# Patient Record
Sex: FEMALE | Race: BLACK OR AFRICAN AMERICAN | Employment: UNEMPLOYED | URBAN - METROPOLITAN AREA
[De-identification: names, ages, dates, MRNs, and addresses within clinical notes are randomized per-mention and may not be internally consistent; named-entity substitution may affect disease eponyms.]

---

## 2017-03-27 ENCOUNTER — HOSPITAL ENCOUNTER (OUTPATIENT)
Dept: PREADMISSION TESTING | Age: 57
Discharge: HOME | End: 2017-03-27
Payer: MEDICAID

## 2017-03-27 ENCOUNTER — HOSPITAL ENCOUNTER (OUTPATIENT)
Dept: GENERAL RADIOLOGY | Age: 57
Discharge: HOME | End: 2017-03-27
Attending: ORTHOPAEDIC SURGERY
Payer: MEDICAID

## 2017-03-27 VITALS
BODY MASS INDEX: 37.38 KG/M2 | HEIGHT: 63 IN | OXYGEN SATURATION: 97 % | WEIGHT: 210.98 LBS | SYSTOLIC BLOOD PRESSURE: 126 MMHG | HEART RATE: 73 BPM | DIASTOLIC BLOOD PRESSURE: 79 MMHG | TEMPERATURE: 98.4 F | RESPIRATION RATE: 16 BRPM

## 2017-03-27 LAB
25(OH)D3 SERPL-MCNC: 9.1 NG/ML (ref 30–100)
ABO + RH BLD: NORMAL
ALBUMIN SERPL BCP-MCNC: 3.6 G/DL (ref 3.5–5)
ALBUMIN/GLOB SERPL: 1.1 {RATIO} (ref 1.1–2.2)
ALP SERPL-CCNC: 58 U/L (ref 45–117)
ALT SERPL-CCNC: 32 U/L (ref 12–78)
ANION GAP BLD CALC-SCNC: 11 MMOL/L (ref 5–15)
APPEARANCE UR: CLEAR
AST SERPL W P-5'-P-CCNC: 16 U/L (ref 15–37)
BACTERIA URNS QL MICRO: NEGATIVE /HPF
BILIRUB SERPL-MCNC: 0.3 MG/DL (ref 0.2–1)
BILIRUB UR QL: NEGATIVE
BLOOD GROUP ANTIBODIES SERPL: NORMAL
BUN SERPL-MCNC: 10 MG/DL (ref 6–20)
BUN/CREAT SERPL: 13 (ref 12–20)
CALCIUM SERPL-MCNC: 8.7 MG/DL (ref 8.5–10.1)
CHLORIDE SERPL-SCNC: 108 MMOL/L (ref 97–108)
CO2 SERPL-SCNC: 26 MMOL/L (ref 21–32)
COLOR UR: NORMAL
CREAT SERPL-MCNC: 0.76 MG/DL (ref 0.55–1.02)
EPITH CASTS URNS QL MICRO: NORMAL /LPF
ERYTHROCYTE [DISTWIDTH] IN BLOOD BY AUTOMATED COUNT: 14.9 % (ref 11.5–14.5)
EST. AVERAGE GLUCOSE BLD GHB EST-MCNC: 123 MG/DL
GLOBULIN SER CALC-MCNC: 3.4 G/DL (ref 2–4)
GLUCOSE SERPL-MCNC: 78 MG/DL (ref 65–100)
GLUCOSE UR STRIP.AUTO-MCNC: NEGATIVE MG/DL
HBA1C MFR BLD: 5.9 % (ref 4.2–6.3)
HCT VFR BLD AUTO: 35.1 % (ref 35–47)
HGB BLD-MCNC: 11.5 G/DL (ref 11.5–16)
HGB UR QL STRIP: NEGATIVE
HYALINE CASTS URNS QL MICRO: NORMAL /LPF (ref 0–5)
INR PPP: 1 (ref 0.9–1.1)
KETONES UR QL STRIP.AUTO: NEGATIVE MG/DL
LEUKOCYTE ESTERASE UR QL STRIP.AUTO: NEGATIVE
MCH RBC QN AUTO: 29 PG (ref 26–34)
MCHC RBC AUTO-ENTMCNC: 32.8 G/DL (ref 30–36.5)
MCV RBC AUTO: 88.4 FL (ref 80–99)
NITRITE UR QL STRIP.AUTO: NEGATIVE
PH UR STRIP: 7 [PH] (ref 5–8)
PLATELET # BLD AUTO: 271 K/UL (ref 150–400)
POTASSIUM SERPL-SCNC: 3.9 MMOL/L (ref 3.5–5.1)
PREALB SERPL-MCNC: 26.2 MG/DL (ref 20–40)
PROT SERPL-MCNC: 7 G/DL (ref 6.4–8.2)
PROT UR STRIP-MCNC: NEGATIVE MG/DL
PROTHROMBIN TIME: 10.1 SEC (ref 9–11.1)
RBC # BLD AUTO: 3.97 M/UL (ref 3.8–5.2)
RBC #/AREA URNS HPF: NORMAL /HPF (ref 0–5)
SODIUM SERPL-SCNC: 145 MMOL/L (ref 136–145)
SP GR UR REFRACTOMETRY: 1.02 (ref 1–1.03)
SPECIMEN EXP DATE BLD: NORMAL
UA: UC IF INDICATED,UAUC: NORMAL
UROBILINOGEN UR QL STRIP.AUTO: 1 EU/DL (ref 0.2–1)
WBC # BLD AUTO: 6.2 K/UL (ref 3.6–11)
WBC URNS QL MICRO: NORMAL /HPF (ref 0–4)

## 2017-03-27 PROCEDURE — 84134 ASSAY OF PREALBUMIN: CPT | Performed by: ORTHOPAEDIC SURGERY

## 2017-03-27 PROCEDURE — 86900 BLOOD TYPING SEROLOGIC ABO: CPT | Performed by: ORTHOPAEDIC SURGERY

## 2017-03-27 PROCEDURE — 93005 ELECTROCARDIOGRAM TRACING: CPT

## 2017-03-27 PROCEDURE — 36415 COLL VENOUS BLD VENIPUNCTURE: CPT | Performed by: ORTHOPAEDIC SURGERY

## 2017-03-27 PROCEDURE — 80053 COMPREHEN METABOLIC PANEL: CPT | Performed by: ORTHOPAEDIC SURGERY

## 2017-03-27 PROCEDURE — 85610 PROTHROMBIN TIME: CPT | Performed by: ORTHOPAEDIC SURGERY

## 2017-03-27 PROCEDURE — 85027 COMPLETE CBC AUTOMATED: CPT | Performed by: ORTHOPAEDIC SURGERY

## 2017-03-27 PROCEDURE — 83036 HEMOGLOBIN GLYCOSYLATED A1C: CPT | Performed by: ORTHOPAEDIC SURGERY

## 2017-03-27 PROCEDURE — 82306 VITAMIN D 25 HYDROXY: CPT | Performed by: ORTHOPAEDIC SURGERY

## 2017-03-27 PROCEDURE — 81001 URINALYSIS AUTO W/SCOPE: CPT | Performed by: ORTHOPAEDIC SURGERY

## 2017-03-27 PROCEDURE — 71020 XR CHEST PA LAT: CPT

## 2017-03-27 RX ORDER — SODIUM CHLORIDE, SODIUM LACTATE, POTASSIUM CHLORIDE, CALCIUM CHLORIDE 600; 310; 30; 20 MG/100ML; MG/100ML; MG/100ML; MG/100ML
25 INJECTION, SOLUTION INTRAVENOUS CONTINUOUS
Status: CANCELLED | OUTPATIENT
Start: 2017-03-27

## 2017-03-27 RX ORDER — VANCOMYCIN/0.9 % SOD CHLORIDE 1.5G/250ML
1500 PLASTIC BAG, INJECTION (ML) INTRAVENOUS ONCE
Status: CANCELLED | OUTPATIENT
Start: 2017-03-27 | End: 2017-03-27

## 2017-03-27 RX ORDER — PREGABALIN 150 MG/1
150 CAPSULE ORAL ONCE
Status: CANCELLED | OUTPATIENT
Start: 2017-03-27 | End: 2017-03-28

## 2017-03-27 RX ORDER — LEVOFLOXACIN 5 MG/ML
500 INJECTION, SOLUTION INTRAVENOUS ONCE
Status: CANCELLED | OUTPATIENT
Start: 2017-03-27 | End: 2017-03-27

## 2017-03-27 RX ORDER — ACETAMINOPHEN 500 MG
1000 TABLET ORAL ONCE
Status: CANCELLED | OUTPATIENT
Start: 2017-03-27 | End: 2017-03-28

## 2017-03-27 NOTE — PERIOP NOTES
[facility]  Preoperative Instructions        Surgery Date 4/4/17          Time of Arrival 9:00am    1. On the day of your surgery, please report to the Surgical Services Registration Desk and sign in at your designated time. The Surgery Center is located to the right of the Emergency Room. 2. You must have someone with you to drive you home. You should not drive a car for 24 hours following surgery. Please make arrangements for a friend or family member to stay with you for the first 24 hours after your surgery. 3. Do not have anything to eat or drink (including water, gum, mints, coffee, juice) after midnight 4/3/17              . This may not apply to medications prescribed by your physician. Please note special instructions, if applicable. If you are currently taking Plavix, Coumadin, or other blood-thinning agents, contact your surgeon for instructions. 4. We recommend you do not drink any alcoholic beverages for 24 hours before and after your surgery. 5. Have a list of all current medications, including vitamins, herbal supplements and any other over the counter medications. Stop all Aspirin and non-steroidal anti-inflammatory drugs (I.e. Advil, Aleve), as directed by your surgeon's office. Stop all vitamins and herbal supplements seven days prior to your surgery. 6. Wear comfortable clothes. Wear glasses instead of contacts. Do not bring any money or jewelry. Please bring picture ID, insurance card, and any prearranged co-payment or hospital payment. Do not wear make-up, particularly mascara the morning of your surgery. Do not wear nail polish, particularly if you are having foot /hand surgery. Wear your hair loose or down, no ponytails, buns, anaya pins or clips. All body piercings must be removed.   Please shower with antibacterial soap for three consecutive days before and on the morning of surgery, but do not apply any lotions, powders or deodorants after the shower on the day of surgery. Please use a fresh towels after each shower. Please sleep in clean clothes and change bed linens the night before surgery. Please do not shave for 48 hours prior to surgery. Shaving of the face is acceptable. 7. You should understand that if you do not follow these instructions your surgery may be cancelled. If your physical condition changes (I.e. fever, cold or flu) please contact your surgeon as soon as possible. 8. It is important that you be on time. If a situation occurs where you may be late, please call [phone] (OR Holding Area). 9. If you have any questions and or problems, please call [phone] (Pre-admission Testing). 10. Your surgery time may be subject to change. You will receive a phone call the evening prior if your time changes. 11.  If having outpatient surgery, you must have someone to drive you here, stay with you during the duration of your stay, and to drive you home at time of discharge. Special Instructions:    MEDICATIONS TO TAKE THE MORNING OF SURGERY WITH A SIP OF WATER: Gabapentin, Meclizine, Zofran      I understand a pre-operative phone call will be made to verify my surgery time. In the event that I am not available, I give permission for a message to be left on my answering service and/or with another person?  Yes [phone]         ___________________      __________   _________    (Signature of Patient)             (Witness)                (Date and Time)

## 2017-03-27 NOTE — PERIOP NOTES
Preventing Infections Before - and After - Your Surgery  IMPORTANT INSTRUCTIONS    Please read and follow these instructions carefully. Every Night for Three (3) nights before your surgery:  1. Shower with an antibacterial soap, such as Dial, or the soap provided at your preassessment appointment. A shower is better than a bath for cleaning your skin. 2. If needed, ask someone to help you reach all areas of your body. Dont forget to clean your belly button with every shower. The night before your surgery:  1. On the night before your surgery, shower with an antibacterial soap, such as Dial, or the soap provided at your preassessment appointment. 2. With one packet of Hibiclens in hand, turn water off.  3. Apply Hibiclens antiseptic skin cleanser with a clean, freshly washed washcloth. ? Gently apply to your body from chin to toes (except the genital area) and especially the area(s) where your incision(s) will be. ? Leave Hibiclens on your skin for at least 20 seconds. CAUTION: If needed, Hibiclens may be used to clean the folds of skin of the legs (such as in the area of the groin) and on your buttocks and hips. However, do not use Hibiclens above the neck or in the genital area (your bottom) or put inside any area of your body. 4. Turn the water back on and rinse. 5. Dry gently with a clean, freshly washed towel. 6. After your shower, do not use any powder, deodorant, perfumes or lotion. 7. Use clean, freshly washed towels and washcloths every time you shower. 8. Wear clean, freshly washed pajamas to bed the night before surgery. 9. Sleep on clean, freshly washed sheets. 10. Do not allow pets to sleep in your bed with you. The Morning of your surgery:  1. Shower again thoroughly with an antibacterial soap, such as Dial or the soap provided at your preassessment appointment. If needed, ask someone for help to reach all areas of your body. Dont forget to clean your belly button! Rinse.   2. Dry gently with a clean, freshly washed towel. 3. After your shower, do not use any powder, deodorant, perfumes or lotion prior to surgery. 4. Put on clean, freshly washed clothing. Tips to help prevent infections after your surgery:  1. Protect your surgical wound from germs:  ? Hand washing is the most important thing you and your caregivers can do to prevent infections. ? Keep your bandage clean and dry! ? Do not touch your surgical wound. 2. Use clean, freshly washed towels and washcloths every time you shower; do not share bath linens with others. 3. Until your surgical wound is healed, wear clothing and sleep on bed linens each day that are clean and freshly washed. 4. Do not allow pets to sleep in your bed with you or touch your surgical wound. 5. Do not smoke - smoking delays wound healing. This may be a good time to stop smoking. 6. If you have diabetes, it is important for you to manage your blood sugar levels properly before your surgery as well as after your surgery. Poorly managed blood sugar levels slow down wound healing and prevent you from healing completely.

## 2017-03-28 LAB
ATRIAL RATE: 63 BPM
BACTERIA SPEC CULT: NORMAL
BACTERIA SPEC CULT: NORMAL
CALCULATED P AXIS, ECG09: 55 DEGREES
CALCULATED R AXIS, ECG10: 11 DEGREES
CALCULATED T AXIS, ECG11: 26 DEGREES
DIAGNOSIS, 93000: NORMAL
P-R INTERVAL, ECG05: 168 MS
Q-T INTERVAL, ECG07: 394 MS
QRS DURATION, ECG06: 74 MS
QTC CALCULATION (BEZET), ECG08: 403 MS
SERVICE CMNT-IMP: NORMAL
VENTRICULAR RATE, ECG03: 63 BPM

## 2017-03-29 RX ORDER — CHOLECALCIFEROL (VITAMIN D3) 125 MCG
1 CAPSULE ORAL DAILY
COMMUNITY
End: 2021-01-06

## 2017-03-29 NOTE — PERIOP NOTES
Called 's office for clearance note and vitamin d follow up. Spoke to Ebony. Patient treated with vitamin d 2000 daily.

## 2017-03-31 NOTE — H&P
HISTORY AND PHYSICAL    Subjective:     Chief Complaint is neck and RUE pain . HISTORY OF PRESENT ILLNESS:  Ms. Betzaida Milner is a pleasant 61year-old female who comes in today complaining of pain in the neck radiating into the right shoulder with tingling in the right hand . The pain has been present for 6 to 12 months and she has been seeing Dr. Amanda Jarvis for pain management. Since having a right C6 and right C7 VIV with Dr. Amanda Jarvis she noticed a slight decrease in pain in her right arm, but she still has tingling and weakness with  strength. She drops things frequently due to hand weakness and loss of dexterity. The pain seems to be returning in the right arm . The pain is deep, sharp, achy and stabbing in quality. She describes a painful, itching sensation in the right posterior shoulder and diffuse tingling in the right hand and all digits. Pain occurs constantly . Pain is worse with lying down and lifting ; pain is better with nothing . Pain is rated 6 out of 10 on the VAS. Patient has tried PT, NSAIDs, gabapentin and oral steroids in addition to pain management to relieve the pain. Patient Active Problem List    Diagnosis Date Noted    Chronic low back pain 2014    Depression 2014    Tobacco abuse 2014     Past Medical History:   Diagnosis Date    Adverse effect of anesthesia     hard to wake up    Chronic pain     Depression     GERD (gastroesophageal reflux disease)     Low back pain     Thromboembolus (HCC)     blood clot in foot during pregnancy    Tobacco abuse     Vertigo       Past Surgical History:   Procedure Laterality Date    HX  SECTION      x3    HX HYSTERECTOMY      HX ORTHOPAEDIC  2002    2 disks removed lower back [facility].      Prior to Admission medications    Medication Sig Start Date End Date Taking? Authorizing Provider   cholecalciferol, vitamin D3, (VITAMIN D3) 2,000 unit tab Take 1 Tab by mouth daily.     Historical Provider ondansetron (ZOFRAN ODT) 4 mg disintegrating tablet Take 1 Tab by mouth every eight (8) hours as needed for Nausea. 5/11/16   Martín Castellanos MD   naproxen (NAPROSYN) 375 mg tablet Take 1 Tab by mouth two (2) times daily as needed. 5/11/16   Martín Castellanos MD   amitriptyline (ELAVIL) 25 mg tablet Take 1 Tab by mouth nightly. 4/13/16   Martín Castellanos MD   gabapentin (NEURONTIN) 400 mg capsule Take 1 Cap by mouth three (3) times daily. 4/13/16   Martín Castellanos MD   meclizine (ANTIVERT) 25 mg tablet Take 1 Tab by mouth three (3) times daily as needed for Dizziness. 1/13/16   Martín Castellanos MD     Allergies   Allergen Reactions    Pcn [Penicillins] Hives      Social History   Substance Use Topics    Smoking status: Former Smoker     Packs/day: 0.25     Years: 4.00     Quit date: 6/4/2015    Smokeless tobacco: Never Used    Alcohol use No      Family History   Problem Relation Age of Onset    Hypertension Mother     Cancer Mother      multiple myeloma    Cancer Father      throat      Review of Systems  No bowel or bladder incontinence. No fevers or chills. No saddle anesthesia. Objective:     No data found. General Appearance:  - Well developed patient appropriate for age.   Orientation:  - Alert, active and oriented x3  Mood and Affect:  - Appropriate  Cardiovascular:  - No edema   Skin:  - No lesions on the neck  - No lesions on the right upper extremity  - No lesions on the left upper extremity  Gait:  - Normal  Balance:  - stable, mild difficulty with tandem gait  Neck:  - No tenderness to palpation   - Full range of motion  Right upper extremity:  - No tenderness to palpation  - Full range of motion  - Strength:  - 5 out of 5 to deltoid  - 5 out of 5 to biceps  - 5 out of 5 to wrist extensors  - 5 out of 5 to triceps  - 5 out of 5 to finger flexors  - 5 out of 5 to intrinsics  Left upper extremity:  - No tenderness to palpation  - Full range of motion  - Strength:  - 5 out of 5 to deltoid  - 5 out of 5 to biceps  - 5 out of 5 to wrist extensors  - 5 out of 5 to triceps  - 5 out of 5 to finger flexors  - 5 out of 5 to intrinsics  Sensation:  - Decreased on the right hand  Reflexes:  - +2 biceps   - +2 brachioradialis  - +2 triceps  Negative Florence's bilaterally  Negative Romberg's     IMAGING STUDIES:     MRI of the cervical spine done at [facility] 10/2017 demonstrates a disc herniation with central stenosis at C6-C7, foraminal stenosis on the right at C5-C6 and bilateral foraminal stenosis at C4-C5 .  At C3-C4 she has foraminal stenosis, left worse than right. Right C6 VIV with Dr. Sandip Kevin showed marked restriction and right C7 VIV with Dr. Sandip Kevin showed moderate restriction. Assessment:     Active Problems:    * No active hospital problems. *    -Cervicalgia  -Cervical spinal stenosis  -Cervical disc herniation  -Cervical foraminal stenosis  -Cervical spondylosis  -Cervical radiculopathy AYAD    We discussed the risks of surgery include but are not limited to: death, heart attack, stroke, lung injury or infection, blindness, illeus, bladder or bowel problems, bleeding, nerve injury (including numbness, pain and weakness), paralysis (which may be permanent), failure to heal, failure to fuse bone together in fusion procedures, failure of relief of symptoms, failure of relief of pain, increased pain, need for further surgery, failure or breakage of hardware, malpositioning of hardware, need to fuse or operate on additional levels determined either during or after surgery, destabilization of the spine which may require fusion or later surgery, infections which  may or may not require additional surgery, dural tears (tears of the sack holding in nerves and spinal fluid, meningitis, voice changes, vocal cord injury, blood clots, pulmonary embolus, and anesthetic complication.   Comorbidities such as obesity, smoking, rheumatoid arthritis, chronic steroid use and diabetes increases these risks.  She understands and wants to proceed. Plan:     The various methods of treatment have been discussed with the patient and family. After consideration of risks, benefits and other options for treatment, the patient has consented to surgical interventions (C4-7 ACDF). Questions were answered and Pre-op teaching was done by myself.

## 2017-04-03 NOTE — PERIOP NOTES
Talked to Kelsey Upton from Dr. Umer Freeman office. She is waiting for the clinic to send her the clearance. Kelsey Upton states pt is cleared and she will fax the clearance when she receives it.

## 2017-04-04 ENCOUNTER — HOSPITAL ENCOUNTER (OUTPATIENT)
Age: 57
Setting detail: OBSERVATION
Discharge: HOME HEALTH | DRG: 023 | End: 2017-04-07
Attending: ORTHOPAEDIC SURGERY | Admitting: ORTHOPAEDIC SURGERY
Payer: MEDICAID

## 2017-04-04 ENCOUNTER — APPOINTMENT (OUTPATIENT)
Dept: GENERAL RADIOLOGY | Age: 57
DRG: 023 | End: 2017-04-04
Attending: ORTHOPAEDIC SURGERY
Payer: MEDICAID

## 2017-04-04 ENCOUNTER — ANESTHESIA EVENT (OUTPATIENT)
Dept: SURGERY | Age: 57
DRG: 023 | End: 2017-04-04
Payer: MEDICAID

## 2017-04-04 ENCOUNTER — ANESTHESIA (OUTPATIENT)
Dept: SURGERY | Age: 57
DRG: 023 | End: 2017-04-04
Payer: MEDICAID

## 2017-04-04 PROCEDURE — 77030008771 HC TU NG SALEM SUMP -A: Performed by: NURSE ANESTHETIST, CERTIFIED REGISTERED

## 2017-04-04 PROCEDURE — 77030033138 HC SUT PGA STRATFX J&J -B: Performed by: ORTHOPAEDIC SURGERY

## 2017-04-04 PROCEDURE — 77030020061 HC IV BLD WRMR ADMIN SET 3M -B: Performed by: NURSE ANESTHETIST, CERTIFIED REGISTERED

## 2017-04-04 PROCEDURE — 77030008684 HC TU ET CUF COVD -B: Performed by: NURSE ANESTHETIST, CERTIFIED REGISTERED

## 2017-04-04 PROCEDURE — 77030018719 HC DRSG PTCH ANTIMIC J&J -A: Performed by: ORTHOPAEDIC SURGERY

## 2017-04-04 PROCEDURE — C1821 INTERSPINOUS IMPLANT: HCPCS | Performed by: ORTHOPAEDIC SURGERY

## 2017-04-04 PROCEDURE — 77030032490 HC SLV COMPR SCD KNE COVD -B: Performed by: ORTHOPAEDIC SURGERY

## 2017-04-04 PROCEDURE — 77030034849: Performed by: ORTHOPAEDIC SURGERY

## 2017-04-04 PROCEDURE — 76060000037 HC ANESTHESIA 3 TO 3.5 HR: Performed by: ORTHOPAEDIC SURGERY

## 2017-04-04 PROCEDURE — 77030013567 HC DRN WND RESERV BARD -A: Performed by: ORTHOPAEDIC SURGERY

## 2017-04-04 PROCEDURE — 77030003028 HC SUT VCRL J&J -A: Performed by: ORTHOPAEDIC SURGERY

## 2017-04-04 PROCEDURE — 74011000250 HC RX REV CODE- 250

## 2017-04-04 PROCEDURE — 0RT30ZZ RESECTION OF CERVICAL VERTEBRAL DISC, OPEN APPROACH: ICD-10-PCS | Performed by: ORTHOPAEDIC SURGERY

## 2017-04-04 PROCEDURE — 77030018836 HC SOL IRR NACL ICUM -A: Performed by: ORTHOPAEDIC SURGERY

## 2017-04-04 PROCEDURE — 76010000173 HC OR TIME 3 TO 3.5 HR INTENSV-TIER 1: Performed by: ORTHOPAEDIC SURGERY

## 2017-04-04 PROCEDURE — 77030026438 HC STYL ET INTUB CARD -A: Performed by: NURSE ANESTHETIST, CERTIFIED REGISTERED

## 2017-04-04 PROCEDURE — 74011250636 HC RX REV CODE- 250/636

## 2017-04-04 PROCEDURE — 72040 X-RAY EXAM NECK SPINE 2-3 VW: CPT

## 2017-04-04 PROCEDURE — 0RG2070 FUSION OF 2 OR MORE CERVICAL VERTEBRAL JOINTS WITH AUTOLOGOUS TISSUE SUBSTITUTE, ANTERIOR APPROACH, ANTERIOR COLUMN, OPEN APPROACH: ICD-10-PCS | Performed by: ORTHOPAEDIC SURGERY

## 2017-04-04 PROCEDURE — 76210000017 HC OR PH I REC 1.5 TO 2 HR: Performed by: ORTHOPAEDIC SURGERY

## 2017-04-04 PROCEDURE — 77030004391 HC BUR FLUT MEDT -C: Performed by: ORTHOPAEDIC SURGERY

## 2017-04-04 PROCEDURE — 77030020268 HC MISC GENERAL SUPPLY: Performed by: ORTHOPAEDIC SURGERY

## 2017-04-04 PROCEDURE — 65270000029 HC RM PRIVATE

## 2017-04-04 PROCEDURE — C1713 ANCHOR/SCREW BN/BN,TIS/BN: HCPCS | Performed by: ORTHOPAEDIC SURGERY

## 2017-04-04 PROCEDURE — 77030012961 HC IRR KT CYSTO/TUR ICUM -A: Performed by: ORTHOPAEDIC SURGERY

## 2017-04-04 PROCEDURE — 74011000250 HC RX REV CODE- 250: Performed by: ORTHOPAEDIC SURGERY

## 2017-04-04 PROCEDURE — 77030019908 HC STETH ESOPH SIMS -A: Performed by: NURSE ANESTHETIST, CERTIFIED REGISTERED

## 2017-04-04 PROCEDURE — 77030029099 HC BN WAX SSPC -A: Performed by: ORTHOPAEDIC SURGERY

## 2017-04-04 PROCEDURE — 77030014647 HC SEAL FBRN TISSL BAXT -D: Performed by: ORTHOPAEDIC SURGERY

## 2017-04-04 PROCEDURE — 77030002996 HC SUT SLK J&J -A: Performed by: ORTHOPAEDIC SURGERY

## 2017-04-04 PROCEDURE — 77030002551 HC PLT ANT CERV TI J&J -G: Performed by: ORTHOPAEDIC SURGERY

## 2017-04-04 PROCEDURE — 76001 XR FLUOROSCOPY OVER 60 MINUTES: CPT

## 2017-04-04 PROCEDURE — 77030012414: Performed by: ORTHOPAEDIC SURGERY

## 2017-04-04 PROCEDURE — 74011000272 HC RX REV CODE- 272: Performed by: ORTHOPAEDIC SURGERY

## 2017-04-04 PROCEDURE — 77030008467 HC STPLR SKN COVD -B: Performed by: ORTHOPAEDIC SURGERY

## 2017-04-04 PROCEDURE — 74011250636 HC RX REV CODE- 250/636: Performed by: ORTHOPAEDIC SURGERY

## 2017-04-04 PROCEDURE — 77030018846 HC SOL IRR STRL H20 ICUM -A: Performed by: ORTHOPAEDIC SURGERY

## 2017-04-04 PROCEDURE — 74011250636 HC RX REV CODE- 250/636: Performed by: ANESTHESIOLOGY

## 2017-04-04 PROCEDURE — 77030013079 HC BLNKT BAIR HGGR 3M -A: Performed by: NURSE ANESTHETIST, CERTIFIED REGISTERED

## 2017-04-04 PROCEDURE — 0RG20A0 FUSION OF 2 OR MORE CERVICAL VERTEBRAL JOINTS WITH INTERBODY FUSION DEVICE, ANTERIOR APPROACH, ANTERIOR COLUMN, OPEN APPROACH: ICD-10-PCS | Performed by: ORTHOPAEDIC SURGERY

## 2017-04-04 PROCEDURE — 77030009868 HC PIN DISTR CASPR AESC -B: Performed by: ORTHOPAEDIC SURGERY

## 2017-04-04 PROCEDURE — 74011250637 HC RX REV CODE- 250/637: Performed by: ORTHOPAEDIC SURGERY

## 2017-04-04 PROCEDURE — 77030003029 HC SUT VCRL J&J -B: Performed by: ORTHOPAEDIC SURGERY

## 2017-04-04 PROCEDURE — 99218 HC RM OBSERVATION: CPT

## 2017-04-04 PROCEDURE — 77030034479 HC ADH SKN CLSR PRINEO J&J -B: Performed by: ORTHOPAEDIC SURGERY

## 2017-04-04 PROCEDURE — 77030034475 HC MISC IMPL SPN: Performed by: ORTHOPAEDIC SURGERY

## 2017-04-04 DEVICE — SCREW SPNL L14MM DIA4MM ANT CERV TI ST CONSTRN SKYLINE: Type: IMPLANTABLE DEVICE | Site: SPINE CERVICAL | Status: FUNCTIONAL

## 2017-04-04 DEVICE — GRAFT BNE SUB SM CANC FRZN MORSELIZED W/ VIABLE CELL: Type: IMPLANTABLE DEVICE | Site: SPINE CERVICAL | Status: FUNCTIONAL

## 2017-04-04 DEVICE — IMPLANTABLE DEVICE: Type: IMPLANTABLE DEVICE | Site: SPINE CERVICAL | Status: FUNCTIONAL

## 2017-04-04 DEVICE — PLATE SPNL L48MM ANT CERV TI 3 LEV SKYLINE: Type: IMPLANTABLE DEVICE | Site: SPINE CERVICAL | Status: FUNCTIONAL

## 2017-04-04 RX ORDER — FENTANYL CITRATE 50 UG/ML
INJECTION, SOLUTION INTRAMUSCULAR; INTRAVENOUS AS NEEDED
Status: DISCONTINUED | OUTPATIENT
Start: 2017-04-04 | End: 2017-04-04 | Stop reason: HOSPADM

## 2017-04-04 RX ORDER — SODIUM CHLORIDE 9 MG/ML
125 INJECTION, SOLUTION INTRAVENOUS CONTINUOUS
Status: DISPENSED | OUTPATIENT
Start: 2017-04-04 | End: 2017-04-05

## 2017-04-04 RX ORDER — ONDANSETRON 2 MG/ML
4 INJECTION INTRAMUSCULAR; INTRAVENOUS
Status: DISPENSED | OUTPATIENT
Start: 2017-04-04 | End: 2017-04-05

## 2017-04-04 RX ORDER — FACIAL-BODY WIPES
10 EACH TOPICAL DAILY PRN
Status: DISCONTINUED | OUTPATIENT
Start: 2017-04-06 | End: 2017-04-07 | Stop reason: HOSPADM

## 2017-04-04 RX ORDER — SUCCINYLCHOLINE CHLORIDE 20 MG/ML
INJECTION INTRAMUSCULAR; INTRAVENOUS AS NEEDED
Status: DISCONTINUED | OUTPATIENT
Start: 2017-04-04 | End: 2017-04-04 | Stop reason: HOSPADM

## 2017-04-04 RX ORDER — ONDANSETRON 2 MG/ML
INJECTION INTRAMUSCULAR; INTRAVENOUS AS NEEDED
Status: DISCONTINUED | OUTPATIENT
Start: 2017-04-04 | End: 2017-04-04 | Stop reason: HOSPADM

## 2017-04-04 RX ORDER — HYDROMORPHONE HYDROCHLORIDE 1 MG/ML
1 INJECTION, SOLUTION INTRAMUSCULAR; INTRAVENOUS; SUBCUTANEOUS
Status: DISPENSED | OUTPATIENT
Start: 2017-04-04 | End: 2017-04-05

## 2017-04-04 RX ORDER — SODIUM CHLORIDE, SODIUM LACTATE, POTASSIUM CHLORIDE, CALCIUM CHLORIDE 600; 310; 30; 20 MG/100ML; MG/100ML; MG/100ML; MG/100ML
25 INJECTION, SOLUTION INTRAVENOUS CONTINUOUS
Status: DISCONTINUED | OUTPATIENT
Start: 2017-04-04 | End: 2017-04-04 | Stop reason: HOSPADM

## 2017-04-04 RX ORDER — FAMOTIDINE 20 MG/1
20 TABLET, FILM COATED ORAL 2 TIMES DAILY
Status: DISCONTINUED | OUTPATIENT
Start: 2017-04-04 | End: 2017-04-07 | Stop reason: HOSPADM

## 2017-04-04 RX ORDER — SODIUM CHLORIDE 0.9 % (FLUSH) 0.9 %
5-10 SYRINGE (ML) INJECTION AS NEEDED
Status: DISCONTINUED | OUTPATIENT
Start: 2017-04-04 | End: 2017-04-04 | Stop reason: HOSPADM

## 2017-04-04 RX ORDER — ONDANSETRON 4 MG/1
4 TABLET, ORALLY DISINTEGRATING ORAL
Status: DISCONTINUED | OUTPATIENT
Start: 2017-04-04 | End: 2017-04-07 | Stop reason: HOSPADM

## 2017-04-04 RX ORDER — PROPOFOL 10 MG/ML
INJECTION, EMULSION INTRAVENOUS AS NEEDED
Status: DISCONTINUED | OUTPATIENT
Start: 2017-04-04 | End: 2017-04-04 | Stop reason: HOSPADM

## 2017-04-04 RX ORDER — SODIUM CHLORIDE 0.9 % (FLUSH) 0.9 %
5-10 SYRINGE (ML) INJECTION EVERY 8 HOURS
Status: DISCONTINUED | OUTPATIENT
Start: 2017-04-04 | End: 2017-04-04 | Stop reason: HOSPADM

## 2017-04-04 RX ORDER — ACETAMINOPHEN 500 MG
1000 TABLET ORAL EVERY 6 HOURS
Status: DISCONTINUED | OUTPATIENT
Start: 2017-04-04 | End: 2017-04-07 | Stop reason: HOSPADM

## 2017-04-04 RX ORDER — FENTANYL CITRATE 50 UG/ML
25 INJECTION, SOLUTION INTRAMUSCULAR; INTRAVENOUS
Status: DISCONTINUED | OUTPATIENT
Start: 2017-04-04 | End: 2017-04-04 | Stop reason: HOSPADM

## 2017-04-04 RX ORDER — MIDAZOLAM HYDROCHLORIDE 1 MG/ML
INJECTION, SOLUTION INTRAMUSCULAR; INTRAVENOUS AS NEEDED
Status: DISCONTINUED | OUTPATIENT
Start: 2017-04-04 | End: 2017-04-04 | Stop reason: HOSPADM

## 2017-04-04 RX ORDER — DIAZEPAM 5 MG/1
5 TABLET ORAL
Status: DISCONTINUED | OUTPATIENT
Start: 2017-04-04 | End: 2017-04-07 | Stop reason: HOSPADM

## 2017-04-04 RX ORDER — VANCOMYCIN/0.9 % SOD CHLORIDE 1.5G/250ML
1500 PLASTIC BAG, INJECTION (ML) INTRAVENOUS ONCE
Status: COMPLETED | OUTPATIENT
Start: 2017-04-04 | End: 2017-04-04

## 2017-04-04 RX ORDER — SODIUM CHLORIDE, SODIUM LACTATE, POTASSIUM CHLORIDE, CALCIUM CHLORIDE 600; 310; 30; 20 MG/100ML; MG/100ML; MG/100ML; MG/100ML
INJECTION, SOLUTION INTRAVENOUS
Status: DISCONTINUED | OUTPATIENT
Start: 2017-04-04 | End: 2017-04-04 | Stop reason: HOSPADM

## 2017-04-04 RX ORDER — LIDOCAINE HYDROCHLORIDE 10 MG/ML
0.1 INJECTION, SOLUTION EPIDURAL; INFILTRATION; INTRACAUDAL; PERINEURAL AS NEEDED
Status: DISCONTINUED | OUTPATIENT
Start: 2017-04-04 | End: 2017-04-04 | Stop reason: HOSPADM

## 2017-04-04 RX ORDER — NALOXONE HYDROCHLORIDE 0.4 MG/ML
0.4 INJECTION, SOLUTION INTRAMUSCULAR; INTRAVENOUS; SUBCUTANEOUS AS NEEDED
Status: DISCONTINUED | OUTPATIENT
Start: 2017-04-04 | End: 2017-04-07 | Stop reason: HOSPADM

## 2017-04-04 RX ORDER — SODIUM CHLORIDE 0.9 % (FLUSH) 0.9 %
5-10 SYRINGE (ML) INJECTION EVERY 8 HOURS
Status: DISCONTINUED | OUTPATIENT
Start: 2017-04-05 | End: 2017-04-07 | Stop reason: HOSPADM

## 2017-04-04 RX ORDER — DIPHENHYDRAMINE HYDROCHLORIDE 50 MG/ML
12.5 INJECTION, SOLUTION INTRAMUSCULAR; INTRAVENOUS AS NEEDED
Status: DISCONTINUED | OUTPATIENT
Start: 2017-04-04 | End: 2017-04-04 | Stop reason: HOSPADM

## 2017-04-04 RX ORDER — ROCURONIUM BROMIDE 10 MG/ML
INJECTION, SOLUTION INTRAVENOUS AS NEEDED
Status: DISCONTINUED | OUTPATIENT
Start: 2017-04-04 | End: 2017-04-04 | Stop reason: HOSPADM

## 2017-04-04 RX ORDER — OXYCODONE HYDROCHLORIDE 5 MG/1
5 TABLET ORAL
Status: DISCONTINUED | OUTPATIENT
Start: 2017-04-04 | End: 2017-04-07 | Stop reason: HOSPADM

## 2017-04-04 RX ORDER — HYDROXYZINE HYDROCHLORIDE 10 MG/1
10 TABLET, FILM COATED ORAL
Status: DISCONTINUED | OUTPATIENT
Start: 2017-04-04 | End: 2017-04-07 | Stop reason: HOSPADM

## 2017-04-04 RX ORDER — AMOXICILLIN 250 MG
1 CAPSULE ORAL 2 TIMES DAILY
Status: DISCONTINUED | OUTPATIENT
Start: 2017-04-04 | End: 2017-04-07 | Stop reason: HOSPADM

## 2017-04-04 RX ORDER — AMITRIPTYLINE HYDROCHLORIDE 25 MG/1
25 TABLET, FILM COATED ORAL
Status: DISCONTINUED | OUTPATIENT
Start: 2017-04-04 | End: 2017-04-07 | Stop reason: HOSPADM

## 2017-04-04 RX ORDER — MECLIZINE HCL 12.5 MG 12.5 MG/1
25 TABLET ORAL
Status: DISCONTINUED | OUTPATIENT
Start: 2017-04-04 | End: 2017-04-07 | Stop reason: HOSPADM

## 2017-04-04 RX ORDER — HYDROMORPHONE HYDROCHLORIDE 1 MG/ML
0.2 INJECTION, SOLUTION INTRAMUSCULAR; INTRAVENOUS; SUBCUTANEOUS
Status: DISCONTINUED | OUTPATIENT
Start: 2017-04-04 | End: 2017-04-04 | Stop reason: HOSPADM

## 2017-04-04 RX ORDER — LABETALOL HYDROCHLORIDE 5 MG/ML
INJECTION, SOLUTION INTRAVENOUS AS NEEDED
Status: DISCONTINUED | OUTPATIENT
Start: 2017-04-04 | End: 2017-04-04 | Stop reason: HOSPADM

## 2017-04-04 RX ORDER — DEXAMETHASONE SODIUM PHOSPHATE 4 MG/ML
INJECTION, SOLUTION INTRA-ARTICULAR; INTRALESIONAL; INTRAMUSCULAR; INTRAVENOUS; SOFT TISSUE AS NEEDED
Status: DISCONTINUED | OUTPATIENT
Start: 2017-04-04 | End: 2017-04-04 | Stop reason: HOSPADM

## 2017-04-04 RX ORDER — LIDOCAINE HYDROCHLORIDE 20 MG/ML
INJECTION, SOLUTION EPIDURAL; INFILTRATION; INTRACAUDAL; PERINEURAL AS NEEDED
Status: DISCONTINUED | OUTPATIENT
Start: 2017-04-04 | End: 2017-04-04 | Stop reason: HOSPADM

## 2017-04-04 RX ORDER — HYDROMORPHONE HYDROCHLORIDE 2 MG/ML
INJECTION, SOLUTION INTRAMUSCULAR; INTRAVENOUS; SUBCUTANEOUS AS NEEDED
Status: DISCONTINUED | OUTPATIENT
Start: 2017-04-04 | End: 2017-04-04 | Stop reason: HOSPADM

## 2017-04-04 RX ORDER — LEVOFLOXACIN 5 MG/ML
500 INJECTION, SOLUTION INTRAVENOUS ONCE
Status: COMPLETED | OUTPATIENT
Start: 2017-04-04 | End: 2017-04-04

## 2017-04-04 RX ORDER — SODIUM CHLORIDE 0.9 % (FLUSH) 0.9 %
5-10 SYRINGE (ML) INJECTION AS NEEDED
Status: DISCONTINUED | OUTPATIENT
Start: 2017-04-04 | End: 2017-04-07 | Stop reason: HOSPADM

## 2017-04-04 RX ORDER — POLYETHYLENE GLYCOL 3350 17 G/17G
17 POWDER, FOR SOLUTION ORAL DAILY
Status: DISCONTINUED | OUTPATIENT
Start: 2017-04-05 | End: 2017-04-07 | Stop reason: HOSPADM

## 2017-04-04 RX ORDER — OXYCODONE HYDROCHLORIDE 5 MG/1
10 TABLET ORAL
Status: DISCONTINUED | OUTPATIENT
Start: 2017-04-04 | End: 2017-04-07 | Stop reason: HOSPADM

## 2017-04-04 RX ORDER — VANCOMYCIN/0.9 % SOD CHLORIDE 1.5G/250ML
1500 PLASTIC BAG, INJECTION (ML) INTRAVENOUS EVERY 12 HOURS
Status: COMPLETED | OUTPATIENT
Start: 2017-04-05 | End: 2017-04-05

## 2017-04-04 RX ORDER — ACETAMINOPHEN 500 MG
1000 TABLET ORAL ONCE
Status: COMPLETED | OUTPATIENT
Start: 2017-04-04 | End: 2017-04-04

## 2017-04-04 RX ORDER — DEXAMETHASONE SODIUM PHOSPHATE 4 MG/ML
8 INJECTION, SOLUTION INTRA-ARTICULAR; INTRALESIONAL; INTRAMUSCULAR; INTRAVENOUS; SOFT TISSUE
Status: COMPLETED | OUTPATIENT
Start: 2017-04-04 | End: 2017-04-04

## 2017-04-04 RX ORDER — PREGABALIN 75 MG/1
150 CAPSULE ORAL ONCE
Status: COMPLETED | OUTPATIENT
Start: 2017-04-04 | End: 2017-04-04

## 2017-04-04 RX ORDER — DEXAMETHASONE SODIUM PHOSPHATE 4 MG/ML
10 INJECTION, SOLUTION INTRA-ARTICULAR; INTRALESIONAL; INTRAMUSCULAR; INTRAVENOUS; SOFT TISSUE ONCE
Status: COMPLETED | OUTPATIENT
Start: 2017-04-05 | End: 2017-04-05

## 2017-04-04 RX ORDER — FENTANYL CITRATE 50 UG/ML
INJECTION, SOLUTION INTRAMUSCULAR; INTRAVENOUS
Status: COMPLETED
Start: 2017-04-04 | End: 2017-04-04

## 2017-04-04 RX ORDER — MELATONIN
2000 DAILY
Status: DISCONTINUED | OUTPATIENT
Start: 2017-04-05 | End: 2017-04-07 | Stop reason: HOSPADM

## 2017-04-04 RX ADMIN — FENTANYL CITRATE 25 MCG: 50 INJECTION, SOLUTION INTRAMUSCULAR; INTRAVENOUS at 14:53

## 2017-04-04 RX ADMIN — LEVOFLOXACIN 500 MG: 5 INJECTION, SOLUTION INTRAVENOUS at 11:45

## 2017-04-04 RX ADMIN — ROCURONIUM BROMIDE 45 MG: 10 INJECTION, SOLUTION INTRAVENOUS at 11:39

## 2017-04-04 RX ADMIN — FENTANYL CITRATE 25 MCG: 50 INJECTION, SOLUTION INTRAMUSCULAR; INTRAVENOUS at 15:47

## 2017-04-04 RX ADMIN — LABETALOL HYDROCHLORIDE 5 MG: 5 INJECTION, SOLUTION INTRAVENOUS at 14:29

## 2017-04-04 RX ADMIN — LIDOCAINE HYDROCHLORIDE 100 MG: 20 INJECTION, SOLUTION EPIDURAL; INFILTRATION; INTRACAUDAL; PERINEURAL at 11:32

## 2017-04-04 RX ADMIN — DIAZEPAM 5 MG: 5 TABLET ORAL at 15:21

## 2017-04-04 RX ADMIN — FAMOTIDINE 20 MG: 20 TABLET, FILM COATED ORAL at 17:24

## 2017-04-04 RX ADMIN — FENTANYL CITRATE 25 MCG: 50 INJECTION, SOLUTION INTRAMUSCULAR; INTRAVENOUS at 16:21

## 2017-04-04 RX ADMIN — ACETAMINOPHEN 1000 MG: 500 TABLET ORAL at 10:49

## 2017-04-04 RX ADMIN — ROCURONIUM BROMIDE 5 MG: 10 INJECTION, SOLUTION INTRAVENOUS at 11:32

## 2017-04-04 RX ADMIN — AMITRIPTYLINE HYDROCHLORIDE 25 MG: 25 TABLET, FILM COATED ORAL at 21:30

## 2017-04-04 RX ADMIN — OXYCODONE HYDROCHLORIDE 10 MG: 5 TABLET ORAL at 20:45

## 2017-04-04 RX ADMIN — FENTANYL CITRATE 25 MCG: 50 INJECTION, SOLUTION INTRAMUSCULAR; INTRAVENOUS at 15:23

## 2017-04-04 RX ADMIN — DEXAMETHASONE SODIUM PHOSPHATE 8 MG: 4 INJECTION, SOLUTION INTRA-ARTICULAR; INTRALESIONAL; INTRAMUSCULAR; INTRAVENOUS; SOFT TISSUE at 11:59

## 2017-04-04 RX ADMIN — PROPOFOL 200 MG: 10 INJECTION, EMULSION INTRAVENOUS at 11:32

## 2017-04-04 RX ADMIN — SUCCINYLCHOLINE CHLORIDE 140 MG: 20 INJECTION INTRAMUSCULAR; INTRAVENOUS at 11:32

## 2017-04-04 RX ADMIN — FENTANYL CITRATE 50 MCG: 50 INJECTION, SOLUTION INTRAMUSCULAR; INTRAVENOUS at 12:44

## 2017-04-04 RX ADMIN — ONDANSETRON HYDROCHLORIDE 4 MG: 2 INJECTION, SOLUTION INTRAMUSCULAR; INTRAVENOUS at 20:48

## 2017-04-04 RX ADMIN — FENTANYL CITRATE 50 MCG: 50 INJECTION, SOLUTION INTRAMUSCULAR; INTRAVENOUS at 12:27

## 2017-04-04 RX ADMIN — HYDROMORPHONE HYDROCHLORIDE 0.2 MG: 1 INJECTION, SOLUTION INTRAMUSCULAR; INTRAVENOUS; SUBCUTANEOUS at 15:54

## 2017-04-04 RX ADMIN — FENTANYL CITRATE 100 MCG: 50 INJECTION, SOLUTION INTRAMUSCULAR; INTRAVENOUS at 11:32

## 2017-04-04 RX ADMIN — PREGABALIN 150 MG: 75 CAPSULE ORAL at 10:49

## 2017-04-04 RX ADMIN — FENTANYL CITRATE 25 MCG: 50 INJECTION, SOLUTION INTRAMUSCULAR; INTRAVENOUS at 14:44

## 2017-04-04 RX ADMIN — ONDANSETRON 4 MG: 2 INJECTION INTRAMUSCULAR; INTRAVENOUS at 14:15

## 2017-04-04 RX ADMIN — GABAPENTIN 400 MG: 100 CAPSULE ORAL at 17:24

## 2017-04-04 RX ADMIN — FENTANYL CITRATE 25 MCG: 50 INJECTION, SOLUTION INTRAMUSCULAR; INTRAVENOUS at 15:05

## 2017-04-04 RX ADMIN — PROPOFOL 50 MG: 10 INJECTION, EMULSION INTRAVENOUS at 13:30

## 2017-04-04 RX ADMIN — SODIUM CHLORIDE, SODIUM LACTATE, POTASSIUM CHLORIDE, AND CALCIUM CHLORIDE 25 ML/HR: 600; 310; 30; 20 INJECTION, SOLUTION INTRAVENOUS at 11:14

## 2017-04-04 RX ADMIN — HYDROMORPHONE HYDROCHLORIDE 0.5 MG: 2 INJECTION, SOLUTION INTRAMUSCULAR; INTRAVENOUS; SUBCUTANEOUS at 14:37

## 2017-04-04 RX ADMIN — FENTANYL CITRATE 50 MCG: 50 INJECTION, SOLUTION INTRAMUSCULAR; INTRAVENOUS at 13:30

## 2017-04-04 RX ADMIN — HYDROMORPHONE HYDROCHLORIDE 0.2 MG: 1 INJECTION, SOLUTION INTRAMUSCULAR; INTRAVENOUS; SUBCUTANEOUS at 15:32

## 2017-04-04 RX ADMIN — DEXAMETHASONE SODIUM PHOSPHATE 8 MG: 4 INJECTION, SOLUTION INTRAMUSCULAR; INTRAVENOUS at 18:20

## 2017-04-04 RX ADMIN — VANCOMYCIN HYDROCHLORIDE 1500 MG: 10 INJECTION, POWDER, LYOPHILIZED, FOR SOLUTION INTRAVENOUS at 11:14

## 2017-04-04 RX ADMIN — MIDAZOLAM HYDROCHLORIDE 2 MG: 1 INJECTION, SOLUTION INTRAMUSCULAR; INTRAVENOUS at 11:23

## 2017-04-04 RX ADMIN — SODIUM CHLORIDE 125 ML/HR: 900 INJECTION, SOLUTION INTRAVENOUS at 14:47

## 2017-04-04 RX ADMIN — HYDROMORPHONE HYDROCHLORIDE 0.5 MG: 2 INJECTION, SOLUTION INTRAMUSCULAR; INTRAVENOUS; SUBCUTANEOUS at 13:20

## 2017-04-04 RX ADMIN — HYDROMORPHONE HYDROCHLORIDE 0.5 MG: 2 INJECTION, SOLUTION INTRAMUSCULAR; INTRAVENOUS; SUBCUTANEOUS at 12:19

## 2017-04-04 RX ADMIN — HYDROMORPHONE HYDROCHLORIDE 1 MG: 1 INJECTION, SOLUTION INTRAMUSCULAR; INTRAVENOUS; SUBCUTANEOUS at 23:29

## 2017-04-04 RX ADMIN — HYDROMORPHONE HYDROCHLORIDE 0.2 MG: 1 INJECTION, SOLUTION INTRAMUSCULAR; INTRAVENOUS; SUBCUTANEOUS at 15:06

## 2017-04-04 RX ADMIN — SODIUM CHLORIDE, SODIUM LACTATE, POTASSIUM CHLORIDE, CALCIUM CHLORIDE: 600; 310; 30; 20 INJECTION, SOLUTION INTRAVENOUS at 11:35

## 2017-04-04 RX ADMIN — VANCOMYCIN HYDROCHLORIDE 1.5 G: 10 INJECTION, POWDER, LYOPHILIZED, FOR SOLUTION INTRAVENOUS at 11:35

## 2017-04-04 RX ADMIN — HYDROMORPHONE HYDROCHLORIDE 0.2 MG: 1 INJECTION, SOLUTION INTRAMUSCULAR; INTRAVENOUS; SUBCUTANEOUS at 14:50

## 2017-04-04 RX ADMIN — HYDROMORPHONE HYDROCHLORIDE 0.5 MG: 2 INJECTION, SOLUTION INTRAMUSCULAR; INTRAVENOUS; SUBCUTANEOUS at 11:53

## 2017-04-04 RX ADMIN — FENTANYL CITRATE 25 MCG: 50 INJECTION, SOLUTION INTRAMUSCULAR; INTRAVENOUS at 15:56

## 2017-04-04 RX ADMIN — OXYCODONE HYDROCHLORIDE 10 MG: 5 TABLET ORAL at 17:24

## 2017-04-04 RX ADMIN — FENTANYL CITRATE 50 MCG: 50 INJECTION, SOLUTION INTRAMUSCULAR; INTRAVENOUS at 13:24

## 2017-04-04 RX ADMIN — GABAPENTIN 400 MG: 100 CAPSULE ORAL at 21:30

## 2017-04-04 RX ADMIN — LABETALOL HYDROCHLORIDE 5 MG: 5 INJECTION, SOLUTION INTRAVENOUS at 14:37

## 2017-04-04 RX ADMIN — Medication 1 LOZENGE: at 18:19

## 2017-04-04 NOTE — PROGRESS NOTES
Patient unable to move her right arm but able to slight moved fingers and feeling weird to touch. Dr Pat Javier inform order Decadron 8 mg iv now.

## 2017-04-04 NOTE — PROGRESS NOTES
Pt seen and examined this PM.  Weak RUE on the deltoid, biceps and triceps. FROM wrist and hand. 1 dose of steroid today.   Will recheck in AM.  No s/s on LUE and BLE

## 2017-04-04 NOTE — IP AVS SNAPSHOT
Current Discharge Medication List  
  
START taking these medications Dose & Instructions Dispensing Information Comments Morning Noon Evening Bedtime  
 acetaminophen 500 mg tablet Commonly known as:  TYLENOL Your last dose was: Your next dose is:    
   
   
 Dose:  1000 mg Take 2 Tabs by mouth every six (6) hours for 14 days. Quantity:  112 Tab Refills:  0  
     
   
   
   
  
 methylPREDNISolone 4 mg Tab Commonly known as:  MEDROL Your last dose was: Your next dose is: Take two more doses (dinner and bedtime) 4/7 Take three times with meals 4/8 Take with breakfast and supper 4/9 Take with breakfast 4/10 Quantity:  8 Tab Refills:  0  
     
   
   
   
  
 oxyCODONE IR 5 mg immediate release tablet Commonly known as:  Carolyn Dankh Your last dose was: Your next dose is:    
   
   
 Dose:  5-10 mg Take 1-2 Tabs by mouth every three (3) hours as needed. Max Daily Amount: 80 mg.  
 Quantity:  80 Tab Refills:  0  
     
   
   
   
  
 polyethylene glycol 17 gram/dose powder Commonly known as:  Diego Spotted Your last dose was: Your next dose is:    
   
   
 Dose:  17 g Take 17 g by mouth daily for 15 days. Quantity:  255 g Refills:  0  
     
   
   
   
  
 senna-docusate 8.6-50 mg per tablet Commonly known as:  SENNA PLUS Your last dose was: Your next dose is:    
   
   
 Dose:  1 Tab Take 1 Tab by mouth two (2) times a day. Quantity:  60 Tab Refills:  0 CONTINUE these medications which have NOT CHANGED Dose & Instructions Dispensing Information Comments Morning Noon Evening Bedtime  
 amitriptyline 25 mg tablet Commonly known as:  ELAVIL Your last dose was: Your next dose is:    
   
   
 Dose:  25 mg Take 1 Tab by mouth nightly. Quantity:  90 Tab Refills:  3  
     
   
   
   
  
 gabapentin 400 mg capsule Commonly known as:  NEURONTIN Your last dose was: Your next dose is:    
   
   
 Dose:  400 mg Take 1 Cap by mouth three (3) times daily. Quantity:  270 Cap Refills:  11  
     
   
   
   
  
 meclizine 25 mg tablet Commonly known as:  ANTIVERT Your last dose was: Your next dose is:    
   
   
 Dose:  25 mg Take 1 Tab by mouth three (3) times daily as needed for Dizziness. Quantity:  30 Tab Refills:  6  
     
   
   
   
  
 ondansetron 4 mg disintegrating tablet Commonly known as:  ZOFRAN ODT Your last dose was: Your next dose is:    
   
   
 Dose:  4 mg Take 1 Tab by mouth every eight (8) hours as needed for Nausea. Quantity:  12 Tab Refills:  1 VITAMIN D3 2,000 unit Tab Generic drug:  cholecalciferol (vitamin D3) Your last dose was: Your next dose is:    
   
   
 Dose:  1 Tab Take 1 Tab by mouth daily. Refills:  0 STOP taking these medications   
 naproxen 375 mg tablet Commonly known as:  NAPROSYN Where to Get Your Medications Information on where to get these meds will be given to you by the nurse or doctor. ! Ask your nurse or doctor about these medications  
  acetaminophen 500 mg tablet  
 methylPREDNISolone 4 mg Tab  
 oxyCODONE IR 5 mg immediate release tablet  
 polyethylene glycol 17 gram/dose powder  
 senna-docusate 8.6-50 mg per tablet

## 2017-04-04 NOTE — PERIOP NOTES
Handoff Report from Operating Room to PACU    Report received from STACY Ariza and ARSENIO Barlow[address]) regarding Emmie Milind. Surgeon(s):  Kevin Begum MD  And Procedure(s) (LRB):  C4-7 ANTERIOR CERVICAL DISCECTOMY WITH FUSION (N/A)  confirmed   with allergies, drains and dressings discussed. Anesthesia type, drugs, patient history, complications, estimated blood loss, vital signs, intake and output, and last pain medication, lines, reversal medications and temperature were reviewed.

## 2017-04-04 NOTE — OP NOTES
[address], [facility]   OP NOTE       Name:  Nic Saha   MR#:  [id number]   :  1960   Account #:  [de-identified]    Surgery Date:  2017   Date of Adm:  2017       PREOPERATIVE DIAGNOSES:   1. Cervical spinal stenosis, C4 to C7.   2. Cervical radiculopathy. 3. Cervicalgia. POSTOPERATIVE DIAGNOSES:   1. Cervical spinal stenosis, C4 to C7.   2. Cervical radiculopathy. 2. Cervicalgia. PROCEDURES PERFORMED:   1. C4-C5 anterior cervical diskectomy and fusion. 2. C5-C6 anterior cervical diskectomy and fusion. 3. C6-C7 anterior cervical diskectomy and fusion. 4. E6-X0 application of interbody biomechanical device. 5. C5-C6 insertion of interbody biomechanical device. 6. P8-H7 application of interbody biomechanical device. 7. C4-C5, C6-C7 anterior instrumentation. 8. Use of allograft bone for spine fusion. 9. Use of operative microscope. SURGEON: Haley Blanton MD    FIRST ASSISTANT: Odin Xiong     ESTIMATED BLOOD LOSS: 200 mL. COMPLICATIONS: None. SPECIMENS REMOVED: None. ANESTHESIA: General.      DRAINS: X1.     IMPLANTS USED: The De[employer]uy [employer] [place] plate and the Q6T   Peggs interbody biomechanical device. INDICATIONS FOR PROCEDURE: This patient is a pleasant 57-year-  old lady with cervical spinal stenosis that has resulted in cervicalgia   and cervical radiculopathy and has failed to improve with nonoperative   treatment. At this point, she would like to proceed with surgical   intervention. I have given her warnings about possible complications,   including but not limited to pain, scar, bleeding, infection, nonunion,   damage to surrounding structures, death, paralysis, blindness, and   stroke. She understands and wants to proceed.     DESCRIPTION OF PROCEDURE: Informed consent was obtained   and the operative site was properly marked, the patient was moved   back in the operating room and underwent general endotracheal   anesthesia. She was positioned supine on the room table using the   Foruforever table flat top. The arms were well padded and tucked at the   sides. The knees were gently bent with pillows. A bump was placed   between the shoulder blades and the shoulders were gently taped   down with 3-inch tape. The fluoroscope was used to aura the level of   the incision and we then proceeded to prep and drape in the usual   manner. Time-out was obtained, verifying that this was the correct   patient, the correct surgery, the correct site, as well as that she had   received antibiotics within 30 minutes of the incision. We then proceeded to perform a standard anterior approach to the   cervical spine, exposing the area between C4 and C7. Once that area   was exposed and hemostasis was obtained, fluoroscopy was used to   verify that we were in the correct level. Once that was all verified, I   proceeded then to elevate the longus colli muscle on both right and left   sides, protecting the sympathetic chain and exposing the uncinate   processes bilaterally. We then proceeded to place Shadow-Line   retractors and then placed a Harrison pin at C4 and another one at C5. I   then proceeded to bring in the operative microscope and kept it in   place for the remainder of the procedure. A 15 blade was used to   perform a box annulotomy and I was able to do that annulotomy   to remove the annulus with a pituitary and then removed the remainder   of the annulus with a pituitary and a curette, all the way down to the   PLL. I was able then to use a Midas Yuval to remove the anterior   osteophytes, the posterior osteophytes and remove the medial   overhang of the uncinate processes and make the endplates coplanar. I was able then to measure for an interbody biomechanical spacer with   a size 7 having a good fit.  I then proceeded to remove the PLL with a   Kerrison #1, followed by Kerrison #2, decompressing the thecal sac   and entering the neural foramina bilaterally. Once this was completed,   I proceeded to insert the interbody biomechanical spacer packed with   Kaitlynn allograft bone and then remove the [place] pin from C4   and move it down to C6, and repeated the procedure in exact same   manner at the C5-C6 level, finally moving down to the C6-C7 and   repeating once again at the C6-C7 level. Once all 3 levels had been   fully decompressed, the interbody biomechanical spacer was inserted,   I proceeded then to insert the Ubly plate and then drill for 14 mm   screws bilaterally at C4, C5, C6, C7. Final tightening was put into place   and locking with the final locking device. The wounds were then irrigated with 3 L of normal saline. A deep drain   was placed. The platysma was closed with a 2-0 Vicryl, subcutaneous   with 3-0 Vicryl, the skin with a 3-0 running Monocryl and Dermabond. Sterile dressing was applied. X-rays were taken and saved to PACS. The patient was awakened and transferred to the PACU in stable   condition. POSTOPERATIVE PLAN: The patient is going to remain here   overnight. We are going to give her SCDs and CHANTELLE hose for DVT   prophylaxis and Ancef for infection prophylaxis.         MD TIMBO Winter / MAR   D:  04/04/2017   14:09   T:  04/04/2017   14:44   Job #:  [id number]

## 2017-04-04 NOTE — ANESTHESIA PREPROCEDURE EVALUATION
Anesthetic History   No history of anesthetic complications            Review of Systems / Medical History  Patient summary reviewed, nursing notes reviewed and pertinent labs reviewed    Pulmonary                Comments: Former smoker - Quit 2015 - 4 pack years   Neuro/Psych         Psychiatric history    Comments: Cervicalgia/Cervical stenosis with radiculopathy  Depression  Vertigo Cardiovascular                  Exercise tolerance: >4 METS     GI/Hepatic/Renal     GERD: well controlled           Endo/Other        Obesity     Other Findings            Physical Exam    Airway  Mallampati: I  TM Distance: > 6 cm  Neck ROM: decreased range of motion   Mouth opening: Normal     Cardiovascular  Regular rate and rhythm,  S1 and S2 normal,  no murmur, click, rub, or gallop             Dental         Pulmonary  Breath sounds clear to auscultation               Abdominal  GI exam deferred       Other Findings            Anesthetic Plan    ASA: 2  Anesthesia type: general          Induction: Intravenous  Anesthetic plan and risks discussed with: Patient

## 2017-04-04 NOTE — IP AVS SNAPSHOT
[address] [facility] 
[phone] Patient: José Miguel Lozoya MRN: [id number] QEQ:2/23/1964 You are allergic to the following Allergen Reactions Pcn (Penicillins) Hives Recent Documentation Height Weight Breastfeeding? BMI OB Status Smoking Status 1.6 m 94.3 kg No 36.83 kg/m2 Hysterectomy Former Smoker Emergency Contacts Name Discharge Info Relation Home Work Mobile Trg Radha 13 CAREGIVER [3] Child [2] [phone] [address] [facility] CAREGIVER [3] Daughter [21] [phone] About your hospitalization You were admitted on:  April 4, 2017 You last received care in the:  [facility] 3 ORTHOPEDICS You were discharged on:  April 7, 2017 Unit phone number:  [phone] Why you were hospitalized Your primary diagnosis was:  Not on File Your diagnoses also included:  S/P Cervical Spinal Fusion Providers Seen During Your Hospitalizations Provider Role Specialty Primary office phone Ruben Bunn MD Attending Provider Orthopedic Surgery [phone] Your Primary Care Physician (PCP) Primary Care Physician Office Phone Office Fax [address], [address] [phone] Follow-up Information Follow up With Details Comments Contact Info Ruben Bunn MD Schedule an appointment as soon as possible for a visit in 2 weeks  [address] [place] [address] 
[phone] Current Discharge Medication List  
  
START taking these medications Dose & Instructions Dispensing Information Comments Morning Noon Evening Bedtime  
 acetaminophen 500 mg tablet Commonly known as:  TYLENOL Your last dose was: Your next dose is:    
   
   
 Dose:  1000 mg Take 2 Tabs by mouth every six (6) hours for 14 days. Quantity:  112 Tab Refills:  0 methylPREDNISolone 4 mg Tab Commonly known as:  MEDROL Your last dose was: Your next dose is: Take two more doses (dinner and bedtime) 4/7 Take three times with meals 4/8 Take with breakfast and supper 4/9 Take with breakfast 4/10 Quantity:  8 Tab Refills:  0  
     
   
   
   
  
 oxyCODONE IR 5 mg immediate release tablet Commonly known as:  Sushilasg Rutherford Your last dose was: Your next dose is:    
   
   
 Dose:  5-10 mg Take 1-2 Tabs by mouth every three (3) hours as needed. Max Daily Amount: 80 mg.  
 Quantity:  80 Tab Refills:  0  
     
   
   
   
  
 polyethylene glycol 17 gram/dose powder Commonly known as:  Sharkumar [place] Your last dose was: Your next dose is:    
   
   
 Dose:  17 g Take 17 g by mouth daily for 15 days. Quantity:  255 g Refills:  0  
     
   
   
   
  
 senna-docusate 8.6-50 mg per tablet Commonly known as:  SENNA PLUS Your last dose was: Your next dose is:    
   
   
 Dose:  1 Tab Take 1 Tab by mouth two (2) times a day. Quantity:  60 Tab Refills:  0 CONTINUE these medications which have NOT CHANGED Dose & Instructions Dispensing Information Comments Morning Noon Evening Bedtime  
 amitriptyline 25 mg tablet Commonly known as:  ELAVIL Your last dose was: Your next dose is:    
   
   
 Dose:  25 mg Take 1 Tab by mouth nightly. Quantity:  90 Tab Refills:  3  
     
   
   
   
  
 gabapentin 400 mg capsule Commonly known as:  NEURONTIN Your last dose was: Your next dose is:    
   
   
 Dose:  400 mg Take 1 Cap by mouth three (3) times daily. Quantity:  270 Cap Refills:  11  
     
   
   
   
  
 meclizine 25 mg tablet Commonly known as:  ANTIVERT Your last dose was: Your next dose is:    
   
   
 Dose:  25 mg Take 1 Tab by mouth three (3) times daily as needed for Dizziness. Quantity:  30 Tab Refills:  6  
     
   
   
   
  
 ondansetron 4 mg disintegrating tablet Commonly known as:  ZOFRAN ODT Your last dose was: Your next dose is:    
   
   
 Dose:  4 mg Take 1 Tab by mouth every eight (8) hours as needed for Nausea. Quantity:  12 Tab Refills:  1 VITAMIN D3 2,000 unit Tab Generic drug:  cholecalciferol (vitamin D3) Your last dose was: Your next dose is:    
   
   
 Dose:  1 Tab Take 1 Tab by mouth daily. Refills:  0 STOP taking these medications   
 naproxen 375 mg tablet Commonly known as:  NAPROSYN Where to Get Your Medications Information on where to get these meds will be given to you by the nurse or doctor. ! Ask your nurse or doctor about these medications  
  acetaminophen 500 mg tablet  
 methylPREDNISolone 4 mg Tab  
 oxyCODONE IR 5 mg immediate release tablet  
 polyethylene glycol 17 gram/dose powder  
 senna-docusate 8.6-50 mg per tablet Discharge Instructions 4 [facility] Orthopedics [facility] Discharge Instruction Sheet: Anterior Cervical Fusion Veronika Perry Pain control: 
Typically, we will prescribe a narcotic usually 1-2 tabs every four hours is sufficient for the pain. Most patients need this only for the first few weeks. You should discontinue this as the pain decreases. You should not drive while taking any narcotic pain medications. Constipation Pain medicines and anesthesia can be constipating-this can be prevented by gentle physical activity and drinking plenty of fluid. It should be treated with over-the-counter medications such as Miralax or suppositories, and/or Fleets enema. You should have a bowel movement at least every other day following surgery. Incision care Keep this area clean and dry. Do not remove the dressing. DO NOT take a tub bath or go swimming until cleared by your doctor. DO NOT apply lotions, oils, or creams to incision. Cover the wound with an impermiable dressing to shower for then next 5 days, then no cover is needed. Wear cervical collar for comfort. If staples are in place, they should be removed about 14-20 days after surgery. To increase and promote healing: 
? Stop Smoking (or at least cut back on smoking). ? Eat a well-balanced diet (high in protein and vitamin C) ? If your appetite is poor, consider nutritional supplements like Ensure, Glucerna, or [place] Instant Breakfast. 
? If you are diabetic, controlling you blood sugars is very important to prevent infection and promote wound healing. Nutrition: ? If you were on a supplement such as Ensure or Glucerna) while in the hospital, please continue using them with each meal for the next 30 days. ? Eat a well-balanced diet - High in protein, high in vitamins and minerals, especially vitamin C and zinc.  
 
Restrictions: 
Limited bending at waist 
Lift no more than 10 pounds Warning signs :  
Please call your physician IMMEDIATELY at [phone] if you have: ? If you have throat soreness that worsens ? If you have difficulty swallowing. ? If you have any difficulty breathing ? Bleeding from incision that is constant. ? Change in mental status (unusual behavior or confusion) ? If your incision develops redness or swelling 
? Change in wound drainage (increase in amount, color, or foul odor) ? [place] over 101.5 degrees Fahrenheit  
? Headache that is not relieved with pain medication ? Tenderness or redness in the calf of your leg Emergency: CALL 841 if you have: ? Any Difficulty Breathing or Shortness of breath ? Difficulty Swallowing ? Chest pain ? Localized chest pain when coughing or taking a deep breath Take your steroid medication as directed until it is complete. Perform shoulder range of motion exercises as directed by PT to prevent frozen shoulder. Obtain and use over the door pulley for shoulder range of motion. (Examples below) Follow-up Please call Dr. Samia Porter office for a follow up appointment in 2 weeks at [phone]. You can return to work when cleared by a physician. During normal business hours you may reach Dr. Raina Bagley' team directly at [phone] if you have concerns or questions. Discharge Orders None [employer] Announcement We are excited to announce that we are making your provider's discharge notes available to you in [employer]. You will see these notes when they are completed and signed by the physician that discharged you from your recent hospital stay. If you have any questions or concerns about any information you see in [employer], please call the Health Information Department where you were seen or reach out to your Primary Care Provider for more information about your plan of care. Introducing [facility]! Dear Faustina Camacho: Thank you for requesting a [employer] account. Our records indicate that you already have an active [employer] account. You can access your account anytime at [email or web address] Did you know that you can access your hospital and ER discharge instructions at any time in [employer]? You can also review all of your test results from your hospital stay or ER visit. Additional Information If you have questions, please visit the Frequently Asked Questions section of the [employer] website at [email or web address] Remember, [employer] is NOT to be used for urgent needs. For medical emergencies, dial 911. Now available from your iPhone and Android! General Information Please provide this summary of care documentation to your next provider.  
  
  
    
    
 Patient Signature: ____________________________________________________________ Date:  ____________________________________________________________  
  
Carlena Jaxon Provider Signature:  ____________________________________________________________ Date:  ____________________________________________________________

## 2017-04-04 NOTE — IP AVS SNAPSHOT
Summary of Care Report The Summary of Care report has been created to help improve care coordination. Users with access to [employer] or [address] (Web-based application) may access additional patient information including the Discharge Summary. If you are not currently a [employer] user and need more information, please call the number listed below in the [place] [address] section and ask to be connected with Medical Records. Facility Information Name Address Phone [address] [address] [phone] Patient Information Patient Name Sex  Kim Funk ([id number]) Female 1960 Discharge Information Admitting Provider Service Area Unit Brie De La Rosa MD / [address] [facility] 3 Orthopedics  [phone] Discharge Provider Discharge Date/Time Discharge Disposition Destination (none) 2017 (Pending) [facility] (none) Patient Language Language ENGLISH [13] Hospital Problems as of 2017  Reviewed: 2017  9:14 AM by Chaim Alvarado MD  
  
  
  
 Class Noted - Resolved Last Modified POA Active Problems S/P cervical spinal fusion  2017 - Present 2017 by Brie De La Rosa MD Unknown Entered by Brie De La Rosa MD  
  
Non-Hospital Problems as of 2017  Reviewed: 2017  9:14 AM by Cahim Alvarado MD  
  
  
  
 Class Noted - Resolved Last Modified Active Problems Chronic low back pain  2014 - Present 2014 by Marichuy Chavarria MD  
  Entered by Marichuy Chavarria MD  
  Depression  2014 - Present 2014 by Marichuy Chavarria MD  
  Entered by Marichuy Chavarria MD  
  Tobacco abuse  2014 - Present 2014 by Marichuy Chavarria MD  
  Entered by Marichuy Chavarria MD  
  
You are allergic to the following Allergen Reactions Pcn (Penicillins) Hives Current Discharge Medication List  
  
START taking these medications Dose & Instructions Dispensing Information Comments  
 acetaminophen 500 mg tablet Commonly known as:  TYLENOL Dose:  1000 mg Take 2 Tabs by mouth every six (6) hours for 14 days. Quantity:  112 Tab Refills:  0  
   
 methylPREDNISolone 4 mg Tab Commonly known as:  MEDROL Take two more doses (dinner and bedtime) 4/7 Take three times with meals 4/8 Take with breakfast and supper 4/9 Take with breakfast 4/10 Quantity:  8 Tab Refills:  0  
   
 oxyCODONE IR 5 mg immediate release tablet Commonly known as:  Joel Kerry Dose:  5-10 mg Take 1-2 Tabs by mouth every three (3) hours as needed. Max Daily Amount: 80 mg.  
 Quantity:  80 Tab Refills:  0  
   
 polyethylene glycol 17 gram/dose powder Commonly known as:  [place] Dose:  17 g Take 17 g by mouth daily for 15 days. Quantity:  255 g Refills:  0  
   
 senna-docusate 8.6-50 mg per tablet Commonly known as:  SENNA PLUS Dose:  1 Tab Take 1 Tab by mouth two (2) times a day. Quantity:  60 Tab Refills:  0 CONTINUE these medications which have NOT CHANGED Dose & Instructions Dispensing Information Comments  
 amitriptyline 25 mg tablet Commonly known as:  ELAVIL Dose:  25 mg Take 1 Tab by mouth nightly. Quantity:  90 Tab Refills:  3  
   
 gabapentin 400 mg capsule Commonly known as:  NEURONTIN Dose:  400 mg Take 1 Cap by mouth three (3) times daily. Quantity:  270 Cap Refills:  11  
   
 meclizine 25 mg tablet Commonly known as:  ANTIVERT Dose:  25 mg Take 1 Tab by mouth three (3) times daily as needed for Dizziness. Quantity:  30 Tab Refills:  6  
   
 ondansetron 4 mg disintegrating tablet Commonly known as:  ZOFRAN ODT Dose:  4 mg Take 1 Tab by mouth every eight (8) hours as needed for Nausea. Quantity:  12 Tab Refills:  1 VITAMIN D3 2,000 unit Tab Generic drug:  cholecalciferol (vitamin D3) Dose:  1 Tab Take 1 Tab by mouth daily. Refills:  0 STOP taking these medications Comments  
 naproxen 375 mg tablet Commonly known as:  NAPROSYN Current Immunizations Name Date Influenza Vaccine Intradermal PF 1/13/2016, 10/10/2014 Tdap 8/6/2015 Surgery Information ID Date/Time Status Primary Surgeon All Procedures Location [id number] [facility] C4-7 ANTERIOR CERVICAL DISCECTOMY WITH FUSION [facility] MAIN OR Follow-up Information Follow up With Details Comments Contact Info Maurice Fernando MD Schedule an appointment as soon as possible for a visit in 2 weeks  [address] [facility] [address] [facility] [address] [facility] 
[phone] Discharge Instructions [address] [facility] Orthopedics [facility] Discharge Instruction Sheet: Anterior Cervical Fusion Rica Briggs Pain control: 
Typically, we will prescribe a narcotic usually 1-2 tabs every four hours is sufficient for the pain. Most patients need this only for the first few weeks. You should discontinue this as the pain decreases. You should not drive while taking any narcotic pain medications. Constipation Pain medicines and anesthesia can be constipating-this can be prevented by gentle physical activity and drinking plenty of fluid. It should be treated with over-the-counter medications such as Miralax or suppositories, and/or Fleets enema. You should have a bowel movement at least every other day following surgery. Incision care Keep this area clean and dry. Do not remove the dressing. DO NOT take a tub bath or go swimming until cleared by your doctor. DO NOT apply lotions, oils, or creams to incision.   Cover the wound with an impermiable dressing to shower for then next 5 days, then no cover is needed. Wear cervical collar for comfort. If staples are in place, they should be removed about 14-20 days after surgery. To increase and promote healing: 
? Stop Smoking (or at least cut back on smoking). ? Eat a well-balanced diet (high in protein and vitamin C) ? If your appetite is poor, consider nutritional supplements like Ensure, Glucerna, or [place] Instant Breakfast. 
? If you are diabetic, controlling you blood sugars is very important to prevent infection and promote wound healing. Nutrition: ? If you were on a supplement such as Ensure or Glucerna) while in the hospital, please continue using them with each meal for the next 30 days. ? Eat a well-balanced diet - High in protein, high in vitamins and minerals, especially vitamin C and zinc.  
 
Restrictions: 
Limited bending at waist 
Lift no more than 10 pounds Warning signs :  
Please call your physician IMMEDIATELY at [phone] if you have: ? If you have throat soreness that worsens ? If you have difficulty swallowing. ? If you have any difficulty breathing ? Bleeding from incision that is constant. ? Change in mental status (unusual behavior or confusion) ? If your incision develops redness or swelling 
? Change in wound drainage (increase in amount, color, or foul odor) ? [place] over 101.5 degrees Fahrenheit  
? Headache that is not relieved with pain medication ? Tenderness or redness in the calf of your leg Emergency: CALL 911 if you have: ? Any Difficulty Breathing or Shortness of breath ? Difficulty Swallowing ? Chest pain ? Localized chest pain when coughing or taking a deep breath Take your steroid medication as directed until it is complete. Perform shoulder range of motion exercises as directed by PT to prevent frozen shoulder. Obtain and use over the door pulley for shoulder range of motion. (Examples below) Follow-up Please call Dr. Dickey Check office for a follow up appointment in 2 weeks at [phone]. You can return to work when cleared by a physician. During normal business hours you may reach Dr. Kendrick Pompa' team directly at [phone] if you have concerns or questions. Chart Review Routing History Recipient Method Report Sent By Crissie Sever Prentis Coy Fax: [phone] Fax [facility] IP AMB RESULT REPORT IMAGING Rob Blanton MD [[id number]] 11/16/2016  2:36 PM 10/20/2016 Scotty Ludwig MD  
Phone: [phone] In Basket IP Auto Routed Kristi Duran MD [[id number]] 4/4/2017  6:57 PM 04/04/2017

## 2017-04-04 NOTE — PROGRESS NOTES
Physical Therapy Note    Orders received. Noted PT evaluation to be initiated on 4/5/17 as appropriate. Will follow up tomorrow for PT evaluation.     Thank you,  Dexter Michaels, PT, DPT

## 2017-04-04 NOTE — BRIEF OP NOTE
BRIEF OPERATIVE NOTE    Date of Procedure: 4/4/2017   Preoperative Diagnosis: CERVICALGIA  RADICULOPATHY  STENOSIS   Postoperative Diagnosis: CERVICALGIA  RADICULOPATHY  STENOSIS     Procedure(s):  C4-7 ANTERIOR CERVICAL DISCECTOMY WITH FUSION  Surgeon(s) and Role:     * Jeannette Guallpa MD - Primary       Physician Assistant: Mahin Hutchinson    Surgical Staff:  Circ-1: Marisol Verdugo RN  Circ-Relief: Eris He RN  Circ-Intern: Candice Aviles RN  Physician Assistant: Mahin Hutchinson  Radiology Technician: Heavenly Smith, RT  Scrub Tech-1: Lauren Huizar RT; Keily Gandara  Event Time In   Incision Start 1216   Incision Close      Anesthesia: General   Estimated Blood Loss: 200cc  Specimens: * No specimens in log *   Findings: stenosis   Complications: none  Implants:   Implant Name Type Inv.  Item Serial No.  Lot No. LRB No. Used Action   CASCAdia interbody ysytem cervical [id number] degree     [employer] [facility]-31015 N/A 1 Implanted   GRAFT BNE [place]ARA  --  - [id number]  GRAFT BNE [place] SM --  [id number] MUSCULOSKELETAL TRANS N/A N/A 1 Implanted   GRAFT BNE [place]ARA  --  - [id number]  GRAFT BNE [place]ARA SM --  [id number] MUSCULOSKELETAL TRANS N/A N/A 1 Implanted   [place] cervical interbody [id number] , 7 degree   N/A [employer] [facility]-[id number] N/A 2 Implanted   PLATE CERV ANTR 3 LVL 48MM TI -- SKYLINE - SN/A  PLATE CERV ANTR 3 LVL 48MM TI -- SKYLINE N/A JNJ DEPUY SPINE N/A N/A 1 Implanted   SCR CERV ANTR FA ST 14MM TI -- SKYLINE - SN/A  SCR CERV ANTR FA ST 14MM TI -- SKYLINE N/A [employer]NJ DEPUY SPINE N/A N/A 8 Implanted   MARCELA CONST LRG-D SCW 14MM TI -- SKYLINE - SN/A   MARCELA CONST LRG-D SCW 14MM TI -- SKYLINE N/A [employer]NJ DEPUY SPINE N/A N/A 2 Implanted

## 2017-04-04 NOTE — ANESTHESIA POSTPROCEDURE EVALUATION
Post-Anesthesia Evaluation and Assessment    Patient: Yelena Rodriguez MRN: [id number]  SSN: [id number]    YOB: 1960  Age: 61 y.o. Sex: female       Cardiovascular Function/Vital Signs  Visit Vitals    BP (!) 147/92 (BP 1 Location: Left arm, BP Patient Position: At rest)    Pulse 75    Temp 36.4 °C (97.5 °F)    Resp 25    Ht 5' 3\" (1.6 m)    Wt 94.3 kg (207 lb 14.3 oz)    SpO2 100%    BMI 36.83 kg/m2       Patient is status post general anesthesia for Procedure(s):  C4-7 ANTERIOR CERVICAL DISCECTOMY WITH FUSION. Nausea/Vomiting: None    Postoperative hydration reviewed and adequate. Pain:  Pain Scale 1: Numeric (0 - 10) (04/04/17 1545)  Pain Intensity 1: 8 (04/04/17 1545)   Managed    Neurological Status:   Neuro (WDL): Exceptions to WDL (04/04/17 1445)  Neuro  Neurologic State: Drowsy; Eyes open to voice (04/04/17 1445)  Orientation Level: Oriented X4 (04/04/17 1445)  Cognition: Follows commands (04/04/17 1445)  Speech: Clear (04/04/17 1445)  LUE Motor Response: Purposeful;Weak (04/04/17 1445)  LLE Motor Response: Purposeful (04/04/17 1445)  RUE Motor Response: Purposeful;Weak (04/04/17 1445)  RLE Motor Response: Purposeful (04/04/17 1445)   At baseline    Mental Status and Level of Consciousness: Arousable    Pulmonary Status:   O2 Device: Nasal cannula (04/04/17 1545)   Adequate oxygenation and airway patent    Complications related to anesthesia: None    Post-anesthesia assessment completed.  No concerns    Signed By: Bj Rivera MD     April 4, 2017

## 2017-04-04 NOTE — ROUTINE PROCESS
Patient: Yelena Rodriguez MRN: [id number]  SSN: [id number]   YOB: 1960  Age: 61 y.o. Sex: female     Patient is status post Procedure(s):  C4-7 ANTERIOR CERVICAL DISCECTOMY WITH FUSION. Surgeon(s) and Role:     * Deepthi Williamson MD - Primary    Local/Dose/Irrigation:  See [facility]                Peripheral IV 04/04/17 Right Antecubital (Active)   Site Assessment Clean, dry, & intact 4/4/2017 11:00 AM   Phlebitis Assessment 0 4/4/2017 11:00 AM   Infiltration Assessment 0 4/4/2017 11:00 AM   Dressing Status Clean, dry, & intact; Occlusive 4/4/2017 11:00 AM   Dressing Type Tape;Transparent 4/4/2017 11:00 AM   Hub Color/Line Status Pink; Infusing;Patent 4/4/2017 11:00 AM   Alcohol Cap Used Yes 4/4/2017 11:00 AM       Peripheral IV 04/04/17 Left Hand (Active)          Ritesh-Walker Drain 04/04/17 Anterior Neck (Active)      Airway - Endotracheal Tube 04/04/17 Oral (Active)   Line Po Lips 4/4/2017 12:00 AM                   Dressing/Packing:  Wound Neck Anterior-DRESSING TYPE: Topical skin adhesive/glue;Transparent film (biopatch ) (04/04/17 2193)  Splint/Cast: Wound Neck Anterior-SPLINT TYPE/MATERIAL: Cervical Collar]

## 2017-04-04 NOTE — PERIOP NOTES
TRANSFER - OUT REPORT:    Verbal report given to HRASH Rogers on Ruddy Tejada  being transferred to [place] [address] for routine post - op       Report consisted of patients Situation, Background, Assessment and   Recommendations(SBAR). Information from the following report(s) SBAR, Kardex, OR Summary, Intake/Output, MAR and Cardiac Rhythm NSR was reviewed with the receiving nurse. Opportunity for questions and clarification was provided. Patient transported with:   O2 @ 2 liters  Tech     Attempted to update daughter at 1, but patient's daughter was not in waiting room, and updated at time of transfer of room assignment and status.

## 2017-04-05 ENCOUNTER — APPOINTMENT (OUTPATIENT)
Dept: GENERAL RADIOLOGY | Age: 57
DRG: 023 | End: 2017-04-05
Attending: ORTHOPAEDIC SURGERY
Payer: MEDICAID

## 2017-04-05 PROCEDURE — 77010033678 HC OXYGEN DAILY

## 2017-04-05 PROCEDURE — 97116 GAIT TRAINING THERAPY: CPT

## 2017-04-05 PROCEDURE — 74011250637 HC RX REV CODE- 250/637: Performed by: NURSE PRACTITIONER

## 2017-04-05 PROCEDURE — 74011250636 HC RX REV CODE- 250/636: Performed by: ORTHOPAEDIC SURGERY

## 2017-04-05 PROCEDURE — 74011250637 HC RX REV CODE- 250/637: Performed by: ORTHOPAEDIC SURGERY

## 2017-04-05 PROCEDURE — 97530 THERAPEUTIC ACTIVITIES: CPT

## 2017-04-05 PROCEDURE — 99218 HC RM OBSERVATION: CPT

## 2017-04-05 PROCEDURE — 97161 PT EVAL LOW COMPLEX 20 MIN: CPT

## 2017-04-05 PROCEDURE — 72040 X-RAY EXAM NECK SPINE 2-3 VW: CPT

## 2017-04-05 PROCEDURE — 65270000029 HC RM PRIVATE

## 2017-04-05 RX ORDER — METHYLPREDNISOLONE 4 MG/1
4 TABLET ORAL
Status: DISCONTINUED | OUTPATIENT
Start: 2017-04-07 | End: 2017-04-07 | Stop reason: HOSPADM

## 2017-04-05 RX ORDER — METHYLPREDNISOLONE 4 MG/1
4 TABLET ORAL
Status: DISCONTINUED | OUTPATIENT
Start: 2017-04-09 | End: 2017-04-07 | Stop reason: HOSPADM

## 2017-04-05 RX ORDER — METHYLPREDNISOLONE 4 MG/1
8 TABLET ORAL ONCE
Status: COMPLETED | OUTPATIENT
Start: 2017-04-06 | End: 2017-04-06

## 2017-04-05 RX ORDER — METHYLPREDNISOLONE 4 MG/1
4 TABLET ORAL ONCE
Status: COMPLETED | OUTPATIENT
Start: 2017-04-05 | End: 2017-04-05

## 2017-04-05 RX ORDER — METHYLPREDNISOLONE 4 MG/1
8 TABLET ORAL ONCE
Status: COMPLETED | OUTPATIENT
Start: 2017-04-05 | End: 2017-04-05

## 2017-04-05 RX ORDER — METHYLPREDNISOLONE 4 MG/1
4 TABLET ORAL
Status: DISCONTINUED | OUTPATIENT
Start: 2017-04-08 | End: 2017-04-07 | Stop reason: HOSPADM

## 2017-04-05 RX ORDER — TAMSULOSIN HYDROCHLORIDE 0.4 MG/1
0.4 CAPSULE ORAL DAILY
Status: DISCONTINUED | OUTPATIENT
Start: 2017-04-05 | End: 2017-04-07 | Stop reason: HOSPADM

## 2017-04-05 RX ORDER — METHYLPREDNISOLONE 4 MG/1
4 TABLET ORAL
Status: COMPLETED | OUTPATIENT
Start: 2017-04-06 | End: 2017-04-06

## 2017-04-05 RX ORDER — METHYLPREDNISOLONE 4 MG/1
4 TABLET ORAL
Status: DISCONTINUED | OUTPATIENT
Start: 2017-04-10 | End: 2017-04-07 | Stop reason: HOSPADM

## 2017-04-05 RX ADMIN — METHYLPREDNISOLONE 4 MG: 4 TABLET ORAL at 20:47

## 2017-04-05 RX ADMIN — GABAPENTIN 400 MG: 100 CAPSULE ORAL at 22:28

## 2017-04-05 RX ADMIN — Medication 10 ML: at 22:29

## 2017-04-05 RX ADMIN — TAMSULOSIN HYDROCHLORIDE 0.4 MG: 0.4 CAPSULE ORAL at 13:58

## 2017-04-05 RX ADMIN — CHLORASEPTIC 1 SPRAY: 1.5 LIQUID ORAL at 12:05

## 2017-04-05 RX ADMIN — VANCOMYCIN HYDROCHLORIDE 1500 MG: 10 INJECTION, POWDER, LYOPHILIZED, FOR SOLUTION INTRAVENOUS at 02:26

## 2017-04-05 RX ADMIN — METHYLPREDNISOLONE 8 MG: 4 TABLET ORAL at 12:07

## 2017-04-05 RX ADMIN — FAMOTIDINE 20 MG: 20 TABLET, FILM COATED ORAL at 17:23

## 2017-04-05 RX ADMIN — DOCUSATE SODIUM AND SENNOSIDES 1 TABLET: 8.6; 5 TABLET, FILM COATED ORAL at 17:25

## 2017-04-05 RX ADMIN — DEXAMETHASONE SODIUM PHOSPHATE 10 MG: 4 INJECTION, SOLUTION INTRAMUSCULAR; INTRAVENOUS at 14:00

## 2017-04-05 RX ADMIN — OXYCODONE HYDROCHLORIDE 10 MG: 5 TABLET ORAL at 13:58

## 2017-04-05 RX ADMIN — POLYETHYLENE GLYCOL 3350 17 G: 17 POWDER, FOR SOLUTION ORAL at 09:02

## 2017-04-05 RX ADMIN — Medication 10 ML: at 14:26

## 2017-04-05 RX ADMIN — HYDROMORPHONE HYDROCHLORIDE 1 MG: 1 INJECTION, SOLUTION INTRAMUSCULAR; INTRAVENOUS; SUBCUTANEOUS at 05:11

## 2017-04-05 RX ADMIN — AMITRIPTYLINE HYDROCHLORIDE 25 MG: 25 TABLET, FILM COATED ORAL at 22:29

## 2017-04-05 RX ADMIN — OXYCODONE HYDROCHLORIDE 10 MG: 5 TABLET ORAL at 17:21

## 2017-04-05 RX ADMIN — METHYLPREDNISOLONE 4 MG: 4 TABLET ORAL at 14:00

## 2017-04-05 RX ADMIN — GABAPENTIN 400 MG: 100 CAPSULE ORAL at 17:22

## 2017-04-05 RX ADMIN — HYDROMORPHONE HYDROCHLORIDE 1 MG: 1 INJECTION, SOLUTION INTRAMUSCULAR; INTRAVENOUS; SUBCUTANEOUS at 09:02

## 2017-04-05 RX ADMIN — METHYLPREDNISOLONE 8 MG: 4 TABLET ORAL at 22:28

## 2017-04-05 RX ADMIN — ACETAMINOPHEN 1000 MG: 500 TABLET ORAL at 23:30

## 2017-04-05 RX ADMIN — OXYCODONE HYDROCHLORIDE 10 MG: 5 TABLET ORAL at 20:48

## 2017-04-05 NOTE — PROGRESS NOTES
Spiritual Care Partner Volunteer visited patient in orthopedics on 4/5/2017. Documented by:  Visit by: Rev. Latoya Degroot.  Kitty Darling MA, [facility]    Lead  Profession Development & Advancement

## 2017-04-05 NOTE — PROGRESS NOTES
Ortho/ NeuroSurgery NP Note    POD# 1 s/p C4-7 ANTERIOR CERVICAL DISCECTOMY WITH FUSION     Pt OOB to chair. No complaints. Some discomfort with swallowing but liquids and pills going down ok. Patient with new right arm weakness since surgery. VSS Afebrile. Recio removed. Patient is voiding in adequate amounts. Labs  Lab Results   Component Value Date/Time    HGB 11.5 03/27/2017 03:40 PM      Lab Results   Component Value Date/Time    INR 1.0 03/27/2017 03:40 PM        Dressing c.d.i, cryotherapy  Drain  in place. Calves soft and supple; No pain with passive stretch  Sensation and motor intact  SCDs for mechanical DVT proph while in bed     PLAN:  1) PT BID  2) RUE weakness- steroids  3) Swallowing difficulty- steroids and topical analgesics. 4) Plan d/c per therapy recommendations.      Марина Amaya NP

## 2017-04-05 NOTE — PROGRESS NOTES
SW met with pt to provide Obs letter. Copy given to pt and copy placed on pt bedside chart. SW will continue to follow and assist with additional discharge needs.     Jeramie Guerrero, MSW  Ext [phone]

## 2017-04-05 NOTE — PROGRESS NOTES
Bedside and Verbal shift change report given to Uli Kaye (oncoming nurse) by Aly Blair RN (offgoing nurse). Report included the following information SBAR, Kardex, OR Summary, Procedure Summary, Intake/Output and MAR.

## 2017-04-05 NOTE — PROGRESS NOTES
Bedside and Verbal shift change report given to kasia rubio (oncoming nurse) by Vladislav Cardoso (offgoing nurse). Report included the following information SBAR, Kardex, OR Summary, Procedure Summary, Intake/Output, MAR, Accordion and Recent Results.

## 2017-04-05 NOTE — ROUTINE PROCESS
Bedside and Verbal shift change report given to KRISTIN Jha (oncoming nurse) by [facility], RN (offgoing nurse). Report included the following information SBAR, Kardex, Intake/Output, MAR, Recent Results and Med Rec Status.

## 2017-04-05 NOTE — PROGRESS NOTES
Problem: Mobility Impaired (Adult and Pediatric)  Goal: *Acute Goals and Plan of Care (Insert Text)  Physical Therapy Goals  Initiated 4/5/2017    1. Patient will move from supine to sit and sit to supine in bed with supervision/set-up within 4 days. 2. Patient will perform sit to stand with supervision/set-up within 4 days. 3. Patient will ambulate with modified independence for 150 feet with the least restrictive device within 4 days. 4. Patient will ascend/descend 4 stairs with 1 handrail(s) with modified independence within 4 days. 5. Patient will verbalize and demonstrate understanding of spinal precautions (No bending, lifting greater than 5 lbs, or twisting; log-roll technique; frequent repositioning as instructed) within 4 days. PHYSICAL THERAPY TREATMENT  Patient: Ale Mcnulty (61 y.o. female)  Date: 4/5/2017  Precautions:        ASSESSMENT:  Patient agreeable to tx and motivated for progress for the PM. Transitioned gait from HHA x 2 to SBA and no DME upon return to her room. Noted limited confidence in mobility but no overt LOB and much improved compared to am. She continues to c/o limited RUE strength and ROM with minimal change so far. Encouraged her to continued using it and to strengthen existing motor pathways. Progression toward goals:  [X]      Improving appropriately and progressing toward goals  [ ]      Improving slowly and progressing toward goals  [ ]      Not making progress toward goals and plan of care will be adjusted       PLAN:  Patient continues to benefit from skilled intervention to address the above impairments. Continue treatment per established plan of care. Discharge Recommendations:  Rehab vs outpatient to address RUE  Further Equipment Recommendations for Discharge:  None       SUBJECTIVE:   Patient stated I feel the same.    The patient stated 2/3 back precautions. Reviewed all 3 with patient.       OBJECTIVE DATA SUMMARY:   Functional Mobility Training:  Bed Mobility:  Log    Supine to Sit: Minimum assistance     Scooting: Minimum assistance  Transfers:  Sit to Stand: Minimum assistance; Moderate assistance                                Ambulation/Gait Training:  Distance (ft): 175 Feet (ft)  Assistive Device: Brace/Splint;Gait belt (bilateral HHA)  Ambulation - Level of Assistance: Moderate assistance     Gait Description (WDL): Exceptions to WDL  Gait Abnormalities: Decreased step clearance        Base of Support: Widened     Speed/Debbie: Shuffled; Slow  Step Length: Left shortened;Right shortened                             Initially HHA x2 to CGA without DME  Stairs:            Therapeutic Exercises:      Pain:  Pain Scale 1: Numeric (0 - 10)  Pain Intensity 1: 7  Pain Location 1: Throat;Neck  Pain Orientation 1: Anterior  Pain Description 1: Aching  Pain Intervention(s) 1: Medication (see MAR)     After treatment:   [ ]  Patient left in no apparent distress sitting up in chair  [X]  Patient left in no apparent distress in bed  [X]  Call bell left within reach  [X]  Nursing notified  [ ]  Caregiver present  [ ]  Bed alarm activated      COMMUNICATION/COLLABORATION:   The patients plan of care was discussed with: Registered Nurse     Sharon Peters, PT, DPT   Time Calculation: 23 mins

## 2017-04-05 NOTE — PROGRESS NOTES
Problem: Mobility Impaired (Adult and Pediatric)  Goal: *Acute Goals and Plan of Care (Insert Text)  Physical Therapy Goals  Initiated 4/5/2017    1. Patient will move from supine to sit and sit to supine in bed with supervision/set-up within 4 days. 2. Patient will perform sit to stand with supervision/set-up within 4 days. 3. Patient will ambulate with modified independence for 150 feet with the least restrictive device within 4 days. 4. Patient will ascend/descend 4 stairs with 1 handrail(s) with modified independence within 4 days. 5. Patient will verbalize and demonstrate understanding of spinal precautions (No bending, lifting greater than 5 lbs, or twisting; log-roll technique; frequent repositioning as instructed) within 4 days. PHYSICAL THERAPY EVALUATION  Patient: Debbie Mark (61 y.o. female)  Date: 4/5/2017  Primary Diagnosis: CERVICALGIA RADICULOPATHY STENOSIS   CERVICALGIA RADICULOPATHY STENOSIS   Procedure(s) (LRB):  C4-7 ANTERIOR CERVICAL DISCECTOMY WITH FUSION (N/A) 1 Day Post-Op   Precautions:          ASSESSMENT :  Based on the objective data described below, the patient presents with considerable drowsiness, moderate pain, and significantly impaired strength and ROM of the right shoulder post operatively. At the time of evaluation she demonstrated trace strength at the elbow and shoulder and 2+ strength at the wrist and hand. She has diminished sensation thoughout the hand but her complaints are greatest in the C6 dermatome. Completed a short gait session to facilitate transport for xray where slow gait with high guard and shuffling steps were noted. Will progress activity towards discharge to home vs rehab pending progress. Patient will benefit from skilled intervention to address the above impairments.   Patients rehabilitation potential is considered to be Good  Factors which may influence rehabilitation potential include:   [X]         None noted  [ ]         Mental ability/status  [ ]         Medical condition  [ ]         Home/family situation and support systems  [ ]         Safety awareness  [ ]         Pain tolerance/management  [ ]         Other:        PLAN :  Recommendations and Planned Interventions:  [X]           Bed Mobility Training             [ ]    Neuromuscular Re-Education  [X]           Transfer Training                   [ ]    Orthotic/Prosthetic Training  [X]           Gait Training                         [ ]    Modalities  [X]           Therapeutic Exercises           [ ]    Edema Management/Control  [X]           Therapeutic Activities            [ ]    Patient and Family Training/Education  [ ]           Other (comment):     Frequency/Duration: Patient will be followed by physical therapy  twice daily to address goals. Discharge Recommendations: To Be Determined  Further Equipment Recommendations for Discharge: to be determined          SUBJECTIVE:   Patient stated My right arm won't move.       OBJECTIVE DATA SUMMARY:   HISTORY:    Past Medical History:   Diagnosis Date    Adverse effect of anesthesia       hard to wake up    Chronic pain      Depression      GERD (gastroesophageal reflux disease)      Low back pain      Thromboembolus (HCC)       blood clot in foot during pregnancy    Tobacco abuse      Vertigo       Past Surgical History:   Procedure Laterality Date    HX  SECTION         x3    HX HYSTERECTOMY        HX ORTHOPAEDIC   2002     2 disks removed lower back [facility].     Prior Level of Function/Home Situation: independent and active  Personal factors and/or comorbidities impacting plan of care:      Home Situation  Home Environment: Apartment  # Steps to Enter: 0  One/Two Story Residence: One story  Living Alone: No  Support Systems: Family member(s)  Patient Expects to be Discharged to[de-identified] Private residence  Current DME Used/Available at Home: None     EXAMINATION/PRESENTATION/DECISION MAKING:   Critical Behavior:  Neurologic State: Alert  Orientation Level: Oriented X4  Cognition: Follows commands     Hearing: Auditory  Auditory Impairment: None  Range Of Motion:  AROM: Grossly decreased, non-functional (WFL excpt, right UE, trace shlder & elbow, 2+/5 /wrst ex)                       Strength:    Strength: Grossly decreased, non-functional (trace right shoulder/elbow, 2+/5 /wrist ext)                    Tone & Sensation:                  Sensation: Impaired (diminished right hand, impaired >C6 thumb)               Coordination:     Vision:      Functional Mobility:  Bed Mobility:     Supine to Sit: Minimum assistance     Scooting: Minimum assistance  Transfers:  Sit to Stand: Minimum assistance; Moderate assistance                          Balance:   Sitting: Impaired  Sitting - Static: Fair (occasional)  Sitting - Dynamic: Fair (occasional)  Standing: Impaired  Standing - Static: Fair  Standing - Dynamic : Fair  Ambulation/Gait Training:  Distance (ft): 25 Feet (ft)  Assistive Device: Brace/Splint;Gait belt (bilateral HHA)  Ambulation - Level of Assistance: Moderate assistance     Gait Description (WDL): Exceptions to WDL  Gait Abnormalities: Decreased step clearance        Base of Support: Widened     Speed/Kingsley: Shuffled; Slow  Step Length: Left shortened;Right shortened              Increased trunk sway and slow kingsley                      Physical Therapy Evaluation Charge Determination   History Examination Presentation Decision-Making   MEDIUM  Complexity : 1-2 comorbidities / personal factors will impact the outcome/ POC  MEDIUM Complexity : 3 Standardized tests and measures addressing body structure, function, activity limitation and / or participation in recreation  MEDIUM Complexity : Evolving with changing characteristics  MEDIUM Complexity : FOTO score of 26-74      Based on the above components, the patient evaluation is determined to be of the following complexity level: MEDIUM Pain:  Pain Scale 1: Numeric (0 - 10)  Pain Intensity 1: 0 during eval  Pain Location 1: Neck; Throat  Pain Orientation 1: Left  Pain Description 1: Aching  Pain Intervention(s) 1: Medication (see MAR)  Activity Tolerance:   BP stable seated edge of bed  Please refer to the flowsheet for vital signs taken during this treatment. After treatment:   [ ]         Patient left in no apparent distress sitting up in chair  [X]         Patient left in no apparent distress in wheelchair in prep for transfer to radiology  [X]         Call bell left within reach  [X]         Nursing notified  [ ]         Caregiver present  [ ]         Bed alarm activated      COMMUNICATION/EDUCATION:   The patients plan of care was discussed with: Registered Nurse.  [X]         Fall prevention education was provided and the patient/caregiver indicated understanding. [X]         Patient/family have participated as able in goal setting and plan of care. [X]         Patient/family agree to work toward stated goals and plan of care. [ ]         Patient understands intent and goals of therapy, but is neutral about his/her participation. [ ]         Patient is unable to participate in goal setting and plan of care.      Thank you for this referral.  Estevan Nagy, PT, DPT   Time Calculation: 29 mins

## 2017-04-06 PROBLEM — Z98.1 S/P CERVICAL SPINAL FUSION: Status: ACTIVE | Noted: 2017-04-06

## 2017-04-06 PROCEDURE — 74011250637 HC RX REV CODE- 250/637: Performed by: NURSE PRACTITIONER

## 2017-04-06 PROCEDURE — 99218 HC RM OBSERVATION: CPT

## 2017-04-06 PROCEDURE — 65270000029 HC RM PRIVATE

## 2017-04-06 PROCEDURE — 97116 GAIT TRAINING THERAPY: CPT

## 2017-04-06 PROCEDURE — 97535 SELF CARE MNGMENT TRAINING: CPT

## 2017-04-06 PROCEDURE — 74011250637 HC RX REV CODE- 250/637: Performed by: ORTHOPAEDIC SURGERY

## 2017-04-06 PROCEDURE — G8987 SELF CARE CURRENT STATUS: HCPCS

## 2017-04-06 PROCEDURE — 74011250636 HC RX REV CODE- 250/636: Performed by: ORTHOPAEDIC SURGERY

## 2017-04-06 PROCEDURE — 97165 OT EVAL LOW COMPLEX 30 MIN: CPT

## 2017-04-06 RX ADMIN — OXYCODONE HYDROCHLORIDE 10 MG: 5 TABLET ORAL at 18:49

## 2017-04-06 RX ADMIN — METHYLPREDNISOLONE 4 MG: 4 TABLET ORAL at 09:06

## 2017-04-06 RX ADMIN — DIAZEPAM 5 MG: 5 TABLET ORAL at 19:48

## 2017-04-06 RX ADMIN — OXYCODONE HYDROCHLORIDE 10 MG: 5 TABLET ORAL at 12:50

## 2017-04-06 RX ADMIN — GABAPENTIN 400 MG: 100 CAPSULE ORAL at 09:05

## 2017-04-06 RX ADMIN — GABAPENTIN 400 MG: 100 CAPSULE ORAL at 16:01

## 2017-04-06 RX ADMIN — Medication 10 ML: at 21:21

## 2017-04-06 RX ADMIN — ACETAMINOPHEN 1000 MG: 500 TABLET ORAL at 12:49

## 2017-04-06 RX ADMIN — AMITRIPTYLINE HYDROCHLORIDE 25 MG: 25 TABLET, FILM COATED ORAL at 21:19

## 2017-04-06 RX ADMIN — METHYLPREDNISOLONE 4 MG: 4 TABLET ORAL at 18:14

## 2017-04-06 RX ADMIN — OXYCODONE HYDROCHLORIDE 10 MG: 5 TABLET ORAL at 16:01

## 2017-04-06 RX ADMIN — DOCUSATE SODIUM AND SENNOSIDES 1 TABLET: 8.6; 5 TABLET, FILM COATED ORAL at 09:06

## 2017-04-06 RX ADMIN — ACETAMINOPHEN 1000 MG: 500 TABLET ORAL at 08:09

## 2017-04-06 RX ADMIN — DOCUSATE SODIUM AND SENNOSIDES 1 TABLET: 8.6; 5 TABLET, FILM COATED ORAL at 18:14

## 2017-04-06 RX ADMIN — FAMOTIDINE 20 MG: 20 TABLET, FILM COATED ORAL at 09:05

## 2017-04-06 RX ADMIN — ACETAMINOPHEN 1000 MG: 500 TABLET ORAL at 18:18

## 2017-04-06 RX ADMIN — OXYCODONE HYDROCHLORIDE 10 MG: 5 TABLET ORAL at 09:11

## 2017-04-06 RX ADMIN — METHYLPREDNISOLONE 4 MG: 4 TABLET ORAL at 12:49

## 2017-04-06 RX ADMIN — METHYLPREDNISOLONE 8 MG: 4 TABLET ORAL at 21:19

## 2017-04-06 RX ADMIN — OXYCODONE HYDROCHLORIDE 10 MG: 5 TABLET ORAL at 21:43

## 2017-04-06 RX ADMIN — OXYCODONE HYDROCHLORIDE 10 MG: 5 TABLET ORAL at 03:23

## 2017-04-06 RX ADMIN — Medication 10 ML: at 08:09

## 2017-04-06 RX ADMIN — TAMSULOSIN HYDROCHLORIDE 0.4 MG: 0.4 CAPSULE ORAL at 09:06

## 2017-04-06 RX ADMIN — GABAPENTIN 400 MG: 100 CAPSULE ORAL at 21:18

## 2017-04-06 RX ADMIN — Medication 10 ML: at 13:59

## 2017-04-06 RX ADMIN — FAMOTIDINE 20 MG: 20 TABLET, FILM COATED ORAL at 18:14

## 2017-04-06 RX ADMIN — VITAMIN D, TAB 1000IU (100/BT) 2000 UNITS: 25 TAB at 09:05

## 2017-04-06 NOTE — PROGRESS NOTES
Problem: Self Care Deficits Care Plan (Adult)  Goal: *Acute Goals and Plan of Care (Insert Text)  Occupational Therapy Goals  Initiated 4/6/2017    1. Patient will perform lower body dressing with minimal assistance/contact guard assist using Reacher PRN within 7 days. 2. Patient will perform toileting with supervision/set-up using most appropriate DME within 7 days. 3. Patient will grooming at supervision/set-up within 7 days. 4. Patient will don/doff neck brace at minimal assistance/contact guard assist within 7 days. 5. Patient will verbalize/demonstrate 3/3 cervical precautions during ADL tasks without cues within 7 days. OCCUPATIONAL THERAPY EVALUATION  Patient: Olive Keys (61 y.o. female)  Date: 4/6/2017  Primary Diagnosis: CERVICALGIA RADICULOPATHY STENOSIS   CERVICALGIA RADICULOPATHY STENOSIS   S/P cervical spinal fusion  Procedure(s) (LRB):  C4-7 ANTERIOR CERVICAL DISCECTOMY WITH FUSION (N/A) 2 Days Post-Op   Precautions:          ASSESSMENT :  Based on the objective data described below, the patient presents with generalized endurance, strength, functional mobility, and ADLs. Pt was living with family and was independent prior. Pt was sitting up in chair when OT arrived. OT assessed pts LUE and noted the she has fair  strength in left hand,with the exception of only gravity eliminated movement in her thumb on the left. She has weak wrist movement in flexion , and extension but against gravity, and supination and pronation is weak is able to move against gravity. Pt has elbow extension, and flexion with gravity eliminated, and shoulder adduction. Pt noted to elevate her shoulder on the left when she was asked or assisted with shoulder flexion. Pt was walked to the sink, with SBA and setup for brushing her teeth and she was able to hold the toothpaste with left hand. She was set up for brushing her teeth and washing her face.   Pt was given yellow foam square for hand strength on the left.  Pt was left in the chair with call bell with int reach and recommend that pt be discharged to her daughter home and have out pt OT or PT for her LUE. Patient will benefit from skilled intervention to address the above impairments. Patients rehabilitation potential is considered to be Good  Factors which may influence rehabilitation potential include:   [X]             None noted  [ ]             Mental ability/status  [ ]             Medical condition  [ ]             Home/family situation and support systems  [ ]             Safety awareness  [ ]             Pain tolerance/management  [ ]             Other:        PLAN :  Recommendations and Planned Interventions:  [X]               Self Care Training                  [ ]        Therapeutic Activities  [X]               Functional Mobility Training    [ ]        Cognitive Retraining  [X]               Therapeutic Exercises           [X]        Endurance Activities  [ ]               Balance Training                   [X]        Neuromuscular Re-Education  [ ]               Visual/Perceptual Training     [X]   Home Safety Training  [X]               Patient Education                 [X]        Family Training/Education  [ ]               Other (comment):     Frequency/Duration: Patient will be followed by occupational therapy 5 times a week to address goals. Discharge Recommendations: Outpatient  Further Equipment Recommendations for Discharge: tbd       SUBJECTIVE:   Patient stated I am so glad my arm is doing better.       OBJECTIVE DATA SUMMARY:   HISTORY:   Past Medical History:   Diagnosis Date    Adverse effect of anesthesia       hard to wake up    Chronic pain      Depression      GERD (gastroesophageal reflux disease)      Low back pain      Thromboembolus (HCC)       blood clot in foot during pregnancy    Tobacco abuse      Vertigo       Past Surgical History:   Procedure Laterality Date    HX  SECTION         x3    HX HYSTERECTOMY        HX ORTHOPAEDIC   2002     2 disks removed lower back [facility].        Prior Level of Function/Home Situation: pt lives with family and was independent in all nu prior. Expanded or extensive additional review of patient history:      Home Situation  Home Environment: Apartment  # Steps to Enter: 0  One/Two Story Residence: One story  Living Alone: No  Support Systems: Family member(s)  Patient Expects to be Discharged to[de-identified] Private residence  Current DME Used/Available at Home: None  [X]  Right hand dominant             [ ]  Left hand dominant     EXAMINATION OF PERFORMANCE DEFICITS:  Cognitive/Behavioral Status:  Neurologic State: Alert  Orientation Level: Appropriate for age;Oriented X4  Cognition: Appropriate decision making; Follows commands  Perception: Appears intact  Perseveration: No perseveration noted  Safety/Judgement: Awareness of environment; Fall prevention     Skin: in good health      Edema: none     Hearing: Auditory  Auditory Impairment: None     Vision/Perceptual:    Tracking: Able to track stimulus in all quadrants w/o difficulty                                 Range of Motion:     AROM: Grossly decreased, non-functional (LUE functional)  PROM: Grossly decreased, non-functional (LUE functional)                       Strength:     Strength: Grossly decreased, non-functional (LUE functional strength)                 Coordination:  Coordination: Grossly decreased, non-functional (LUE intact)  Fine Motor Skills-Upper: Left Intact; Right Impaired    Gross Motor Skills-Upper: Left Intact; Right Impaired     Tone & Sensation:     Tone: Abnormal (in RUE)  Sensation: Intact                       Balance:  Sitting: Intact  Sitting - Static: Good (unsupported)  Sitting - Dynamic: Good (unsupported)  Standing: Impaired; Without support  Standing - Static: Good;Constant support  Standing - Dynamic : Fair     Functional Mobility and Transfers for ADLs:  Bed Mobility:  Supine to Sit: Stand-by asssistance  Sit to Supine: Stand-by asssistance     Transfers:  Sit to Stand: Stand-by asssistance  Stand to Sit: Stand-by asssistance  Bed to Chair: Stand-by asssistance  Toilet Transfer : Stand-by asssistance     ADL Assessment:  Feeding: Setup     Oral Facial Hygiene/Grooming: Setup     Bathing: Maximum assistance;Minimum assistance     Upper Body Dressing: Moderate assistance;Contact guard assistance     Lower Body Dressing: Maximum assistance;Minimum assistance     Toileting: Stand by assistance;Supervision            Barthel Index:      Bathin  Bladder: 10  Bowels: 10  Groomin  Dressin  Feedin  Mobility: 10  Stairs: 0  Toilet Use: 5  Transfer (Bed to Chair and Back): 10  Total: 55         Barthel and G-code impairment scale:  Percentage of impairment CH  0% CI  1-19% CJ  20-39% CK  40-59% CL  60-79% CM  80-99% CN  100%   Barthel Score 0-100 100 99-80 79-60 59-40 20-39 1-19    0   Barthel Score 0-20 20 17-19 13-16 9-12 5-8 1-4 0      The Barthel ADL Index: Guidelines  1. The index should be used as a record of what a patient does, not as a record of what a patient could do. 2. The main aim is to establish degree of independence from any help, physical or verbal, however minor and for whatever reason. 3. The need for supervision renders the patient not independent. 4. A patient's performance should be established using the best available evidence. Asking the patient, friends/relatives and nurses are the usual sources, but direct observation and common sense are also important. However direct testing is not needed. 5. Usually the patient's performance over the preceding 24-48 hours is important, but occasionally longer periods will be relevant. 6. Middle categories imply that the patient supplies over 50 per cent of the effort. 7. Use of aids to be independent is allowed. Tari Severe., Barthel, D.W. (9889). Functional evaluation: the Barthel Index. [facility] (14)2.   Leslie Pulido DEEP Fitzgerald, Rudy Ritchie., Alvina Aceves., Hildy Halsted. (1999). Measuring the change indisability after inpatient rehabilitation; comparison of the responsiveness of the Barthel Index and Functional Power Measure. Journal of Neurology, Neurosurgery, and Psychiatry, 66(4), 064-113. NILDA Farnsworth, TUCKER Malhotra, & Rosalina Mckinley M.A. (2004.) Assessment of post-stroke quality of life in cost-effectiveness studies: The usefulness of the Barthel Index and the EuroQoL-5D. Quality of Life Research, 13, 499-90         Cognitive Retraining  Safety/Judgement: Awareness of environment; Fall prevention           Functional Measure:        G codes: In compliance with CMSs Claims Based Outcome Reporting, the following G-code set was chosen for this patient based on their primary functional limitation being treated: The outcome measure chosen to determine the severity of the functional limitation was the Barthel with a score of 55/100 which was correlated with the impairment scale. · Self Care:               - CURRENT STATUS:    CK - 40%-59% impaired, limited or restricted               - GOAL STATUS:           CJ - 20%-39% impaired, limited or restricted               - D/C STATUS:                       ---------------To be determined---------------      Occupational Therapy Evaluation Charge Determination   History Examination Decision-Making   MEDIUM Complexity : Expanded review of history including physical, cognitive and psychosocial  history  MEDIUM Complexity : 3-5 performance deficits relating to physical, cognitive , or psychosocial skils that result in activity limitations and / or participation restrictions MEDIUM Complexity : Patient may present with comorbidities that affect occupational performnce.  Miniml to moderate modification of tasks or assistance (eg, physical or verbal ) with assesment(s) is necessary to enable patient to complete evaluation       Based on the above components, the patient evaluation is determined to be of the following complexity level: MEDIUM  Pain:  Pain Scale 1: Numeric (0 - 10)  Pain Intensity 1: 0  Pain Location 1: Neck  Pain Orientation 1: Anterior  Pain Description 1: Burning; Sore  Pain Intervention(s) 1: Medication (see MAR)  Activity Tolerance:   vss  Please refer to the flowsheet for vital signs taken during this treatment. After treatment:   [X] Patient left in no apparent distress sitting up in chair  [ ] Patient left in no apparent distress in bed  [X] Call bell left within reach  [X] Nursing notified  [ ] Caregiver present  [ ] Bed alarm activated      COMMUNICATION/EDUCATION:   The patients plan of care was discussed with: Physical Therapist and Registered Nurse.  [X] Home safety education was provided and the patient/caregiver indicated understanding. [X] Patient/family have participated as able in goal setting and plan of care. [X] Patient/family agree to work toward stated goals and plan of care. [ ] Patient understands intent and goals of therapy, but is neutral about his/her participation. [ ] Patient is unable to participate in goal setting and plan of care. This patients plan of care is appropriate for delegation to [facility].      Thank you for this referral.  Merlin Flores, OT  Time Calculation: 36 mins

## 2017-04-06 NOTE — PROGRESS NOTES
Spiritual Care Partner Volunteer visited patient in Ortho on 4/6/17.   Documented by:  ISAÍAS Oneill, [facility], [address] [place] [address] [address]     Paging Service  [phone])

## 2017-04-06 NOTE — PROGRESS NOTES
Problem: Patient Education: Go to Patient Education Activity  Goal: Patient/Family Education  Outcome: Progressing Towards Goal  Patient will use call bell for assistance before rising

## 2017-04-06 NOTE — PROGRESS NOTES
Problem: Mobility Impaired (Adult and Pediatric)  Goal: *Acute Goals and Plan of Care (Insert Text)  Physical Therapy Goals  Initiated 4/5/2017    1. Patient will move from supine to sit and sit to supine in bed with supervision/set-up within 4 days. 2. Patient will perform sit to stand with supervision/set-up within 4 days. 3. Patient will ambulate with modified independence for 150 feet with the least restrictive device within 4 days. 4. Patient will ascend/descend 4 stairs with 1 handrail(s) with modified independence within 4 days. 5. Patient will verbalize and demonstrate understanding of spinal precautions (No bending, lifting greater than 5 lbs, or twisting; log-roll technique; frequent repositioning as instructed) within 4 days. PHYSICAL THERAPY TREATMENT  Patient: Julia Mendoza (61 y.o. female)  Date: 4/6/2017  Precautions:  Falls, spinal      ASSESSMENT:  Patient received supine in bed and agreeable to therapy. Patient tolerated session well and made good progress towards goals. Patient completed supine<>sit with standby assist, sit<>stand with standby assist, and increased gait distance ambulated. Patient demonstrated decreased kingsley, no LOB. Patient requested to return to supine in bed at end of session. Patient continues to have decreased strength in RUE, but slowly improving and demonstrated good carryover from OT session. Patient will continue to benefit from PT to progress mobility as tolerated and reach highest level of independence. D/C rec: home with outpatient PT  Progression toward goals:  [ ]      Improving appropriately and progressing toward goals  [ ]      Improving slowly and progressing toward goals  [ ]      Not making progress toward goals and plan of care will be adjusted       PLAN:  Patient continues to benefit from skilled intervention to address the above impairments. Continue treatment per established plan of care.   Discharge Recommendations:  Outpatient  Further Equipment Recommendations for Discharge:  none       SUBJECTIVE:   Patient stated I feel good.    The patient stated 3/3 back precautions. Reviewed all 3 with patient. OBJECTIVE DATA SUMMARY:   Functional Mobility Training:  Bed Mobility:  Log    Supine to Sit: Stand-by asssistance  Sit to Supine: Stand-by asssistance        Transfers:  Sit to Stand: Stand-by asssistance  Stand to Sit: Stand-by asssistance   Ambulation/Gait Training:  Distance (ft): 250 Feet (ft)  Assistive Device: Gait belt;Brace/Splint  Ambulation - Level of Assistance: Stand-by asssistance;Contact guard assistance        Gait Abnormalities: Decreased step clearance (decreased arm swing R)        Base of Support: Widened     Speed/Debbie: Pace decreased (<100 feet/min)  Step Length: Right shortened;Left shortened     Pain:  Pain Scale 1: Numeric (0 - 10)  Pain Intensity 1: 0  Pain Location 1: Neck  Pain Orientation 1: Anterior  Pain Description 1: Burning; Sore  Pain Intervention(s) 1: Medication (see MAR)  Activity Tolerance:   Good. Please refer to the flowsheet for vital signs taken during this treatment.   After treatment:   [ ]  Patient left in no apparent distress sitting up in chair  [X]  Patient left in no apparent distress in bed  [X]  Call bell left within reach  [X]  Nursing notified  [ ]  Caregiver present  [ ]  Bed alarm activated      COMMUNICATION/COLLABORATION:   The patients plan of care was discussed with: Registered Nurse     Martinez Mccormick PT, DPT   Time Calculation: 13 mins

## 2017-04-06 NOTE — PROGRESS NOTES
Bedside interdisciplinary rounds were held today to discuss patient plan of care and outcomes. The following members were present: Nurse Practitioners, Nurse, Clinical Care Leader, Pharmacy, Physical Therapy, and Case Management. Plan:  RUE weakness and swallowing improving with steroids. Patient's plan is for home with daughter who lives in [place].

## 2017-04-06 NOTE — PROGRESS NOTES
ORTHO POST OP SPINE PROGRESS NOTE    2017  Admit Date: 2017  Admit Diagnosis: CERVICALGIA RADICULOPATHY STENOSIS   CERVICALGIA RADICULOPATHY STENOSIS   Procedure: Procedure(s):  C4-7 ANTERIOR CERVICAL DISCECTOMY WITH FUSION  Post Op day: 2 Days Post-Op    Subjective: Yelena Rodriguez is a patient who has complaints of neck pain and arm weakness s/p C4-7 ACDF. new onset arm weakness following surgery. rec steroids yesterday although notes little improvement. has worked with PT/OT . Review of Systems: Pertinent items are noted in HPI. Objective:     PT/OT:   Distance Ambulated:           Time Ambulated (min):        Ambulation Response: Activity Response: Fairly tolerated  Assistive Device:              Assistive Device: Walker (comment), Fall prevention device    Vital Signs:    Blood pressure 132/69, pulse 79, temperature 97.9 °F (36.6 °C), resp. rate 16, height 5' 3\" (1.6 m), weight 94.3 kg (207 lb 14.3 oz), SpO2 98 %, not currently breastfeeding. Temp (24hrs), Av.9 °F (37.2 °C), Min:97.9 °F (36.6 °C), Max:100.5 °F (38.1 °C)          1901 -  0700  In: 1147.1 [P.O.:120; I.V.:1027.1]  Out: 2655 [Urine:2550; Drains:105]    LAB:    No results for input(s): HGB, HGBEXT, WBC, PLT, PLTEXT, HGBEXT, PLTEXT in the last 72 hours.     Wound/Drain Assessment:  Drain:      Dressing:     Physical Exam:  Neurological: weak in RUE diffuse  Incision clean, dry, and intact  0/1/1/2/2/3 RUE    Assessment:      Patient Active Problem List   Diagnosis Code    Chronic low back pain M54.5, G89.29    Depression F32.9    Tobacco abuse Z72.0       Plan:     Continue PT/OT/Rehab  Discontinue: IV and drain (cervical)  Consult: PT  and OT    Discharge To: home v rehab       Signed By: EDIN Villagomez

## 2017-04-06 NOTE — PROGRESS NOTES
1500- Bedside shift change report given to [address] [facility] (oncoming nurse) by Laura Gallegos (offgoing nurse). Report included the following information SBAR, Kardex, Procedure Summary, Intake/Output, MAR and Recent Results.

## 2017-04-06 NOTE — PROGRESS NOTES
Problem: Mobility Impaired (Adult and Pediatric)  Goal: *Acute Goals and Plan of Care (Insert Text)  Physical Therapy Goals  Initiated 4/5/2017    1. Patient will move from supine to sit and sit to supine in bed with supervision/set-up within 4 days. 2. Patient will perform sit to stand with supervision/set-up within 4 days. 3. Patient will ambulate with modified independence for 150 feet with the least restrictive device within 4 days. 4. Patient will ascend/descend 4 stairs with 1 handrail(s) with modified independence within 4 days. 5. Patient will verbalize and demonstrate understanding of spinal precautions (No bending, lifting greater than 5 lbs, or twisting; log-roll technique; frequent repositioning as instructed) within 4 days. PHYSICAL THERAPY TREATMENT  Patient: Ruddy Tejada (61 y.o. female)  Date: 4/6/2017  Diagnosis: CERVICALGIA RADICULOPATHY STENOSIS   CERVICALGIA RADICULOPATHY STENOSIS  <principal problem not specified>  Procedure(s) (LRB):  C4-7 ANTERIOR CERVICAL DISCECTOMY WITH FUSION (N/A) 2 Days Post-Op  Precautions:   Falls, Spine      ASSESSMENT:  Patient received supine in bed and agreeable to therapy. Patient tolerated session well and making good progress towards goals. Patient required min assist for supine to sidelying position to bring RUE across midline due to significant RUE weakness (proximal >distal). Patient then able to perform sidelying to seated EOB with CGA. Patient performed sit<>stand with standby assist from bed and toilet without assistive device. Patient increased gait distance ambulated without assistive device and demonstrated no LOB. Patient demonstrated improved confidence with gait. Patient remained seated in chair at end of session. Patient will continue to benefit from PT to progress mobility as tolerated and reach highest level of independence. Patient would mostly benefit from outpatient PT with emphasis on RUE weakness.    Progression toward goals:  [X] Improving appropriately and progressing toward goals  [ ]      Improving slowly and progressing toward goals  [ ]      Not making progress toward goals and plan of care will be adjusted       PLAN:  Patient continues to benefit from skilled intervention to address the above impairments. Continue treatment per established plan of care. Discharge Recommendations:  Outpatient  Further Equipment Recommendations for Discharge:         SUBJECTIVE:   Patient stated I'm feeling better, just this arm won't move.    The patient stated 0/3 back precautions. Reviewed all 3 with patient. OBJECTIVE DATA SUMMARY:   Critical Behavior:  Neurologic State: Alert  Orientation Level: Oriented X4  Cognition: Appropriate for age attention/concentration, Follows commands     Functional Mobility Training:  Bed Mobility:  Log    Supine to Sit: Minimum assistance (to bring RUE across midline)   Cervical collar in place upon arrival to room  Transfers:  Sit to Stand: Stand-by asssistance  Stand to Sit: Stand-by asssistance           Balance:  Sitting: Intact  Standing: Impaired  Standing - Static: Good  Standing - Dynamic : Fair  Ambulation/Gait Training:  Distance (ft): 200 Feet (ft)  Assistive Device: Gait belt;Brace/Splint  Ambulation - Level of Assistance: Stand-by asssistance;Contact guard assistance        Gait Abnormalities: Decreased step clearance (decreased arm swing R)        Base of Support: Widened     Speed/Debbie: Pace decreased (<100 feet/min)  Step Length: Right shortened;Left shortened        Pain:  Pain Scale 1: Numeric (0 - 10)  Pain Intensity 1: 0  Pain Location 1: Neck  Pain Orientation 1: Anterior;Posterior  Pain Description 1: Burning; Sore  Pain Intervention(s) 1: Medication (see MAR)  Activity Tolerance:   Good  Please refer to the flowsheet for vital signs taken during this treatment.   After treatment:   [X]  Patient left in no apparent distress sitting up in chair  [ ]  Patient left in no apparent distress in bed  [X]  Call bell left within reach  [X]  Nursing notified  [ ]  Caregiver present  [ ]  Bed alarm activated      COMMUNICATION/COLLABORATION:   The patients plan of care was discussed with: Occupational Therapist and Registered Nurse     Bety Mandujano PT, DPT   Time Calculation: 15 mins

## 2017-04-07 VITALS
DIASTOLIC BLOOD PRESSURE: 85 MMHG | WEIGHT: 207.89 LBS | HEART RATE: 69 BPM | HEIGHT: 63 IN | OXYGEN SATURATION: 96 % | TEMPERATURE: 98.8 F | SYSTOLIC BLOOD PRESSURE: 142 MMHG | RESPIRATION RATE: 16 BRPM | BODY MASS INDEX: 36.84 KG/M2

## 2017-04-07 PROCEDURE — 99218 HC RM OBSERVATION: CPT

## 2017-04-07 PROCEDURE — 74011250637 HC RX REV CODE- 250/637: Performed by: NURSE PRACTITIONER

## 2017-04-07 PROCEDURE — 74011250636 HC RX REV CODE- 250/636: Performed by: ORTHOPAEDIC SURGERY

## 2017-04-07 PROCEDURE — 97530 THERAPEUTIC ACTIVITIES: CPT

## 2017-04-07 PROCEDURE — 97116 GAIT TRAINING THERAPY: CPT

## 2017-04-07 PROCEDURE — 74011250637 HC RX REV CODE- 250/637: Performed by: ORTHOPAEDIC SURGERY

## 2017-04-07 RX ORDER — OXYCODONE HYDROCHLORIDE 5 MG/1
5-10 TABLET ORAL
Qty: 80 TAB | Refills: 0 | Status: SHIPPED | OUTPATIENT
Start: 2017-04-07 | End: 2018-07-23

## 2017-04-07 RX ORDER — POLYETHYLENE GLYCOL 3350 17 G/17G
17 POWDER, FOR SOLUTION ORAL DAILY
Qty: 255 G | Refills: 0 | Status: SHIPPED | OUTPATIENT
Start: 2017-04-07 | End: 2017-04-22

## 2017-04-07 RX ORDER — AMOXICILLIN 250 MG
1 CAPSULE ORAL 2 TIMES DAILY
Qty: 60 TAB | Refills: 0 | Status: SHIPPED | OUTPATIENT
Start: 2017-04-07 | End: 2018-06-12 | Stop reason: ALTCHOICE

## 2017-04-07 RX ORDER — ACETAMINOPHEN 500 MG
1000 TABLET ORAL EVERY 6 HOURS
Qty: 112 TAB | Refills: 0 | Status: SHIPPED | OUTPATIENT
Start: 2017-04-07 | End: 2017-04-21

## 2017-04-07 RX ORDER — METHYLPREDNISOLONE 4 MG/1
TABLET ORAL
Qty: 8 TAB | Refills: 0 | Status: SHIPPED | OUTPATIENT
Start: 2017-04-07 | End: 2018-06-12 | Stop reason: ALTCHOICE

## 2017-04-07 RX ADMIN — GABAPENTIN 400 MG: 100 CAPSULE ORAL at 09:17

## 2017-04-07 RX ADMIN — OXYCODONE HYDROCHLORIDE 10 MG: 5 TABLET ORAL at 09:17

## 2017-04-07 RX ADMIN — VITAMIN D, TAB 1000IU (100/BT) 2000 UNITS: 25 TAB at 09:17

## 2017-04-07 RX ADMIN — ACETAMINOPHEN 1000 MG: 500 TABLET ORAL at 05:41

## 2017-04-07 RX ADMIN — Medication 10 ML: at 05:44

## 2017-04-07 RX ADMIN — ACETAMINOPHEN 1000 MG: 500 TABLET ORAL at 12:39

## 2017-04-07 RX ADMIN — DOCUSATE SODIUM AND SENNOSIDES 1 TABLET: 8.6; 5 TABLET, FILM COATED ORAL at 09:17

## 2017-04-07 RX ADMIN — METHYLPREDNISOLONE 4 MG: 4 TABLET ORAL at 09:17

## 2017-04-07 RX ADMIN — POLYETHYLENE GLYCOL 3350 17 G: 17 POWDER, FOR SOLUTION ORAL at 09:18

## 2017-04-07 RX ADMIN — GABAPENTIN 400 MG: 100 CAPSULE ORAL at 15:55

## 2017-04-07 RX ADMIN — OXYCODONE HYDROCHLORIDE 10 MG: 5 TABLET ORAL at 12:39

## 2017-04-07 RX ADMIN — OXYCODONE HYDROCHLORIDE 10 MG: 5 TABLET ORAL at 15:55

## 2017-04-07 RX ADMIN — FAMOTIDINE 20 MG: 20 TABLET, FILM COATED ORAL at 09:17

## 2017-04-07 RX ADMIN — OXYCODONE HYDROCHLORIDE 10 MG: 5 TABLET ORAL at 05:41

## 2017-04-07 RX ADMIN — METHYLPREDNISOLONE 4 MG: 4 TABLET ORAL at 12:39

## 2017-04-07 RX ADMIN — OXYCODONE HYDROCHLORIDE 10 MG: 5 TABLET ORAL at 01:43

## 2017-04-07 NOTE — PROGRESS NOTES
Pt to discharge home today and stay with her daughter in the [place] area. Pt stated her daughter was aware of an outpatient PT facility in the area and would make the appointment post discharge. No additional needs.     Evert Hope, MSW  Ext [phone]

## 2017-04-07 NOTE — PROGRESS NOTES
Problem: Mobility Impaired (Adult and Pediatric)  Goal: *Acute Goals and Plan of Care (Insert Text)  Physical Therapy Goals  Initiated 4/5/2017    1. Patient will move from supine to sit and sit to supine in bed with supervision/set-up within 4 days. 2. Patient will perform sit to stand with supervision/set-up within 4 days. 3. Patient will ambulate with modified independence for 150 feet with the least restrictive device within 4 days. 4. Patient will ascend/descend 4 stairs with 1 handrail(s) with modified independence within 4 days. 5. Patient will verbalize and demonstrate understanding of spinal precautions (No bending, lifting greater than 5 lbs, or twisting; log-roll technique; frequent repositioning as instructed) within 4 days. PHYSICAL THERAPY TREATMENT     Patient: Ruddy Tejada (61 y.o. female)  Date: 4/7/2017  Diagnosis: CERVICALGIA RADICULOPATHY STENOSIS   CERVICALGIA RADICULOPATHY STENOSIS   S/P cervical spinal fusion <principal problem not specified>  Procedure(s) (LRB):  C4-7 ANTERIOR CERVICAL DISCECTOMY WITH FUSION (N/A) 3 Days Post-Op  Precautions:    Chart, physical therapy assessment, plan of care and goals were reviewed. ASSESSMENT:  Pt progressing well with mobility as she is at supervision level. Pt ambulated 300 ft without AD and also performed 8 steps with 1 hand rail. Pt instructed in safety and pacing. Pt also instructed in self ROM and ex's for RUE. Pt to discharge home with family and HHPT. Pt ready for discharge from PT standpoint. Progression toward goals:  [X]      Improving appropriately and progressing toward goals  [ ]      Improving slowly and progressing toward goals  [ ]      Not making progress toward goals and plan of care will be adjusted       PLAN:  Patient continues to benefit from skilled intervention to address the above impairments. Continue treatment per established plan of care.   Discharge Recommendations:  Home Health with transition to Outpatient PT   Further Equipment Recommendations for Discharge:  none       SUBJECTIVE:   Patient stated Bill Wray still can't do a lot with the right arm.    The patient stated 3/3 back precautions. Reviewed all 3 with patient. OBJECTIVE DATA SUMMARY:   Critical Behavior:  Neurologic State: Alert  Orientation Level: Oriented X4  Cognition: Follows commands  Safety/Judgement: Awareness of environment, Fall prevention  Functional Mobility Training:  Bed Mobility:  Log Rolling: Supervision (to left side)  Supine to Sit: Supervision  Sit to Supine: Supervision           Transfers:  Sit to Stand: Supervision  Stand to Sit: Supervision              Balance:  Sitting: Intact  Standing - Static: Good  Standing - Dynamic : Fair  Ambulation/Gait Training:  Distance (ft): 300 Feet (ft)  Assistive Device: Gait belt  Ambulation - Level of Assistance: Supervision  Gait Abnormalities: Decreased step clearance (decreased RUE swing)  Base of Support: Widened     Speed/Debbie: Pace decreased (<100 feet/min)                    Stairs:  Number of Stairs Trained: 4  Stairs - Level of Assistance: Stand-by asssistance  Rail Use: Both  Therapeutic Exercises:   Pt instructed in AROM ex's of: shoulder shrugs, scapular retraction; AAROM shoulder flex/ext, horizontal abd/add; AAROM elbow flex/ext, wrist flex/ext and hand flex/ext     Pain:  Pain Scale 1: Numeric (0 - 10)  Pain Intensity 1: 8  Pain Location 1: Neck  Pain Orientation 1: Anterior  Pain Description 1: Aching  Pain Intervention(s) 1: Medication (see MAR)  Activity Tolerance:   No s/s of distress with activity     Please refer to the flowsheet for vital signs taken during this treatment.      After treatment:   [ ] Patient left in no apparent distress sitting up in chair  [X] Patient left in no apparent distress in bed  [X] Call bell left within reach  [X] Nursing notified  [ ] Caregiver present  [ ] Bed alarm activated      COMMUNICATION/COLLABORATION:   The patients plan of care was discussed with: Registered Nurse and ortho NP     Bryan Tarango PTA   Time Calculation: 30 mins

## 2017-04-07 NOTE — PROGRESS NOTES
ORTHO POST OP SPINE PROGRESS NOTE    2017  Admit Date: 2017  Admit Diagnosis: CERVICALGIA RADICULOPATHY STENOSIS   CERVICALGIA RADICULOPATHY STENOSIS   S/P cervical spinal fusion  Procedure: Procedure(s):  C4-7 ANTERIOR CERVICAL DISCECTOMY WITH FUSION  Post Op day: 3 Days Post-Op    Subjective: Emmie Vaz is a patient who has complaints of neck and RUE pain and weakness s/p C4-7 ACDF. has had diffuse weakness in RUE since surgery. noting more pain in her R thumb this morning stating that it is sensitive to touch. She notes improvement with swallowing. tolerating po and able to void. .     Review of Systems: Pertinent items are noted in HPI. Objective:     PT/OT:   Distance Ambulated:           Time Ambulated (min):        Ambulation Response: Activity Response: Fairly tolerated  Assistive Device:              Assistive Device: Fall prevention device    Vital Signs:    Blood pressure 127/75, pulse 75, temperature 98.5 °F (36.9 °C), resp. rate 18, height 5' 3\" (1.6 m), weight 94.3 kg (207 lb 14.3 oz), SpO2 94 %, not currently breastfeeding. Temp (24hrs), Av.5 °F (36.9 °C), Min:98.3 °F (36.8 °C), Max:98.8 °F (37.1 °C)          1901 -  0700  In: 960 [P.O.:960]  Out: 2615 [Urine:2550; Drains:65]    LAB:    No results for input(s): HGB, HGBEXT, WBC, PLT, PLTEXT, HGBEXT, PLTEXT in the last 72 hours. Wound/Drain Assessment:  Drain:      Dressing:     Physical Exam:  Neurological: weak in RUE  Incision clean, dry, and intact  /2/3/3/3 RUE    Assessment:      Patient Active Problem List   Diagnosis Code    Chronic low back pain M54.5, G89.29    Depression F32.9    Tobacco abuse Z72.0    S/P cervical spinal fusion Z98.1       Plan:     Continue PT/OT/Rehab  Discontinue: IV  Consult: PT  and OT    Discharge To: home.    Request PT work with shoulder ROM      Signed By: EDIN Currie

## 2017-04-07 NOTE — PROGRESS NOTES
Bedside and Verbal shift change report given to [address] (oncoming nurse) by Candi Geiger RN (offgoing nurse). Report included the following information SBAR, Kardex, OR Summary, Procedure Summary, Intake/Output, MAR and Recent Results.

## 2017-04-07 NOTE — PROGRESS NOTES
Bedside and Verbal shift change report given to Nikos FITCH RN (oncoming nurse) by John Hart (offgoing nurse). Report included the following information SBAR, Kardex, OR Summary, Procedure Summary, Intake/Output, MAR, Recent Results and Med Rec Status.

## 2017-04-07 NOTE — DISCHARGE SUMMARY
Spine Discharge Summary    Patient ID:  Wagner Ruggiero  [id number]  female  61 y.o.  1960    Admit date: 4/4/2017    Discharge date: 4/7/2017    Admitting Physician: Estefanía Dorado MD     Consulting Physician(s):   Treatment Team: Attending Provider: Estefanía Dorado MD; Care Manager: JAIDEN Riggins; Utilization Review: Vishal Lin    Date of Surgery:   4/4/2017     Preoperative Diagnosis:  CERVICALGIA  RADICULOPATHY  STENOSIS     Postoperative Diagnosis:   CERVICALGIA  RADICULOPATHY  STENOSIS     Procedure(s):  C4-7 ANTERIOR CERVICAL DISCECTOMY WITH FUSION     Anesthesia Type:   General     Surgeon: Estefanía Dorado MD                            HPI:  Pt is a 61 y.o. female who has a history of CERVICALGIA  RADICULOPATHY  STENOSIS   with pain and limitations of activities of daily living who presents at this time for a C4-C7 ACDF following the failure of conservative management. PMH:   Past Medical History:   Diagnosis Date    Adverse effect of anesthesia     hard to wake up    Chronic pain     Depression     GERD (gastroesophageal reflux disease)     Low back pain     Thromboembolus (HCC)     blood clot in foot during pregnancy    Tobacco abuse     Vertigo        Medications upon admission :   Prior to Admission Medications   Prescriptions Last Dose Informant Patient Reported? Taking?   amitriptyline (ELAVIL) 25 mg tablet 4/2/2017  No No   Sig: Take 1 Tab by mouth nightly. cholecalciferol, vitamin D3, (VITAMIN D3) 2,000 unit tab   Yes No   Sig: Take 1 Tab by mouth daily. gabapentin (NEURONTIN) 400 mg capsule 4/2/2017  No No   Sig: Take 1 Cap by mouth three (3) times daily. meclizine (ANTIVERT) 25 mg tablet 3/4/2017 at Unknown time  No Yes   Sig: Take 1 Tab by mouth three (3) times daily as needed for Dizziness. naproxen (NAPROSYN) 375 mg tablet 4/2/2017  No No   Sig: Take 1 Tab by mouth two (2) times daily as needed.    ondansetron (ZOFRAN ODT) 4 mg disintegrating tablet 3/4/2017 at Unknown time  No Yes   Sig: Take 1 Tab by mouth every eight (8) hours as needed for Nausea. Facility-Administered Medications: None        Allergies: Allergies   Allergen Reactions    n [Penicillins] [facility] [place] Course: The patient underwent surgery. Complications:  None; patient tolerated the procedure well. Was taken to the PACU in stable condition and then transferred to the ortho floor. Post-op noted to have RUE weakness and paresthesias. This was treated with PO steroids (Medrol DosePak) and aggressive OT and PT. Patient is improving and will continue the steroids to completion and continue PT/OT. Perioperative Antibiotics:  vancomycin     Postoperative Pain Management:  Oxycodone     Postoperative transfusions:    Number of units banked PRBCs =   none     Post Op complications: none    Hemoglobin at discharge:    Lab Results   Component Value Date/Time    HGB 11.5 03/27/2017 03:40 PM    INR 1.0 03/27/2017 03:40 PM       Dressing Prineo - clean, dry and intact. No significant erythema or swelling. Neurovascular exam found to be within normal limits. Wound appears to be healing without any evidence of infection. Pt had a ANANT drain that was removed on POD# 2. Physical Therapy started on the day following surgery and participated in bed mobility, transfers and ambulation. Gait:  Gait  Base of Support: Widened  Speed/Debbie: Pace decreased (<100 feet/min)  Step Length: Right shortened, Left shortened  Gait Abnormalities: Decreased step clearance (decreased RUE swing)  Ambulation - Level of Assistance: Supervision  Distance (ft): 300 Feet (ft)  Assistive Device: Gait belt  Rail Use: Both  Stairs - Level of Assistance: Stand-by asssistance  Number of Stairs Trained: 4                   Discharged to: Home.     Condition on Discharge:   serious    Discharge instructions:    - Take pain medications as prescribed  - Resume pre hospital diet      - Discharge activity: activity as tolerated  - Ambulate as tolerated  - Wound Care Keep wound clean and dry. See discharge instruction sheet.  - Staples, if present, to be removed 10-14 days after surgery            -DISCHARGE MEDICATION LIST     Current Discharge Medication List      START taking these medications    Details   acetaminophen (TYLENOL) 500 mg tablet Take 2 Tabs by mouth every six (6) hours for 14 days. Qty: 112 Tab, Refills: 0      methylPREDNISolone (MEDROL) 4 mg tab Take two more doses (dinner and bedtime) 4/7  Take three times with meals 4/8  Take with breakfast and supper 4/9  Take with breakfast 4/10  Qty: 8 Tab, Refills: 0      oxyCODONE IR (ROXICODONE) 5 mg immediate release tablet Take 1-2 Tabs by mouth every three (3) hours as needed. Max Daily Amount: 80 mg.  Qty: 80 Tab, Refills: 0      polyethylene glycol (MIRALAX) 17 gram/dose powder Take 17 g by mouth daily for 15 days. Qty: 255 g, Refills: 0      senna-docusate (SENNA PLUS) 8.6-50 mg per tablet Take 1 Tab by mouth two (2) times a day. Qty: 60 Tab, Refills: 0         CONTINUE these medications which have NOT CHANGED    Details   ondansetron (ZOFRAN ODT) 4 mg disintegrating tablet Take 1 Tab by mouth every eight (8) hours as needed for Nausea. Qty: 12 Tab, Refills: 1    Associated Diagnoses: Nausea      meclizine (ANTIVERT) 25 mg tablet Take 1 Tab by mouth three (3) times daily as needed for Dizziness. Qty: 30 Tab, Refills: 6    Associated Diagnoses: Dizziness      cholecalciferol, vitamin D3, (VITAMIN D3) 2,000 unit tab Take 1 Tab by mouth daily. amitriptyline (ELAVIL) 25 mg tablet Take 1 Tab by mouth nightly. Qty: 90 Tab, Refills: 3      gabapentin (NEURONTIN) 400 mg capsule Take 1 Cap by mouth three (3) times daily.   Qty: 270 Cap, Refills: 11         STOP taking these medications       naproxen (NAPROSYN) 375 mg tablet Comments:   Reason for Stopping:            per medical continuation form      -Follow up in office in 2 weeks      Signed:  Charis Pool. Beny  MSN, APRN, NP-C, [facility]  Orthopaedic Nurse Practitioner    4/7/2017  12:08 PM

## 2017-04-07 NOTE — PROGRESS NOTES
Bedside and Verbal shift change report given to [place] [address] (oncoming nurse) by Cyndy Oshea RN (offgoing nurse). Report included the following information SBAR, Kardex, MAR, Recent Results and Med Rec Status.

## 2017-04-07 NOTE — PROGRESS NOTES
Bedside and Verbal shift change report given to [place] [address] (oncoming nurse) by Shelley Quinteros RN (offgoing nurse). Report included the following information SBAR, Kardex, Intake/Output, MAR, Recent Results and Med Rec Status.

## 2017-04-07 NOTE — DISCHARGE INSTRUCTIONS
[facility]    Discharge Instruction Sheet: Anterior Cervical Fusion    DR. Isaiah Nesbitt    Pain control:  Typically, we will prescribe a narcotic usually 1-2 tabs every four hours is sufficient for the pain. Most patients need this only for the first few weeks. You should discontinue this as the pain decreases. You should not drive while taking any narcotic pain medications. Constipation  Pain medicines and anesthesia can be constipating-this can be prevented by gentle physical activity and drinking plenty of fluid. It should be treated with over-the-counter medications such as Miralax or suppositories, and/or Fleets enema. You should have a bowel movement at least every other day following surgery. Incision care   Keep this area clean and dry. Do not remove the dressing. DO NOT take a tub bath or go swimming until cleared by your doctor. DO NOT apply lotions, oils, or creams to incision. Cover the wound with an impermiable dressing to shower for then next 5 days, then no cover is needed. Wear cervical collar for comfort. If staples are in place, they should be removed about 14-20 days after surgery. To increase and promote healing:   Stop Smoking (or at least cut back on smoking).  Eat a well-balanced diet (high in protein and vitamin C)   If your appetite is poor, consider nutritional supplements like Ensure, Glucerna, or [place] Instant Breakfast.   If you are diabetic, controlling you blood sugars is very important to prevent infection and promote wound healing. Nutrition:   If you were on a supplement such as Ensure or Glucerna) while in the hospital, please continue using them with each meal for the next 30 days.    Eat a well-balanced diet - High in protein, high in vitamins and minerals, especially vitamin C and zinc.     Restrictions:  Limited bending at waist  Lift no more than 10 pounds    Warning signs :   Please call your physician IMMEDIATELY at [phone] if you have:   If you have throat soreness that worsens   If you have difficulty swallowing.  If you have any difficulty breathing   Bleeding from incision that is constant.  Change in mental status (unusual behavior or confusion)   If your incision develops redness or swelling   Change in wound drainage (increase in amount, color, or foul odor)   [place] over 101.5 degrees Fahrenheit    Headache that is not relieved with pain medication   Tenderness or redness in the calf of your leg    Emergency: CALL 911 if you have:   Any Difficulty Breathing or Shortness of breath   Difficulty Swallowing   Chest pain   Localized chest pain when coughing or taking a deep breath      Take your steroid medication as directed until it is complete. Perform shoulder range of motion exercises as directed by PT to prevent frozen shoulder. Obtain and use over the door pulley for shoulder range of motion. (Examples below)      Follow-up  Please call Dr. Kaya Garcia office for a follow up appointment in 2 weeks at [phone]. You can return to work when cleared by a physician. During normal business hours you may reach Dr. Cynthia Loco' team directly at [phone] if you have concerns or questions.

## 2017-04-07 NOTE — PROGRESS NOTES
Discharge instructions reviewed with the patient. The patient able to verbalize an understanding. All personal belongings with the patient. The patient left via wheelchair.

## 2017-04-10 ENCOUNTER — TELEPHONE (OUTPATIENT)
Dept: INTERNAL MEDICINE CLINIC | Age: 57
End: 2017-04-10

## 2017-04-10 NOTE — TELEPHONE ENCOUNTER
Pt is listed on [facility] discharge report dated 4/7/17. PCP listed is Dr. Susi Del Angel who is no longer aligned with [facility]. This writer reaches out to pt who says she is still being cared for by Dr. Nnamdi Barboza at his new location. This writer wished pt a speedy recovery and ended the call not opening an MICHAEL episode.

## 2017-05-01 ENCOUNTER — HOSPITAL ENCOUNTER (OUTPATIENT)
Dept: PHYSICAL THERAPY | Age: 57
Discharge: HOME | End: 2017-05-01
Payer: MEDICAID

## 2017-05-01 PROCEDURE — 97163 PT EVAL HIGH COMPLEX 45 MIN: CPT

## 2017-05-01 PROCEDURE — 97110 THERAPEUTIC EXERCISES: CPT

## 2017-05-01 NOTE — PROGRESS NOTES
PT INITIAL EVALUATION NOTE 2-15    Patient Name: Kinjal Jaimes  Date:2017  : 1960  [x]  Patient  Verified  Payor: Marcos Meek / Plan: [id number] / Product Type: Managed Care Medicaid /    In time:100  Out time:200  Total Treatment Time (min): 60  Visit #: 1     Treatment Area: 5511    SUBJECTIVE  Pain Level (0-10 scale): 6  Any medication changes, allergies to medications, adverse drug reactions, diagnosis change, or new procedure performed?: [] No    [x] Yes (see summary sheet for update)  Subjective:   s  Patient seen here in 2016 for cervical pain and right upper extremity radiculopathy. Conservative measures failed. She underwent a C4-C7 ACDF on 17 by Dr. Figueroa Cutler. She states that she wore a cervical collar for 2 weeks post op but was able to remove it per MD orders. She is using a bone stimulator 30 minutes 2x/day since last week and using shoulder pulleys at home per MD orders (2x/week). Since surgery she has had very limited motion and usage of her right upper extremity, primarily from her shoulder. She has diffuse right sided upper trapezius/parascapular discomfort. She reports altered sensation throughout her right arm, \"pins/needles\" feeling in her thumb and 2nd digit  and a feeling like something is \"sticking her\" in the tip of her right thumb thumb. Patient denies any left UE s/s. Right handed. Not working. MD follow up in a month. Per patient report, no restrictions in place. PMH includes lumbar discectomy several years ago.,     OBJECTIVE/EXAMINATION   Forward head, rounded shoulder posture. Incision appears to be healing well.   Cervical AROM:  Flexion and extension 20, left and right lateral flexion 12, left rotation 40, right rotation 30  Major movement restrictions in motion in her right UE:  AROM right shoulder flexion and abduction ~ 5 degree's with shoulder hike compensation noted, active elbow flexion only able to be performed with thumb up (brachioradialis) and limited to 110 degree's vs 140 on the left, IR to L4/L5 vs T7 on the left, ER limited ~20% with b/l comparison. Right wrist extension/flexion and finger botions all WFL. PROM unrestricted throughout her right UE  MMT:  Right UE - shoulder flexion 1/5, abduction 1/5, ER 2/5, IR 4/5, elbow flexion 2/5, extension 5/5, wrist flexion/extension 5/5.  strength average of two trials:  Right 32.8 lbs, Left 46 lbs. Good scapular retraction. Noted atrophy in her right deltoid, infraspinatus fossa. 20 min Therapeutic Exercise:  [x] See flow sheet :   Rationale: increase ROM and increase strength to improve the patients ability to move her right arm           With   [x] TE   [] TA   [] neuro   [] other: Patient Education: [x] Review HEP    [] Progressed/Changed HEP based on:   [] positioning   [x] body mechanics   [] transfers   [] heat/ice application    [] other:        Other Objective/Functional Measures:    Pain Level (0-10 scale) post treatment: 6      ASSESSMENT:    Patient 4 weeks post op ACDF 4 weeks ago. Her primary physical therapy findings include limited right shoulder and upper extremity ROM and strength, decreased cervical motion, Right upper extremity radiculopathy including altered sensation, numbness, tingling.     [x]  See Plan of 718 Alyx Felton, PT 5/1/2017  1:53 PM

## 2017-05-01 NOTE — PROGRESS NOTES
[facility]  [address]  Phone: [phone]  Fax: [phone]    Plan of Care/Statement of Necessity for Physical Therapy Services  2-15    Patient name: Mar Slater  : 1960  Provider#: [id number]  Referral source: Anita Grace NP      Medical/Treatment Diagnosis: M25.511     Prior Hospitalization: see medical history     Comorbidities: BMI > 30, depression  Prior Level of Function: restricted  Medications: Verified on Patient Summary List    Start of Care: 17      Onset Date: DOS 17       The Plan of Care and following information is based on the information from the initial evaluation. Assessment/ key information:  Patient 4 weeks post op ACDF from C4-C7, 4 weeks ago tomorrow. Her primary physical therapy findings include limited right shoulder and upper extremity ROM and strength, decreased cervical motion, Right upper extremity radiculopathy including altered sensation, numbness, tingling. Evaluation Complexity History HIGH Complexity :3+ comorbidities / personal factors will impact the outcome/ POC ; Examination HIGH Complexity : 4+ Standardized tests and measures addressing body structure, function, activity limitation and / or participation in recreation  ;Presentation HIGH Complexity : Unstable and unpredictable characteristics  ; Clinical Decision Making HIGH Complexity : FOTO score of 1- 25   Overall Complexity Rating: HIGH     Problem List: pain affecting function, decrease ROM, decrease strength, decrease ADL/ functional abilitiies and decrease activity tolerance   Treatment Plan may include any combination of the following: Therapeutic exercise, Therapeutic activities, Neuromuscular re-education, Physical agent/modality, Manual therapy and Patient education  Patient / Family readiness to learn indicated by: trying to perform skills and interest  Persons(s) to be included in education: patient (P)  Barriers to Learning/Limitations: None  Patient Goal (s): to get better  Patient Self Reported Health Status: good  Rehabilitation Potential: good    Short Term Goals: To be accomplished in 4 weeks:   Patient will be independent with a HEP addressing all objective deficits   Less difficulty dressing  Long Term Goals: To be accomplished in 8 weeks:   Functional ROM in her right upper extremity   32 point improvement in her FOTO score indicating improved overall quality of life. Functional strength in her right upper extremity   Frequency / Duration: Patient to be seen 2 times per week for 8 weeks. Patient/ Caregiver education and instruction: self care and exercises    [x]  Plan of care has been reviewed with SUSHIL Berger, PT 5/1/2017 3:36 PM    ________________________________________________________________________    I certify that the above Therapy Services are being furnished while the patient is under my care. I agree with the treatment plan and certify that this therapy is necessary.     [facility] Signature:____________________  Date:____________Time: _________

## 2017-05-04 ENCOUNTER — HOSPITAL ENCOUNTER (OUTPATIENT)
Dept: PHYSICAL THERAPY | Age: 57
Discharge: HOME | End: 2017-05-04
Payer: MEDICAID

## 2017-05-04 PROCEDURE — 97110 THERAPEUTIC EXERCISES: CPT

## 2017-05-04 PROCEDURE — 97140 MANUAL THERAPY 1/> REGIONS: CPT

## 2017-05-04 NOTE — PROGRESS NOTES
PT DAILY TREATMENT NOTE - [facility] 2-15    Patient Name: Ruddy Tejada  Date:2017  : 1960  [x]  Patient  Verified  Payor: Jose Beckham / Plan: [place] [employer] / Product Type: Managed Care Medicaid /    In time:1:05P  Out time: 2:00P  Total Treatment Time (min): 60  Total Timed Codes (min): 60  1:1 Treatment Time ( [address] only): --   Visit #: 2     Treatment Area: [place] [address]    SUBJECTIVE  Pain Level (0-10 scale): 6/10  Any medication changes, allergies to medications, adverse drug reactions, diagnosis change, or new procedure performed?: [x] No    [] Yes (see summary sheet for update)  Subjective functional status/changes:   [] No changes reported  \"I am sore from last time. I am hurting some today but I just took my medicine so that should help. \"    OBJECTIVE    35 min Therapeutic Exercise:  [x] See flow sheet :   Rationale: increase ROM, increase strength and improve coordination to improve the patients ability to increase functional use of R arm    20 min Manual Therapy: AAROM with manual resistance. In seated for IR/ER, AAROM with manual resistance in L S/L for R shoulder flexion    Rationale: decrease pain and increase ROM to improve the patients ability to increase R shoulder strength         With   [] TE   [] TA   [] neuro   [] other: Patient Education: [x] Review HEP    [] Progressed/Changed HEP based on:   [] positioning   [] body mechanics   [] transfers   [] heat/ice application    [] other:      Other Objective/Functional Measures: --     Pain Level (0-10 scale) post treatment: 0/10    ASSESSMENT/Changes in Function:   Pt reported increase in fatigue and muscle burning with added exercises.  Pt instructed in self assist S/L AAROM fro R shoulder flexion at home  Patient will continue to benefit from skilled PT services to modify and progress therapeutic interventions, address functional mobility deficits, address ROM deficits, address strength deficits, analyze and address soft tissue restrictions and analyze and cue movement patterns to attain remaining goals. [x]  See Plan of Care  []  See progress note/recertification  []  See Discharge Summary         Progress towards goals / Updated goals:  Short Term Goals: To be accomplished in 4 weeks:  Patient will be independent with a HEP addressing all objective deficits  Less difficulty dressing  Long Term Goals: To be accomplished in 8 weeks:  Functional ROM in her right upper extremity  32 point improvement in her FOTO score indicating improved overall quality of life.   Functional strength in her right upper extremity     PLAN  [x]  Upgrade activities as tolerated     [x]  Continue plan of care  []  Update interventions per flow sheet       []  Discharge due to:_  []  Other:_      Ammon Gold, SUSHIL 5/4/2017  1:10 PM

## 2017-05-08 ENCOUNTER — HOSPITAL ENCOUNTER (OUTPATIENT)
Dept: PHYSICAL THERAPY | Age: 57
Discharge: HOME | End: 2017-05-08
Payer: MEDICAID

## 2017-05-08 PROCEDURE — 97110 THERAPEUTIC EXERCISES: CPT

## 2017-05-08 PROCEDURE — 97530 THERAPEUTIC ACTIVITIES: CPT

## 2017-05-08 NOTE — PROGRESS NOTES
PT DAILY TREATMENT NOTE - [facility] 2-15    Patient Name: Wagner Ruggiero  Date:2017  : 1960  [x]  Patient  Verified  Payor: Lauren Queen / Plan: Azul Scales / Product Type: Managed Care Medicaid /    In time:1:05P  Out time: 2:10P  Total Treatment Time (min): 60  Total Timed Codes (min): 60  1:1 Treatment Time ( [address] only): --   Visit #: 3     Treatment Area: [place] [address]    SUBJECTIVE  Pain Level (0-10 scale): 6/10  Any medication changes, allergies to medications, adverse drug reactions, diagnosis change, or new procedure performed?: [x] No    [] Yes (see summary sheet for update)  Subjective functional status/changes:   [] No changes reported  Pain level fluctuates greatly from day to day. Yesterday high intensity, better today    OBJECTIVE    35 min Therapeutic Exercise:  [x] See flow sheet :   Rationale: increase ROM, increase strength and improve coordination to improve the patients ability to increase functional use of R arm    35 min Manual Therapy: AAROM with manual resistance. In seated for IR/ER, AAROM with manual resistance in L S/L for R shoulder flexion    Rationale: decrease pain and increase ROM to improve the patients ability to increase R shoulder strength         With   [] TE   [] TA   [] neuro   [] other: Patient Education: [x] Review HEP    [] Progressed/Changed HEP based on:   [] positioning   [] body mechanics   [] transfers   [] heat/ice application    [] other:      Other Objective/Functional Measures: --     Pain Level (0-10 scale) post treatment: 0/10    ASSESSMENT/Changes in Function:   Profound weakness persists. Advanced PRE's without any increased pain  Patient will continue to benefit from skilled PT services to modify and progress therapeutic interventions, address functional mobility deficits, address ROM deficits, address strength deficits, analyze and address soft tissue restrictions and analyze and cue movement patterns to attain remaining goals.      [x]  See Plan of Care  []  See progress note/recertification  []  See Discharge Summary         Progress towards goals / Updated goals:  Short Term Goals: To be accomplished in 4 weeks:  Patient will be independent with a HEP addressing all objective deficits  Less difficulty dressing  Long Term Goals: To be accomplished in 8 weeks:  Functional ROM in her right upper extremity  32 point improvement in her FOTO score indicating improved overall quality of life.   Functional strength in her right upper extremity     PLAN  [x]  Upgrade activities as tolerated     [x]  Continue plan of care  []  Update interventions per flow sheet       []  Discharge due to:_  []  Other:_      Gianluca Haq, PT, PTA 5/8/2017  1:10 PM

## 2017-05-09 ENCOUNTER — APPOINTMENT (OUTPATIENT)
Dept: PHYSICAL THERAPY | Age: 57
End: 2017-05-09
Payer: MEDICAID

## 2017-05-11 ENCOUNTER — HOSPITAL ENCOUNTER (OUTPATIENT)
Dept: PHYSICAL THERAPY | Age: 57
Discharge: HOME | End: 2017-05-11
Payer: MEDICAID

## 2017-05-11 PROCEDURE — 97140 MANUAL THERAPY 1/> REGIONS: CPT

## 2017-05-11 PROCEDURE — 97110 THERAPEUTIC EXERCISES: CPT

## 2017-05-11 NOTE — PROGRESS NOTES
PT DAILY TREATMENT NOTE - [facility] 2-15    Patient Name: Sylvia Mejia  Date:2017  : 1960  [x]  Patient  Verified  Payor: Osvaldo Strong / Plan: [id number] / Product Type: Managed Care Medicaid /    In time: 11:00A  Out time: 11:50A  Total Treatment Time (min): 50  Total Timed Codes (min):50  1:1 Treatment Time ( [address] only): --   Visit #: 4     Treatment Area: [place] [address]    SUBJECTIVE  Pain Level (0-10 scale): 6/10  Any medication changes, allergies to medications, adverse drug reactions, diagnosis change, or new procedure performed?: [x] No    [] Yes (see summary sheet for update)  Subjective functional status/changes:   [] No changes reported  \"\"I was sore after my last visit. \"    OBJECTIVE    30 min Therapeutic Exercise:  [x] See flow sheet :   Rationale: increase ROM, increase strength and improve coordination to improve the patients ability to increase functional use of R arm    20 min Manual Therapy: AAROM with manual resistance. In seated for IR/ER, AAROM with manual resistance in L S/L for R shoulder flexion, STM/MFR R UT, LS, cervical PSM   Rationale: decrease pain and increase ROM to improve the patients ability to increase R shoulder strength         With   [] TE   [] TA   [] neuro   [] other: Patient Education: [x] Review HEP    [] Progressed/Changed HEP based on:   [] positioning   [] body mechanics   [] transfers   [] heat/ice application    [] other:      Other Objective/Functional Measures: --     Pain Level (0-10 scale) post treatment: 0/10    ASSESSMENT/Changes in Function:   Pt continues to demonstrate decrease muscle control and weakness in R shoulder. Patient will continue to benefit from skilled PT services to modify and progress therapeutic interventions, address functional mobility deficits, address ROM deficits, address strength deficits, analyze and address soft tissue restrictions and analyze and cue movement patterns to attain remaining goals.      [x]  See Plan of Care  []  See progress note/recertification  []  See Discharge Summary         Progress towards goals / Updated goals:  Short Term Goals: To be accomplished in 4 weeks:  Patient will be independent with a HEP addressing all objective deficits  Less difficulty dressing  Long Term Goals: To be accomplished in 8 weeks:  Functional ROM in her right upper extremity  32 point improvement in her FOTO score indicating improved overall quality of life.   Functional strength in her right upper extremity     PLAN  [x]  Upgrade activities as tolerated     [x]  Continue plan of care  []  Update interventions per flow sheet       []  Discharge due to:_  []  Other:_      Kaveh Schmidt PTA 5/11/2017  1:10 PM

## 2017-05-15 ENCOUNTER — HOSPITAL ENCOUNTER (OUTPATIENT)
Dept: PHYSICAL THERAPY | Age: 57
Discharge: HOME | End: 2017-05-15
Payer: MEDICAID

## 2017-05-15 PROCEDURE — 97110 THERAPEUTIC EXERCISES: CPT

## 2017-05-15 PROCEDURE — 97140 MANUAL THERAPY 1/> REGIONS: CPT

## 2017-05-15 NOTE — PROGRESS NOTES
PT DAILY TREATMENT NOTE - [facility] 2-15    Patient Name: Fox Mukherjee  Date:5/15/2017  : 1960  [x]  Patient  Verified  Payor: Sary Conway / Plan: [id number] / Product Type: Managed Care Medicaid /    In time: 11:00A  Out time: 11:50A  Total Treatment Time (min): 50  Total Timed Codes (min):50  1:1 Treatment Time ( [address] only): --   Visit #: 45    Treatment Area: M25.511    SUBJECTIVE  Pain Level (0-10 scale): 6/10  Any medication changes, allergies to medications, adverse drug reactions, diagnosis change, or new procedure performed?: [x] No    [] Yes (see summary sheet for update)  Subjective functional status/changes:   [] No changes reported  Patient frustrated as it is taking her longer to do things such as dressing. Overall s/s consistent with initial visit - various radicular s/s extending into left UE:  Pins needles in the tips of her thumb and index finger, numbness in her deltoid insertion region. OBJECTIVE   Obvious muscle atrophy noted in her right upper trapezius and deltoid. Minimal active movement of her right shoulder into flexion and abduction  Maintaining passive motion into all right shoulder movement patterns. 30 min Therapeutic Exercise:  [x] See flow sheet :   Rationale: increase ROM, increase strength and improve coordination to improve the patients ability to increase functional use of R arm    25 min Manual Therapy: AAROM/PROM right shoulder flexion/abduction/IR/ER.   . In seated for IR/ER, AAROM with manual resistance in L S/L for R shoulder flexion, STM/MFR R UT, LS, cervical PSM   Rationale: decrease pain and increase ROM to improve the patients ability to increase R shoulder strength         With   [] TE   [] TA   [] neuro   [] other: Patient Education: [x] Review HEP    [] Progressed/Changed HEP based on:   [] positioning   [] body mechanics   [] transfers   [] heat/ice application    [] other:      Other Objective/Functional Measures: --     Pain Level (0-10 scale) post treatment: 0/10    ASSESSMENT/Changes in Function:   Pt continues to demonstrate decrease muscle control and weakness in R shoulder. Patient will continue to benefit from skilled PT services to modify and progress therapeutic interventions, address functional mobility deficits, address ROM deficits, address strength deficits, analyze and address soft tissue restrictions and analyze and cue movement patterns to attain remaining goals. [x]  See Plan of Care  []  See progress note/recertification  []  See Discharge Summary         Progress towards goals / Updated goals:  Short Term Goals: To be accomplished in 4 weeks:  Patient will be independent with a HEP addressing all objective deficits  Less difficulty dressing  Long Term Goals: To be accomplished in 8 weeks:  Functional ROM in her right upper extremity  32 point improvement in her FOTO score indicating improved overall quality of life.   Functional strength in her right upper extremity     PLAN  [x]  Upgrade activities as tolerated     [x]  Continue plan of care  []  Update interventions per flow sheet       []  Discharge due to:_  []  Other:_      Jeet Jaffe, PT, PTA 5/15/2017  1:10 PM

## 2017-05-16 ENCOUNTER — APPOINTMENT (OUTPATIENT)
Dept: PHYSICAL THERAPY | Age: 57
End: 2017-05-16
Payer: MEDICAID

## 2017-05-17 ENCOUNTER — HOSPITAL ENCOUNTER (OUTPATIENT)
Dept: PHYSICAL THERAPY | Age: 57
Discharge: HOME | End: 2017-05-17
Payer: MEDICAID

## 2017-05-17 PROCEDURE — 97140 MANUAL THERAPY 1/> REGIONS: CPT

## 2017-05-17 PROCEDURE — 97110 THERAPEUTIC EXERCISES: CPT

## 2017-05-17 NOTE — PROGRESS NOTES
PT DAILY TREATMENT NOTE - [facility] 2-15    Patient Name: Debbie Mark  Date:2017  : 1960  [x]  Patient  Verified  Payor: Melinda Morocho / Plan: [id number] [employer] / Product Type: Managed Care Medicaid /    In time: 11:00A  Out time: 11:50A  Total Treatment Time (min): 50  Total Timed Codes (min):50  1:1 Treatment Time ( [address] only): --   Visit #: 6    Treatment Area: [place] [address]    SUBJECTIVE  Pain Level (0-10 scale): 6/10  Any medication changes, allergies to medications, adverse drug reactions, diagnosis change, or new procedure performed?: [x] No    [] Yes (see summary sheet for update)  Subjective functional status/changes:   [] No changes reported  Better internal rotation today. OBJECTIVE      30 min Therapeutic Exercise:  [x] See flow sheet :   Rationale: increase ROM, increase strength and improve coordination to improve the patients ability to increase functional use of R arm    25 min Manual Therapy: AAROM/PROM right shoulder flexion/abduction/IR/ER. . In seated for IR/ER, AAROM with manual resistance in L S/L for R shoulder flexion, STM/MFR R UT, LS, cervical PSM   Rationale: decrease pain and increase ROM to improve the patients ability to increase R shoulder strength         With   [] TE   [] TA   [] neuro   [] other: Patient Education: [x] Review HEP    [] Progressed/Changed HEP based on:   [] positioning   [] body mechanics   [] transfers   [] heat/ice application    [] other:      Other Objective/Functional Measures: --     Pain Level (0-10 scale) post treatment: 0/10    ASSESSMENT/Changes in Function:   Pt continues to demonstrate decrease muscle control and weakness in R shoulder. Patient will continue to benefit from skilled PT services to modify and progress therapeutic interventions, address functional mobility deficits, address ROM deficits, address strength deficits, analyze and address soft tissue restrictions and analyze and cue movement patterns to attain remaining goals. [x]  See Plan of Care  []  See progress note/recertification  []  See Discharge Summary         Progress towards goals / Updated goals:  Short Term Goals: To be accomplished in 4 weeks:  Patient will be independent with a HEP addressing all objective deficits  Less difficulty dressing  Long Term Goals: To be accomplished in 8 weeks:  Functional ROM in her right upper extremity  32 point improvement in her FOTO score indicating improved overall quality of life.   Functional strength in her right upper extremity     PLAN  [x]  Upgrade activities as tolerated     [x]  Continue plan of care  []  Update interventions per flow sheet       []  Discharge due to:_  []  Other:_      Ruma Monahan, PT, PTA 5/17/2017  1:10 PM

## 2017-05-22 ENCOUNTER — HOSPITAL ENCOUNTER (OUTPATIENT)
Dept: PHYSICAL THERAPY | Age: 57
Discharge: HOME | End: 2017-05-22
Payer: MEDICAID

## 2017-05-22 PROCEDURE — 97140 MANUAL THERAPY 1/> REGIONS: CPT

## 2017-05-22 PROCEDURE — 97110 THERAPEUTIC EXERCISES: CPT

## 2017-05-22 NOTE — PROGRESS NOTES
MD PROGRESS REPORT - [facility] 2-15    Patient Name: Fox Mukherjee  Date:2017  : 1960  Visit #: 7    Treatment Area: [address]     Treatment focus:  maintaining right shoulder PROM,  attempting any right shoulder girdle/right arm strengthening that we can through manual techniques and a therapeutic exercise program.    Patient still with little to no active right shoulder motion away from her body. Concerned about shoulder laxity/instability. Plan to continue therapy 2X/week for another month. Please advise.        Brian Mcdemrott, PT,  2017  1:10 PM

## 2017-05-22 NOTE — PROGRESS NOTES
PT DAILY TREATMENT NOTE - [facility] 2-15    Patient Name: Harika Lopez  Date:2017  : 1960  [x]  Patient  Verified  Payor: Susan Hickey / Plan: [id number] / Product Type: Managed Care Medicaid /    In time: 11:00A  Out time: 11:50A  Total Treatment Time (min): 50  Total Timed Codes (min):50  1:1 Treatment Time ( [address] only): --   Visit #: 7    Treatment Area: [address]    SUBJECTIVE  Pain Level (0-10 scale): 6/10  Any medication changes, allergies to medications, adverse drug reactions, diagnosis change, or new procedure performed?: [x] No    [] Yes (see summary sheet for update)  Subjective functional status/changes:   [] No changes reported  Patient ran out of medicine last Thursday, by  she was experiencing more pain into her right UE. She was able to get a limited refill - since taking this, her pain level has abated. She has a call into her MD office for a full refill. OBJECTIVE      30 min Therapeutic Exercise:  [x] See flow sheet :   Rationale: increase ROM, increase strength and improve coordination to improve the patients ability to increase functional use of R arm    30 min Manual Therapy: AAROM/PROM right shoulder flexion/abduction/IR/ER. . In supine for IR/ER/EXT manual resistance, AAROM L S/L for R shoulder flexion,    Rationale: decrease pain and increase ROM to improve the patients ability to increase R shoulder strength         With   [] TE   [] TA   [] neuro   [] other: Patient Education: [x] Review HEP    [] Progressed/Changed HEP based on:   [] positioning   [] body mechanics   [] transfers   [] heat/ice application    [] other:      Other Objective/Functional Measures: --     Pain Level (0-10 scale) post treatment: 0/10    ASSESSMENT/Changes in Function:   Pt continues to demonstrate decrease muscle control and weakness in R shoulder. Limited AROM into flexion and and abduction (any movement away from her body).   Patient will continue to benefit from skilled PT services to modify and progress therapeutic interventions, address functional mobility deficits, address ROM deficits, address strength deficits, analyze and address soft tissue restrictions and analyze and cue movement patterns to attain remaining goals. [x]  See Plan of Care  []  See progress note/recertification  []  See Discharge Summary         Progress towards goals / Updated goals:  Short Term Goals: To be accomplished in 4 weeks:  Patient will be independent with a HEP addressing all objective deficits  Less difficulty dressing  Long Term Goals: To be accomplished in 8 weeks:  Functional ROM in her right upper extremity  32 point improvement in her FOTO score indicating improved overall quality of life.   Functional strength in her right upper extremity     PLAN  [x]  Upgrade activities as tolerated     [x]  Continue plan of care  []  Update interventions per flow sheet       []  Discharge due to:_  []  Other:_      Bernardino Gutierrez PT, PTA 5/22/2017  1:10 PM

## 2017-05-25 ENCOUNTER — HOSPITAL ENCOUNTER (OUTPATIENT)
Dept: PHYSICAL THERAPY | Age: 57
Discharge: HOME | End: 2017-05-25
Payer: MEDICAID

## 2017-05-25 PROCEDURE — 97140 MANUAL THERAPY 1/> REGIONS: CPT

## 2017-05-25 PROCEDURE — 97110 THERAPEUTIC EXERCISES: CPT

## 2017-05-25 NOTE — PROGRESS NOTES
PT DAILY TREATMENT NOTE - [facility] 215    Patient Name: Debbie Mark  Date:2017  : 1960  [x]  Patient  Verified  Payor: Melinda Morocho / Plan: [id number] [employer] / Product Type: Managed Care Medicaid /    In time:12:50P  Out time:1:50P  Total Treatment Time (min): 60  Total Timed Codes (min): 60  1:1 Treatment Time ([facility] only): --   Visit #: 8     Treatment Area: [facility]    SUBJECTIVE  Pain Level (0-10 scale): 7-8/10  Any medication changes, allergies to medications, adverse drug reactions, diagnosis change, or new procedure performed?: [x] No    [] Yes (see summary sheet for update)  Subjective functional status/changes:   [] No changes reported  I ran our of my prescription so I have been having a lot more pain that I usually experience. I have an appointment with the Dr tomorrow. \"    OBJECTIVE    30 min Therapeutic Exercise:  [x] See flow sheet :   Rationale: increase ROM, increase strength and improve coordination to improve the patients ability to restore R shoulder and UE function    30 min Manual Therapy: AAROM/PROM right shoulder flexion/abduction/IR/ER. In supine for IR/ER/EXT manual resistance, AAROM L S/L for R shoulder flexion,     Rationale: decrease pain, increase ROM and increase tissue extensibility to improve the patients ability to restore R shoulder and UE function. With   [] TE   [] TA   [] neuro   [] other: Patient Education: [x] Review HEP    [] Progressed/Changed HEP based on:   [] positioning   [] body mechanics   [] transfers   [] heat/ice application    [] other:      Other Objective/Functional Measures: --     Pain Level (0-10 scale) post treatment: 7-8/10    ASSESSMENT/Changes in Function:   Pt was guarded with S/L AAROM for R shoulder flexion secondary to increase in pain today.    Patient will continue to benefit from skilled PT services to modify and progress therapeutic interventions, address functional mobility deficits, address ROM deficits, address strength deficits, analyze and address soft tissue restrictions and analyze and cue movement patterns to attain remaining goals. [x]  See Plan of Care  []  See progress note/recertification  []  See Discharge Summary         Progress towards goals / Updated goals:  Short Term Goals: To be accomplished in 4 weeks:  Patient will be independent with a HEP addressing all objective deficits  Less difficulty dressing  Long Term Goals: To be accomplished in 8 weeks:  Functional ROM in her right upper extremity  32 point improvement in her FOTO score indicating improved overall quality of life.   Functional strength in her right upper extremity      PLAN  [x]  Upgrade activities as tolerated     [x]  Continue plan of care  []  Update interventions per flow sheet       []  Discharge due to:_  []  Other:_      Odell Emerson PTA 5/25/2017  12:59 PM

## 2017-05-31 ENCOUNTER — HOSPITAL ENCOUNTER (OUTPATIENT)
Dept: PHYSICAL THERAPY | Age: 57
Discharge: HOME | End: 2017-05-31
Payer: MEDICAID

## 2017-05-31 PROCEDURE — 97140 MANUAL THERAPY 1/> REGIONS: CPT

## 2017-05-31 PROCEDURE — 97110 THERAPEUTIC EXERCISES: CPT

## 2017-05-31 NOTE — PROGRESS NOTES
PT DAILY TREATMENT NOTE - [facility] 215    Patient Name: Naresh Walls  Date:2017  : 1960  [x]  Patient  Verified  Payor: Mariela Elkins / Plan: [id number] [employer] / Product Type: Managed Care Medicaid /    In time:12:50P  Out time:1:50P  Total Treatment Time (min): 60  Total Timed Codes (min): 60  1:1 Treatment Time ( only): --   Visit #: 9     Treatment Area: [facility]    SUBJECTIVE  Pain Level (0-10 scale): 7-8/10  Any medication changes, allergies to medications, adverse drug reactions, diagnosis change, or new procedure performed?: [x] No    [] Yes (see summary sheet for update)  Subjective functional status/changes:   [] No changes reported  Saw MD, restarted on medication. Pain level better. Per patient, MD requesting continuation of therapy. Shoulder presentation unchanged. OBJECTIVE    30 min Therapeutic Exercise:  [x] See flow sheet :   Rationale: increase ROM, increase strength and improve coordination to improve the patients ability to restore R shoulder and UE function    30 min Manual Therapy: AAROM/PROM right shoulder flexion/abduction/IR/ER. In supine for IR/ER/EXT manual resistance, AAROM L S/L for R shoulder flexion,     Rationale: decrease pain, increase ROM and increase tissue extensibility to improve the patients ability to restore R shoulder and UE function.          With   [] TE   [] TA   [] neuro   [] other: Patient Education: [x] Review HEP    [] Progressed/Changed HEP based on:   [] positioning   [] body mechanics   [] transfers   [] heat/ice application    [] other:      Other Objective/Functional Measures: --     Pain Level (0-10 scale) post treatment: 7-8/10    ASSESSMENT/Changes in Function:   Still with minimal AROM of right shoulder into any movements away from her body  Patient will continue to benefit from skilled PT services to modify and progress therapeutic interventions, address functional mobility deficits, address ROM deficits, address strength deficits, analyze and address soft tissue restrictions and analyze and cue movement patterns to attain remaining goals. [x]  See Plan of Care  []  See progress note/recertification  []  See Discharge Summary         Progress towards goals / Updated goals:  Short Term Goals: To be accomplished in 4 weeks:  Patient will be independent with a HEP addressing all objective deficits  Less difficulty dressing  Long Term Goals: To be accomplished in 8 weeks:  Functional ROM in her right upper extremity  32 point improvement in her FOTO score indicating improved overall quality of life.   Functional strength in her right upper extremity      PLAN  [x]  Upgrade activities as tolerated     [x]  Continue plan of care  []  Update interventions per flow sheet       []  Discharge due to:_  []  Other:_      Chaparro Wells, PT, PTA 5/31/2017  12:59 PM

## 2017-06-05 ENCOUNTER — HOSPITAL ENCOUNTER (OUTPATIENT)
Dept: PHYSICAL THERAPY | Age: 57
Discharge: HOME | End: 2017-06-05
Payer: MEDICAID

## 2017-06-05 PROCEDURE — 97110 THERAPEUTIC EXERCISES: CPT

## 2017-06-05 PROCEDURE — 97140 MANUAL THERAPY 1/> REGIONS: CPT

## 2017-06-05 NOTE — PROGRESS NOTES
PT DAILY TREATMENT NOTE - [facility] 215    Patient Name: Tracie Valenzuela  Date:2017  : 1960  [x]  Patient  Verified  Payor: Maribeth Ho / Plan: Kwadwo Ochoa / Product Type: Managed Care Medicaid /    In time:12:30P  Out time: 1:40P  Total Treatment Time (min): 70  Total Timed Codes (min): 70  1:1 Treatment Time ( [address] only): --   Visit #: 10     Treatment Area: [address]    SUBJECTIVE  Pain Level (0-10 scale): 5/10  Any medication changes, allergies to medications, adverse drug reactions, diagnosis change, or new procedure performed?: [x] No    [] Yes (see summary sheet for update)  Subjective functional status/changes:   [] No changes reported  \" I went to the Dr and they stated me on new medicine. I have not taken it yet. I am hurting a little bit this morning. OBJECTIVE    45 min Therapeutic Exercise:  [x] See flow sheet :   Rationale: increase ROM, increase strength and improve coordination to improve the patients ability to restore R shoulder and UE function    25 min Manual Therapy: AAROM/PROM right shoulder flexion/abduction/IR/ER. In supine for IR/ER/EXT manual resistance, AAROM L S/L for R shoulder flexion,     Rationale: decrease pain, increase ROM and increase tissue extensibility to improve the patients ability to restore R shoulder and UE function. With   [] TE   [] TA   [] neuro   [] other: Patient Education: [x] Review HEP    [] Progressed/Changed HEP based on:   [] positioning   [] body mechanics   [] transfers   [] heat/ice application    [] other:      Other Objective/Functional Measures: --     Pain Level (0-10 scale) post treatment: 5/10    ASSESSMENT/Changes in Function:   Pt continues to demonstrate R shoulder weakness. Pt reported pain level remained about the same throughout the session today.   Patient will continue to benefit from skilled PT services to modify and progress therapeutic interventions, address functional mobility deficits, address ROM deficits, address strength deficits, analyze and address soft tissue restrictions and analyze and cue movement patterns to attain remaining goals. [x]  See Plan of Care  []  See progress note/recertification  []  See Discharge Summary         Progress towards goals / Updated goals:  Short Term Goals: To be accomplished in 4 weeks:  Patient will be independent with a HEP addressing all objective deficits  Less difficulty dressing  Long Term Goals: To be accomplished in 8 weeks:  Functional ROM in her right upper extremity  32 point improvement in her FOTO score indicating improved overall quality of life.   Functional strength in her right upper extremity      PLAN  [x]  Upgrade activities as tolerated     [x]  Continue plan of care  []  Update interventions per flow sheet       []  Discharge due to:_  []  Other:_      Loy Tse PTA 6/5/2017  12:59 PM

## 2017-06-07 ENCOUNTER — HOSPITAL ENCOUNTER (OUTPATIENT)
Dept: PHYSICAL THERAPY | Age: 57
Discharge: HOME | End: 2017-06-07
Payer: MEDICAID

## 2017-06-07 PROCEDURE — 97110 THERAPEUTIC EXERCISES: CPT

## 2017-06-07 PROCEDURE — 97140 MANUAL THERAPY 1/> REGIONS: CPT

## 2017-06-07 NOTE — PROGRESS NOTES
PT DAILY TREATMENT NOTE - [facility] 215    Patient Name: Tracie Valenzuela  Date:2017  : 1960  [x]  Patient  Verified  Payor: Maribeth Fix / Plan: [id number] [employer] / Product Type: Managed Care Medicaid /    In time:12:30P  Out time: 1:40P  Total Treatment Time (min): 70  Total Timed Codes (min): 70  1:1 Treatment Time ([facility] only): --   Visit #: 11     Treatment Area: [place] [address]    SUBJECTIVE  Pain Level (0-10 scale): 5/10  Any medication changes, allergies to medications, adverse drug reactions, diagnosis change, or new procedure performed?: [x] No    [] Yes (see summary sheet for update)  Subjective functional status/changes:   [] No changes reported  Minimal c/o pain. Cc is of diffuse anterior shoulder \"ache\" with a tightness in her axillary region. OBJECTIVE    45 min Therapeutic Exercise:  [x] See flow sheet :   Rationale: increase ROM, increase strength and improve coordination to improve the patients ability to restore R shoulder and UE function    25 min Manual Therapy: AAROM/PROM right shoulder flexion/abduction/IR/ER. In supine for IR/ER/EXT manual resistance, AAROM L S/L for R shoulder flexion,     Rationale: decrease pain, increase ROM and increase tissue extensibility to improve the patients ability to restore R shoulder and UE function. With   [] TE   [] TA   [] neuro   [] other: Patient Education: [x] Review HEP    [] Progressed/Changed HEP based on:   [] positioning   [] body mechanics   [] transfers   [] heat/ice application    [] other:      Other Objective/Functional Measures: --     Pain Level (0-10 scale) post treatment: 5/10    ASSESSMENT/Changes in Function:   Pt continues to demonstrate R shoulder weakness. Symptom relief with treatment.    Patient will continue to benefit from skilled PT services to modify and progress therapeutic interventions, address functional mobility deficits, address ROM deficits, address strength deficits, analyze and address soft tissue restrictions and analyze and cue movement patterns to attain remaining goals. [x]  See Plan of Care  []  See progress note/recertification  []  See Discharge Summary         Progress towards goals / Updated goals:  Short Term Goals: To be accomplished in 4 weeks:  Patient will be independent with a HEP addressing all objective deficits  Less difficulty dressing  Long Term Goals: To be accomplished in 8 weeks:  Functional ROM in her right upper extremity  32 point improvement in her FOTO score indicating improved overall quality of life.   Functional strength in her right upper extremity      PLAN  [x]  Upgrade activities as tolerated     [x]  Continue plan of care  []  Update interventions per flow sheet       []  Discharge due to:_  []  Other:_      Jose Feng, PT,  6/7/2017  12:59 PM

## 2017-06-12 ENCOUNTER — HOSPITAL ENCOUNTER (OUTPATIENT)
Dept: PHYSICAL THERAPY | Age: 57
Discharge: HOME | End: 2017-06-12
Payer: MEDICAID

## 2017-06-12 PROCEDURE — 97110 THERAPEUTIC EXERCISES: CPT

## 2017-06-12 PROCEDURE — 97140 MANUAL THERAPY 1/> REGIONS: CPT

## 2017-06-12 NOTE — PROGRESS NOTES
PT DAILY TREATMENT NOTE - [facility] 215    Patient Name: Olive Keys  Date:2017  : 1960  [x]  Patient  Verified  Payor: Oswaldo Morin / Plan: David Lawson / Product Type: Managed Care Medicaid /    In time:12:20P  Out time: 1:30P  Total Treatment Time (min): 70  Total Timed Codes (min): 70  1:1 Treatment Time ( [address] only): --   Visit #: 12     Treatment Area: 5511    SUBJECTIVE  Pain Level (0-10 scale): 0/10  Any medication changes, allergies to medications, adverse drug reactions, diagnosis change, or new procedure performed?: [x] No    [] Yes (see summary sheet for update)  Subjective functional status/changes:   [] No changes reported  \"I am not having much pain in my shoulder today. Just the continued numbness/tingling in my thumb and first finger that is a little painful. OBJECTIVE    40 min Therapeutic Exercise:  [x] See flow sheet :   Rationale: increase ROM, increase strength and improve coordination to improve the patients ability to restore R shoulder and UE function    30 min Manual Therapy: AAROM/PROM right shoulder flexion/abduction/IR/ER. In supine for IR/ER/EXT manual resistance, AAROM L S/L for R shoulder flexion,     Rationale: decrease pain, increase ROM and increase tissue extensibility to improve the patients ability to restore R shoulder and UE function. With   [] TE   [] TA   [] neuro   [] other: Patient Education: [x] Review HEP    [] Progressed/Changed HEP based on:   [] positioning   [] body mechanics   [] transfers   [] heat/ice application    [] other:      Other Objective/Functional Measures: --     Pain Level (0-10 scale) post treatment: 0/10    ASSESSMENT/Changes in Function:   Pt continues to demonstrate R UE weakness. Pt reported feeling good following today's session.    Patient will continue to benefit from skilled PT services to modify and progress therapeutic interventions, address functional mobility deficits, address ROM deficits, address strength deficits, analyze and address soft tissue restrictions and analyze and cue movement patterns to attain remaining goals. [x]  See Plan of Care  []  See progress note/recertification  []  See Discharge Summary         Progress towards goals / Updated goals:  Short Term Goals: To be accomplished in 4 weeks:  Patient will be independent with a HEP addressing all objective deficits  Less difficulty dressing  Long Term Goals: To be accomplished in 8 weeks:  Functional ROM in her right upper extremity  32 point improvement in her FOTO score indicating improved overall quality of life.   Functional strength in her right upper extremity      PLAN  [x]  Upgrade activities as tolerated     [x]  Continue plan of care  []  Update interventions per flow sheet       []  Discharge due to:_  []  Other:_      Chris Montanez, PTA 6/12/2017  12:59 PM

## 2017-06-14 ENCOUNTER — HOSPITAL ENCOUNTER (OUTPATIENT)
Dept: PHYSICAL THERAPY | Age: 57
Discharge: HOME | End: 2017-06-14
Payer: MEDICAID

## 2017-06-14 PROCEDURE — 97140 MANUAL THERAPY 1/> REGIONS: CPT

## 2017-06-14 PROCEDURE — 97110 THERAPEUTIC EXERCISES: CPT

## 2017-06-14 NOTE — PROGRESS NOTES
PT DAILY TREATMENT NOTE - [facility] 215    Patient Name: Teofilo Harry  Date:2017  : 1960  [x]  Patient  Verified  Payor: Mary Jane Soria / Plan: [employer] / Product Type: Managed Care Medicaid /    In time:12:20P  Out time: 1:30P  Total Treatment Time (min): 70  Total Timed Codes (min): 70  1:1 Treatment Time ( [address] only): --   Visit #: 13     Treatment Area: 5511    SUBJECTIVE  Pain Level (0-10 scale): 0/10  Any medication changes, allergies to medications, adverse drug reactions, diagnosis change, or new procedure performed?: [x] No    [] Yes (see summary sheet for update)  Subjective functional status/changes:   [] No changes reported  Small improvement in function reported. OBJECTIVE    45 min Therapeutic Exercise:  [x] See flow sheet :   Rationale: increase ROM, increase strength and improve coordination to improve the patients ability to restore R shoulder and UE function    15 min Manual Therapy: AAROM/PROM right shoulder flexion/abduction/IR/ER. In supine for IR/ER/EXT manual resistance, AAROM L S/L for R shoulder flexion,     Rationale: decrease pain, increase ROM and increase tissue extensibility to improve the patients ability to restore R shoulder and UE function. With   [] TE   [] TA   [] neuro   [] other: Patient Education: [x] Review HEP    [] Progressed/Changed HEP based on:   [] positioning   [] body mechanics   [] transfers   [] heat/ice application    [] other:      Other Objective/Functional Measures: --     Pain Level (0-10 scale) post treatment: 0/10    ASSESSMENT/Changes in Function:   Pt continues to demonstrate R UE weakness. Pt reported feeling good following today's session.    Patient will continue to benefit from skilled PT services to modify and progress therapeutic interventions, address functional mobility deficits, address ROM deficits, address strength deficits, analyze and address soft tissue restrictions and analyze and cue movement patterns to attain remaining goals. [x]  See Plan of Care  []  See progress note/recertification  []  See Discharge Summary         Progress towards goals / Updated goals:  Short Term Goals: To be accomplished in 4 weeks:  Patient will be independent with a HEP addressing all objective deficits  Less difficulty dressing  Long Term Goals: To be accomplished in 8 weeks:  Functional ROM in her right upper extremity  32 point improvement in her FOTO score indicating improved overall quality of life.   Functional strength in her right upper extremity      PLAN  [x]  Upgrade activities as tolerated     [x]  Continue plan of care  []  Update interventions per flow sheet       []  Discharge due to:_  []  Other:_      Ivan Romeo, PT, PTA 6/14/2017  12:59 PM

## 2017-06-15 ENCOUNTER — APPOINTMENT (OUTPATIENT)
Dept: PHYSICAL THERAPY | Age: 57
End: 2017-06-15
Payer: MEDICAID

## 2017-06-19 ENCOUNTER — HOSPITAL ENCOUNTER (OUTPATIENT)
Dept: PHYSICAL THERAPY | Age: 57
Discharge: HOME | End: 2017-06-19
Payer: MEDICAID

## 2017-06-19 PROCEDURE — 97140 MANUAL THERAPY 1/> REGIONS: CPT

## 2017-06-19 PROCEDURE — 97110 THERAPEUTIC EXERCISES: CPT

## 2017-06-19 NOTE — PROGRESS NOTES
PT DAILY TREATMENT NOTE - [facility] 215    Patient Name: Fox Mukherjee  Date:2017  : 1960  [x]  Patient  Verified  Payor: Sary Nap / Plan: Rashaun Gibson / Product Type: Managed Care Medicaid /    In time:12:20P  Out time: 1:30P  Total Treatment Time (min): 70  Total Timed Codes (min): 70  1:1 Treatment Time ( [address] only): --   Visit #: 13     Treatment Area: 5511    SUBJECTIVE  Pain Level (0-10 scale): 0/10  Any medication changes, allergies to medications, adverse drug reactions, diagnosis change, or new procedure performed?: [x] No    [] Yes (see summary sheet for update)  Subjective functional status/changes:   [] No changes reported  Numbness/tingling/swelling in right thumb    OBJECTIVE    45 min Therapeutic Exercise:  [x] See flow sheet :   Rationale: increase ROM, increase strength and improve coordination to improve the patients ability to restore R shoulder and UE function    15 min Manual Therapy: AAROM/PROM right shoulder flexion/abduction/IR/ER. In supine for IR/ER/EXT manual resistance, AAROM L S/L for R shoulder flexion,     Rationale: decrease pain, increase ROM and increase tissue extensibility to improve the patients ability to restore R shoulder and UE function. With   [] TE   [] TA   [] neuro   [] other: Patient Education: [x] Review HEP    [] Progressed/Changed HEP based on:   [] positioning   [] body mechanics   [] transfers   [] heat/ice application    [] other:      Other Objective/Functional Measures: --     Pain Level (0-10 scale) post treatment: 0/10    ASSESSMENT/Changes in Function:   Pt continues to demonstrate R UE weakness. Pt reported feeling good following today's session.    Patient will continue to benefit from skilled PT services to modify and progress therapeutic interventions, address functional mobility deficits, address ROM deficits, address strength deficits, analyze and address soft tissue restrictions and analyze and cue movement patterns to attain remaining goals. [x]  See Plan of Care  []  See progress note/recertification  []  See Discharge Summary         Progress towards goals / Updated goals:  Short Term Goals: To be accomplished in 4 weeks:  Patient will be independent with a HEP addressing all objective deficits  Less difficulty dressing  Long Term Goals: To be accomplished in 8 weeks:  Functional ROM in her right upper extremity  32 point improvement in her FOTO score indicating improved overall quality of life.   Functional strength in her right upper extremity      PLAN  [x]  Upgrade activities as tolerated     [x]  Continue plan of care  []  Update interventions per flow sheet       []  Discharge due to:_  []  Other:_      Ebony Fernandez, PT, PTA 6/19/2017  12:59 PM

## 2017-06-21 ENCOUNTER — HOSPITAL ENCOUNTER (OUTPATIENT)
Dept: PHYSICAL THERAPY | Age: 57
Discharge: HOME | End: 2017-06-21
Payer: MEDICAID

## 2017-06-21 PROCEDURE — 97140 MANUAL THERAPY 1/> REGIONS: CPT

## 2017-06-21 PROCEDURE — 97150 GROUP THERAPEUTIC PROCEDURES: CPT

## 2017-06-21 NOTE — PROGRESS NOTES
PT DAILY TREATMENT NOTE - [facility] 2-15    Patient Name: Claudia Dial  Date:2017  : 1960  [x]  Patient  Verified  Payor: Silke Titus / Plan: Bhavin Moreno / Product Type: Managed Care Medicaid /    In time:12:20P  Out time: 1:30P  Total Treatment Time (min): 70  Total Timed Codes (min): 70  1:1 Treatment Time ( [address] only): --   Visit #: 14    Treatment Area: M25.511    SUBJECTIVE  Pain Level (0-10 scale): 0/10  Any medication changes, allergies to medications, adverse drug reactions, diagnosis change, or new procedure performed?: [x] No    [] Yes (see summary sheet for update)  Subjective functional status/changes:   [] No changes reported  Worked on her hair for 3 1/2 hours, increased anterior shoulder discomfort and thumb swelling/tingling as a result. Iced her thumb, medication. No residual effects today. OBJECTIVE    45 min Therapeutic Exercise:  [x] See flow sheet :   Rationale: increase ROM, increase strength and improve coordination to improve the patients ability to restore R shoulder and UE function    15 min Manual Therapy: AAROM/PROM right shoulder flexion/abduction/IR/ER. In supine for IR/ER/EXT manual resistance, AAROM L S/L for R shoulder flexion,     Rationale: decrease pain, increase ROM and increase tissue extensibility to improve the patients ability to restore R shoulder and UE function.          With   [] TE   [] TA   [] neuro   [] other: Patient Education: [x] Review HEP    [] Progressed/Changed HEP based on:   [] positioning   [] body mechanics   [] transfers   [] heat/ice application    [] other:      Other Objective/Functional Measures: --     Pain Level (0-10 scale) post treatment: 0/10    ASSESSMENT/Changes in Function:    getting better slowly  Patient will continue to benefit from skilled PT services to modify and progress therapeutic interventions, address functional mobility deficits, address ROM deficits, address strength deficits, analyze and address soft tissue restrictions and analyze and cue movement patterns to attain remaining goals. [x]  See Plan of Care  []  See progress note/recertification  []  See Discharge Summary         Progress towards goals / Updated goals:  Short Term Goals: To be accomplished in 4 weeks:  Patient will be independent with a HEP addressing all objective deficits  Less difficulty dressing  Long Term Goals: To be accomplished in 8 weeks:  Functional ROM in her right upper extremity  32 point improvement in her FOTO score indicating improved overall quality of life.   Functional strength in her right upper extremity      PLAN  [x]  Upgrade activities as tolerated     [x]  Continue plan of care  []  Update interventions per flow sheet       []  Discharge due to:_  []  Other:_      Jose Feng, PT,  6/21/2017  12:59 PM Diminished

## 2017-06-22 ENCOUNTER — APPOINTMENT (OUTPATIENT)
Dept: PHYSICAL THERAPY | Age: 57
End: 2017-06-22
Payer: MEDICAID

## 2017-06-26 ENCOUNTER — HOSPITAL ENCOUNTER (OUTPATIENT)
Dept: PHYSICAL THERAPY | Age: 57
Discharge: HOME | End: 2017-06-26
Payer: MEDICAID

## 2017-06-26 PROCEDURE — 97140 MANUAL THERAPY 1/> REGIONS: CPT

## 2017-06-26 PROCEDURE — 97110 THERAPEUTIC EXERCISES: CPT

## 2017-06-26 NOTE — PROGRESS NOTES
PT DAILY TREATMENT NOTE - [facility] 2-15    Patient Name: Vadim Mccullough  Date:2017  : 1960  [x]  Patient  Verified  Payor: Ena Vazquez / Plan: [id number] / Product Type: Managed Care Medicaid /    In time:12:20P  Out time: 1:30P  Total Treatment Time (min): 70  Total Timed Codes (min): 70  1:1 Treatment Time ([address] only): --   Visit #: 15    Treatment Area: 5511    SUBJECTIVE  Pain Level (0-10 scale): 0/10  Any medication changes, allergies to medications, adverse drug reactions, diagnosis change, or new procedure performed?: [x] No    [] Yes (see summary sheet for update)  Subjective functional status/changes:   [] No changes reported  S/s unchanged overall. Numbness in right upper arm has returned. chanllenged with ADL's with right upper extremity      OBJECTIVE    45 min Therapeutic Exercise:  [x] See flow sheet :   Rationale: increase ROM, increase strength and improve coordination to improve the patients ability to restore R shoulder and UE function    15 min Manual Therapy: AAROM/PROM right shoulder flexion/abduction/IR/ER. In supine for IR/ER/EXT manual resistance, AAROM L S/L for R shoulder flexion,     Rationale: decrease pain, increase ROM and increase tissue extensibility to improve the patients ability to restore R shoulder and UE function. With   [] TE   [] TA   [] neuro   [] other: Patient Education: [x] Review HEP    [] Progressed/Changed HEP based on:   [] positioning   [] body mechanics   [] transfers   [] heat/ice application    [] other:      Other Objective/Functional Measures: --     Pain Level (0-10 scale) post treatment: 0/10    ASSESSMENT/Changes in Function:    patient wants to continue PT next week at the closest facility possible (on the bus route) - will send her to [place].   Patient will continue to benefit from skilled PT services to modify and progress therapeutic interventions, address functional mobility deficits, address ROM deficits, address strength deficits, analyze and address soft tissue restrictions and analyze and cue movement patterns to attain remaining goals. [x]  See Plan of Care  []  See progress note/recertification  []  See Discharge Summary         Progress towards goals / Updated goals:  Short Term Goals: To be accomplished in 4 weeks:  Patient will be independent with a HEP addressing all objective deficits  Less difficulty dressing  Long Term Goals: To be accomplished in 8 weeks:  Functional ROM in her right upper extremity  32 point improvement in her FOTO score indicating improved overall quality of life.   Functional strength in her right upper extremity      PLAN  [x]  Upgrade activities as tolerated     [x]  Continue plan of care  []  Update interventions per flow sheet       []  Discharge due to:_  []  Other:_      Luz Looney, PT,  6/26/2017  12:59 PM

## 2017-06-28 ENCOUNTER — HOSPITAL ENCOUNTER (OUTPATIENT)
Dept: PHYSICAL THERAPY | Age: 57
Discharge: HOME | End: 2017-06-28
Payer: MEDICAID

## 2017-06-28 PROCEDURE — 97110 THERAPEUTIC EXERCISES: CPT

## 2017-06-28 PROCEDURE — 97140 MANUAL THERAPY 1/> REGIONS: CPT

## 2017-06-28 NOTE — PROGRESS NOTES
PT DAILY TREATMENT NOTE - [facility] 2-15    Patient Name: Luann Weems  Date:2017  : 1960  [x]  Patient  Verified  Payor: Kevyn Lynch / Plan: Richardson Pratt / Product Type: Managed Care Medicaid /    In time:12:20P  Out time: 1:30P  Total Treatment Time (min): 70  Total Timed Codes (min): 70  1:1 Treatment Time ( [address] only): --   Visit #: 17    Treatment Area: 5511    SUBJECTIVE  Pain Level (0-10 scale): 0/10  Any medication changes, allergies to medications, adverse drug reactions, diagnosis change, or new procedure performed?: [x] No    [] Yes (see summary sheet for update)  Subjective functional status/changes:   [] No changes reported  Starting to experience increased left hand \"tingling\", intermittent. OBJECTIVE    45 min Therapeutic Exercise:  [x] See flow sheet :   Rationale: increase ROM, increase strength and improve coordination to improve the patients ability to restore R shoulder and UE function    15 min Manual Therapy: AAROM/PROM right shoulder flexion/abduction/IR/ER. In supine for IR/ER/EXT manual resistance, AAROM L S/L for R shoulder flexion,     Rationale: decrease pain, increase ROM and increase tissue extensibility to improve the patients ability to restore R shoulder and UE function. With   [] TE   [] TA   [] neuro   [] other: Patient Education: [x] Review HEP    [] Progressed/Changed HEP based on:   [] positioning   [] body mechanics   [] transfers   [] heat/ice application    [] other:      Other Objective/Functional Measures: --     Pain Level (0-10 scale) post treatment: 0/10    ASSESSMENT/Changes in Function:    patient wants to continue PT next week at the closest facility possible (on the bus route) - will send her to [place] [facility]. Advised patient to notify MD shoulder her left hand s/s continue.     Patient will continue to benefit from skilled PT services to modify and progress therapeutic interventions, address functional mobility deficits, address ROM deficits, address strength deficits, analyze and address soft tissue restrictions and analyze and cue movement patterns to attain remaining goals. [x]  See Plan of Care  []  See progress note/recertification  []  See Discharge Summary         Progress towards goals / Updated goals:  Short Term Goals: To be accomplished in 4 weeks:  Patient will be independent with a HEP addressing all objective deficits met  Less difficulty dressing met  Long Term Goals: To be accomplished in 8 weeks:  Functional ROM in her right upper extremity not met  32 point improvement in her FOTO score indicating improved overall quality of life. Not met  Functional strength in her right upper extremity not met     PLAN  []  Upgrade activities as tolerated     []  Continue plan of care       []  Update interventions per flow sheet       [x]  Discharge due to: [facility] facility secondary to it being closer to her home and on her bus route.     []  Other:_      Yovani Garcia, PT,  6/28/2017  12:59 PM

## 2017-06-28 NOTE — ANCILLARY DISCHARGE INSTRUCTIONS
[facility]  [address], [address] [place] [address]  Phone: [phone]  Fax: [phone]    Discharge Summary  2-15    Patient name: Chato Alberts  : 1960  Provider#: [id number]  Referral source: Freddy Eckert NP      Medical/Treatment Diagnosis: M25.511     Prior Hospitalization: see medical history     Comorbidities: see FOTO  Prior Level of Function:unrestricted  Medications: Verified on Patient Summary List    Start of Care: 17      Onset Date: DOS 17   Visits from Start of Care: 17     Missed Visits: 0  Reporting Period : 17 to 17  Short Term Goals: To be accomplished in 4 weeks:  Patient will be independent with a HEP addressing all objective deficits  MET  Less difficulty dressing  Long Term Goals: To be accomplished in 8 weeks:  Functional ROM in her right upper extremity   NOT MET  32 point improvement in her FOTO score indicating improved overall quality of life. 16 POINT IMPROVEMENT   Functional strength in her right upper extremity  NOT MET    ASSESSMENT/SUMMARY OF CARE:   Manual therapy, therapeutic exercise program, progressive home program instruction. Patient being discharged today as she is going to continue her care at a different, non [facility], facility. Per her request, she was wanting a facility that was close to this one and on her bus route.          RECOMMENDATIONS:  [x]Discontinue therapy:  SEE ABOVE.     []  Royal Smith, PT 2017 2:10 PM

## 2017-06-29 ENCOUNTER — APPOINTMENT (OUTPATIENT)
Dept: PHYSICAL THERAPY | Age: 57
End: 2017-06-29
Payer: MEDICAID

## 2017-08-15 ENCOUNTER — HOSPITAL ENCOUNTER (OUTPATIENT)
Dept: MAMMOGRAPHY | Age: 57
Discharge: HOME | End: 2017-08-15
Attending: INTERNAL MEDICINE
Payer: MEDICAID

## 2017-08-15 DIAGNOSIS — Z12.31 VISIT FOR SCREENING MAMMOGRAM: ICD-10-CM

## 2017-08-15 PROCEDURE — 77067 SCR MAMMO BI INCL CAD: CPT

## 2018-01-02 ENCOUNTER — HOSPITAL ENCOUNTER (OUTPATIENT)
Dept: MRI IMAGING | Age: 58
Discharge: HOME | End: 2018-01-02
Attending: ORTHOPAEDIC SURGERY

## 2018-01-02 DIAGNOSIS — M48.02 SPINAL STENOSIS IN CERVICAL REGION: ICD-10-CM

## 2018-01-02 DIAGNOSIS — M54.2 NECK PAIN: ICD-10-CM

## 2018-05-17 ENCOUNTER — HOSPITAL ENCOUNTER (OUTPATIENT)
Dept: PHYSICAL THERAPY | Age: 58
Discharge: HOME | End: 2018-05-17
Payer: MEDICAID

## 2018-05-17 ENCOUNTER — APPOINTMENT (OUTPATIENT)
Dept: PHYSICAL THERAPY | Age: 58
End: 2018-05-17

## 2018-05-17 PROCEDURE — 97161 PT EVAL LOW COMPLEX 20 MIN: CPT

## 2018-05-17 PROCEDURE — 97110 THERAPEUTIC EXERCISES: CPT

## 2018-05-17 NOTE — PROGRESS NOTES
PT DAILY TREATMENT NOTE/LUMBAR EVAL 3-16    Patient Name: Bj Rosales  Date:2018  : 1960  [x]  Patient  Verified  Payor: [facility] MEDICAID / Plan: GABRIELA DICKINSON CCCP / Product Type: Managed Care Medicaid /    In time:132  Out time:233  Total Treatment Time (min): 61  Total Timed Codes (min): 16  1:1 Treatment Time ( only): 16  Visit #: 1 of 10    Treatment Area: Low back pain [M54.5]  Pain in right shoulder [M25.511]  SUBJECTIVE  Pain Level (0-10 scale): 7  [x]constant []intermittent []improving []worsening [x]no change since onset  Worse lifting overhead,  Raising right arm away from body   Better sometimes medication  Any medication changes, allergies to medications, adverse drug reactions, diagnosis change, or new procedure performed?: [x] No    [] Yes (see summary sheet for update)  Subjective functional status/changes:     PLOF:I all areas of ADLs and activities, using Left SC, household chores , community   Limitations to PLOF: pain   Mechanism of Injury: 2017 woke up like that prior to surgery on her neck for a pinched nerve . insideous onset. Current symptoms/Complaints: 60 YO female diagnosed as above and with S/S consistent with above diagnosis presents to skilled outpatient PT. CCO pain in the right shoulder, numbness on the upper shoulder, and pins feeling in the thumb and index finger of the right hand. Now she states she has frozen shoulder (per Dr. Christoph Boston) relapsed. She reports back pain longstanding, 2001 back surgery. She notes continued back issues since that time more recently with the pain in the lower back indicating coccyx region and radiating to inguinal fold and down the  Right LE anterior thigh to the knee level. Right  LE S/S that are worse with standing and walking. Pain today is 7/10.    Previous Treatment/Compliance: MD, X-rays, medication, PT for the Right shoulder  PMHx/Surgical Hx:  P/O Csp surgery to help a pinched nerve, frozen shoulder right,depression, back surgery  Work Hx: disabled   Living Situation: Lives in a 1 story house, 1 step to enter, no rail, not alone  Pt Goals: to loosen my shoulder  Barriers: [x]pain []financial []time []transportation []other  Motivation: good  Substance use: []Alcohol []Tobacco []other:   FABQ Score: []low []elevate  Cognition: A & O x 4   Other:    OBJECTIVE/EXAMINATION  Domestic Life: household chores, community activities, enjoys working in the yard when able   Activity/Recreational Limitations: pain  Mobility: left SC  Self Care: needs A with bathing and dressing         Modality rationale:     Min Type Additional Details    [] Estim:  []Unatt       []IFC  []Premod                        []Other:  []w/ice   []w/heat  Position:  Location:    [] Estim: []Att    []TENS instruct  []NMES                    []Other:  []w/US   []w/ice   []w/heat  Position:  Location:    []  Traction: [] Cervical       []Lumbar                       [] Prone          []Supine                       []Intermittent   []Continuous Lbs:  [] before manual  [] after manual    []  Ultrasound: []Continuous   [] Pulsed                           []1MHz   []3MHz Location:  W/cm2:    []  Iontophoresis with dexamethasone         Location: [] Take home patch   [] In clinic    []  Ice     []  heat  []  Ice massage  []  Laser   []  Anodyne Position:  Location:    []  Laser with stim  []  Other: Position:  Location:    []  Vasopneumatic Device Pressure:       [] lo [] med [] hi   Temperature: [] lo [] med [] hi   [] Skin assessment post-treatment:  []intact []redness- no adverse reaction    []redness - adverse reaction:     45 min [x]Eval                  []Re-Eval       16 min Therapeutic Exercise:  [x] See flow sheet :   Rationale: increase ROM, increase strength and improve coordination to improve the patients ability to aid with increase tolerance to ADLs and activities     min Therapeutic Activity:  []  See flow sheet :   Rationale:   to improve the patients ability to       min Neuromuscular Re-education:  []  See flow sheet :   Rationale:   to improve the patients ability to      min Manual Therapy:     Rationale:  to     min Gait Training:  ___ feet with ___ device on level surfaces with ___ level of assist   Rationale: With   [] TE   [] TA   [] neuro   [] other: Patient Education: [x] Review HEP    [] Progressed/Changed HEP based on:   [] positioning   [] body mechanics   [] transfers   [] heat/ice application    [] other:      Other Objective/Functional Measures:    Physical Therapy Evaluation - [facility]ine ([facility])    SUBJECTIVE  Chief Complaint:    Mechanism of injury:    Symptoms:  Aggravated by:   [] Bending [] Sitting [] Standing [] Walking   [] Moving [] Cough [] Sneeze [] Valsalva   [] AM  [] PM  Lying:  [] sup   [] pro   [] sidelying   [] Other:     Eased by:    [] Bending [] Sitting [] Standing [] Walking   [] Moving [] AM  [] PM  Lying: [] sup  [] pro  [] sidelying   [] Other:     General Health:  Red Flags Indicated? [] Yes    [] No  [] Yes [] No Recent weight change (If yes, due to dieting?  [] Yes  [] No)   [] Yes [] No Weakness in legs during walking  [] Yes [] No Unremitting pain at night  [] Yes [] No Abdominal pain or problems  [] Yes [] No Rectal bleeding  [] Yes [] No Feet more cold or painful in cold weather  [] Yes [] No Menstrual irregularities  [] Yes [] No Blood or pain with urination  [] Yes [] No Dysfunction of bowel or bladder  [] Yes [] No Recent illness within past 3 weeks (i.e, cold, flu)  [] Yes [] No Numbness/tingling in buttock/genitalia region    Past History/Treatments:     Diagnostic Tests: [] Lab work [x] X-rays    [] CT [] MRI     [] Other:  Results:    Functional Status  Prior level of function:as above  Present functional limitations:seee FOTO  What position do you sleep in?:left side    OBJECTIVE  Posture:  Lateral Shift: [x] R    [x] L     [] +  [x] -  Kyphosis: [] Increased [] Decreased   [] WNL  Lordosis:  [x] Increased [] Decreased   [] WNL  Pelvic symmetry: [] WNL    [] Other:    Gait:  [] Normal     [x] Abnormal:  Left SC use    Active Movements: [] N/A   [] Too acute   [] Other:  ROM  AROM % PROM Comments:pain, area   Forward flexion 40-60      Extension 20-30      SB right 20-30 56cm     SB left 20-30 63cm     Rotation right 5-10 75%     Rotation left 5-10 75%       Repeated Movements   Effects on present pain: produces (DC), abolishes (A), increases (incr), decreases (decr), centralizes (C), peripheral (PH), no effect (NE)   Pre-Test Sx Flexion Repeated Flexion Extension Repeated Extension Repeated SBL Repeated SBR   Sitting          Standing          Lying      N/A N/A   Comments:  Side Glide:  Sustained passive positioning test:    Neuro Screen [] WNL  Myotome/Dermatome/Reflexes:  Comments:    Dural Mobility:  SLR Sitting: [] R    [] L    [] +    [] -  @ (degrees):           Supine: [] R    [] L    [] +    [] -  @ (degrees):   Slump Test: [x] R    [x] L    [] +    [x] -  @ (degrees):   Prone Knee Bend: [] R    [] L    [] +    [] -     Palpation  [] Min  [x] Mod  [] Severe    Location:TTP sacrum. She denies piriformis pain  [] Min  [] Mod  [] Severe    Location:  [] Min  [] Mod  [] Severe    Location:    Strength   L(0-5) R (0-5) N/T   Hip Flexion (L1,2)   []   Knee Extension (L3,4)   []   Ankle Dorsiflexion (L4)   []   Great Toe Extension (L5)   []   Ankle Plantarflexion (S1)   []   Knee Flexion (S1,2)   []   Upper Abdominals   []   Lower Abdominals   []   Paraspinals   []   Back Rotators   []   Gluteus Cameron   []   Other   []     Special Tests  Lumbar:  Lumb.  Compression: [] Pos  [] Neg               Lumbar Distraction:   [] Pos  [] Neg    Quadrant:  [] Pos  [] Neg   [] Flex  [] Ext    Sacroilliac:  Gaenslen's: [] R    [] L    [] +    [] -     Compression: [] +    [] -     Gapping:  [] +    [] -     Thigh Thrust: [] R    [] L    [] +    [] -     Leg Length: [] +    [] - Position:    Crests:    ASIS:    PSIS:    Sacral Sulcus:    Mobility: Standing flex:     Sitting flex:     Supine to sit:     Prone knee bend:         Hip: Jules Squibb:  [] R    [] L    [] +    [] -     Scour:  [] R    [] L    [] +    [] -     Piriformis: [x] R    [x] L    [] +    [x] -          Deficits: Natalie's: [] R    [] L    [] +    [] -     Laurey [place]: [] R    [] L    [] +    [] -     Hamstrings 90/90:    Gastrocsoleus (to neutral): Right: Left:       Global Muscular Weakness:  Abdominals:  Quadratus Lumborum:  Paraspinals: Other:    Other tests/comments: falls risk is low to moderate, uses Left SC, recent fall last month, decrease ability to maintain SLS. Right Shoulder IR abdomen, ER 15 pain, F 75  ABD  60    Tight end feels. MMT right shoulder NA due to pain and LOM. TTP Anterior right shoulder with trigger points right teres and sub acromial bursa region. Pain Level (0-10 scale) post treatment: 7    ASSESSMENT/Changes in Function: Patient demonstrates the potential to make gains with improved ROM, strength, endurance/activity tolerance, functional FOTO survey score  and all within a reasonable time frame so as to increase their functional independence with ADLs and activities for carryover to  Improved quality of life, tolerance to household chores and community activities. Patient requires skilled Physical Therapy so as to monitor their response to and modify their treatment plan accordingly. Patient appears to be an appropriate candidate for skilled outpatient Physical Therapy.     Patient will continue to benefit from skilled PT services to modify and progress therapeutic interventions, address functional mobility deficits, address ROM deficits, address strength deficits, analyze and address soft tissue restrictions, analyze and cue movement patterns, analyze and modify body mechanics/ergonomics, assess and modify postural abnormalities and instruct in home and community integration to attain remaining goals.      [x]  See Plan of Care  []  See progress note/recertification  []  See Discharge Summary         Progress towards goals / Updated goals:       PLAN  [x]  Upgrade activities as tolerated     [x]  Continue plan of care  []  Update interventions per flow sheet       []  Discharge due to:_  []  Other:_      Joyce Thorne, PT 5/17/2018  1:34 PM

## 2018-05-30 ENCOUNTER — HOSPITAL ENCOUNTER (OUTPATIENT)
Dept: PHYSICAL THERAPY | Age: 58
Discharge: HOME | End: 2018-05-30
Payer: MEDICAID

## 2018-05-30 PROCEDURE — 97014 ELECTRIC STIMULATION THERAPY: CPT

## 2018-05-30 PROCEDURE — 97110 THERAPEUTIC EXERCISES: CPT

## 2018-05-30 PROCEDURE — 97140 MANUAL THERAPY 1/> REGIONS: CPT

## 2018-05-30 NOTE — PROGRESS NOTES
PT DAILY TREATMENT NOTE - [facility]     Patient Name: Sarah Munguia  Date:2018  : 1960  [x]  Patient  Verified  Payor: [facility] MEDICAID / Plan: Raz 46 / Product Type: Managed Care Medicaid /    In time:  12:02 Out time:1:13  Total Treatment Time (min): 61  Total Timed Codes (min): 61  1:1 Treatment Time ( [address] only): 31  Visit #: 2 of 10    Treatment Area: Low back pain [M54.5]  Pain in right shoulder [M25.511]    SUBJECTIVE  Pain Level (0-10 scale): back  4  Shoulder  7  Any medication changes, allergies to medications, adverse drug reactions, diagnosis change, or new procedure performed?: [x] No    [] Yes (see summary sheet for update)  Subjective functional status/changes:   [] No changes reported  Some pain.     OBJECTIVE    Modality rationale: decrease edema, decrease inflammation, decrease pain and increase tissue extensibility to improve the patients ability to perform ADL    Min Type Additional Details   15 [x] Estim:  [x]Unatt       []IFC  [x]Premod                        []Other:  []w/ice   [x]w/heat  Position: long  sit  Location:(R)  Shoulder/center  LSP    [] Estim: []Att    []TENS instruct  []NMES                    []Other:  []w/US   []w/ice   []w/heat  Position:  Location:    []  Traction: [] Cervical       []Lumbar                       [] Prone          []Supine                       []Intermittent   []Continuous Lbs:  [] before manual  [] after manual    []  Ultrasound: []Continuous   [] Pulsed                           []1MHz   []3MHz W/cm2:  Location:    []  Iontophoresis with dexamethasone         Location: [] Take home patch   [] In clinic    []  Ice     []  heat  []  Ice massage  []  Laser   []  Anodyne Position:  Location:    []  Laser with stim  []  Other:  Position:  Location:    []  Vasopneumatic Device Pressure:       [] lo [] med [] hi   Temperature: [] lo [] med [] hi   [x] Skin assessment post-treatment:  [x]intact []redness- no adverse reaction []redness - adverse reaction:      min []Eval                  []Re-Eval       38  1:1  23 min Therapeutic Exercise:  [x] See flow sheet :   Rationale: increase ROM and increase strength to improve the patients ability to perform ADL      min Therapeutic Activity:  []  See flow sheet :   Rationale:   to improve the patients ability to       min Neuromuscular Re-education:  []  See flow sheet :   Rationale:   to improve the patients ability to     8 min Manual Therapy:  Gentle PROM with distraction    Rationale: decrease pain, increase ROM, increase tissue extensibility and decrease edema  to perform ADL     min Gait Training:  ___ feet with ___ device on level surfaces with ___ level of assist   Rationale: With   [x] TE   [] TA   [] neuro   [] other: Patient Education: [x] Review HEP    [] Progressed/Changed HEP based on:   [] positioning   [] body mechanics   [] transfers   [] heat/ice application    [] other:      Other Objective/Functional Measures:  Pain full with distraction/c/o  Pain  At  armpit    Pain Level (0-10 scale) post treatment: <4  <7    ASSESSMENT/Changes in Function: Pt  Experienced  Pain  With inferior  Cap stretch/wand  Abduction. discussed  With pt  On importance  Of  Using  ICE  Instead  Of  MH. Patient will continue to benefit from skilled PT services to address functional mobility deficits, address ROM deficits, address strength deficits, analyze and address soft tissue restrictions, analyze and cue movement patterns and instruct in home and community integration to attain remaining goals.      [x]  See Plan of Care  []  See progress note/recertification  []  See Discharge Summary         Progress towards goals / Updated goals:  1 patient will have established and be I with HEP to aid with progression of skilled PT program                                               EVAL issued                         CURRENT                         2 patient will have pain 5/10 to aid with increase tolerance to ADLS and activities                         EVAL 7                         CURRENT                           Long Term Goals:  To be accomplished in 10 treatments:                         1 patient will have pain 3/10 to aid with increase tolerance to ADLS and activities at home                         EVAL 7                         CURRENT                         2 patient will have shoulder FOTO  57 to show significant improved tolerance to overhead reaching at home                         EVAL 28                         CURRENT                         3 patient will report overall 50% improvement to aid with increase tolerance to household chores, walking and standing                         EVAL decreased tolerance to standing and walking                         CURRENT                         4  Patient will have AROM right shoulder F 120  abd 110 to aid with increased ease with overhead reaching at home                         EVAL Right shoulder F 75  ABD 60                         CURRENT     PLAN  []  Upgrade activities as tolerated     [x]  Continue plan of care  []  Update interventions per flow sheet       []  Discharge due to:_  []  Other:_      Monie Potter PTA 5/30/2018  12:40 PM    Future Appointments  Date Time Provider Radha Chaudhary   5/31/2018 2:30 PM [address] [place], [place] [address]   6/4/2018 9:30 AM [address] [place], [place] [address]PT[facility]   6/6/2018 9:30 AM [address] [place], [place] [address]   6/12/2018 9:00 AM Nancy Medina  [address]

## 2018-05-31 ENCOUNTER — HOSPITAL ENCOUNTER (OUTPATIENT)
Dept: PHYSICAL THERAPY | Age: 58
Discharge: HOME | End: 2018-05-31
Payer: MEDICAID

## 2018-05-31 PROCEDURE — 97014 ELECTRIC STIMULATION THERAPY: CPT

## 2018-05-31 PROCEDURE — 97110 THERAPEUTIC EXERCISES: CPT

## 2018-05-31 PROCEDURE — 97140 MANUAL THERAPY 1/> REGIONS: CPT

## 2018-05-31 NOTE — PROGRESS NOTES
PT DAILY TREATMENT NOTE - [facility]     Patient Name: Robyn Chase  Date:2018  : 1960  [x]  Patient  Verified  Payor: [facility] MEDICAID / Plan: Raz 46 / Product Type: Managed Care Medicaid /    In time:  2:31 Out time:3:45  Total Treatment Time (min): 63  Total Timed Codes (min): 63  1:1 Treatment Time ( only): 33  Visit #: 3 of 10    Treatment Area: Low back pain [M54.5]  Pain in right shoulder [M25.511]    SUBJECTIVE  Pain Level (0-10 scale):back  0  Shoulder  5  Any medication changes, allergies to medications, adverse drug reactions, diagnosis change, or new procedure performed?: [x] No    [] Yes (see summary sheet for update)  Subjective functional status/changes:   [] No changes reported  Back  Is  Feeling  Better.     OBJECTIVE    Modality rationale: decrease edema, decrease inflammation, decrease pain and increase tissue extensibility to improve the patients ability to perform ADL    Min Type Additional Details   15 [x] Estim:  [x]Unatt       [x]IFC  []Premod                        []Other:  [x]w/ice   []w/heat  Position:supine  Location:(R) shoulder    [] Estim: []Att    []TENS instruct  []NMES                    []Other:  []w/US   []w/ice   []w/heat  Position:  Location:    []  Traction: [] Cervical       []Lumbar                       [] Prone          []Supine                       []Intermittent   []Continuous Lbs:  [] before manual  [] after manual    []  Ultrasound: []Continuous   [] Pulsed                           []1MHz   []3MHz W/cm2:  Location:    []  Iontophoresis with dexamethasone         Location: [] Take home patch   [] In clinic    []  Ice     []  heat  []  Ice massage  []  Laser   []  Anodyne Position:  Location:    []  Laser with stim  []  Other:  Position:  Location:    []  Vasopneumatic Device Pressure:       [] lo [] med [] hi   Temperature: [] lo [] med [] hi   [x] Skin assessment post-treatment:  [x]intact []redness- no adverse reaction []redness - adverse reaction:      min []Eval                  []Re-Eval       38  1:1  23 min Therapeutic Exercise:  [x] See flow sheet :   Rationale: increase ROM and increase strength to improve the patients ability to perform ADL      min Therapeutic Activity:  []  See flow sheet :   Rationale:   to improve the patients ability to       min Neuromuscular Re-education:  []  See flow sheet :   Rationale:   to improve the patients ability to     10 min Manual Therapy:  Gentle  PROM /distraction   Rationale: decrease pain, increase ROM, increase tissue extensibility and decrease edema  to perform ADL      min Gait Training:  ___ feet with ___ device on level surfaces with ___ level of assist   Rationale: With   [x] TE   [] TA   [] neuro   [] other: Patient Education: [x] Review HEP    [] Progressed/Changed HEP based on:   [] positioning   [] body mechanics   [] transfers   [] heat/ice application    [] other:      Other Objective/Functional Measures:  TTP  At  Pec/posterior  shoulder    Pain Level (0-10 scale) post treatment: 0    ASSESSMENT/Changes in Function: Fair  Response  To each there ex. Patient will continue to benefit from skilled PT services to address functional mobility deficits, address ROM deficits, address strength deficits, analyze and address soft tissue restrictions, analyze and cue movement patterns and address imbalance/dizziness to attain remaining goals.      [x]  See Plan of Care  []  See progress note/recertification  []  See Discharge Summary         Progress towards goals / Updated goals:  1 patient will have established and be I with HEP to aid with progression of skilled PT program                                               EVAL issued                         ETGAOCN  Met  5/31/18                         2 patient will have pain 5/10 to aid with increase tolerance to ADLS and activities                         EVAL 7                         CURRENT  Back  0  Shoulder  5 5/31/18     Long Term Goals: To be accomplished in 10 treatments:                         8 patient will have pain 3/10 to aid with increase tolerance to ADLS and activities at home                         EVAL 7                         CURRENT                         2 patient will have shoulder FOTO  57 to show significant improved tolerance to overhead reaching at home                         EVAL 28                         CURRENT                         3 patient will report overall 50% improvement to aid with increase tolerance to household chores, walking and standing                         EVAL decreased tolerance to standing and walking                         CURRENT                         4  Patient will have AROM right shoulder F 120  abd 110 to aid with increased ease with overhead reaching at home                         EVAL Right shoulder F 75  ABD 60                         CURRENT     PLAN  []  Upgrade activities as tolerated     [x]  Continue plan of care  []  Update interventions per flow sheet       []  Discharge due to:_  []  Other:_      Monie Potter PTA 5/31/2018  2:51 PM    Future Appointments  Date Time Provider Radha Chaudhary   6/4/2018 9:30 AM SUSHIL EllerPT [facility]   6/6/2018 9:30 AM SUSHIL EllerPTMEÑO [facility]   6/12/2018 9:00 AM Brianna Lea  [address]

## 2018-06-04 ENCOUNTER — HOSPITAL ENCOUNTER (OUTPATIENT)
Dept: PHYSICAL THERAPY | Age: 58
Discharge: HOME | End: 2018-06-04
Payer: MEDICAID

## 2018-06-04 PROCEDURE — 97140 MANUAL THERAPY 1/> REGIONS: CPT

## 2018-06-04 PROCEDURE — 97110 THERAPEUTIC EXERCISES: CPT

## 2018-06-04 PROCEDURE — 97032 APPL MODALITY 1+ESTIM EA 15: CPT

## 2018-06-04 NOTE — PROGRESS NOTES
PT DAILY TREATMENT NOTE     Patient Name: Coleman Milian  Date:2018  : 1960  [x]  Patient  Verified  Payor: [facility] MEDICAID / Plan: Raz 46 / Product Type: Managed Care Medicaid /    In time:9:14  Out time:11:08  Total Treatment Time (min): 79  Visit #: 4 of 10    Treatment Area: Low back pain [M54.5]  Pain in right shoulder [M25.511]    SUBJECTIVE  Pain Level (0-10 scale): back  4  shoulder  Any medication changes, allergies to medications, adverse drug reactions, diagnosis change, or new procedure performed?: [x] No    [] Yes (see summary sheet for update)  Subjective functional status/changes:   [] No changes reported  Gerri been taking  PM  Medicine.     OBJECTIVE    Modality rationale: decrease edema, decrease inflammation, decrease pain and increase tissue extensibility to improve the patients ability to perform ADL   Min Type Additional Details    [] Estim:  []Unatt       []IFC  []Premod                        []Other:  []w/ice   []w/heat  Position:  Location:   16 [x] Estim: [x]Att    []TENS instruct  []NMES                    [x]Other:LTO  []w/US   []w/ice   []w/heat  Position:prone/supine  Location:center LSP/armpit (R)    []  Traction: [] Cervical       []Lumbar                       [] Prone          []Supine                       []Intermittent   []Continuous Lbs:  [] before manual  [] after manual    []  Ultrasound: []Continuous   [] Pulsed                           []1MHz   []3MHz W/cm2:  Location:    []  Iontophoresis with dexamethasone         Location: [] Take home patch   [] In clinic    []  Ice     []  heat  []  Ice massage  []  Laser   []  Anodyne Position:  Location:    []  Laser with stim  []  Other:  Position:  Location:    []  Vasopneumatic Device Pressure:       [] lo [] med [] hi   Temperature: [] lo [] med [] hi   [x] Skin assessment post-treatment:  [x]intact []redness- no adverse reaction    []redness - adverse reaction:      min []Eval []Re-Eval       36 min Therapeutic Exercise:  [x] See flow sheet :   Rationale: increase ROM to improve the patients ability to perform ADL      min Therapeutic Activity:  []  See flow sheet :   Rationale:   to improve the patients ability to       min Neuromuscular Re-education:  []  See flow sheet :   Rationale:   to improve the patients ability to    15 min Manual Therapy:  P/A  Mob  (B) LSP/(B) LE  Distraction/Gentle  GH JT mob  (R)  shoulder   Rationale: decrease pain and increase ROM to perform ADL      min Gait Training:  ___ feet with ___ device on level surfaces with ___ level of assist   Rationale: With   [x] TE   [] TA   [] neuro   [] other: Patient Education: [x] Review HEP    [] Progressed/Changed HEP based on:   [] positioning   [] body mechanics   [] transfers   [] heat/ice application    [] other:      Other Objective/Functional Measures:  Pt  Experienced  Tingling  At  Radial  aspect  (R) hand  Following  LTO  Lateral  Aspect  (R)  breast    Pain Level (0-10 scale) post treatment: 3-4    ASSESSMENT/Changes in Function: tightness  At  Lateral  Aspect  (R) breast.Fair response  To each there ex. Patient will continue to benefit from skilled PT services to address functional mobility deficits, address ROM deficits, address strength deficits, analyze and address soft tissue restrictions and instruct in home and community integration to attain remaining goals.      [x]  See Plan of Care  []  See progress note/recertification  []  See Discharge Summary         Progress towards goals / Updated goals:  1 patient will have established and be I with HEP to aid with progression of skilled PT program                                               EVAL issued                         [id number]  Met  5/31/18                         2 patient will have pain 5/10 to aid with increase tolerance to ADLS and activities                         EVAL 7                         CURRENT  Back  0  Shoulder  5 5/31/18      Long Term Goals: To be accomplished in 10 treatments:                         2 patient will have pain 3/10 to aid with increase tolerance to ADLS and activities at home                         EVAL 7                         CURRENT                         2 patient will have shoulder FOTO  57 to show significant improved tolerance to overhead reaching at home                         EVAL 28                         CURRENT                         3 patient will report overall 50% improvement to aid with increase tolerance to household chores, walking and standing                         EVAL decreased tolerance to standing and walking                         CURRENT                         4  Patient will have AROM right shoulder F 120  abd 110 to aid with increased ease with overhead reaching at home                         EVAL Right shoulder F 75  ABD 60                         CURRENT     PLAN  []  Upgrade activities as tolerated     [x]  Continue plan of care  []  Update interventions per flow sheet       []  Discharge due to:_  [x]  Other:_  REASSESS ESTIVEN Potter PTA 6/4/2018  9:52 AM    Future Appointments  Date Time Provider Radha Chaudhary   6/6/2018 9:30 AM Zorita Hodgkin, PTA [id number]   6/12/2018 9:00 AM Travon Scott  [address]

## 2018-06-06 ENCOUNTER — HOSPITAL ENCOUNTER (OUTPATIENT)
Dept: PHYSICAL THERAPY | Age: 58
Discharge: HOME | End: 2018-06-06
Payer: MEDICAID

## 2018-06-06 PROCEDURE — 97140 MANUAL THERAPY 1/> REGIONS: CPT

## 2018-06-06 PROCEDURE — 97110 THERAPEUTIC EXERCISES: CPT

## 2018-06-06 NOTE — PROGRESS NOTES
PT DAILY TREATMENT NOTE     Patient Name: Chato Alberts  Date:2018  : 1960  [x]  Patient  Verified  Payor: [facility] MEDICAID / Plan: Raz 46 / Product Type: Managed Care Medicaid /    In time: 9:31  Out time:10:31  Total Treatment Time (min): 60  Visit #: 5 of 10    Treatment Area: Low back pain [M54.5]  Pain in right shoulder [M25.511]    SUBJECTIVE  Pain Level (0-10 scale): back  3  Shoulder  7  Any medication changes, allergies to medications, adverse drug reactions, diagnosis change, or new procedure performed?: [x] No    [] Yes (see summary sheet for update)  Subjective functional status/changes:   [] No changes reported  Still  Some  Pain.     OBJECTIVE    Modality rationale: decrease edema, decrease inflammation, decrease pain and increase tissue extensibility to improve the patients ability to perform ADL   Min Type Additional Details    [] Estim:  []Unatt       []IFC  []Premod                        []Other:  []w/ice   []w/heat  Position:  Location:    [] Estim: []Att    []TENS instruct  []NMES                    []Other:  []w/US   []w/ice   []w/heat  Position:  Location:    []  Traction: [] Cervical       []Lumbar                       [] Prone          []Supine                       []Intermittent   []Continuous Lbs:  [] before manual  [] after manual    []  Ultrasound: []Continuous   [] Pulsed                           []1MHz   []3MHz W/cm2:  Location:    []  Iontophoresis with dexamethasone         Location: [] Take home patch   [] In clinic   5 [x]  Ice post    []  heat  []  Ice massage  []  Laser   []  Anodyne Position:supine  Location:(R) shoulder    []  Laser with stim  []  Other:  Position:  Location:    []  Vasopneumatic Device Pressure:       [] lo [] med [] hi   Temperature: [] lo [] med [] hi   [x] Skin assessment post-treatment:  [x]intact []redness- no adverse reaction    []redness - adverse reaction:      min []Eval                  []Re-Eval       47 min Therapeutic Exercise:  [x] See flow sheet :   Rationale: increase ROM and increase strength to improve the patients ability to perform ADL      min Therapeutic Activity:  []  See flow sheet :   Rationale:   to improve the patients ability to       min Neuromuscular Re-education:  []  See flow sheet :   Rationale:   to improve the patients ability to     8 min Manual Therapy:  Gentle  GH JT  Mob with distraction   Rationale: decrease pain, increase ROM, increase tissue extensibility and decrease edema  to perform ADL      min Gait Training:  ___ feet with ___ device on level surfaces with ___ level of assist   Rationale: With   [x] TE   [] TA   [] neuro   [] other: Patient Education: [x] Review HEP    [] Progressed/Changed HEP based on:   [] positioning   [] body mechanics   [] transfers   [] heat/ice application    [] other:      Other Objective/Functional Measures: added  Radial  Nerve  glide    Pain Level (0-10 scale) post treatment: 1    ASSESSMENT/Changes in Function: pt  C/o  Tingling  At  (R)radial aspect of  Hand. Benefited  With radial  Nerve  Glide. Pt  Will complete  FOTO NV due to pt had  To leave  Due to transportation. Patient will continue to benefit from skilled PT services to address functional mobility deficits, address ROM deficits, address strength deficits, analyze and address soft tissue restrictions, analyze and cue movement patterns and instruct in home and community integration to attain remaining goals.      [x]  See Plan of Care  []  See progress note/recertification  []  See Discharge Summary         Progress towards goals / Updated goals:  1 patient will have established and be I with HEP to aid with progression of skilled PT program                                               EVAL issued                         HKHZSSE  Met  5/31/18                         2 patient will have pain 5/10 to aid with increase tolerance to ADLS and activities                         EVAL [facility] be accomplished in 10 treatments:                         8 patient will have pain 3/10 to aid with increase tolerance to ADLS and activities at home                         EVAL 7                         CURRENT  Back  3  Shoulder  7   6/6/18                         2 patient will have shoulder FOTO  57 to show significant improved tolerance to overhead reaching at home                         EVAL 28                         CURRENT                         3 patient will report overall 50% improvement to aid with increase tolerance to household chores, walking and standing                         EVAL decreased tolerance to standing and walking                         CURRENT                         4  Patient will have AROM right shoulder F 120  abd 110 to aid with increased ease with overhead reaching at home                         EVAL Right shoulder F 75  ABD 60                         CURRENT     PLAN  []  Upgrade activities as tolerated     [x]  Continue plan of care  []  Update interventions per flow sheet       []  Discharge due to:_  [x]  Other:_  REASSESS FOTO NV     [address] [facility] 6/6/2018  9:48 AM    Future Appointments  Date Time Provider Radha Chaudhary   6/12/2018 9:00 AM Gerson Damon  [address]

## 2018-06-12 ENCOUNTER — OFFICE VISIT (OUTPATIENT)
Dept: ORTHOPEDIC SURGERY | Age: 58
End: 2018-06-12

## 2018-06-12 VITALS
RESPIRATION RATE: 16 BRPM | BODY MASS INDEX: 36.19 KG/M2 | DIASTOLIC BLOOD PRESSURE: 76 MMHG | HEART RATE: 84 BPM | SYSTOLIC BLOOD PRESSURE: 116 MMHG | OXYGEN SATURATION: 97 % | TEMPERATURE: 97.4 F | HEIGHT: 65 IN | WEIGHT: 217.2 LBS

## 2018-06-12 DIAGNOSIS — M16.11 OSTEOARTHRITIS OF RIGHT HIP, UNSPECIFIED OSTEOARTHRITIS TYPE: ICD-10-CM

## 2018-06-12 DIAGNOSIS — M54.5 LOW BACK PAIN, UNSPECIFIED BACK PAIN LATERALITY, UNSPECIFIED CHRONICITY, WITH SCIATICA PRESENCE UNSPECIFIED: Primary | ICD-10-CM

## 2018-06-12 DIAGNOSIS — M47.816 LUMBAR FACET ARTHROPATHY: ICD-10-CM

## 2018-06-12 DIAGNOSIS — R26.9 GAIT ABNORMALITY: ICD-10-CM

## 2018-06-12 DIAGNOSIS — M25.551 RIGHT HIP PAIN: ICD-10-CM

## 2018-06-12 PROBLEM — E66.01 SEVERE OBESITY (BMI 35.0-39.9): Status: ACTIVE | Noted: 2018-06-12

## 2018-06-12 RX ORDER — MAGNESIUM GLUCONATE 27 MG(500)
1 TABLET ORAL DAILY
COMMUNITY
End: 2021-01-06

## 2018-06-12 RX ORDER — GABAPENTIN 300 MG/1
CAPSULE ORAL
Refills: 2 | COMMUNITY
Start: 2018-04-18 | End: 2021-01-06

## 2018-06-12 NOTE — MR AVS SNAPSHOT
[address] [place] [address] [address] [place] [address] 
[phone] Patient: Erika Martinez MRN: [id number] SFQ:8/46/1384 Visit Information Date & Time Provider Department Dept. Phone Encounter #  
 6/12/2018  9:00 AM Roya Flowers, [facility] [phone] [id number] Follow-up Instructions Return in about 3 weeks (around 7/3/2018) for Diagnostic Test follow up. Upcoming Health Maintenance Date Due Influenza Age 5 to Adult 8/1/2018 BREAST CANCER SCRN MAMMOGRAM 8/15/2019 DTaP/Tdap/Td series (2 - Td) 8/6/2025 COLONOSCOPY 4/5/2026 Allergies as of 6/12/2018  Review Complete On: 6/12/2018 By: Favian Ca LPN Severity Noted Reaction Type Reactions Pcn [Penicillins]  09/07/2012    Hives Current Immunizations  Reviewed on 1/13/2016 Name Date Influenza Vaccine Intradermal PF 1/13/2016, 10/10/2014 Tdap 8/6/2015  9:34 AM  
  
 Not reviewed this visit You Were Diagnosed With   
  
 Codes Comments Low back pain, unspecified back pain laterality, unspecified chronicity, with sciatica presence unspecified    -  Primary ICD-10-CM: M54.5 ICD-9-CM: 724.2 Right hip pain     ICD-10-CM: M25.551 ICD-9-CM: 719.45 Lumbar facet arthropathy (HCC)     ICD-10-CM: M46.96 
ICD-9-CM: 721.3 Osteoarthritis of right hip, unspecified osteoarthritis type     ICD-10-CM: M16.11 
ICD-9-CM: 715.95 Gait abnormality     ICD-10-CM: R26.9 ICD-9-CM: 456. 2 Vitals BP Pulse Temp Resp Height(growth percentile) Weight(growth percentile) 116/76 84 97.4 °F (36.3 °C) (Oral) 16 5' 4.75\" (1.645 m) 217 lb 3.2 oz (98.5 kg) SpO2 BMI OB Status Smoking Status 97% 36.42 kg/m2 Hysterectomy Former Smoker BMI and BSA Data Body Mass Index Body Surface Area  
 36.42 kg/m 2 2.12 m 2 Preferred Pharmacy Pharmacy Name Phone [address], [place] [phone]  [facility] [phone] Your Updated Medication List  
  
   
This list is accurate as of 6/12/18 10:21 AM.  Always use your most recent med list.  
  
  
  
  
 gabapentin 300 mg capsule Commonly known as:  NEURONTIN  
TK ONE PO  TID  
  
 IODINE STRONG (LUGOLS) PO Take 25 mg by mouth daily. oxyCODONE IR 5 mg immediate release tablet Commonly known as:  [place] Take 1-2 Tabs by mouth every three (3) hours as needed. Max Daily Amount: 80 mg. Selenomethionine 200 mcg Tab Take 1 Tab by mouth daily. VITAMIN D3 2,000 unit Tab Generic drug:  cholecalciferol (vitamin D3) Take 1 Tab by mouth daily. We Performed the Following AMB POC X-RAY RADEX HIP UNI WITH PELVIS 2-3 VIEWS [07220 CPT(R)] AMB POC XRAY, SPINE, LUMBOSACRAL; 4+ [id number] CPT(R)] Follow-up Instructions Return in about 3 weeks (around 7/3/2018) for Diagnostic Test follow up. To-Do List   
 06/19/2018 Imaging:  MRI HIP  RT  WO CONT   
  
 06/22/2018 4:00 PM  
  [facility] at [facility] ([phone]) GENERAL INSTRUCTIONS  Bring information (ID card) if you have any medically implanted devices. You will be required to lie still while the procedure is being performed. Remove any jewelry (including body piercing, hairpins) prior to MRI. If you have had a creatinine level drawn within the past 30 days, please bring most recent results to your appt. Bring any films, CD's, and reports related to your study with you on the day of your exam.  This only includes studies done outside of [address] [place] [address], [facility], [place], and [place]. Bring a complete list of all medications you are currently taking to include prescriptions, over-the-counter meds, herbals, vitamins & any dietary supplements.   If you were given medications for claustrophobia or anxiety, please arrange to have someone drive you to your appointment. QUESTIONS  Notify the MRI Department if you have any questions concerning your study. Chavo Cha - [phone] [address] - [phone] Patient Instructions Low Back Arthritis: Exercises Your Care Instructions Here are some examples of typical rehabilitation exercises for your condition. Start each exercise slowly. Ease off the exercise if you start to have pain. Your doctor or physical therapist will tell you when you can start these exercises and which ones will work best for you. When you are not being active, find a comfortable position for rest. Some people are comfortable on the floor or a medium-firm bed with a small pillow under their head and another under their knees. Some people prefer to lie on their side with a pillow between their knees. Don't stay in one position for too long. Take short walks (10 to 20 minutes) every 2 to 3 hours. Avoid slopes, hills, and stairs until you feel better. Walk only distances you can manage without pain, especially leg pain. How to do the exercises Pelvic tilt 1. Lie on your back with your knees bent. 2. \"Brace\" your stomach-tighten your muscles by pulling in and imagining your belly button moving toward your spine. 3. Press your lower back into the floor. You should feel your hips and pelvis rock back. 4. Hold for 6 seconds while breathing smoothly. 5. Relax and allow your pelvis and hips to rock forward. 6. Repeat 8 to 12 times. Back stretches 1. Get down on your hands and knees on the floor. 2. Relax your head and allow it to droop. Round your back up toward the ceiling until you feel a nice stretch in your upper, middle, and lower back. Hold this stretch for as long as it feels comfortable, or about 15 to 30 seconds. 3. Return to the starting position with a flat back while you are on your hands and knees. 4. Let your back sway by pressing your stomach toward the floor. Lift your buttocks toward the ceiling. 5. Hold this position for 15 to 30 seconds. 6. Repeat 2 to 4 times. Follow-up care is a key part of your treatment and safety. Be sure to make and go to all appointments, and call your doctor if you are having problems. It's also a good idea to know your test results and keep a list of the medicines you take. Where can you learn more? Go to http://ramirez-sidra.info/. Enter X772 in the search box to learn more about \"Low Back Arthritis: Exercises. \" Current as of: March 21, 2017 Content Version: 11.4 © 2215-6422 Hotreader. Care instructions adapted under license by [employer] (which disclaims liability or warranty for this information). If you have questions about a medical condition or this instruction, always ask your healthcare professional. [address] any warranty or liability for your use of this information. Hip Arthritis: Exercises Your Care Instructions Here are some examples of exercises for hip arthritis. Start each exercise slowly. Ease off the exercise if you start to have pain. Your doctor or physical therapist will tell you when you can start these exercises and which ones will work best for you. How to do the exercises Straight-leg raises to the outside 7. Lie on your side, with your affected hip on top. 8. Tighten the front thigh muscles of your top leg to keep your knee straight. 9. Keep your hip and your leg straight in line with the rest of your body, and keep your knee pointing forward. Do not drop your hip back. 10. Lift your top leg straight up toward the ceiling, about 12 inches off the floor. Hold for about 6 seconds, then slowly lower your leg. 11. Repeat 8 to 12 times. 12. Switch legs and repeat steps 1 through 5, even if only one hip is sore. Straight-leg raises to the inside 7. Lie on your side with your affected hip on the floor. 8. You can either prop up your other leg on a chair, or you can bend that knee and put that foot in front of your other knee. Do not drop your hip back. 9. Tighten the muscles on the front thigh of your bottom leg to straighten that knee. 10. Keep your kneecap pointing forward and your leg straight, and lift your bottom leg up toward the ceiling about 6 inches. Hold for about 6 seconds, then lower slowly. 11. Repeat 8 to 12 times. 12. Switch legs and repeat steps 1 through 5, even if only one hip is sore. Hip hike 1. Stand sideways on the bottom step of a staircase, and hold on to the banister or wall. 2. Keeping both knees straight, lift your good leg off the step and let it hang down. Then hike your good hip up to the same level as your affected hip or a little higher. 3. Repeat 8 to 12 times. 4. Switch legs and repeat steps 1 through 3, even if only one hip is sore. Bridging 1. Lie on your back with both knees bent. Your knees should be bent about 90 degrees. 2. Then push your feet into the floor, squeeze your buttocks, and lift your hips off the floor until your shoulders, hips, and knees are all in a straight line. 3. Hold for about 6 seconds as you continue to breathe normally, and then slowly lower your hips back down to the floor and rest for up to 10 seconds. 4. Repeat 8 to 12 times. Hamstring stretch (lying down) 1. Lie flat on your back with your legs straight. If you feel discomfort in your back, place a small towel roll under your lower back. 2. Holding the back of your affected leg, lift your leg straight up and toward your body until you feel a stretch at the back of your thigh. 3. Hold the stretch for at least 30 seconds. 4. Repeat 2 to 4 times. 5. Switch legs and repeat steps 1 through 4, even if only one hip is sore. Standing quadriceps stretch 1. If you are not steady on your feet, hold on to a chair, counter, or wall. You can also lie on your stomach or your side to do this exercise. 2. Bend the knee of the leg you want to stretch, and reach behind you to grab the front of your foot or ankle with the hand on the same side. For example, if you are stretching your right leg, use your right hand. 3. Keeping your knees next to each other, pull your foot toward your buttock until you feel a gentle stretch across the front of your hip and down the front of your thigh. Your knee should be pointed directly to the ground, and not out to the side. 4. Hold the stretch for at least 15 to 30 seconds. 5. Repeat 2 to 4 times. 6. Switch legs and repeat steps 1 through 5, even if only one hip is sore. Hip rotator stretch 1. Lie on your back with both knees bent and your feet flat on the floor. 2. Put the ankle of your affected leg on your opposite thigh near your knee. 3. Use your hand to gently push your knee away from your body until you feel a gentle stretch around your hip. 4. Hold the stretch for 15 to 30 seconds. 5. Repeat 2 to 4 times. 6. Repeat steps 1 through 5, but this time use your hand to gently pull your knee toward your opposite shoulder. 7. Switch legs and repeat steps 1 through 6, even if only one hip is sore. Knee-to-chest 
 
1. Lie on your back with your knees bent and your feet flat on the floor. 2. Bring your affected leg to your chest, keeping the other foot flat on the floor (or keeping the other leg straight, whichever feels better on your lower back). 3. Keep your lower back pressed to the floor. Hold for at least 15 to 30 seconds. 4. Relax, and lower the knee to the starting position. 5. Repeat 2 to 4 times. 6. Switch legs and repeat steps 1 through 5, even if only one hip is sore. 7. To get more stretch, put your other leg flat on the floor while pulling your knee to your chest. 
[place] 1. Lie on your side, with your affected hip on top. Keep your feet and knees together and your knees bent. 2. Raise your top knee, but keep your feet together. Do not let your hips roll back. Your legs should open up like a clamshell. 3. Hold for 6 seconds. 4. Slowly lower your knee back down. Rest for 10 seconds. 5. Repeat 8 to 12 times. 6. Switch legs and repeat steps 1 through 5, even if only one hip is sore. Follow-up care is a key part of your treatment and safety. Be sure to make and go to all appointments, and call your doctor if you are having problems. It's also a good idea to know your test results and keep a list of the medicines you take. Where can you learn more? Go to http://ramirez-sidra.info/. Enter V741 in the search box to learn more about \"Hip Arthritis: Exercises. \" Current as of: March 21, 2017 Content Version: 11.4 © 6142-2092 [employer]. Care instructions adapted under license by Linkdex (which disclaims liability or warranty for this information). If you have questions about a medical condition or this instruction, always ask your healthcare professional. [place] [address] any warranty or liability for your use of this information. Introducing [facility] & HEALTH SERVICES! Dear Tessa Dickson: Thank you for requesting a [employer] account. Our records indicate that you already have an active [employer] account. You can access your account anytime at [email or web address] Did you know that you can access your hospital and ER discharge instructions at any time in [employer]? You can also review all of your test results from your hospital stay or ER visit. Additional Information If you have questions, please visit the Frequently Asked Questions section of the [employer] website at [email or web address] Remember, [employer] is NOT to be used for urgent needs. For medical emergencies, dial 911. Now available from your iPhone and Android! Please provide this summary of care documentation to your next provider. Your primary care clinician is listed as [address] [place] [address]. If you have any questions after today's visit, please call [phone].

## 2018-06-12 NOTE — PROGRESS NOTES
[facility]  [address], [facility] [address]  Phone: [phone]  Fax: [phone]    NEW PATIENT  Pt's YOB: 1960    ASSESSMENT   Diagnoses and all orders for this visit:    1. Low back pain, unspecified back pain laterality, unspecified chronicity, with sciatica presence unspecified  -     [51218] LS Spine 4V    2. Right hip pain  -     [60553] Hip Uni with Pelvis 2-3 Views  -     MRI HIP  RT  WO CONT; Future    3. Lumbar facet arthropathy ([address]    4. Osteoarthritis of right hip, unspecified osteoarthritis type  -     MRI HIP  RT  WO CONT; Future    5. Gait abnormality  -     MRI HIP  RT  WO CONT; Future         IMPRESSION AND PLAN:  Tatyana Hackett is a 61 y.o. female with history of lumbar pain. She complains of cramping, sharp, stabbing pain in the lower back that radiates down the right leg and wraps around to the right groin. Pt also complains of numbness in the right shoulder that extends down the arm to the 1st and 2nd fingers. She is currently in physical therapy and takes OTC Tylenol and Neurontin 300 mg.    1) Pt was given information on lumbar and hip arthritis exercises. 2) A right hip MRI was ordered. She has difficulty with weight bearing, reports pain when walking, uses a cane, and is currently in physical therapy. 3) She will continue taking Neurontin 300 mg as prescribed. 4) Ms. Delvis Claros has a reminder for a \"due or due soon\" health maintenance. I have asked that she contact her primary care provider, Renetta Agudelo MD, for follow-up on this health maintenance. 5)  demonstrated consistency with prescribing. 6) Pt will follow-up in 2-3 weeks or sooner if needed. HISTORY OF PRESENT ILLNESS:  Tatyana Hackett is a 61 y.o. female with history of lumbar pain. Pt presents to the office today as a new patient referred by Dr. Laina Kitchen.  She complains of cramping, sharp, stabbing pain in the lower back that radiates down the right leg and wraps around to the right groin. Pt admits that her pain has progressively worsened over the years. She had a lumbar disc removed in 2002 (she is unable to recall the surgeon) and states that she has experienced lower back pain since the surgery. Pt states that when she walks frequently the pain in her right leg extends to the foot and she experiences numbness in the right foot. She admits that her right leg gives way on her and she often has to take breaks when walking due to pain. Pt states that she occasionally feels off balance. She is currently in physical therapy for her right shoulder and right leg as ordered by Dr. Vishal Robertson. Pt notes that she experiences numbness in the right shoulder that extends down the arm to the 1st and 2nd fingers. She has been taking OTC Tylenol and Neurontin 300 mg 1 tab QAM. 1 tab in the afternoon, and 2 tabs QHS. Pt admits that her pain increased when she tried taking Tylenol Arthritis about 2 months ago. Pt denies any stomach issues or prior gastric surgery. Pt at this time desires to proceed with a right hip MRI. Of note, she is a former smoker. Pain Scale: 5/10     PCP: David Hinson MD    Past Medical History:   Diagnosis Date    Adverse effect of anesthesia     hard to wake up    Chronic pain     Depression     GERD (gastroesophageal reflux disease)     Low back pain     Thromboembolus (HCC)     blood clot in foot during pregnancy    Tobacco abuse     Vertigo         Social History     Social History    Marital status: UNKNOWN     Spouse name: N/A    Number of children: N/A    Years of education: N/A     Occupational History    Not on file.      Social History Main Topics    Smoking status: Former Smoker     Packs/day: 0.25     Years: 4.00     Quit date: 6/4/2015    Smokeless tobacco: Never Used    Alcohol use No    Drug use: No    Sexual activity: Not on file     Other Topics Concern    Not on file     Social History Narrative 3/2015[de-identified] currently unemployed due to chronic low back pain, used to work renovating houses. Lived with her mother until her mother's death in the summer of 2014. Currently living with her daughters, splits time between [place] and [place]. Current Outpatient Prescriptions   Medication Sig Dispense Refill    gabapentin (NEURONTIN) 300 mg capsule TK ONE PO  TID  2    potassium iodide/iodine (IODINE STRONG, LUGOLS, PO) Take 25 mg by mouth daily.  Selenomethionine 200 mcg tab Take 1 Tab by mouth daily.  oxyCODONE IR (ROXICODONE) 5 mg immediate release tablet Take 1-2 Tabs by mouth every three (3) hours as needed. Max Daily Amount: 80 mg. 80 Tab 0    cholecalciferol, vitamin D3, (VITAMIN D3) 2,000 unit tab Take 1 Tab by mouth daily. Allergies   Allergen Reactions    Pcn [Penicillins] Hives       REVIEW OF SYSTEMS    Constitutional: Negative for fever, chills, or weight change. Respiratory: Negative for cough or shortness of breath. Cardiovascular: Negative for chest pain or palpitations. Gastrointestinal: Negative for acid reflux, change in bowel habits, or constipation. Genitourinary: Negative for dysuria and flank pain. Musculoskeletal: Positive for lumbar and hip pain. Skin: Negative for rash. Neurological: Negative for headaches, dizziness, or numbness. Endo/Heme/Allergies: Negative for increased bruising. Psychiatric/Behavioral: Negative for difficulty with sleep. PHYSICAL EXAMINATION  Visit Vitals    /76    Pulse 84    Temp 97.4 °F (36.3 °C) (Oral)    Resp 16    Ht 5' 4.75\" (1.645 m)    Wt 217 lb 3.2 oz (98.5 kg)    SpO2 97%    BMI 36.42 kg/m2       Constitutional: Awake, alert, and in no acute distress. HEENT: Normocephalic. Atraumatic. Oropharynx is moist and clear. PERRL. EOMI. Sclerae are nonicteric  Heart: Regular rate and rhythm  Lungs: Clear to auscultation bilaterally  Abdomen: Soft and nontender.  Bowel sounds are present  Neurological: 1+ symmetrical DTRs in the upper extremities. 1+ symmetrical DTRs in the lower extremities. Sensation to light touch is intact. Negative Florence's sign bilaterally. Skin: warm, dry, and intact. Pt has a 2+ dorsalis pedis pulse on the right. Musculoskeletal: Tightness across the trapezius bilaterally. Pain and limited range of motion with abduction of the right arm to about 70 degrees. Good range of motion in the left shoulder. Decreased range of motion with right cervical flexion, and extension. Good range of motion with forward flexion and left cervical flexion. Positive for antalgic gait. Difficulty transitioning from sit to stand. Tenderness to palpation int he lower lumbar region. Moderate pain with extension and axial loading. Improved with forward flexion. Pain and limited range of motion with internal rotation of her right hip. Negative straight leg raise bilaterally. No pain with toe walking; some difficulty with heel walking. No difficulty with the single leg stand bilaterally. Patient ambulates with the assistance of a single point cane. Biceps  Triceps Deltoids Wrist Ext Wrist Flex Hand Intrin   Right +4/5 +4/5 +4/5 +4/5 +4/5 +4/5   Left +4/5 +4/5 +4/5 +4/5 +4/5 +4/5      Hip Flex  Quads Hamstrings Ankle DF EHL Ankle PF   Right  4/5  4/5  4/5  4/5  4/5  4/5   Left +4/5 +4/5 +4/5 +4/5 +4/5 +4/5     IMAGING:    Lumbar spine x-rays from 06/12/2018 were personally reviewed with the patient and demonstrated:  Multilevel degenerative facets. Severe degenerative disc at L5-S1. No instability. Hip x-rays from 06/12/2018 were personally reviewed with the patient and demonstrated:  Osteoarthritis bilaterally, R>L, in the hips. Cervical spine x-rays from 12/07/2017 were personally reviewed with the patient and demonstrated:  Impression: C4-C7 in good alignment with no evidence of hardware failure   or loosening.     Cervical spine MRI from 10/20/2016 was personally reviewed with the patient and demonstrated:    Results from Hospital Encounter encounter on 10/20/16   MRI CERV SPINE WO CONT   Narrative Study: MRI CERV SPINE WO CONT    Indication:  shoulder weakness    Additional clinical history: Muscle weakness (generalized)    Comparison:  none available. Contrast: None administered    Technique:  Magnetic resonance images of the cervical spine were obtained. The  following sequences were obtained: Sagittal T1, T2, T2 STIR; axial T1 and T2. Findings:  Alignment is anatomic without subluxation. Moderate disc space narrowing is seen  at the C6-C7 level. Osteophytic bony endplate changes are also noted at this  level. Concerning signal changes are seen in the osseous structures. The  craniocervical junction is normal in appearance. The spinal cord is normal in  caliber and signal. There is mild developmental narrowing of the spinal canal.  No concerning signal changes are seen in the visualized paraspinal soft tissues. C2-C3: Mild right facet arthropathy and uncovertebral joint hypertrophy. Mild  right neuroforaminal narrowing. No spinal canal stenosis. C3-C4: Minimal central disc protrusion. Moderate right and mild left facet  arthropathy. Bilateral uncovertebral joint hypertrophy. Severe left and  moderate/severe right neuroforaminal narrowing. Mild spinal canal stenosis. C4-C5: Small disc bulge. Mild/moderate bilateral facet arthropathy. Bilateral  uncovertebral joint hypertrophy, right worse than left. Moderate/severe right  and moderate left neuroforaminal narrowing. Mild/moderate spinal canal stenosis. C5-C6: Small disc bulge. Left greater than right facet arthropathy and right  greater than left uncovertebral joint hypertrophy. Severe right and  mild/moderate left neuroforaminal narrowing. Mild/moderate spinal canal  stenosis. C6-C7: Large disc osteophyte complex deforming the ventral cord. Bilateral  uncovertebral joint hypertrophy. Focal ligamentum flavum thickening. Severe  bilateral neuroforaminal narrowing. Severe spinal canal stenosis. C7-T1: Mild bilateral facet arthropathy. No neuroforaminal narrowing or  significant spinal canal stenosis. Impression Impression:  Mild developed mental narrowing of the spinal canal. Multilevel degenerative  disc disease, worst at C6-C7. Multilevel severe and moderate/severe  neuroforaminal narrowing. Severe spinal canal stenosis at C6-C7. Written by Danica Conowingo, as dictated by Mayito De Jesus MD.  I, Dr. Mayito De Jesus confirm that all documentation is accurate.

## 2018-06-12 NOTE — PATIENT INSTRUCTIONS
Low Back Arthritis: Exercises  Your Care Instructions  Here are some examples of typical rehabilitation exercises for your condition. Start each exercise slowly. Ease off the exercise if you start to have pain. Your doctor or physical therapist will tell you when you can start these exercises and which ones will work best for you. When you are not being active, find a comfortable position for rest. Some people are comfortable on the floor or a medium-firm bed with a small pillow under their head and another under their knees. Some people prefer to lie on their side with a pillow between their knees. Don't stay in one position for too long. Take short walks (10 to 20 minutes) every 2 to 3 hours. Avoid slopes, hills, and stairs until you feel better. Walk only distances you can manage without pain, especially leg pain. How to do the exercises  Pelvic tilt    1. Lie on your back with your knees bent. 2. \"Brace\" your stomach-tighten your muscles by pulling in and imagining your belly button moving toward your spine. 3. Press your lower back into the floor. You should feel your hips and pelvis rock back. 4. Hold for 6 seconds while breathing smoothly. 5. Relax and allow your pelvis and hips to rock forward. 6. Repeat 8 to 12 times. Back stretches    1. Get down on your hands and knees on the floor. 2. Relax your head and allow it to droop. Round your back up toward the ceiling until you feel a nice stretch in your upper, middle, and lower back. Hold this stretch for as long as it feels comfortable, or about 15 to 30 seconds. 3. Return to the starting position with a flat back while you are on your hands and knees. 4. Let your back sway by pressing your stomach toward the floor. Lift your buttocks toward the ceiling. 5. Hold this position for 15 to 30 seconds. 6. Repeat 2 to 4 times. Follow-up care is a key part of your treatment and safety.  Be sure to make and go to all appointments, and call your doctor if you are having problems. It's also a good idea to know your test results and keep a list of the medicines you take. Where can you learn more? Go to http://ramirez-sidra.info/. Enter V108 in the search box to learn more about \"Low Back Arthritis: Exercises. \"  Current as of: March 21, 2017  Content Version: 11.4  © 3816-6249 [employer]. Care instructions adapted under license by [employer] (which disclaims liability or warranty for this information). If you have questions about a medical condition or this instruction, always ask your healthcare professional. [place] [address] any warranty or liability for your use of this information. Hip Arthritis: Exercises  Your Care Instructions  Here are some examples of exercises for hip arthritis. Start each exercise slowly. Ease off the exercise if you start to have pain. Your doctor or physical therapist will tell you when you can start these exercises and which ones will work best for you. How to do the exercises  Straight-leg raises to the outside    7. Lie on your side, with your affected hip on top. 8. Tighten the front thigh muscles of your top leg to keep your knee straight. 9. Keep your hip and your leg straight in line with the rest of your body, and keep your knee pointing forward. Do not drop your hip back. 10. Lift your top leg straight up toward the ceiling, about 12 inches off the floor. Hold for about 6 seconds, then slowly lower your leg. 11. Repeat 8 to 12 times. 12. Switch legs and repeat steps 1 through 5, even if only one hip is sore. Straight-leg raises to the inside    7. Lie on your side with your affected hip on the floor. 8. You can either prop up your other leg on a chair, or you can bend that knee and put that foot in front of your other knee. Do not drop your hip back.   9. Tighten the muscles on the front thigh of your bottom leg to straighten that knee.  10. Keep your kneecap pointing forward and your leg straight, and lift your bottom leg up toward the ceiling about 6 inches. Hold for about 6 seconds, then lower slowly. 11. Repeat 8 to 12 times. 12. Switch legs and repeat steps 1 through 5, even if only one hip is sore. Hip hike    1. Stand sideways on the bottom step of a staircase, and hold on to the banister or wall. 2. Keeping both knees straight, lift your good leg off the step and let it hang down. Then hike your good hip up to the same level as your affected hip or a little higher. 3. Repeat 8 to 12 times. 4. Switch legs and repeat steps 1 through 3, even if only one hip is sore. Bridging    1. Lie on your back with both knees bent. Your knees should be bent about 90 degrees. 2. Then push your feet into the floor, squeeze your buttocks, and lift your hips off the floor until your shoulders, hips, and knees are all in a straight line. 3. Hold for about 6 seconds as you continue to breathe normally, and then slowly lower your hips back down to the floor and rest for up to 10 seconds. 4. Repeat 8 to 12 times. Hamstring stretch (lying down)    1. Lie flat on your back with your legs straight. If you feel discomfort in your back, place a small towel roll under your lower back. 2. Holding the back of your affected leg, lift your leg straight up and toward your body until you feel a stretch at the back of your thigh. 3. Hold the stretch for at least 30 seconds. 4. Repeat 2 to 4 times. 5. Switch legs and repeat steps 1 through 4, even if only one hip is sore. Standing quadriceps stretch    1. If you are not steady on your feet, hold on to a chair, counter, or wall. You can also lie on your stomach or your side to do this exercise. 2. Bend the knee of the leg you want to stretch, and reach behind you to grab the front of your foot or ankle with the hand on the same side.  For example, if you are stretching your right leg, use your right hand.  3. Keeping your knees next to each other, pull your foot toward your buttock until you feel a gentle stretch across the front of your hip and down the front of your thigh. Your knee should be pointed directly to the ground, and not out to the side. 4. Hold the stretch for at least 15 to 30 seconds. 5. Repeat 2 to 4 times. 6. Switch legs and repeat steps 1 through 5, even if only one hip is sore. Hip rotator stretch    1. Lie on your back with both knees bent and your feet flat on the floor. 2. Put the ankle of your affected leg on your opposite thigh near your knee. 3. Use your hand to gently push your knee away from your body until you feel a gentle stretch around your hip. 4. Hold the stretch for 15 to 30 seconds. 5. Repeat 2 to 4 times. 6. Repeat steps 1 through 5, but this time use your hand to gently pull your knee toward your opposite shoulder. 7. Switch legs and repeat steps 1 through 6, even if only one hip is sore. Knee-to-chest    1. Lie on your back with your knees bent and your feet flat on the floor. 2. Bring your affected leg to your chest, keeping the other foot flat on the floor (or keeping the other leg straight, whichever feels better on your lower back). 3. Keep your lower back pressed to the floor. Hold for at least 15 to 30 seconds. 4. Relax, and lower the knee to the starting position. 5. Repeat 2 to 4 times. 6. Switch legs and repeat steps 1 through 5, even if only one hip is sore. 7. To get more stretch, put your other leg flat on the floor while pulling your knee to your chest.  Clamshell    1. Lie on your side, with your affected hip on top. Keep your feet and knees together and your knees bent. 2. Raise your top knee, but keep your feet together. Do not let your hips roll back. Your legs should open up like a clamshell. 3. Hold for 6 seconds. 4. Slowly lower your knee back down. Rest for 10 seconds. 5. Repeat 8 to 12 times.   6. Switch legs and repeat steps 1 through 5, even if only one hip is sore. Follow-up care is a key part of your treatment and safety. Be sure to make and go to all appointments, and call your doctor if you are having problems. It's also a good idea to know your test results and keep a list of the medicines you take. Where can you learn more? Go to http://ramirez-sidra.info/. Enter R041 in the search box to learn more about \"Hip Arthritis: Exercises. \"  Current as of: March 21, 2017  Content Version: 11.4  © 6396-9004 [employer]. Care instructions adapted under license by [employer] (which disclaims liability or warranty for this information). If you have questions about a medical condition or this instruction, always ask your healthcare professional. [address] any warranty or liability for your use of this information.

## 2018-06-22 ENCOUNTER — HOSPITAL ENCOUNTER (OUTPATIENT)
Dept: MRI IMAGING | Age: 58
Discharge: HOME | End: 2018-06-22
Attending: PHYSICAL MEDICINE & REHABILITATION
Payer: MEDICAID

## 2018-06-22 DIAGNOSIS — M16.11 OSTEOARTHRITIS OF RIGHT HIP, UNSPECIFIED OSTEOARTHRITIS TYPE: ICD-10-CM

## 2018-06-22 DIAGNOSIS — R26.9 GAIT ABNORMALITY: ICD-10-CM

## 2018-06-22 DIAGNOSIS — M25.551 RIGHT HIP PAIN: ICD-10-CM

## 2018-06-22 PROCEDURE — 73721 MRI JNT OF LWR EXTRE W/O DYE: CPT

## 2018-06-26 ENCOUNTER — HOSPITAL ENCOUNTER (OUTPATIENT)
Dept: PHYSICAL THERAPY | Age: 58
Discharge: HOME | End: 2018-06-26
Payer: MEDICAID

## 2018-06-26 PROCEDURE — 97110 THERAPEUTIC EXERCISES: CPT

## 2018-06-26 PROCEDURE — 97032 APPL MODALITY 1+ESTIM EA 15: CPT

## 2018-06-26 PROCEDURE — 97140 MANUAL THERAPY 1/> REGIONS: CPT

## 2018-06-28 ENCOUNTER — HOSPITAL ENCOUNTER (OUTPATIENT)
Dept: PHYSICAL THERAPY | Age: 58
Discharge: HOME | End: 2018-06-28
Payer: MEDICAID

## 2018-06-28 PROCEDURE — 97014 ELECTRIC STIMULATION THERAPY: CPT

## 2018-06-28 PROCEDURE — 97110 THERAPEUTIC EXERCISES: CPT

## 2018-06-28 PROCEDURE — 97032 APPL MODALITY 1+ESTIM EA 15: CPT

## 2018-06-28 NOTE — PROGRESS NOTES
PT DAILY TREATMENT NOTE     Patient Name: Domonique Byrne  Date:2018  : 1960  [x]  Patient  Verified  Payor: [facility] MEDICAID / Plan: [address] / Product Type: Managed Care Medicaid /    In time: 2:15  Out time:3:24  Total Treatment Time (min): 57  Visit #: 7 of 10    Treatment Area: Low back pain [M54.5]  Pain in right shoulder [M25.511]    SUBJECTIVE  Pain Level (0-10 scale): back  4  Sh  5  Any medication changes, allergies to medications, adverse drug reactions, diagnosis change, or new procedure performed?: [x] No    [] Yes (see summary sheet for update)  Subjective functional status/changes:   [] No changes reported    Having  Tingling  At  Thumb  And  Index  Finger.   OBJECTIVE    Modality rationale: decrease edema, decrease inflammation, decrease pain and increase tissue extensibility to improve the patients ability to perform ADL    Min Type Additional Details   15 [x] Estim:  [x]Unatt       [x]IFC  []Premod                        []Other:  [x]w/ice   []w/heat  Position:supine  Location:B(LSP   10 [x] Estim: [x]Att    []TENS instruct  []NMES                    [x]Other:LTO  []w/US   []w/ice   []w/heat  Position:long  sit  Location:right  shoulder    []  Traction: [] Cervical       []Lumbar                       [] Prone          []Supine                       []Intermittent   []Continuous Lbs:  [] before manual  [] after manual    []  Ultrasound: []Continuous   [] Pulsed                           []1MHz   []3MHz W/cm2:  Location:    []  Iontophoresis with dexamethasone         Location: [] Take home patch   [] In clinic    []  Ice     []  heat  []  Ice massage  []  Laser   []  Anodyne Position:  Location:    []  Laser with stim  []  Other:  Position:  Location:    []  Vasopneumatic Device Pressure:       [] lo [] med [] hi   Temperature: [] lo [] med [] hi   [x] Skin assessment post-treatment:  [x]intact []redness- no adverse reaction    []redness - adverse reaction: min []Eval                  []Re-Eval       32 min Therapeutic Exercise:  [] See flow sheet :   Rationale: increase ROM and increase strength to improve the patients ability to perform ADL      min Therapeutic Activity:  []  See flow sheet :   Rationale:   to improve the patients ability to       min Neuromuscular Re-education:  []  See flow sheet :   Rationale:   to improve the patients ability to      min Manual Therapy:     Rationale: decrease pain, increase ROM, increase tissue extensibility and decrease edema  to perform ADL      min Gait Training:  ___ feet with ___ device on level surfaces with ___ level of assist   Rationale: With   [x] TE   [] TA   [] neuro   [] other: Patient Education: [x] Review HEP    [] Progressed/Changed HEP based on:   [] positioning   [] body mechanics   [] transfers   [] heat/ice application    [] other:      Other Objective/Functional Measures:  C/o  Numbness  At (R) shoulder and  Tingling  At  Radial  Side  Of  hand    Pain Level (0-10 scale) post treatment: back  0  Shoulder  5    ASSESSMENT/Changes in Function: continued  With tingling  And  Pain radiating  Down to radial aspect  Of  Finger  Following ESTIM. Demo  Increased  Mobility  With there ex . Patient will continue to benefit from skilled PT services to address functional mobility deficits, address ROM deficits, address strength deficits, analyze and address soft tissue restrictions, analyze and cue movement patterns and instruct in home and community integration to attain remaining goals.      [x]  See Plan of Care  []  See progress note/recertification  []  See Discharge Summary         Progress towards goals / Updated goals:  1 patient will have established and be I with HEP to aid with progression of skilled PT program                                               EVAL issued                         EGBJEVN  Met  5/31/18                         2 patient will have pain 5/10 to aid with increase tolerance to ADLS and activities                         EVAL 7                         CURRENT  Back 5   Shoulder  5   6/26/18      Long Term Goals: To be accomplished in 10 treatments:                         9 patient will have pain 3/10 to aid with increase tolerance to ADLS and activities at home  [facility] [address]   6/26/18                         2 patient will have shoulder FOTO  57 to show significant improved tolerance to overhead reaching at home                         EVAL 28                         CURRENT                         3 patient will report overall 50% improvement to aid with increase tolerance to household chores, walking and standing                         EVAL decreased tolerance to standing and walking                         CURRENT  30%   6/26/18                         4  Patient will have AROM right shoulder F 120  abd 110 to aid with increased ease with overhead reaching at home                         EVAL Right shoulder F 75  ABD 60                         CURRENT     PLAN  []  Upgrade activities as tolerated     [x]  Continue plan of care  []  Update interventions per flow sheet       []  Discharge due to:_  [x]  Other:_  REASSESS GOALS RE REED NOTE     [address] [facility] 6/28/2018  2:22 PM    Future Appointments  Date Time Provider Radha Chaudhary   6/28/2018 2:30 PM Crystal [place], PTA [facility]   7/5/2018 2:30 PM Crystal Tariq, PTA [facility]   7/6/2018 12:00 PM Crystal Tariq, PTA [facility]   7/9/2018 2:00 PM Crystal Tariq, PTA [facility]   7/10/2018 10:45 AM Magaly Contreras  [address] [facility]   7/11/2018 11:30 AM Crystal Tariq, PTA [facility]   7/16/2018 1:30 PM Crystal Tariq, PTA [facility]   7/18/2018 12:00 PM Crystal Tariq, PTA [facility]   7/23/2018 1:30 PM Crystal Tariq, PTA [facility]   7/25/2018 12:00 PM Crystal Tariq, PTA [facility]   7/30/2018 1:30 PM Monie Potter PTA [facility]

## 2018-07-05 ENCOUNTER — HOSPITAL ENCOUNTER (OUTPATIENT)
Dept: PHYSICAL THERAPY | Age: 58
Discharge: HOME | End: 2018-07-05
Payer: MEDICAID

## 2018-07-05 PROCEDURE — 97110 THERAPEUTIC EXERCISES: CPT

## 2018-07-05 PROCEDURE — 97140 MANUAL THERAPY 1/> REGIONS: CPT

## 2018-07-05 NOTE — PROGRESS NOTES
PT DAILY TREATMENT NOTE     Patient Name: Lance Sparrow  Date:2018  : 1960  [x]  Patient  Verified  Payor: [facility] MEDICAID / Plan: [address] / Product Type: Managed Care Medicaid /    In time:2:20  Out time:3:35  Total Treatment Time (min): 66  Visit #: 8 of 10    Treatment Area: Low back pain [M54.5]  Pain in right shoulder [M25.511]    SUBJECTIVE  Pain Level (0-10 scale): 0  Any medication changes, allergies to medications, adverse drug reactions, diagnosis change, or new procedure performed?: [x] No    [] Yes (see summary sheet for update)  Subjective functional status/changes:   [] No changes reported  I have  My  Pain pill  Refill.     OBJECTIVE    Modality rationale: decrease edema, decrease inflammation, decrease pain and increase tissue extensibility to improve the patients ability to perform ADL    Min Type Additional Details    [] Estim:  []Unatt       []IFC  []Premod                        []Other:  []w/ice   []w/heat  Position:  Location:    [] Estim: []Att    []TENS instruct  []NMES                    []Other:  []w/US   []w/ice   []w/heat  Position:  Location:    []  Traction: [] Cervical       []Lumbar                       [] Prone          []Supine                       []Intermittent   []Continuous Lbs:  [] before manual  [] after manual    []  Ultrasound: []Continuous   [] Pulsed                           []1MHz   []3MHz W/cm2:  Location:    []  Iontophoresis with dexamethasone         Location: [] Take home patch   [] In clinic   10/10 [x]  Ice right  shoulder    [x]  Heat LSP  []  Ice massage  []  Laser   []  Anodyne Position: long  sit  Location: shoulder/LSP    []  Laser with stim  []  Other:  Position:  Location:    []  Vasopneumatic Device Pressure:       [] lo [] med [] hi   Temperature: [] lo [] med [] hi   [x] Skin assessment post-treatment:  [x]intact []redness- no adverse reaction    []redness - adverse reaction:      min []Eval []Re-Eval       38 min Therapeutic Exercise:  [x] See flow sheet :   Rationale: increase ROM and increase strength to improve the patients ability to perform ADL      min Therapeutic Activity:  []  See flow sheet :   Rationale:   to improve the patients ability to       min Neuromuscular Re-education:  []  See flow sheet :   Rationale:   to improve the patients ability to     8 min Manual Therapy:  Gentle  GH JT  Mob  With distraction   Rationale: decrease pain, increase ROM, increase tissue extensibility and decrease edema  to perform ADL      min Gait Training:  ___ feet with ___ device on level surfaces with ___ level of assist   Rationale: With   [x] TE   [] TA   [] neuro   [] other: Patient Education: [x] Review HEP    [] Progressed/Changed HEP based on:   [] positioning   [] body mechanics   [] transfers   [] heat/ice application    [] other:      Other Objective/Functional Measures: FOTO:   LSP   30   Shoulder  40  Added  Femoral   Nerve  glide    Pain Level (0-10 scale) post treatment: 0    ASSESSMENT/Changes in Function: LSP  FOTO has  Remained  The same  But shoulder  FOTO has improved. Patient will continue to benefit from skilled PT services to address functional mobility deficits, address ROM deficits, address strength deficits, analyze and address soft tissue restrictions and analyze and cue movement patterns to attain remaining goals.      [x]  See Plan of Care  []  See progress note/recertification  []  See Discharge Summary         Progress towards goals / Updated goals:  1 patient will have established and be I with HEP to aid with progression of skilled PT program                                               EVAL issued                         OWJQRGD  Met  5/31/18                         2 patient will have pain 5/10 to aid with increase tolerance to ADLS and activities                         EVAL 7                         CURRENT  Back 5   Shoulder  5   6/26/18      Long Term Goals: To be accomplished in 10 treatments:                         3 patient will have pain 3/10 to aid with increase tolerance to ADLS and activities at home  [facility] 7                         CURRENT [facility] LONG TERM  5  Shoulder  5   6/26/18                         2 patient will have shoulder FOTO  57 to show significant improved tolerance to overhead reaching at home                         EVAL 28                         CURRENT   40  Shoulder   Back   30   7/5/18                         3 patient will report overall 50% improvement to aid with increase tolerance to household chores, walking and standing                         EVAL decreased tolerance to standing and walking                         CURRENT  30%   6/26/18                         4  Patient will have AROM right shoulder F 120  abd 110 to aid with increased ease with overhead reaching at home                         EVAL Right shoulder F 75  ABD 60                         CURRENT     PLAN  []  Upgrade activities as tolerated     [x]  Continue plan of care  []  Update interventions per flow sheet       []  Discharge due to:_  [x]  Other:_  REASSESS shoulder  AROM NV    Crystal Tariq, PTA 7/5/2018  2:23 PM    Future Appointments  Date Time Provider Radha Chaudhary   7/5/2018 2:30 PM Crystal [place], PTA [facility]   7/6/2018 12:00 PM Crystal Tariq, PTA [facility]   7/9/2018 2:00 PM Crystal Tariq, PTA [facility]   7/10/2018 10:45 AM Joyce Denson  [address]   7/11/2018 11:30 AM Crystal Tariq, PTA [facility]   7/16/2018 1:30 PM Crystal Tariq, PTA [facility]   7/18/2018 12:00 PM Crystal Tariq, PTA [facility]   7/23/2018 1:30 PM Crystal Tariq, PTA [facility]   7/25/2018 12:00 PM Crystal Tariq, PTA [facility]   7/30/2018 1:30 PM Crystal Tariq, PTA [facility]

## 2018-07-06 ENCOUNTER — HOSPITAL ENCOUNTER (OUTPATIENT)
Dept: PHYSICAL THERAPY | Age: 58
Discharge: HOME | End: 2018-07-06
Payer: MEDICAID

## 2018-07-06 PROCEDURE — 97110 THERAPEUTIC EXERCISES: CPT

## 2018-07-06 PROCEDURE — 97014 ELECTRIC STIMULATION THERAPY: CPT

## 2018-07-06 NOTE — PROGRESS NOTES
PT DAILY TREATMENT NOTE 1216    Patient Name: Dedrick Reid  Date:2018  : 1960  [x]  Patient  Verified  Payor: [facility] MEDICAID / Plan: Raz 46 / Product Type: Managed Care Medicaid /    In time:11:53  Out time:12:57  Total Treatment Time (min): 56  Visit #: 9 of 10    Treatment Area: Low back pain [M54.5]  Pain in right shoulder [M25.511]    SUBJECTIVE  Pain Level (0-10 scale): 4  Any medication changes, allergies to medications, adverse drug reactions, diagnosis change, or new procedure performed?: [x] No    [] Yes (see summary sheet for update)  Subjective functional status/changes:   [] No changes reported  This  Is  What I have  To deal  With.     OBJECTIVE    Modality rationale: decrease edema, decrease inflammation, decrease pain and increase tissue extensibility to improve the patients ability to perform ADL    Min Type Additional Details   15 [x] Estim:  [x]Unatt       []IFC  [x]Premod                        []Other:  [x]w/ice   []w/heat  Position:long  sit  Location: (B) LSP/right  shoulder    [] Estim: []Att    []TENS instruct  []NMES                    []Other:  []w/US   []w/ice   []w/heat  Position:  Location:    []  Traction: [] Cervical       []Lumbar                       [] Prone          []Supine                       []Intermittent   []Continuous Lbs:  [] before manual  [] after manual    []  Ultrasound: []Continuous   [] Pulsed                           []1MHz   []3MHz W/cm2:  Location:    []  Iontophoresis with dexamethasone         Location: [] Take home patch   [] In clinic    []  Ice     []  heat  []  Ice massage  []  Laser   []  Anodyne Position:  Location:    []  Laser with stim  []  Other:  Position:  Location:    []  Vasopneumatic Device Pressure:       [] lo [] med [] hi   Temperature: [] lo [] med [] hi   [x] Skin assessment post-treatment:  [x]intact []redness- no adverse reaction    []redness - adverse reaction:      min []Eval []Re-Eval       41 min Therapeutic Exercise:  [x] See flow sheet :   Rationale: increase ROM and increase strength to improve the patients ability to perform ADL      min Therapeutic Activity:  []  See flow sheet :   Rationale:   to improve the patients ability to       min Neuromuscular Re-education:  []  See flow sheet :   Rationale:   to improve the patients ability to      min Manual Therapy:     Rationale: decrease pain, increase ROM, increase tissue extensibility and decrease edema  to perform ADL      min Gait Training:  ___ feet with ___ device on level surfaces with ___ level of assist   Rationale: With   [x] TE   [] TA   [] neuro   [] other: Patient Education: [x] Review HEP    [] Progressed/Changed HEP based on:   [] positioning   [] body mechanics   [] transfers   [] heat/ice application    [] other:      Other Objective/Functional Measures:  C/o pain at  Right  Shoulder/groin  Area  Tingling  Radial  Aspect  Right hand    Pain Level (0-10 scale) post treatment: 2    ASSESSMENT/Changes in Function: pain  With  Standing  Wand  Abd  Modified  Supine  With minimal  Pain. Patient will continue to benefit from skilled PT services to address functional mobility deficits, address ROM deficits, address strength deficits, analyze and address soft tissue restrictions, analyze and cue movement patterns and instruct in home and community integration to attain remaining goals.      [x]  See Plan of Care  []  See progress note/recertification  []  See Discharge Summary         Progress towards goals / Updated goals:  1 patient will have established and be I with HEP to aid with progression of skilled PT program                                               EVAL stefan HOWARD  Met  5/31/18                         2 patient will have pain 5/10 to aid with increase tolerance to ADLS and activities                         EVAL 7                         CURRENT  Back 5   Shoulder  5   6/26/18      Long Term Goals: To be accomplished in 10 treatments:                         8 patient will have pain 3/10 to aid with increase tolerance to ADLS and activities at home  [facility] 7                         CURRENT [facility] LONG TERM  5  Shoulder  5   6/26/18                         2 patient will have shoulder FOTO  57 to show significant improved tolerance to overhead reaching at home                         EVAL 28                         CURRENT   40  Shoulder   Back   30   7/5/18                         3 patient will report overall 50% improvement to aid with increase tolerance to household chores, walking and standing                         EVAL decreased tolerance to standing and walking                         CURRENT  30%   6/26/18                         4  Patient will have AROM right shoulder F 120  abd 110 to aid with increased ease with overhead reaching at home                         EVAL Right shoulder F 75  ABD 60                         CURRENT     PLAN  []  Upgrade activities as tolerated     [x]  Continue plan of care  []  Update interventions per flow sheet       []  Discharge due to:_  []  Other:_      Crystal Tariq, PTA 7/6/2018  12:10 PM    Future Appointments  Date Time Provider Radha Chaudhary   7/9/2018 2:00 PM Crystal [place], [place] [facility]   7/10/2018 10:45 AM Julia Hicks  [address]   7/11/2018 11:30 AM Crystal Tariq, PTA [facility]   7/16/2018 1:30 PM Crystal Tariq, PTA [facility]   7/18/2018 12:00 PM Crystal Tariq, PTA [facility]   7/23/2018 1:30 PM Crystal Tariq, PTA [facility]   7/25/2018 12:00 PM Crystal Tariq, PTA [facility]   7/30/2018 1:30 PM Crystal Tariq, PTA [facility]

## 2018-07-09 ENCOUNTER — HOSPITAL ENCOUNTER (OUTPATIENT)
Dept: PHYSICAL THERAPY | Age: 58
Discharge: HOME | End: 2018-07-09
Payer: MEDICAID

## 2018-07-09 PROCEDURE — 97110 THERAPEUTIC EXERCISES: CPT

## 2018-07-09 NOTE — PROGRESS NOTES
PT DAILY TREATMENT NOTE     Patient Name: Sacha Swain  Date:2018  : 1960  [x]  Patient  Verified  Payor: [facility] MEDICAID / Plan: [address] 46 / Product Type: Managed Care Medicaid /    In time: 2:05  Out time:  3:00  Total Treatment Time (min): 40  Visit #: 10 of 10    Treatment Area: Low back pain [M54.5]  Pain in right shoulder [M25.511]    SUBJECTIVE  Pain Level (0-10 scale): 5  Any medication changes, allergies to medications, adverse drug reactions, diagnosis change, or new procedure performed?: [x] No    [] Yes (see summary sheet for update)  Subjective functional status/changes:   [] No changes reported  Pt   C/o  Pain  At  (R) groin  Area. Ambulated  On  Right  Toes  Due  To pain  radiaiting  Down  LE.     OBJECTIVE    Modality rationale: decrease edema, decrease inflammation, decrease pain and increase tissue extensibility to improve the patients ability to perform ADL    Min Type Additional Details    [] Estim:  []Unatt       []IFC  []Premod                        []Other:  []w/ice   []w/heat  Position:  Location:    [] Estim: []Att    []TENS instruct  []NMES                    []Other:  []w/US   []w/ice   []w/heat  Position:  Location:    []  Traction: [] Cervical       []Lumbar                       [] Prone          []Supine                       []Intermittent   []Continuous Lbs:  [] before manual  [] after manual    []  Ultrasound: []Continuous   [] Pulsed                           []1MHz   []3MHz W/cm2:  Location:    []  Iontophoresis with dexamethasone         Location: [] Take home patch   [] In clinic    []  Ice     []  heat  []  Ice massage  []  Laser   []  Anodyne Position:  Location:    []  Laser with stim  []  Other:  Position:  Location:    []  Vasopneumatic Device Pressure:       [] lo [] med [] hi   Temperature: [] lo [] med [] hi   [x] Skin assessment post-treatment:  [x]intact []redness- no adverse reaction    []redness - adverse reaction:      min []Eval                  []Re-Eval       40 min Therapeutic Exercise:  [x] See flow sheet :   Rationale: increase ROM and increase strength to improve the patients ability to perform ADL      min Therapeutic Activity:  []  See flow sheet :   Rationale:   to improve the patients ability to       min Neuromuscular Re-education:  []  See flow sheet :   Rationale:   to improve the patients ability to      min Manual Therapy:     Rationale: decrease pain, increase ROM, increase tissue extensibility and decrease edema  to perform ADL      min Gait Training:  ___ feet with ___ device on level surfaces with ___ level of assist   Rationale: With   [x] TE   [] TA   [] neuro   [] other: Patient Education: [x] Review HEP    [] Progressed/Changed HEP based on:   [] positioning   [] body mechanics   [] transfers   [] heat/ice application    [] other:      Other Objective/Functional Measures: FOTO: NV    AROM  F NV    ABD   NV    Pain Level (0-10 scale) post treatment: 5    ASSESSMENT/Changes in Function: Unable  To reassess goals  Due to pt had  To leave  Early  Due to ride  Was  Ready. Patient will continue to benefit from skilled PT services to address functional mobility deficits, address ROM deficits, address strength deficits, analyze and address soft tissue restrictions, analyze and cue movement patterns and instruct in home and community integration to attain remaining goals.      [x]  See Plan of Care  []  See progress note/recertification  []  See Discharge Summary         Progress towards goals / Updated goals:  1 patient will have established and be I with HEP to aid with progression of skilled PT program                                               EVAL issued                         MMDKCQK  Met  5/31/18                         2 patient will have pain 5/10 to aid with increase tolerance to ADLS and activities                         EVAL 7                         CURRENT  Back 5   Shoulder  5   6/26/18      Long Term Goals: To be accomplished in 10 treatments:                         2 patient will have pain 3/10 to aid with increase tolerance to ADLS and activities at home                         EVAL 7                         CURRENT  Back  5  Shoulder  5   6/26/18                         2 patient will have shoulder FOTO  57 to show significant improved tolerance to overhead reaching at home                         EVAL 28                         CURRENT   40  Shoulder   Back   30   7/5/18                         3 patient will report overall 50% improvement to aid with increase tolerance to household chores, walking and standing                         EVAL decreased tolerance to standing and walking                         CURRENT  30%   6/26/18                         4  Patient will have AROM right shoulder F 120  abd 110 to aid with increased ease with overhead reaching at home                         EVAL Right shoulder F 75  ABD 60                         CURRENT     PLAN  []  Upgrade activities as tolerated     [x]  Continue plan of care  []  Update interventions per flow sheet       []  Discharge due to:_  [x]  Other:_  REASSESS  GOALS NV    [facility] 7/9/2018  1:55 PM    Future Appointments  Date Time Provider Radha Chaudhary   7/9/2018 2:00 PM Crystal [place], [place] [facility]   7/10/2018 10:45 AM Ana Koenig  [address]    7/11/2018 11:30 AM Crystal Tariq, PTA [facility]   7/16/2018 1:30 PM Crystal Tariq, PTA [facility]   7/18/2018 12:00 PM Crystal Tariq, PTA [facility]   7/23/2018 1:30 PM Crystal Tariq, PTA [facility]   7/25/2018 12:00 PM Crystal Tariq, PTA [facility]   7/30/2018 1:30 PM Crystal Tariq, PTA [facility]

## 2018-07-10 ENCOUNTER — OFFICE VISIT (OUTPATIENT)
Dept: ORTHOPEDIC SURGERY | Age: 58
End: 2018-07-10

## 2018-07-10 VITALS
HEIGHT: 65 IN | BODY MASS INDEX: 35.99 KG/M2 | OXYGEN SATURATION: 98 % | HEART RATE: 72 BPM | TEMPERATURE: 98.1 F | WEIGHT: 216 LBS | SYSTOLIC BLOOD PRESSURE: 127 MMHG | RESPIRATION RATE: 14 BRPM | DIASTOLIC BLOOD PRESSURE: 81 MMHG

## 2018-07-10 DIAGNOSIS — R26.9 GAIT ABNORMALITY: ICD-10-CM

## 2018-07-10 DIAGNOSIS — R29.898 RIGHT LEG WEAKNESS: ICD-10-CM

## 2018-07-10 DIAGNOSIS — Z98.1 S/P CERVICAL SPINAL FUSION: ICD-10-CM

## 2018-07-10 DIAGNOSIS — M16.11 OSTEOARTHRITIS OF RIGHT HIP, UNSPECIFIED OSTEOARTHRITIS TYPE: Primary | ICD-10-CM

## 2018-07-10 RX ORDER — LEVOTHYROXINE, LIOTHYRONINE 9.5; 2.25 UG/1; UG/1
TABLET ORAL
Refills: 1 | COMMUNITY
Start: 2018-06-11

## 2018-07-10 NOTE — PATIENT INSTRUCTIONS
Low Back Arthritis: Exercises  Your Care Instructions  Here are some examples of typical rehabilitation exercises for your condition. Start each exercise slowly. Ease off the exercise if you start to have pain. Your doctor or physical therapist will tell you when you can start these exercises and which ones will work best for you. When you are not being active, find a comfortable position for rest. Some people are comfortable on the floor or a medium-firm bed with a small pillow under their head and another under their knees. Some people prefer to lie on their side with a pillow between their knees. Don't stay in one position for too long. Take short walks (10 to 20 minutes) every 2 to 3 hours. Avoid slopes, hills, and stairs until you feel better. Walk only distances you can manage without pain, especially leg pain. How to do the exercises  Pelvic tilt    1. Lie on your back with your knees bent. 2. \"Brace\" your stomach-tighten your muscles by pulling in and imagining your belly button moving toward your spine. 3. Press your lower back into the floor. You should feel your hips and pelvis rock back. 4. Hold for 6 seconds while breathing smoothly. 5. Relax and allow your pelvis and hips to rock forward. 6. Repeat 8 to 12 times. Back stretches    1. Get down on your hands and knees on the floor. 2. Relax your head and allow it to droop. Round your back up toward the ceiling until you feel a nice stretch in your upper, middle, and lower back. Hold this stretch for as long as it feels comfortable, or about 15 to 30 seconds. 3. Return to the starting position with a flat back while you are on your hands and knees. 4. Let your back sway by pressing your stomach toward the floor. Lift your buttocks toward the ceiling. 5. Hold this position for 15 to 30 seconds. 6. Repeat 2 to 4 times. Follow-up care is a key part of your treatment and safety.  Be sure to make and go to all appointments, and call your doctor if you are having problems. It's also a good idea to know your test results and keep a list of the medicines you take. Where can you learn more? Go to http://ramirez-sidra.info/. Enter L214 in the search box to learn more about \"Low Back Arthritis: Exercises. \"  Current as of: March 21, 2017  Content Version: 11.4  © 7427-8935 [employer]. Care instructions adapted under license by "[employer]" (which disclaims liability or warranty for this information). If you have questions about a medical condition or this instruction, always ask your healthcare professional. [place] [address] any warranty or liability for your use of this information. Hip Arthritis: Exercises  Your Care Instructions  Here are some examples of exercises for hip arthritis. Start each exercise slowly. Ease off the exercise if you start to have pain. Your doctor or physical therapist will tell you when you can start these exercises and which ones will work best for you. How to do the exercises  Straight-leg raises to the outside    7. Lie on your side, with your affected hip on top. 8. Tighten the front thigh muscles of your top leg to keep your knee straight. 9. Keep your hip and your leg straight in line with the rest of your body, and keep your knee pointing forward. Do not drop your hip back. 10. Lift your top leg straight up toward the ceiling, about 12 inches off the floor. Hold for about 6 seconds, then slowly lower your leg. 11. Repeat 8 to 12 times. 12. Switch legs and repeat steps 1 through 5, even if only one hip is sore. Straight-leg raises to the inside    7. Lie on your side with your affected hip on the floor. 8. You can either prop up your other leg on a chair, or you can bend that knee and put that foot in front of your other knee. Do not drop your hip back.   9. Tighten the muscles on the front thigh of your bottom leg to straighten that knee.  10. Keep your kneecap pointing forward and your leg straight, and lift your bottom leg up toward the ceiling about 6 inches. Hold for about 6 seconds, then lower slowly. 11. Repeat 8 to 12 times. 12. Switch legs and repeat steps 1 through 5, even if only one hip is sore. Hip hike    1. Stand sideways on the bottom step of a staircase, and hold on to the banister or wall. 2. Keeping both knees straight, lift your good leg off the step and let it hang down. Then hike your good hip up to the same level as your affected hip or a little higher. 3. Repeat 8 to 12 times. 4. Switch legs and repeat steps 1 through 3, even if only one hip is sore. Bridging    1. Lie on your back with both knees bent. Your knees should be bent about 90 degrees. 2. Then push your feet into the floor, squeeze your buttocks, and lift your hips off the floor until your shoulders, hips, and knees are all in a straight line. 3. Hold for about 6 seconds as you continue to breathe normally, and then slowly lower your hips back down to the floor and rest for up to 10 seconds. 4. Repeat 8 to 12 times. Hamstring stretch (lying down)    1. Lie flat on your back with your legs straight. If you feel discomfort in your back, place a small towel roll under your lower back. 2. Holding the back of your affected leg, lift your leg straight up and toward your body until you feel a stretch at the back of your thigh. 3. Hold the stretch for at least 30 seconds. 4. Repeat 2 to 4 times. 5. Switch legs and repeat steps 1 through 4, even if only one hip is sore. Standing quadriceps stretch    1. If you are not steady on your feet, hold on to a chair, counter, or wall. You can also lie on your stomach or your side to do this exercise. 2. Bend the knee of the leg you want to stretch, and reach behind you to grab the front of your foot or ankle with the hand on the same side.  For example, if you are stretching your right leg, use your right hand.  3. Keeping your knees next to each other, pull your foot toward your buttock until you feel a gentle stretch across the front of your hip and down the front of your thigh. Your knee should be pointed directly to the ground, and not out to the side. 4. Hold the stretch for at least 15 to 30 seconds. 5. Repeat 2 to 4 times. 6. Switch legs and repeat steps 1 through 5, even if only one hip is sore. Hip rotator stretch    1. Lie on your back with both knees bent and your feet flat on the floor. 2. Put the ankle of your affected leg on your opposite thigh near your knee. 3. Use your hand to gently push your knee away from your body until you feel a gentle stretch around your hip. 4. Hold the stretch for 15 to 30 seconds. 5. Repeat 2 to 4 times. 6. Repeat steps 1 through 5, but this time use your hand to gently pull your knee toward your opposite shoulder. 7. Switch legs and repeat steps 1 through 6, even if only one hip is sore. Knee-to-chest    1. Lie on your back with your knees bent and your feet flat on the floor. 2. Bring your affected leg to your chest, keeping the other foot flat on the floor (or keeping the other leg straight, whichever feels better on your lower back). 3. Keep your lower back pressed to the floor. Hold for at least 15 to 30 seconds. 4. Relax, and lower the knee to the starting position. 5. Repeat 2 to 4 times. 6. Switch legs and repeat steps 1 through 5, even if only one hip is sore. 7. To get more stretch, put your other leg flat on the floor while pulling your knee to your chest.  Clamshell    1. Lie on your side, with your affected hip on top. Keep your feet and knees together and your knees bent. 2. Raise your top knee, but keep your feet together. Do not let your hips roll back. Your legs should open up like a clamshell. 3. Hold for 6 seconds. 4. Slowly lower your knee back down. Rest for 10 seconds. 5. Repeat 8 to 12 times.   6. Switch legs and repeat steps 1 through 5, even if only one hip is sore. Follow-up care is a key part of your treatment and safety. Be sure to make and go to all appointments, and call your doctor if you are having problems. It's also a good idea to know your test results and keep a list of the medicines you take. Where can you learn more? Go to http://ramirez-sidra.info/. Enter R668 in the search box to learn more about \"Hip Arthritis: Exercises. \"  Current as of: March 21, 2017  Content Version: 11.4  © 7771-5339 [employer]. Care instructions adapted under license by [employer] (which disclaims liability or warranty for this information). If you have questions about a medical condition or this instruction, always ask your healthcare professional. [address] any warranty or liability for your use of this information.

## 2018-07-10 NOTE — PROGRESS NOTES
[facility]  [address] [place], [facility] [address]  Phone: [phone]  Fax: [phone]    Pt's YOB: 1960    ASSESSMENT   Diagnoses and all orders for this visit:    1. Osteoarthritis of right hip, unspecified osteoarthritis type  -     REFERRAL TO ORTHOPEDICS    2. Gait abnormality  -     REFERRAL TO ORTHOPEDICS    3. Right leg weakness    4. S/P cervical spinal fusion         IMPRESSION AND PLAN:  Renuka Porras is a 61 y.o. female with history of lumbar and right hip pain. She complains of pain in the lower back that radiates down the right leg and wraps around to the right groin. Pt takes Neurontin 300 mg 1 tab QAM, 1 tab in the afternoon, and 2 tabs QHS. 1) Pt was given information on lumbar and hip arthritis exercises. 2) She was referred to Dr. Annie Urias for right hip evaluation. 3) Paper was given paperwork for a handicapped placard. 4) Ms. Celia Perez has a reminder for a \"due or due soon\" health maintenance. I have asked that she contact her primary care provider, Isaiah Suarez MD, for follow-up on this health maintenance. 5)  demonstrated consistency with prescribing. 6) Pt will follow-up as needed. HISTORY OF PRESENT ILLNESS:  Renuka Porras is a 61 y.o. female with history of lumbar and right hip pain. She presents to the office today for right hip MRI follow up. She complains of pain in the lower back that radiates down the right leg and wraps around to the right groin. . Pt has been prescribed Neurontin 300 mg and takes 1 tab QAM, 1 tab in the afternoon, and 2 tabs QHS. She ambulates with the assistance of a single point cane. Pt at this time desires to proceed with a referral to orthopedics.     Pain Scale: 6/10    PCP: Isaiah Suarez MD     Past Medical History:   Diagnosis Date    Adverse effect of anesthesia     hard to wake up    Chronic pain     Depression     GERD (gastroesophageal reflux disease)     Low back pain     Thromboembolus (HCC)     blood clot in foot during pregnancy    Tobacco abuse     Vertigo         Social History     Social History    Marital status: UNKNOWN     Spouse name: N/A    Number of children: N/A    Years of education: N/A     Occupational History    Not on file. Social History Main Topics    Smoking status: Former Smoker     Packs/day: 0.25     Years: 4.00     Quit date: 6/4/2015    Smokeless tobacco: Never Used    Alcohol use No    Drug use: No    Sexual activity: Not on file     Other Topics Concern    Not on file     Social History Narrative    3/2015[de-identified] currently unemployed due to chronic low back pain, used to work renovating houses. Lived with her mother until her mother's death in the summer of 2014. Currently living with her daughters, splits time between [place] and [place]. Current Outpatient Prescriptions   Medication Sig Dispense Refill    NP THYROID 15 mg tablet TK 3 TS PO D  1    gabapentin (NEURONTIN) 300 mg capsule TK ONE PO  TID  2    potassium iodide/iodine (IODINE STRONG, LUGOLS, PO) Take 25 mg by mouth daily.  Selenomethionine 200 mcg tab Take 1 Tab by mouth daily.  cholecalciferol, vitamin D3, (VITAMIN D3) 2,000 unit tab Take 1 Tab by mouth daily.  oxyCODONE IR (ROXICODONE) 5 mg immediate release tablet Take 1-2 Tabs by mouth every three (3) hours as needed. Max Daily Amount: 80 mg. 80 Tab 0       Allergies   Allergen Reactions    Pcn [Penicillins] Hives         REVIEW OF SYSTEMS    Constitutional: Negative for fever, chills, or weight change. Respiratory: Negative for cough or shortness of breath. Cardiovascular: Negative for chest pain or palpitations. Gastrointestinal: Negative for acid reflux, change in bowel habits, or constipation. Genitourinary: Negative for dysuria and flank pain. Musculoskeletal: Positive for lumbar and right hip pain. Skin: Negative for rash.    Neurological: Negative for headaches, dizziness, or numbness. Endo/Heme/Allergies: Negative for increased bruising. Psychiatric/Behavioral: Negative for difficulty with sleep. PHYSICAL EXAMINATION  Visit Vitals    /81    Pulse 72    Temp 98.1 °F (36.7 °C) (Oral)    Resp 14    Ht 5' 4.75\" (1.645 m)    Wt 216 lb (98 kg)    SpO2 98%    BMI 36.22 kg/m2       Constitutional: Awake, alert, and in no acute distress. Neurological: 1+ symmetrical DTRs in the upper extremities. 1+ symmetrical DTRs in the lower extremities. Sensation to light touch is intact. Negative Florence's sign bilaterally. Skin: warm, dry, and intact. Musculoskeletal: No pain with extension, axial loading, or forward flexion. Pain and limited range of motion with internal rotation of her right hip. Negative straight leg raise bilaterally. Hip Flex  Quads Hamstrings Ankle DF EHL Ankle PF   Right +3 to -4/5  4/5  4/5 +4/5 +4/5 +4/5   Left +4/5 +4/5 +4/5 +4/5 +4/5 +4/5     IMAGING:    Right hip MRI from 06/22/2018 was personally reviewed with the patient and demonstrated:    Results from [place] encounter on 06/22/18   MRI HIP  RT  WO CONT   Narrative EXAM: MRI of the pelvis  and right hip     INDICATION: Right hip pain    TECHNIQUE: Multiplanar multisequence MR imaging of the right  hip and pelvis    IV Contrast: None    COMPARISON: None    FINDINGS:   Osseous Structures: The osseous marrow of the pelvis is unremarkable. The  femoral heads grossly demonstrate normal marrow signal. A small likely benign  chondroid lesion is noted in the left femoral head. Joints/Cartilage: The right hip has a small joint effusion. There is  significant asymmetric joint space narrowing. There is subchondral cyst is noted  anteriorly. Significant cartilage loss and osteophyte formation are noted in the  acetabulum and femoral head. There is a large area of likely grade IV  chondromalacia of the anterior superior acetabulum.  The left hip likely has mild  to moderate degenerative changes but are incompletely evaluated. Ligaments: No gross ligamentous pathology appreciated. Tendons/Muscle: Gluteus muscles and tendons are unremarkable bilaterally. The  iliopsoas tendon and muscle are unremarkable bilaterally. No evidence of  iliopsoas bursitis. The hamstring origins are unremarkable. No muscular atrophy  identified. Soft Tissues/Other:  Small of fluid is noted at the right greater trochanteric  bursa. Impression IMPRESSION:   1. Severe likely grade 4 osteoarthritis of the right hip with large area of  grade IV chondromalacia, osteophyte formation and asymmetric joint space  narrowing as well as a small joint effusion. 2.  Mild right greater trochanteric bursitis.          Lumbar spine x-rays from 06/12/2018 were personally reviewed with the patient and demonstrated:  Multilevel degenerative facets. Severe degenerative disc at L5-S1. No instability. Cervical spine x-rays from 12/07/2017 were personally reviewed with the patient and demonstrated:  Impression: C4-C7 in good alignment with no evidence of hardware failure   or loosening.     Cervical spine MRI from 10/20/2016 was personally reviewed and demonstrated:  Results from Hospital Encounter on 10/20/16   MRI CERV SPINE WO CONT    Narrative Study: MRI CERV SPINE WO CONT    Indication:  shoulder weakness    Additional clinical history: Muscle weakness (generalized)    Comparison:  none available. Contrast: None administered    Technique:  Magnetic resonance images of the cervical spine were obtained. The  following sequences were obtained: Sagittal T1, T2, T2 STIR; axial T1 and T2. Findings:  Alignment is anatomic without subluxation. Moderate disc space narrowing is seen  at the C6-C7 level. Osteophytic bony endplate changes are also noted at this  level. Concerning signal changes are seen in the osseous structures. The  craniocervical junction is normal in appearance.  The spinal cord is normal in  caliber and signal. There is mild developmental narrowing of the spinal canal.  No concerning signal changes are seen in the visualized paraspinal soft tissues. C2-C3: Mild right facet arthropathy and uncovertebral joint hypertrophy. Mild  right neuroforaminal narrowing. No spinal canal stenosis. C3-C4: Minimal central disc protrusion. Moderate right and mild left facet  arthropathy. Bilateral uncovertebral joint hypertrophy. Severe left and  moderate/severe right neuroforaminal narrowing. Mild spinal canal stenosis. C4-C5: Small disc bulge. Mild/moderate bilateral facet arthropathy. Bilateral  uncovertebral joint hypertrophy, right worse than left. Moderate/severe right  and moderate left neuroforaminal narrowing. Mild/moderate spinal canal stenosis. C5-C6: Small disc bulge. Left greater than right facet arthropathy and right  greater than left uncovertebral joint hypertrophy. Severe right and  mild/moderate left neuroforaminal narrowing. Mild/moderate spinal canal  stenosis. C6-C7: Large disc osteophyte complex deforming the ventral cord. Bilateral  uncovertebral joint hypertrophy. Focal ligamentum flavum thickening. Severe  bilateral neuroforaminal narrowing. Severe spinal canal stenosis. C7-T1: Mild bilateral facet arthropathy. No neuroforaminal narrowing or  significant spinal canal stenosis.          Impression Impression:  Mild developed mental narrowing of the spinal canal. Multilevel degenerative  disc disease, worst at C6-C7. Multilevel severe and moderate/severe  neuroforaminal narrowing. Severe spinal canal stenosis at C6-C7.          Written by Chris Hogan, as dictated by Keagan Maloney MD.  I, Dr. Keagan Maloney confirm that all documentation is accurate.

## 2018-07-10 NOTE — MR AVS SNAPSHOT
[address] [facility] [address] [facility] [address] 
[phone] Patient: Zaire Pereira MRN: [id number] ASW:8/09/[birth year] Visit Information Date & Time Provider Department Dept. Phone Encounter #  
 7/10/2018 10:45 AM Aleksey Arzola MD [address] [facility] [phone] [id number] Follow-up Instructions Return if symptoms worsen or fail to improve. Your Appointments 7/19/2018  8:45 AM  
New Patient with Radha Michel MD  
[address] [facility], [address] and [facility] - [facility] [address]) Appt Note: rt hip pain, ref'd/sched by Dr. Darci Pringle, adv HS office [address] [facility], [address] [facility] [address]  
[phone]  
  
   
 [address] [place] [phone] Upcoming Health Maintenance Date Due Influenza Age 5 to Adult 8/1/2018 BREAST CANCER SCRN MAMMOGRAM 8/15/2019 DTaP/Tdap/Td series (2 - Td) 8/6/2025 COLONOSCOPY 4/5/2026 Allergies as of 7/10/2018  Review Complete On: 7/10/2018 By: Aleksey Arzola MD  
  
 Severity Noted Reaction Type Reactions Pcn [Penicillins]  09/07/2012    Hives Current Immunizations  Reviewed on 1/13/2016 Name Date Influenza Vaccine Intradermal PF 1/13/2016, 10/10/2014 Tdap 8/6/2015  9:34 AM  
  
 Not reviewed this visit You Were Diagnosed With   
  
 Codes Comments Osteoarthritis of right hip, unspecified osteoarthritis type    -  Primary ICD-10-CM: M16.11 
ICD-9-CM: 715.95 Gait abnormality     ICD-10-CM: R26.9 ICD-9-CM: 755. 2 Vitals BP Pulse Temp Resp Height(growth percentile) Weight(growth percentile) 127/81 72 98.1 °F (36.7 °C) (Oral) 14 5' 4.75\" (1.645 m) 216 lb (98 kg) SpO2 BMI OB Status Smoking Status 98% 36.22 kg/m2 Hysterectomy Former Smoker BMI and BSA Data Body Mass Index Body Surface Area  
 36.22 kg/m 2 2.12 m 2 Preferred Pharmacy Pharmacy Name Phone [address], [address] [facility] [phone] [facility] [phone] Your Updated Medication List  
  
   
This list is accurate as of 7/10/18 12:19 PM.  Always use your most recent med list.  
  
  
  
  
 gabapentin 300 mg capsule Commonly known as:  NEURONTIN  
TK ONE PO  TID  
  
 IODINE STRONG (LUGOLS) PO Take 25 mg by mouth daily. NP THYROID 15 mg tablet Generic drug:  thyroid (Pork) TK 3 TS PO D  
  
 oxyCODONE IR 5 mg immediate release tablet Commonly known as:  [place] Take 1-2 Tabs by mouth every three (3) hours as needed. Max Daily Amount: 80 mg. Selenomethionine 200 mcg Tab Take 1 Tab by mouth daily. VITAMIN D3 2,000 unit Tab Generic drug:  cholecalciferol (vitamin D3) Take 1 Tab by mouth daily. We Performed the Following REFERRAL TO ORTHOPEDICS [BVY793 Custom] Comments:  
 Eval/Treat Right Hip Pain Follow-up Instructions Return if symptoms worsen or fail to improve. To-Do List   
 07/11/2018 11:30 AM  
  Appointment with Jose Miguel Becerra PTA at [facility] ([phone])  
  
 07/16/2018 1:30 PM  
  Appointment with Jose Miguel Becerra PTA at [facility] ([phone])  
  
 07/18/2018 12:00 PM  
  Appointment with SUSHIL Dejesus [facility] ([phone])  
  
 07/23/2018 1:30 PM  
  Appointment with Jose Miguel Becerra PTA at [facility] ([phone])  
  
 07/25/2018 12:00 PM  
  Appointment with Jose Miguel Becerra PTA at [facility] ([phone])  
  
 07/30/2018 1:30 PM  
  Appointment with Jose Miguel Becerra PTA at [facility] ([phone]) Referral Information Referral ID Referred By Referred To  
  
 [id number] NIKOLAI, [address] [place] [address], [address] Phone: [phone] Fax: [phone] Visits Status Start Date End Date 1 Authorized/Scheduled 7/10/18 7/10/19 If your referral has a status of pending review or denied, additional information will be sent to support the outcome of this decision. Patient Instructions Low Back Arthritis: Exercises Your Care Instructions Here are some examples of typical rehabilitation exercises for your condition. Start each exercise slowly. Ease off the exercise if you start to have pain. Your doctor or physical therapist will tell you when you can start these exercises and which ones will work best for you. When you are not being active, find a comfortable position for rest. Some people are comfortable on the floor or a medium-firm bed with a small pillow under their head and another under their knees. Some people prefer to lie on their side with a pillow between their knees. Don't stay in one position for too long. Take short walks (10 to 20 minutes) every 2 to 3 hours. Avoid slopes, hills, and stairs until you feel better. Walk only distances you can manage without pain, especially leg pain. How to do the exercises Pelvic tilt 1. Lie on your back with your knees bent. 2. \"Brace\" your stomach-tighten your muscles by pulling in and imagining your belly button moving toward your spine. 3. Press your lower back into the floor. You should feel your hips and pelvis rock back. 4. Hold for 6 seconds while breathing smoothly. 5. Relax and allow your pelvis and hips to rock forward. 6. Repeat 8 to 12 times. Back stretches 1. Get down on your hands and knees on the floor. 2. Relax your head and allow it to droop. Round your back up toward the ceiling until you feel a nice stretch in your upper, middle, and lower back. Hold this stretch for as long as it feels comfortable, or about 15 to 30 seconds. 3. Return to the starting position with a flat back while you are on your hands and knees. 4. Let your back sway by pressing your stomach toward the floor. Lift your buttocks toward the ceiling. 5. Hold this position for 15 to 30 seconds. 6. Repeat 2 to 4 times. Follow-up care is a key part of your treatment and safety. Be sure to make and go to all appointments, and call your doctor if you are having problems. It's also a good idea to know your test results and keep a list of the medicines you take. Where can you learn more? Go to http://ramirez-sidra.info/. Enter H335 in the search box to learn more about \"Low Back Arthritis: Exercises. \" Current as of: March 21, 2017 Content Version: 11.4 © 8868-2642 netZentry. Care instructions adapted under license by [employer] (which disclaims liability or warranty for this information). If you have questions about a medical condition or this instruction, always ask your healthcare professional. [address] any warranty or liability for your use of this information. Hip Arthritis: Exercises Your Care Instructions Here are some examples of exercises for hip arthritis. Start each exercise slowly. Ease off the exercise if you start to have pain. Your doctor or physical therapist will tell you when you can start these exercises and which ones will work best for you. How to do the exercises Straight-leg raises to the outside 7. Lie on your side, with your affected hip on top. 8. Tighten the front thigh muscles of your top leg to keep your knee straight. 9. Keep your hip and your leg straight in line with the rest of your body, and keep your knee pointing forward. Do not drop your hip back. 10. Lift your top leg straight up toward the ceiling, about 12 inches off the floor. Hold for about 6 seconds, then slowly lower your leg. 11. Repeat 8 to 12 times. 12. Switch legs and repeat steps 1 through 5, even if only one hip is sore. Straight-leg raises to the inside 7. Lie on your side with your affected hip on the floor. 8. You can either prop up your other leg on a chair, or you can bend that knee and put that foot in front of your other knee. Do not drop your hip back. 9. Tighten the muscles on the front thigh of your bottom leg to straighten that knee. 10. Keep your kneecap pointing forward and your leg straight, and lift your bottom leg up toward the ceiling about 6 inches. Hold for about 6 seconds, then lower slowly. 11. Repeat 8 to 12 times. 12. Switch legs and repeat steps 1 through 5, even if only one hip is sore. Hip hike 1. Stand sideways on the bottom step of a staircase, and hold on to the banister or wall. 2. Keeping both knees straight, lift your good leg off the step and let it hang down. Then hike your good hip up to the same level as your affected hip or a little higher. 3. Repeat 8 to 12 times. 4. Switch legs and repeat steps 1 through 3, even if only one hip is sore. Bridging 1. Lie on your back with both knees bent. Your knees should be bent about 90 degrees. 2. Then push your feet into the floor, squeeze your buttocks, and lift your hips off the floor until your shoulders, hips, and knees are all in a straight line. 3. Hold for about 6 seconds as you continue to breathe normally, and then slowly lower your hips back down to the floor and rest for up to 10 seconds. 4. Repeat 8 to 12 times. Hamstring stretch (lying down) 1. Lie flat on your back with your legs straight. If you feel discomfort in your back, place a small towel roll under your lower back. 2. Holding the back of your affected leg, lift your leg straight up and toward your body until you feel a stretch at the back of your thigh. 3. Hold the stretch for at least 30 seconds. 4. Repeat 2 to 4 times. 5. Switch legs and repeat steps 1 through 4, even if only one hip is sore. Standing quadriceps stretch 1. If you are not steady on your feet, hold on to a chair, counter, or wall. You can also lie on your stomach or your side to do this exercise. 2. Bend the knee of the leg you want to stretch, and reach behind you to grab the front of your foot or ankle with the hand on the same side. For example, if you are stretching your right leg, use your right hand. 3. Keeping your knees next to each other, pull your foot toward your buttock until you feel a gentle stretch across the front of your hip and down the front of your thigh. Your knee should be pointed directly to the ground, and not out to the side. 4. Hold the stretch for at least 15 to 30 seconds. 5. Repeat 2 to 4 times. 6. Switch legs and repeat steps 1 through 5, even if only one hip is sore. Hip rotator stretch 1. Lie on your back with both knees bent and your feet flat on the floor. 2. Put the ankle of your affected leg on your opposite thigh near your knee. 3. Use your hand to gently push your knee away from your body until you feel a gentle stretch around your hip. 4. Hold the stretch for 15 to 30 seconds. 5. Repeat 2 to 4 times. 6. Repeat steps 1 through 5, but this time use your hand to gently pull your knee toward your opposite shoulder. 7. Switch legs and repeat steps 1 through 6, even if only one hip is sore. Knee-to-chest 
 
1. Lie on your back with your knees bent and your feet flat on the floor. 2. Bring your affected leg to your chest, keeping the other foot flat on the floor (or keeping the other leg straight, whichever feels better on your lower back). 3. Keep your lower back pressed to the floor. Hold for at least 15 to 30 seconds. 4. Relax, and lower the knee to the starting position. 5. Repeat 2 to 4 times. 6. Switch legs and repeat steps 1 through 5, even if only one hip is sore. 7. To get more stretch, put your other leg flat on the floor while pulling your knee to your chest. 
[place] 1. Lie on your side, with your affected hip on top. Keep your feet and knees together and your knees bent. 2. Raise your top knee, but keep your feet together. Do not let your hips roll back. Your legs should open up like a clamshell. 3. Hold for 6 seconds. 4. Slowly lower your knee back down. Rest for 10 seconds. 5. Repeat 8 to 12 times. 6. Switch legs and repeat steps 1 through 5, even if only one hip is sore. Follow-up care is a key part of your treatment and safety. Be sure to make and go to all appointments, and call your doctor if you are having problems. It's also a good idea to know your test results and keep a list of the medicines you take. Where can you learn more? Go to http://ramirez-sidra.info/. Enter X802 in the search box to learn more about \"Hip Arthritis: Exercises. \" Current as of: March 21, 2017 Content Version: 11.4 © 7832-3405 Lolabox. Care instructions adapted under license by [employer] (which disclaims liability or warranty for this information). If you have questions about a medical condition or this instruction, always ask your healthcare professional. [place] [address] any warranty or liability for your use of this information. Introducing [facility]! Dear Ino York: Thank you for requesting a Indicee account. Our records indicate that you already have an active Indicee account. You can access your account anytime at [email or web address] Did you know that you can access your hospital and ER discharge instructions at any time in Indicee? You can also review all of your test results from your hospital stay or ER visit. Additional Information If you have questions, please visit the Frequently Asked Questions section of the Indicee website at [email or web address] Remember, Indicee is NOT to be used for urgent needs. For medical emergencies, dial 911. Now available from your iPhone and Android! Please provide this summary of care documentation to your next provider. Your primary care clinician is listed as [address] [place] [address]. If you have any questions after today's visit, please call [phone].

## 2018-07-11 ENCOUNTER — HOSPITAL ENCOUNTER (OUTPATIENT)
Dept: PHYSICAL THERAPY | Age: 58
Discharge: HOME | End: 2018-07-11
Payer: MEDICAID

## 2018-07-11 PROCEDURE — 97032 APPL MODALITY 1+ESTIM EA 15: CPT

## 2018-07-11 PROCEDURE — 97110 THERAPEUTIC EXERCISES: CPT

## 2018-07-11 NOTE — PROGRESS NOTES
PT DAILY TREATMENT NOTE     Patient Name: Sharmin Villatoro  Date:2018  : 1960  [x]  Patient  Verified  Payor: [facility] MEDICAID / Plan: [address] / Product Type: Managed Care Medicaid /    In time: 11:18  Out time:12:30  Total Treatment Time (min): 53  Visit #: 11 of 20    Treatment Area: Low back pain [M54.5]  Pain in right shoulder [M25.511]    SUBJECTIVE  Pain Level (0-10 scale): shoulder 5 LSP  6  Any medication changes, allergies to medications, adverse drug reactions, diagnosis change, or new procedure performed?: [x] No    [] Yes (see summary sheet for update)  Subjective functional status/changes:   [] No changes reported  Pt  Reports  Pinch  At right  Shoulder. MD  Told  Me  I hav e to have  Right  Hip surgery  That's  what  Causing  All the pain at right LE.     OBJECTIVE    Modality rationale: decrease edema, decrease inflammation, decrease pain and increase tissue extensibility to improve the patients ability to perform ADL   Min Type Additional Details    [] Estim:  []Unatt       []IFC  []Premod                        []Other:  []w/ice   []w/heat  Position:  Location:   8 [x] Estim: [x]Att    []TENS instruct  []NMES                    [x]Other: LTO []w/US   []w/ice   []w/heat  Position:supine  Location:(R) groin    []  Traction: [] Cervical       []Lumbar                       [] Prone          []Supine                       []Intermittent   []Continuous Lbs:  [] before manual  [] after manual    []  Ultrasound: []Continuous   [] Pulsed                           []1MHz   []3MHz W/cm2:  Location:    []  Iontophoresis with dexamethasone         Location: [] Take home patch   [] In clinic    []  Ice     []  heat  []  Ice massage  []  Laser   []  Anodyne Position:  Location:    []  Laser with stim  []  Other:  Position:  Location:    []  Vasopneumatic Device Pressure:       [] lo [] med [] hi   Temperature: [] lo [] med [] hi   [x] Skin assessment post-treatment:  []intact []redness- no adverse reaction    []redness - adverse reaction:      min []Eval                  []Re-Eval       45 min Therapeutic Exercise:  [x] See flow sheet :   Rationale: increase ROM and increase strength to improve the patients ability to perform ADL      min Therapeutic Activity:  []  See flow sheet :   Rationale:   to improve the patients ability to       min Neuromuscular Re-education:  []  See flow sheet :   Rationale:   to improve the patients ability to      min Manual Therapy:     Rationale: decrease pain, increase ROM, increase tissue extensibility and decrease edema  to perform ADL      min Gait Training:  ___ feet with ___ device on level surfaces with ___ level of assist   Rationale: With   [x] TE   [] TA   [] neuro   [] other: Patient Education: [x] Review HEP    [] Progressed/Changed HEP based on:   [] positioning   [] body mechanics   [] transfers   [] heat/ice application    [x] other: REASSESS GOALS     Other Objective/Functional Measures: AROM  F/ ABD  90    FOTO  Unable  To assess  Pt  Was unable  To perform  Hip flexor  Stretch. Pain Level (0-10 scale) post treatment: 8    ASSESSMENT/Changes in Function: Ms. Christen Mcnulty  Reports  30%  Improvement. Pain level  On average  5/10. Pt  C/o  tinling  At radial  Aspect  Right  Hand. Patient will continue to benefit from skilled PT services to address functional mobility deficits, address ROM deficits, address strength deficits, analyze and address soft tissue restrictions, analyze and cue movement patterns and instruct in home and community integration to attain remaining goals.      [x]  See Plan of Care  []  See progress note/recertification  []  See Discharge Summary         Progress towards goals / Updated goals:  1 patient will have established and be I with HEP to aid with progression of skilled PT program                                               EVAL issued                         RVXCMVG  Met  5/31/18                         9 patient will have pain 5/10 to aid with increase tolerance to ADLS and activities                         EVAL 7                         CURRENT  Back 6   Shoulder  5   7/11/18      Long Term Goals: To be accomplished in 10 treatments:                         8 patient will have pain 3/10 to aid with increase tolerance to ADLS and activities at home                         EVAL 7                         CURRENT  Back  6  Shoulder  5   7/11/18                         2 patient will have shoulder FOTO  57 to show significant improved tolerance to overhead reaching at home                         EVAL 28                         CURRENT   40  Shoulder   Back   30   7/5/18                         3 patient will report overall 50% improvement to aid with increase tolerance to household chores, walking and standing                         EVAL decreased tolerance to standing and walking                         CURRENT  30%   7/11/18                         4  Patient will have AROM right shoulder F 120  abd 110 to aid with increased ease with overhead reaching at home                         EVAL Right shoulder F 75  ABD 60                         CURRENT    AROM  F/ABD   90  Tingling  A  Radial  Side  Of  Hand  7/11/18       PLAN  []  Upgrade activities as tolerated     []  Continue plan of care  []  Update interventions per flow sheet       []  Discharge due to:_  [x]  Other:_ FAX MD NOTE     River Martín, SUSHIL 7/11/2018  11:21 AM    Future Appointments  Date Time Provider Radha Chaudhary   7/11/2018 11:30 AM Crystal Samina, PTA [facility]   7/16/2018 1:30 PM Crystal Tariq, PTA [facility]   7/18/2018 12:00 PM Crystal Tariq, PTA [facility]   7/19/2018 8:45 AM Vivica Klinefelter, MD [facility]   7/23/2018 1:30 PM Crystal Tariq, PTA [facility]   7/25/2018 12:00 PM Crystal Tariq, PTA [facility]   7/30/2018 1:30 PM Crystal Tariq, PTA [facility]

## 2018-07-11 NOTE — PROGRESS NOTES
In [address] [place] [address] Square  [address] [place] [address], [address] [place] [address], [address]  [phone] fax    Progress Note  Patient name: Domonique Byrne Start of Care:18   Referral source: Vicenta Iqbal MD : 1960   Medical/Treatment Diagnosis: Low back pain [M54.5]  Pain in right shoulder [M25.511] Onset Date:2017 Right shoulder, and back pain longstanding P/O      Prior Hospitalization: see medical history Provider#: [id number]   Medications: Verified on Patient Summary List     Comorbidities: neck and back surgery, depression, right frozen shoulder   Prior Level of Function: :I all areas of ADLs and activities, using Left SC, household chores , community     Visits from [facility]: 11    Missed Visits: 0    Established Goals:         Excellent           Good         Limited           None  [] Increased ROM   []  []  []  []  [] Increased Strength  []  []  []  []  [] Increased Mobility  []  []  []  []   [] Decreased Pain   []  []  []  []  [] Decreased Swelling  []  []  []  []    Key Functional Changes: pain shoulder 5 Lower back 6, Has exercises for her HEP. Her shoulder FOTO has increased to 46 and her lumbar has not changed at 30. She has shoulder  AROM  F/ ABD  90 with tingling on the radial side of her hand. Overall she notes 30% improvement.       Updated Goals: to be achieved in 10 treatments   Continue with long term eval goals                          1 patient will have pain 3/10 to aid with increase tolerance to ADLS and activities at home                         PN back 6 shoulder 5                         CURRENT                            2 patient will have shoulder FOTO  57 to show significant improved tolerance to overhead reaching at home                         PN shoulder 46                         CURRENT                            3 patient will report overall 50% improvement to aid with increase tolerance to household chores, walking and standing                         KB 30%                         CURRENT                            3  Patient will have AROM right shoulder F 120  abd 110 to aid with increased ease with overhead reaching at home                         PN AROM  F/ABD   90  Tingling  A  Radial  Side  Of  Hand                            CURRENT         ASSESSMENT/RECOMMENDATIONS:  [x]Continue therapy per initial plan/protocol at a frequency of  2-3 x per week for 10    []Continue therapy with the following recommended changes:_____________________      _____________________________________________________________________  []Discontinue therapy progressing towards or have reached established goals  []Discontinue therapy due to lack of appreciable progress towards goals  []Discontinue therapy due to lack of attendance or compliance  [x]Await Physician's recommendations/decisions regarding therapy  []Other:________________________________________________________________    Thank you for this referral.   Xavier Loya, PT 7/11/2018 1:02 PM  NOTE TO PHYSICIAN:  PLEASE COMPLETE THE ORDERS BELOW AND   FAX TO [facility]: [phone]  If you are unable to process this request in 24 hours please contact our office: [phone]    []  I have read the above report and request that my patient continue as recommended. []  I have read the above report and request that my patient continue therapy with the following changes/special instructions:________________________________________  []I have read the above report and request that my patient be discharged from therapy.     Physicians signature: ______________________________Date: _____Time:______

## 2018-07-16 ENCOUNTER — HOSPITAL ENCOUNTER (OUTPATIENT)
Dept: PHYSICAL THERAPY | Age: 58
Discharge: HOME | End: 2018-07-16
Payer: MEDICAID

## 2018-07-16 PROCEDURE — 97110 THERAPEUTIC EXERCISES: CPT

## 2018-07-16 PROCEDURE — 97032 APPL MODALITY 1+ESTIM EA 15: CPT

## 2018-07-16 NOTE — PROGRESS NOTES
PT DAILY TREATMENT NOTE     Patient Name: Sebas Myers  Date:2018  : 1960  [x]  Patient  Verified  Payor: [facility] MEDICAID / Plan: [address] / Product Type: Managed Care Medicaid /    In time: 12:55 Out time:2:22  Total Treatment Time (min): 53  Visit #: 12 of 20    Treatment Area: Low back pain [M54.5]  Pain in right shoulder [M25.511]    SUBJECTIVE  Pain Level (0-10 scale): LBP 6  Shoulder   Any medication changes, allergies to medications, adverse drug reactions, diagnosis change, or new procedure performed?: [x] No    [] Yes (see summary sheet for update)  Subjective functional status/changes:   [] No changes reported  I will  Be  Needing  Surgery. Still  Having  Tingling  At thumb/index  Finger.     OBJECTIVE    Modality rationale: decrease edema, decrease inflammation, decrease pain and increase tissue extensibility to improve the patients ability to perform ADL    Min Type Additional Details    [] Estim:  []Unatt       []IFC  []Premod                        []Other:  []w/ice   []w/heat  Position:  Location:   8 [x] Estim: [x]Att    []TENS instruct  []NMES                    [x]Other: LTO []w/US   []w/ice   []w/heat  Position:left  SL  Location:right  hip    []  Traction: [] Cervical       []Lumbar                       [] Prone          []Supine                       []Intermittent   []Continuous Lbs:  [] before manual  [] after manual    []  Ultrasound: []Continuous   [] Pulsed                           []1MHz   []3MHz W/cm2:  Location:    []  Iontophoresis with dexamethasone         Location: [] Take home patch   [] In clinic    []  Ice     []  heat  []  Ice massage  []  Laser   []  Anodyne Position:  Location:    []  Laser with stim  []  Other:  Position:  Location:    []  Vasopneumatic Device Pressure:       [] lo [] med [] hi   Temperature: [] lo [] med [] hi   [x] Skin assessment post-treatment:  [x]intact []redness- no adverse reaction    []redness - adverse reaction:      min []Eval                  []Re-Eval       45 min Therapeutic Exercise:  [x] See flow sheet :   Rationale: increase ROM and increase strength to improve the patients ability to perform ADL      min Therapeutic Activity:  []  See flow sheet :   Rationale:   to improve the patients ability to       min Neuromuscular Re-education:  []  See flow sheet :   Rationale:   to improve the patients ability to      min Manual Therapy:     Rationale: decrease pain, increase ROM, increase tissue extensibility and decrease edema  to perform ADL      min Gait Training:  ___ feet with ___ device on level surfaces with ___ level of assist   Rationale: With   [x] TE   [] TA   [] neuro   [] other: Patient Education: [x] Review HEP    [] Progressed/Changed HEP based on:   [] positioning   [] body mechanics   [] transfers   [] heat/ice application    [] other:      Other Objective/Functional Measures:  Pt  Experienced  Increased  Pan at right  Hip   During  And  Following there ex. Pain Level (0-10 scale) post treatment: <5    ASSESSMENT/Changes in Function: OVerall fair  Response to remaining  There ex. Patient will continue to benefit from skilled PT services to address functional mobility deficits and address ROM deficits to attain remaining goals.      [x]  See Plan of Care  []  See progress note/recertification  []  See Discharge Summary         Progress towards goals / Updated goals:             Continue with long term eval goals                          1 patient will have pain 3/10 to aid with increase tolerance to ADLS and activities at home                         PN back 6 shoulder 5                         CURRENT                            2 patient will have shoulder FOTO  57 to show significant improved tolerance to overhead reaching at home                         PN shoulder 46                         CURRENT                            3 patient will report overall 50% improvement to aid with increase tolerance to household chores, walking and standing                         PN 30%                         CURRENT                            4  Patient will have AROM right shoulder F 120  abd 110 to aid with increased ease with overhead reaching at home                         PN AROM  F/ABD   90  Tingling  At  Radial  Side  Of  Hand                            CURRENT        PLAN  []  Upgrade activities as tolerated     [x]  Continue plan of care  []  Update interventions per flow sheet       []  Discharge due to:_  []  Other:_      Monie Potter, PTA 7/16/2018  1:21 PM    Future Appointments  Date Time Provider Radha Chaudhary   7/16/2018 1:30 PM Monie Haas PTA [facility]   7/18/2018 12:00 PM Monie Haas PTA [facility]   7/19/2018 8:45 AM Bill Payne MD [facility]   7/23/2018 1:30 PM Monie Potter, PTA [facility]   7/25/2018 12:00 PM Monie Potter, PTA [facility]   7/30/2018 1:30 PM oMnie Potter, PTA [facility]

## 2018-07-18 ENCOUNTER — APPOINTMENT (OUTPATIENT)
Dept: PHYSICAL THERAPY | Age: 58
End: 2018-07-18
Payer: MEDICAID

## 2018-07-19 ENCOUNTER — OFFICE VISIT (OUTPATIENT)
Dept: ORTHOPEDIC SURGERY | Facility: CLINIC | Age: 58
End: 2018-07-19

## 2018-07-19 VITALS
DIASTOLIC BLOOD PRESSURE: 80 MMHG | WEIGHT: 219.2 LBS | SYSTOLIC BLOOD PRESSURE: 133 MMHG | BODY MASS INDEX: 37.42 KG/M2 | HEIGHT: 64 IN | OXYGEN SATURATION: 96 % | TEMPERATURE: 97.5 F | HEART RATE: 74 BPM | RESPIRATION RATE: 16 BRPM

## 2018-07-19 DIAGNOSIS — G89.29 CHRONIC HIP PAIN, RIGHT: Primary | ICD-10-CM

## 2018-07-19 DIAGNOSIS — M25.551 CHRONIC HIP PAIN, RIGHT: Primary | ICD-10-CM

## 2018-07-19 DIAGNOSIS — Z01.818 PREOP EXAMINATION: Primary | ICD-10-CM

## 2018-07-19 DIAGNOSIS — M16.11 PRIMARY OSTEOARTHRITIS OF RIGHT HIP: ICD-10-CM

## 2018-07-19 RX ORDER — LANOLIN ALCOHOL/MO/W.PET/CERES
1000 CREAM (GRAM) TOPICAL DAILY
COMMUNITY
End: 2021-01-06

## 2018-07-19 NOTE — PROGRESS NOTES
Patient: Florentino Lopez                MRN: [id number]       SSN: [id number]  YOB: 1960        AGE: 61 y.o. SEX: female  Body mass index is 37.63 kg/(m^2). PCP: Carlos Gr[place] MD  07/19/18    HISTORY: I had the pleasure of reviewing Aleida today. She is a very nice lady. She has had some back problems over the years and still has some ongoing issues, but her main complaint is right hip and groin discomfort. It can be quite severe. It hurts all of the time. It is worse with walking and putting shoes and socks on. It is very difficult at the grocery store. She has less than a five-minute level walking tolerance. She has difficulty getting in and out of the car. It is the right hip. She denies any trauma. She otherwise has been feeling well. PHYSICAL EXAMINATION:  On examination today, her BMI is 38. She is very nice. She is alert and oriented. Affect is normal.  There is no respiratory compromise or indrawing. There is no scleral icterus. There is no JVD. EOM is normal.  Abdominal examination is nontender. The pelvis itself is stable. The right hip is fairly stiff, which reproduces her groin discomfort with internal rotation. The low back remains mildly tender, and she does have mild weakness involving the right ankle dorsiflexor with a -5/5 on the right and slight decrease of L4 sensation as well. EHL is 5/5. The calf is nontender. Violetta's sign is negative. There is a pannus, especially in the abdominal area. There is no cyanosis, peripheral edema, or clubbing. Good pulse is present distally. RADIOGRAPHS:  Review of her x-rays, AP of the pelvis and AP and lateral, and MRI confirm severe arthritis of the right hip. PLAN:  We discussed. Treatment options, including injections, therapy, and surgery. She would like to have her right hip replaced.   We had a lengthy discussion regarding the risks and benefits including but not limited to infection, DVT, pulmonary embolism, anesthetic complications, blood loss requiring transfusion, pain, stiffness, and other complications. All questions were answered. We talked about leg length discrepancy and limp and dislocation as well as chronic pain. I kindly ask for medical clearance. She would like to proceed. I think she will do very well with surgery. REVIEW OF SYSTEMS:      CON: negative for weight loss, fever  EYE: negative for double vision  ENT: negative for hoarseness  RS:   negative for Tb  GI:    negative for blood in stool  :  negative for blood in urine  Other systems reviewed and noted below. Past Medical History:   Diagnosis Date    Adverse effect of anesthesia     hard to wake up    Chronic pain     Depression     GERD (gastroesophageal reflux disease)     Low back pain     Thromboembolus (HCC)     blood clot in foot during pregnancy    Tobacco abuse     Vertigo        Family History   Problem Relation Age of Onset    Hypertension Mother     Cancer Mother      multiple myeloma    Cancer Father      throat       Current Outpatient Prescriptions   Medication Sig Dispense Refill    cyanocobalamin (VITAMIN B-12) 1,000 mcg tablet Take 1,000 mcg by mouth daily.  NP THYROID 15 mg tablet TK 3 TS PO D  1    gabapentin (NEURONTIN) 300 mg capsule TK ONE PO  TID  2    potassium iodide/iodine (IODINE STRONG, LUGOLS, PO) Take 25 mg by mouth daily.  Selenomethionine 200 mcg tab Take 1 Tab by mouth daily.  cholecalciferol, vitamin D3, (VITAMIN D3) 2,000 unit tab Take 1 Tab by mouth daily.  oxyCODONE IR (ROXICODONE) 5 mg immediate release tablet Take 1-2 Tabs by mouth every three (3) hours as needed.  Max Daily Amount: 80 mg. 80 Tab 0       Allergies   Allergen Reactions    Pcn [Penicillins] Hives       Past Surgical History:   Procedure Laterality Date    HX  SECTION      x3    HX HYSTERECTOMY      HX ORTHOPAEDIC  2002    2 disks removed lower back [facility].   Opal Lemus ORTHOPAEDIC Right 2017    shoulder surgery    NEUROLOGICAL PROCEDURE UNLISTED         Social History     Social History    Marital status: UNKNOWN     Spouse name: N/A    Number of children: N/A    Years of education: N/A     Occupational History    Not on file. Social History Main Topics    Smoking status: Former Smoker     Packs/day: 0.25     Years: 4.00     Quit date: 6/4/2015    Smokeless tobacco: Never Used    Alcohol use No    Drug use: No    Sexual activity: Not on file     Other Topics Concern    Not on file     Social History Narrative    3/2015[de-identified] currently unemployed due to chronic low back pain, used to work renovating houses. Lived with her mother until her mother's death in the summer of 2014. Currently living with her daughters, splits time between [place] and [place]. Visit Vitals    /80    Pulse 74    Temp 97.5 °F (36.4 °C) (Oral)    Resp 16    Ht 5' 4\" (1.626 m)    Wt 219 lb 3.2 oz (99.4 kg)    SpO2 96%    BMI 37.63 kg/m2         PHYSICAL EXAMINATION:  GENERAL: Alert and oriented x3, in no acute distress, well-developed, well-nourished, afebrile. HEART: No JVD. EYES: No scleral icterus   NECK: No significant lymphadenopathy   LUNGS: No respiratory compromise or indrawing  ABDOMEN: Soft, non-tender, non-distended. Electronically signed by:  Giancarlo Baltazar MD

## 2018-07-23 ENCOUNTER — HOSPITAL ENCOUNTER (OUTPATIENT)
Dept: GENERAL RADIOLOGY | Age: 58
Discharge: HOME | End: 2018-07-23
Payer: MEDICAID

## 2018-07-23 ENCOUNTER — HOSPITAL ENCOUNTER (OUTPATIENT)
Dept: PREADMISSION TESTING | Age: 58
Discharge: HOME | End: 2018-07-23
Payer: MEDICAID

## 2018-07-23 DIAGNOSIS — M16.11 PRIMARY OSTEOARTHRITIS OF RIGHT HIP: ICD-10-CM

## 2018-07-23 DIAGNOSIS — Z01.818 PREOP EXAMINATION: ICD-10-CM

## 2018-07-23 LAB
ABO + RH BLD: NORMAL
ALBUMIN SERPL-MCNC: 3.8 G/DL (ref 3.4–5)
ANION GAP SERPL CALC-SCNC: 6 MMOL/L (ref 3–18)
APPEARANCE UR: CLEAR
APTT PPP: 29.1 SEC (ref 23–36.4)
ATRIAL RATE: 66 BPM
BASOPHILS # BLD: 0 K/UL (ref 0–0.1)
BASOPHILS NFR BLD: 1 % (ref 0–2)
BILIRUB UR QL: NEGATIVE
BLOOD GROUP ANTIBODIES SERPL: NORMAL
BUN SERPL-MCNC: 12 MG/DL (ref 7–18)
BUN/CREAT SERPL: 16 (ref 12–20)
CALCIUM SERPL-MCNC: 9.2 MG/DL (ref 8.5–10.1)
CALCULATED P AXIS, ECG09: 65 DEGREES
CALCULATED R AXIS, ECG10: 47 DEGREES
CALCULATED T AXIS, ECG11: 46 DEGREES
CHLORIDE SERPL-SCNC: 108 MMOL/L (ref 100–108)
CO2 SERPL-SCNC: 31 MMOL/L (ref 21–32)
COLOR UR: YELLOW
CREAT SERPL-MCNC: 0.77 MG/DL (ref 0.6–1.3)
DIAGNOSIS, 93000: NORMAL
DIFFERENTIAL METHOD BLD: ABNORMAL
EOSINOPHIL # BLD: 0.1 K/UL (ref 0–0.4)
EOSINOPHIL NFR BLD: 2 % (ref 0–5)
ERYTHROCYTE [DISTWIDTH] IN BLOOD BY AUTOMATED COUNT: 14.5 % (ref 11.6–14.5)
EST. AVERAGE GLUCOSE BLD GHB EST-MCNC: 131 MG/DL
GLUCOSE SERPL-MCNC: 87 MG/DL (ref 74–99)
GLUCOSE UR STRIP.AUTO-MCNC: NEGATIVE MG/DL
HBA1C MFR BLD: 6.2 % (ref 4.2–5.6)
HCT VFR BLD AUTO: 36.2 % (ref 35–45)
HGB BLD-MCNC: 11.7 G/DL (ref 12–16)
HGB UR QL STRIP: NEGATIVE
INR PPP: 1.1 (ref 0.8–1.2)
KETONES UR QL STRIP.AUTO: NEGATIVE MG/DL
LEUKOCYTE ESTERASE UR QL STRIP.AUTO: NEGATIVE
LYMPHOCYTES # BLD: 2.4 K/UL (ref 0.9–3.6)
LYMPHOCYTES NFR BLD: 42 % (ref 21–52)
MCH RBC QN AUTO: 28.7 PG (ref 24–34)
MCHC RBC AUTO-ENTMCNC: 32.3 G/DL (ref 31–37)
MCV RBC AUTO: 88.7 FL (ref 74–97)
MONOCYTES # BLD: 0.3 K/UL (ref 0.05–1.2)
MONOCYTES NFR BLD: 6 % (ref 3–10)
NEUTS SEG # BLD: 2.8 K/UL (ref 1.8–8)
NEUTS SEG NFR BLD: 49 % (ref 40–73)
NITRITE UR QL STRIP.AUTO: NEGATIVE
P-R INTERVAL, ECG05: 178 MS
PH UR STRIP: 7 [PH] (ref 5–8)
PLATELET # BLD AUTO: 284 K/UL (ref 135–420)
PMV BLD AUTO: 9.6 FL (ref 9.2–11.8)
POTASSIUM SERPL-SCNC: 4.7 MMOL/L (ref 3.5–5.5)
PROT UR STRIP-MCNC: NEGATIVE MG/DL
PROTHROMBIN TIME: 13.3 SEC (ref 11.5–15.2)
Q-T INTERVAL, ECG07: 384 MS
QRS DURATION, ECG06: 82 MS
QTC CALCULATION (BEZET), ECG08: 402 MS
RBC # BLD AUTO: 4.08 M/UL (ref 4.2–5.3)
SODIUM SERPL-SCNC: 145 MMOL/L (ref 136–145)
SP GR UR REFRACTOMETRY: 1.01 (ref 1–1.03)
SPECIMEN EXP DATE BLD: NORMAL
UROBILINOGEN UR QL STRIP.AUTO: 0.2 EU/DL (ref 0.2–1)
VENTRICULAR RATE, ECG03: 66 BPM
WBC # BLD AUTO: 5.6 K/UL (ref 4.6–13.2)

## 2018-07-23 PROCEDURE — 82040 ASSAY OF SERUM ALBUMIN: CPT | Performed by: PHYSICIAN ASSISTANT

## 2018-07-23 PROCEDURE — 71046 X-RAY EXAM CHEST 2 VIEWS: CPT

## 2018-07-23 PROCEDURE — 85025 COMPLETE CBC W/AUTO DIFF WBC: CPT | Performed by: PHYSICIAN ASSISTANT

## 2018-07-23 PROCEDURE — 93005 ELECTROCARDIOGRAM TRACING: CPT

## 2018-07-23 PROCEDURE — 85610 PROTHROMBIN TIME: CPT | Performed by: PHYSICIAN ASSISTANT

## 2018-07-23 PROCEDURE — 85730 THROMBOPLASTIN TIME PARTIAL: CPT | Performed by: PHYSICIAN ASSISTANT

## 2018-07-23 PROCEDURE — 81003 URINALYSIS AUTO W/O SCOPE: CPT | Performed by: PHYSICIAN ASSISTANT

## 2018-07-23 PROCEDURE — 83036 HEMOGLOBIN GLYCOSYLATED A1C: CPT | Performed by: PHYSICIAN ASSISTANT

## 2018-07-23 PROCEDURE — 87086 URINE CULTURE/COLONY COUNT: CPT | Performed by: PHYSICIAN ASSISTANT

## 2018-07-23 PROCEDURE — 36415 COLL VENOUS BLD VENIPUNCTURE: CPT | Performed by: PHYSICIAN ASSISTANT

## 2018-07-23 PROCEDURE — 86900 BLOOD TYPING SEROLOGIC ABO: CPT | Performed by: PHYSICIAN ASSISTANT

## 2018-07-23 PROCEDURE — 80048 BASIC METABOLIC PNL TOTAL CA: CPT | Performed by: PHYSICIAN ASSISTANT

## 2018-07-23 NOTE — PERIOP NOTES
Florentino Lopez was here today for her PAT appointment. Health assessment was completed and instructions given regarding NPO status, medications, Hibiclens washes, and removal of all jewelry and/or body piercing. Instructed patient not to remove the red Blood Bank armband that was placed on their arm when the Type and Screen was drawn. Opportunity was given to ask questions and all questions were answered. Understanding of instructions was verbalized.

## 2018-07-24 LAB
BACTERIA SPEC CULT: NORMAL
SERVICE CMNT-IMP: NORMAL

## 2018-07-25 ENCOUNTER — APPOINTMENT (OUTPATIENT)
Dept: PHYSICAL THERAPY | Age: 58
End: 2018-07-25
Payer: MEDICAID

## 2018-07-26 ENCOUNTER — OFFICE VISIT (OUTPATIENT)
Dept: ORTHOPEDIC SURGERY | Facility: CLINIC | Age: 58
End: 2018-07-26

## 2018-07-26 VITALS
RESPIRATION RATE: 18 BRPM | BODY MASS INDEX: 36.46 KG/M2 | TEMPERATURE: 96.1 F | WEIGHT: 218.8 LBS | OXYGEN SATURATION: 100 % | HEIGHT: 65 IN | HEART RATE: 71 BPM | DIASTOLIC BLOOD PRESSURE: 85 MMHG | SYSTOLIC BLOOD PRESSURE: 140 MMHG

## 2018-07-26 DIAGNOSIS — M16.11 PRIMARY OSTEOARTHRITIS OF RIGHT HIP: ICD-10-CM

## 2018-07-26 DIAGNOSIS — Z01.818 PRE-OP EXAM: Primary | ICD-10-CM

## 2018-07-26 RX ORDER — WARFARIN 1 MG/1
10 TABLET ORAL ONCE
Status: CANCELLED | OUTPATIENT
Start: 2018-07-26 | End: 2018-07-26

## 2018-07-26 RX ORDER — ACETAMINOPHEN 325 MG/1
1000 TABLET ORAL ONCE
Status: CANCELLED | OUTPATIENT
Start: 2018-07-26 | End: 2018-07-26

## 2018-07-26 RX ORDER — CELECOXIB 100 MG/1
400 CAPSULE ORAL ONCE
Status: CANCELLED | OUTPATIENT
Start: 2018-07-26 | End: 2018-07-26

## 2018-07-26 RX ORDER — PREGABALIN 25 MG/1
75 CAPSULE ORAL ONCE
Status: CANCELLED | OUTPATIENT
Start: 2018-07-26 | End: 2018-07-26

## 2018-07-26 NOTE — H&P
[address] [place], [place] [address]           HISTORY & PHYSICAL      Patient: Nicolasa Pittman                MRN: [id number]       SSN: [id number]  YOB: 1960        AGE: 60 y.o. SEX: female  Body mass index is 36.98 kg/(m^2). PCP: Shavnone Loco MD  07/26/18      CC: right hip end stage OA  Problem List Items Addressed This Visit     None      Visit Diagnoses     Pre-op exam    -  Primary    Relevant Orders    AMB POC X-RAY RADEX HIP UNI WITH PELVIS MIN 4 VIEWS (Completed)    Primary osteoarthritis of right hip                HPI:  The patient is a pleasant 60 y.o. whom has end stage OA of their Right hip and has failed conservative treatment including but not limited to NSAIDS, cortisone injections, viscosupplementation, PT, and pain medicine. Due to the current findings and affected activities of daily living, surgical intervention is indicated. The alternatives, risks, complications, as well as expected outcome were discussed. These include but are not limited to infection, blood loss, need for blood transfusion, neurovascular damage, DVT, PE,  post-op stiffness and pain, leg length discrepancy, dislocation, anesthetic complications, prothesis longevity, need for more surgery, MI, stroke, and even death. The patient understands and wishes to proceed with surgery. Past Medical History:   Diagnosis Date    Adverse effect of anesthesia     hard to wake up    Chronic pain     Depression     GERD (gastroesophageal reflux disease)     Hypothyroidism     Low back pain     Thromboembolus (HCC)     blood clot in foot during pregnancy    Tobacco abuse     Vertigo          Current Outpatient Prescriptions:     cyanocobalamin (VITAMIN B-12) 1,000 mcg tablet, Take 1,000 mcg by mouth daily. , Disp: , Rfl:     NP THYROID 15 mg tablet, TK 3 TS PO D, Disp: , Rfl: 1    gabapentin (NEURONTIN) 300 mg capsule, TK ONE PO  TID, Disp: , Rfl: 2    potassium iodide/iodine (IODINE STRONG, LUGOLS, PO), Take 25 mg by mouth daily. , Disp: , Rfl:     Selenomethionine 200 mcg tab, Take 1 Tab by mouth daily. , Disp: , Rfl:     cholecalciferol, vitamin D3, (VITAMIN D3) 2,000 unit tab, Take 1 Tab by mouth daily. , Disp: , Rfl:     Allergies   Allergen Reactions    Pcn [Penicillins] Hives       Social History     Social History    Marital status: UNKNOWN     Spouse name: N/A    Number of children: N/A    Years of education: N/A     Occupational History    Not on file. Social History Main Topics    Smoking status: Former Smoker     Packs/day: 0.25     Years: 4.00     Quit date: 2015    Smokeless tobacco: Never Used    Alcohol use No    Drug use: No    Sexual activity: Not on file     Other Topics Concern    Not on file     Social History Narrative    3/2015[de-identified] currently unemployed due to chronic low back pain, used to work renovating houses. Lived with her mother until her mother's death in the summer of . Currently living with her daughters, splits time between [place] and [place]. Past Surgical History:   Procedure Laterality Date    HX  SECTION      x3    HX HYSTERECTOMY      HX ORTHOPAEDIC  2002    2 disks removed lower back [facility].    HX ORTHOPAEDIC Right 2017    shoulder surgery    NEUROLOGICAL PROCEDURE UNLISTED         Family History:  Non-contributory. PE:  Visit Vitals    /85    Pulse 71    Temp 96.1 °F (35.6 °C) (Oral)    Resp 18    Ht 5' 4.5\" (1.638 m)    Wt 218 lb 12.8 oz (99.2 kg)    SpO2 100%    BMI 36.98 kg/m2     A&O X3, NAD, well develop, well nourished  Heart: S1-S2, rrr  Lungs: CTA bilat  Abd: soft, nt, nt, + bs in all quadrants  Ext:  Pos distal pulses to DP, PT  Leg lengths show the left LE to be slightly shorter grossly sitting in the chair    X-ray: right hip shows end stage OA    Labs: labs were reviewed and wnl.   Ua neg    A:  Right  hip end stage OA    P:  At this point we will move forward with surgery. Again, the alternatives, risks, complications, as well as expected outcome were discussed and the patient wishes to proceed with surgery. Pt has been instructed to stop aspirin, nsaids, rheumatologic medications and blood thinners. They have also been instructed to continue on any heart and bp meds and to take them the morning of surgery with sips of water. Lateral approach. The patient will require in patient admission due to above stated medical conditions as well the the surgical challenges given the anatomy and bone quality.          Zac Fink

## 2018-07-29 ENCOUNTER — ANESTHESIA EVENT (OUTPATIENT)
Dept: SURGERY | Age: 58
DRG: 301 | End: 2018-07-29
Payer: MEDICAID

## 2018-07-30 ENCOUNTER — APPOINTMENT (OUTPATIENT)
Dept: GENERAL RADIOLOGY | Age: 58
DRG: 301 | End: 2018-07-30
Attending: ORTHOPAEDIC SURGERY
Payer: MEDICAID

## 2018-07-30 ENCOUNTER — HOSPITAL ENCOUNTER (INPATIENT)
Age: 58
LOS: 2 days | Discharge: HOME HEALTH | DRG: 301 | End: 2018-08-01
Attending: ORTHOPAEDIC SURGERY | Admitting: ORTHOPAEDIC SURGERY
Payer: MEDICAID

## 2018-07-30 ENCOUNTER — APPOINTMENT (OUTPATIENT)
Dept: PHYSICAL THERAPY | Age: 58
End: 2018-07-30
Payer: MEDICAID

## 2018-07-30 ENCOUNTER — ANESTHESIA (OUTPATIENT)
Dept: SURGERY | Age: 58
DRG: 301 | End: 2018-07-30
Payer: MEDICAID

## 2018-07-30 DIAGNOSIS — M16.10 HIP ARTHRITIS: Primary | ICD-10-CM

## 2018-07-30 PROCEDURE — 74011250636 HC RX REV CODE- 250/636: Performed by: NURSE ANESTHETIST, CERTIFIED REGISTERED

## 2018-07-30 PROCEDURE — 77030012890: Performed by: ORTHOPAEDIC SURGERY

## 2018-07-30 PROCEDURE — 77030018835 HC SOL IRR LR ICUM -A: Performed by: ORTHOPAEDIC SURGERY

## 2018-07-30 PROCEDURE — 74011000258 HC RX REV CODE- 258: Performed by: PHYSICIAN ASSISTANT

## 2018-07-30 PROCEDURE — 74011250636 HC RX REV CODE- 250/636

## 2018-07-30 PROCEDURE — 74011000250 HC RX REV CODE- 250: Performed by: ORTHOPAEDIC SURGERY

## 2018-07-30 PROCEDURE — 65270000029 HC RM PRIVATE

## 2018-07-30 PROCEDURE — 77030011640 HC PAD GRND REM COVD -A: Performed by: ORTHOPAEDIC SURGERY

## 2018-07-30 PROCEDURE — 77030012935 HC DRSG AQUACEL BMS -B: Performed by: ORTHOPAEDIC SURGERY

## 2018-07-30 PROCEDURE — 77030013708 HC HNDPC SUC IRR PULS STRY –B: Performed by: ORTHOPAEDIC SURGERY

## 2018-07-30 PROCEDURE — 97116 GAIT TRAINING THERAPY: CPT

## 2018-07-30 PROCEDURE — C1776 JOINT DEVICE (IMPLANTABLE): HCPCS | Performed by: ORTHOPAEDIC SURGERY

## 2018-07-30 PROCEDURE — 74011000258 HC RX REV CODE- 258: Performed by: ORTHOPAEDIC SURGERY

## 2018-07-30 PROCEDURE — 74011250637 HC RX REV CODE- 250/637: Performed by: ORTHOPAEDIC SURGERY

## 2018-07-30 PROCEDURE — 74011250636 HC RX REV CODE- 250/636: Performed by: ANESTHESIOLOGY

## 2018-07-30 PROCEDURE — 77030003029 HC SUT VCRL J&J -B: Performed by: ORTHOPAEDIC SURGERY

## 2018-07-30 PROCEDURE — 76060000035 HC ANESTHESIA 2 TO 2.5 HR: Performed by: ORTHOPAEDIC SURGERY

## 2018-07-30 PROCEDURE — 76210000017 HC OR PH I REC 1.5 TO 2 HR: Performed by: ORTHOPAEDIC SURGERY

## 2018-07-30 PROCEDURE — 74011250636 HC RX REV CODE- 250/636: Performed by: ORTHOPAEDIC SURGERY

## 2018-07-30 PROCEDURE — 74011000272 HC RX REV CODE- 272: Performed by: ORTHOPAEDIC SURGERY

## 2018-07-30 PROCEDURE — 97161 PT EVAL LOW COMPLEX 20 MIN: CPT

## 2018-07-30 PROCEDURE — 77030002933 HC SUT MCRYL J&J -A: Performed by: ORTHOPAEDIC SURGERY

## 2018-07-30 PROCEDURE — 74011250637 HC RX REV CODE- 250/637: Performed by: PHYSICIAN ASSISTANT

## 2018-07-30 PROCEDURE — 77030032490 HC SLV COMPR SCD KNE COVD -B: Performed by: ORTHOPAEDIC SURGERY

## 2018-07-30 PROCEDURE — C9290 INJ, BUPIVACAINE LIPOSOME: HCPCS | Performed by: ORTHOPAEDIC SURGERY

## 2018-07-30 PROCEDURE — 88311 DECALCIFY TISSUE: CPT | Performed by: ORTHOPAEDIC SURGERY

## 2018-07-30 PROCEDURE — 77030031139 HC SUT VCRL2 J&J -A: Performed by: ORTHOPAEDIC SURGERY

## 2018-07-30 PROCEDURE — 77030018883 HC BLD SAW SAG4 STRY -B: Performed by: ORTHOPAEDIC SURGERY

## 2018-07-30 PROCEDURE — 74011000250 HC RX REV CODE- 250

## 2018-07-30 PROCEDURE — 77030003666 HC NDL SPINAL BD -A: Performed by: ORTHOPAEDIC SURGERY

## 2018-07-30 PROCEDURE — 77030018836 HC SOL IRR NACL ICUM -A: Performed by: ORTHOPAEDIC SURGERY

## 2018-07-30 PROCEDURE — 74011000250 HC RX REV CODE- 250: Performed by: PHYSICIAN ASSISTANT

## 2018-07-30 PROCEDURE — 77030008683 HC TU ET CUF COVD -A: Performed by: NURSE ANESTHETIST, CERTIFIED REGISTERED

## 2018-07-30 PROCEDURE — 74011000258 HC RX REV CODE- 258

## 2018-07-30 PROCEDURE — 77030038020 HC MANFLD NEPTUNE STRY -B: Performed by: ORTHOPAEDIC SURGERY

## 2018-07-30 PROCEDURE — 77030019557 HC ELECTRD VES SEAL MEDT -F: Performed by: ORTHOPAEDIC SURGERY

## 2018-07-30 PROCEDURE — 77030020294 HC ABD PLLW HIP DERY -B: Performed by: ORTHOPAEDIC SURGERY

## 2018-07-30 PROCEDURE — 77030020782 HC GWN BAIR PAWS FLX 3M -B: Performed by: ORTHOPAEDIC SURGERY

## 2018-07-30 PROCEDURE — 77030008467 HC STPLR SKN COVD -B: Performed by: ORTHOPAEDIC SURGERY

## 2018-07-30 PROCEDURE — 0SR904Z REPLACEMENT OF RIGHT HIP JOINT WITH CERAMIC ON POLYETHYLENE SYNTHETIC SUBSTITUTE, OPEN APPROACH: ICD-10-PCS | Performed by: ORTHOPAEDIC SURGERY

## 2018-07-30 PROCEDURE — 74011250637 HC RX REV CODE- 250/637: Performed by: NURSE ANESTHETIST, CERTIFIED REGISTERED

## 2018-07-30 PROCEDURE — 88304 TISSUE EXAM BY PATHOLOGIST: CPT | Performed by: ORTHOPAEDIC SURGERY

## 2018-07-30 PROCEDURE — 64520 N BLOCK LUMBAR/THORACIC: CPT | Performed by: ANESTHESIOLOGY

## 2018-07-30 PROCEDURE — 74011000250 HC RX REV CODE- 250: Performed by: NURSE ANESTHETIST, CERTIFIED REGISTERED

## 2018-07-30 PROCEDURE — 77030027138 HC INCENT SPIROMETER -A

## 2018-07-30 PROCEDURE — 76010000131 HC OR TIME 2 TO 2.5 HR: Performed by: ORTHOPAEDIC SURGERY

## 2018-07-30 PROCEDURE — 73502 X-RAY EXAM HIP UNI 2-3 VIEWS: CPT

## 2018-07-30 PROCEDURE — 77030026438 HC STYL ET INTUB CARD -A: Performed by: NURSE ANESTHETIST, CERTIFIED REGISTERED

## 2018-07-30 DEVICE — COMPONENT HIP PRSS FT MTL ON CERM POLYETH X3: Type: IMPLANTABLE DEVICE | Site: HIP | Status: FUNCTIONAL

## 2018-07-30 DEVICE — STEM FEM SZ 4 127D 35X105MM -- ACCOLADE II V40: Type: IMPLANTABLE DEVICE | Site: HIP | Status: FUNCTIONAL

## 2018-07-30 DEVICE — PLUG BNE BK TI HA HMSPHR SLD FOR TRIDENT ACET SHELL DOME H: Type: IMPLANTABLE DEVICE | Site: HIP | Status: FUNCTIONAL

## 2018-07-30 DEVICE — HEAD FEM DELT V40 -5MM NK 36MM -- V40 BIOLOX: Type: IMPLANTABLE DEVICE | Site: HIP | Status: FUNCTIONAL

## 2018-07-30 DEVICE — INSERT ACET SZ E DIA36MM 0DEG X3 MTL ON POLY PRSS FIT PRI: Type: IMPLANTABLE DEVICE | Site: HIP | Status: FUNCTIONAL

## 2018-07-30 RX ORDER — LIDOCAINE HYDROCHLORIDE 20 MG/ML
INJECTION, SOLUTION EPIDURAL; INFILTRATION; INTRACAUDAL; PERINEURAL AS NEEDED
Status: DISCONTINUED | OUTPATIENT
Start: 2018-07-30 | End: 2018-07-30 | Stop reason: HOSPADM

## 2018-07-30 RX ORDER — ROPIVACAINE HYDROCHLORIDE 2 MG/ML
30 INJECTION, SOLUTION EPIDURAL; INFILTRATION; PERINEURAL
Status: COMPLETED | OUTPATIENT
Start: 2018-07-30 | End: 2018-07-30

## 2018-07-30 RX ORDER — LIDOCAINE HYDROCHLORIDE 10 MG/ML
0.1 INJECTION, SOLUTION EPIDURAL; INFILTRATION; INTRACAUDAL; PERINEURAL AS NEEDED
Status: DISCONTINUED | OUTPATIENT
Start: 2018-07-30 | End: 2018-07-30 | Stop reason: HOSPADM

## 2018-07-30 RX ORDER — FENTANYL CITRATE 50 UG/ML
100 INJECTION, SOLUTION INTRAMUSCULAR; INTRAVENOUS ONCE
Status: COMPLETED | OUTPATIENT
Start: 2018-07-30 | End: 2018-07-30

## 2018-07-30 RX ORDER — DIPHENHYDRAMINE HCL 25 MG
25 CAPSULE ORAL
Status: DISCONTINUED | OUTPATIENT
Start: 2018-07-30 | End: 2018-08-01 | Stop reason: HOSPADM

## 2018-07-30 RX ORDER — SODIUM CHLORIDE 0.9 % (FLUSH) 0.9 %
5-10 SYRINGE (ML) INJECTION AS NEEDED
Status: DISCONTINUED | OUTPATIENT
Start: 2018-07-30 | End: 2018-07-30 | Stop reason: HOSPADM

## 2018-07-30 RX ORDER — SODIUM CHLORIDE 0.9 % (FLUSH) 0.9 %
5-10 SYRINGE (ML) INJECTION EVERY 8 HOURS
Status: DISCONTINUED | OUTPATIENT
Start: 2018-07-30 | End: 2018-07-30 | Stop reason: HOSPADM

## 2018-07-30 RX ORDER — MORPHINE SULFATE 4 MG/ML
2 INJECTION INTRAVENOUS
Status: COMPLETED | OUTPATIENT
Start: 2018-07-30 | End: 2018-07-30

## 2018-07-30 RX ORDER — OXYCODONE HYDROCHLORIDE 15 MG/1
15 TABLET ORAL
Status: DISCONTINUED | OUTPATIENT
Start: 2018-07-30 | End: 2018-07-31

## 2018-07-30 RX ORDER — WARFARIN 10 MG/1
10 TABLET ORAL ONCE
Status: COMPLETED | OUTPATIENT
Start: 2018-07-30 | End: 2018-07-30

## 2018-07-30 RX ORDER — DIPHENHYDRAMINE HYDROCHLORIDE 50 MG/ML
25 INJECTION, SOLUTION INTRAMUSCULAR; INTRAVENOUS ONCE
Status: COMPLETED | OUTPATIENT
Start: 2018-07-30 | End: 2018-07-30

## 2018-07-30 RX ORDER — INSULIN LISPRO 100 [IU]/ML
INJECTION, SOLUTION INTRAVENOUS; SUBCUTANEOUS ONCE
Status: DISCONTINUED | OUTPATIENT
Start: 2018-07-30 | End: 2018-07-30 | Stop reason: HOSPADM

## 2018-07-30 RX ORDER — SODIUM CHLORIDE 0.9 % (FLUSH) 0.9 %
5-10 SYRINGE (ML) INJECTION EVERY 8 HOURS
Status: DISCONTINUED | OUTPATIENT
Start: 2018-07-30 | End: 2018-08-01 | Stop reason: HOSPADM

## 2018-07-30 RX ORDER — ONDANSETRON 2 MG/ML
4 INJECTION INTRAMUSCULAR; INTRAVENOUS
Status: DISCONTINUED | OUTPATIENT
Start: 2018-07-30 | End: 2018-08-01 | Stop reason: HOSPADM

## 2018-07-30 RX ORDER — DIPHENHYDRAMINE HYDROCHLORIDE 50 MG/ML
INJECTION, SOLUTION INTRAMUSCULAR; INTRAVENOUS
Status: COMPLETED
Start: 2018-07-30 | End: 2018-07-30

## 2018-07-30 RX ORDER — PREGABALIN 50 MG/1
50 CAPSULE ORAL 2 TIMES DAILY
Status: DISCONTINUED | OUTPATIENT
Start: 2018-07-30 | End: 2018-07-31

## 2018-07-30 RX ORDER — NALOXONE HYDROCHLORIDE 0.4 MG/ML
0.4 INJECTION, SOLUTION INTRAMUSCULAR; INTRAVENOUS; SUBCUTANEOUS AS NEEDED
Status: DISCONTINUED | OUTPATIENT
Start: 2018-07-30 | End: 2018-08-01 | Stop reason: HOSPADM

## 2018-07-30 RX ORDER — NEOSTIGMINE METHYLSULFATE 5 MG/5 ML
SYRINGE (ML) INTRAVENOUS AS NEEDED
Status: DISCONTINUED | OUTPATIENT
Start: 2018-07-30 | End: 2018-07-30 | Stop reason: HOSPADM

## 2018-07-30 RX ORDER — ASPIRIN 325 MG/1
325 TABLET, FILM COATED ORAL 2 TIMES DAILY
Status: DISCONTINUED | OUTPATIENT
Start: 2018-07-31 | End: 2018-08-01 | Stop reason: HOSPADM

## 2018-07-30 RX ORDER — DOCUSATE SODIUM 100 MG/1
100 CAPSULE, LIQUID FILLED ORAL 2 TIMES DAILY
Status: DISCONTINUED | OUTPATIENT
Start: 2018-07-30 | End: 2018-08-01 | Stop reason: HOSPADM

## 2018-07-30 RX ORDER — ACETAMINOPHEN 500 MG
1000 TABLET ORAL ONCE
Status: COMPLETED | OUTPATIENT
Start: 2018-07-30 | End: 2018-07-30

## 2018-07-30 RX ORDER — SODIUM CHLORIDE 0.9 % (FLUSH) 0.9 %
5-10 SYRINGE (ML) INJECTION AS NEEDED
Status: DISCONTINUED | OUTPATIENT
Start: 2018-07-30 | End: 2018-08-01 | Stop reason: HOSPADM

## 2018-07-30 RX ORDER — MIDAZOLAM HYDROCHLORIDE 1 MG/ML
2 INJECTION, SOLUTION INTRAMUSCULAR; INTRAVENOUS ONCE
Status: COMPLETED | OUTPATIENT
Start: 2018-07-30 | End: 2018-07-30

## 2018-07-30 RX ORDER — PROPOFOL 10 MG/ML
INJECTION, EMULSION INTRAVENOUS AS NEEDED
Status: DISCONTINUED | OUTPATIENT
Start: 2018-07-30 | End: 2018-07-30 | Stop reason: HOSPADM

## 2018-07-30 RX ORDER — FAMOTIDINE 20 MG/1
20 TABLET, FILM COATED ORAL ONCE
Status: COMPLETED | OUTPATIENT
Start: 2018-07-30 | End: 2018-07-30

## 2018-07-30 RX ORDER — SODIUM CHLORIDE, SODIUM LACTATE, POTASSIUM CHLORIDE, CALCIUM CHLORIDE 600; 310; 30; 20 MG/100ML; MG/100ML; MG/100ML; MG/100ML
75 INJECTION, SOLUTION INTRAVENOUS CONTINUOUS
Status: DISCONTINUED | OUTPATIENT
Start: 2018-07-30 | End: 2018-07-30 | Stop reason: HOSPADM

## 2018-07-30 RX ORDER — ONDANSETRON 2 MG/ML
INJECTION INTRAMUSCULAR; INTRAVENOUS AS NEEDED
Status: DISCONTINUED | OUTPATIENT
Start: 2018-07-30 | End: 2018-07-30 | Stop reason: HOSPADM

## 2018-07-30 RX ORDER — ACETAMINOPHEN 500 MG
1000 TABLET ORAL 4 TIMES DAILY
Status: DISCONTINUED | OUTPATIENT
Start: 2018-07-30 | End: 2018-08-01 | Stop reason: HOSPADM

## 2018-07-30 RX ORDER — CELECOXIB 400 MG/1
400 CAPSULE ORAL ONCE
Status: COMPLETED | OUTPATIENT
Start: 2018-07-30 | End: 2018-07-30

## 2018-07-30 RX ORDER — FENTANYL CITRATE 50 UG/ML
50 INJECTION, SOLUTION INTRAMUSCULAR; INTRAVENOUS
Status: COMPLETED | OUTPATIENT
Start: 2018-07-30 | End: 2018-07-30

## 2018-07-30 RX ORDER — ROCURONIUM BROMIDE 10 MG/ML
INJECTION, SOLUTION INTRAVENOUS AS NEEDED
Status: DISCONTINUED | OUTPATIENT
Start: 2018-07-30 | End: 2018-07-30 | Stop reason: HOSPADM

## 2018-07-30 RX ORDER — CELECOXIB 100 MG/1
200 CAPSULE ORAL 2 TIMES DAILY
Status: DISCONTINUED | OUTPATIENT
Start: 2018-07-30 | End: 2018-08-01 | Stop reason: HOSPADM

## 2018-07-30 RX ORDER — ZOLPIDEM TARTRATE 5 MG/1
5 TABLET ORAL
Status: DISCONTINUED | OUTPATIENT
Start: 2018-07-30 | End: 2018-08-01 | Stop reason: HOSPADM

## 2018-07-30 RX ORDER — FENTANYL CITRATE 50 UG/ML
INJECTION, SOLUTION INTRAMUSCULAR; INTRAVENOUS AS NEEDED
Status: DISCONTINUED | OUTPATIENT
Start: 2018-07-30 | End: 2018-07-30 | Stop reason: HOSPADM

## 2018-07-30 RX ORDER — PREGABALIN 75 MG/1
75 CAPSULE ORAL ONCE
Status: COMPLETED | OUTPATIENT
Start: 2018-07-30 | End: 2018-07-30

## 2018-07-30 RX ORDER — SUCCINYLCHOLINE CHLORIDE 20 MG/ML
INJECTION INTRAMUSCULAR; INTRAVENOUS AS NEEDED
Status: DISCONTINUED | OUTPATIENT
Start: 2018-07-30 | End: 2018-07-30 | Stop reason: HOSPADM

## 2018-07-30 RX ORDER — ONDANSETRON 2 MG/ML
4 INJECTION INTRAMUSCULAR; INTRAVENOUS ONCE
Status: COMPLETED | OUTPATIENT
Start: 2018-07-30 | End: 2018-07-30

## 2018-07-30 RX ORDER — LANOLIN ALCOHOL/MO/W.PET/CERES
1 CREAM (GRAM) TOPICAL 2 TIMES DAILY WITH MEALS
Status: DISCONTINUED | OUTPATIENT
Start: 2018-07-30 | End: 2018-08-01 | Stop reason: HOSPADM

## 2018-07-30 RX ORDER — DEXTROSE MONOHYDRATE AND SODIUM CHLORIDE 5; .45 G/100ML; G/100ML
100 INJECTION, SOLUTION INTRAVENOUS CONTINUOUS
Status: DISPENSED | OUTPATIENT
Start: 2018-07-30 | End: 2018-07-31

## 2018-07-30 RX ORDER — GLYCOPYRROLATE 0.2 MG/ML
INJECTION INTRAMUSCULAR; INTRAVENOUS AS NEEDED
Status: DISCONTINUED | OUTPATIENT
Start: 2018-07-30 | End: 2018-07-30 | Stop reason: HOSPADM

## 2018-07-30 RX ADMIN — ROCURONIUM BROMIDE 40 MG: 10 INJECTION, SOLUTION INTRAVENOUS at 10:21

## 2018-07-30 RX ADMIN — PREGABALIN 75 MG: 75 CAPSULE ORAL at 09:03

## 2018-07-30 RX ADMIN — DEXTROSE MONOHYDRATE AND SODIUM CHLORIDE 100 ML/HR: 5; .45 INJECTION, SOLUTION INTRAVENOUS at 16:18

## 2018-07-30 RX ADMIN — MIDAZOLAM HYDROCHLORIDE 2 MG: 1 INJECTION, SOLUTION INTRAMUSCULAR; INTRAVENOUS at 09:34

## 2018-07-30 RX ADMIN — ONDANSETRON 4 MG: 2 INJECTION INTRAMUSCULAR; INTRAVENOUS at 11:43

## 2018-07-30 RX ADMIN — LIDOCAINE HYDROCHLORIDE 60 MG: 20 INJECTION, SOLUTION EPIDURAL; INFILTRATION; INTRACAUDAL; PERINEURAL at 10:12

## 2018-07-30 RX ADMIN — DOCUSATE SODIUM 100 MG: 100 CAPSULE, LIQUID FILLED ORAL at 17:51

## 2018-07-30 RX ADMIN — FENTANYL CITRATE 100 MCG: 50 INJECTION, SOLUTION INTRAMUSCULAR; INTRAVENOUS at 10:12

## 2018-07-30 RX ADMIN — GLYCOPYRROLATE 0.4 MG: 0.2 INJECTION INTRAMUSCULAR; INTRAVENOUS at 11:48

## 2018-07-30 RX ADMIN — PREGABALIN 50 MG: 50 CAPSULE ORAL at 16:18

## 2018-07-30 RX ADMIN — SUCCINYLCHOLINE CHLORIDE 120 MG: 20 INJECTION INTRAMUSCULAR; INTRAVENOUS at 10:13

## 2018-07-30 RX ADMIN — ROPIVACAINE HYDROCHLORIDE 60 MG: 2 INJECTION, SOLUTION EPIDURAL; INFILTRATION at 09:39

## 2018-07-30 RX ADMIN — MORPHINE SULFATE 2 MG: 4 INJECTION INTRAVENOUS at 12:54

## 2018-07-30 RX ADMIN — ACETAMINOPHEN 1000 MG: 500 TABLET, FILM COATED ORAL at 09:03

## 2018-07-30 RX ADMIN — FENTANYL CITRATE 50 MCG: 50 INJECTION INTRAMUSCULAR; INTRAVENOUS at 12:31

## 2018-07-30 RX ADMIN — FENTANYL CITRATE 50 MCG: 50 INJECTION, SOLUTION INTRAMUSCULAR; INTRAVENOUS at 10:48

## 2018-07-30 RX ADMIN — ACETAMINOPHEN 1000 MG: 500 TABLET, FILM COATED ORAL at 21:11

## 2018-07-30 RX ADMIN — FENTANYL CITRATE 50 MCG: 50 INJECTION, SOLUTION INTRAMUSCULAR; INTRAVENOUS at 10:36

## 2018-07-30 RX ADMIN — TRANEXAMIC ACID 1 G: 100 INJECTION, SOLUTION INTRAVENOUS at 11:45

## 2018-07-30 RX ADMIN — DIPHENHYDRAMINE HYDROCHLORIDE 25 MG: 50 INJECTION, SOLUTION INTRAMUSCULAR; INTRAVENOUS at 13:57

## 2018-07-30 RX ADMIN — CLINDAMYCIN PHOSPHATE 900 MG: 150 INJECTION, SOLUTION INTRAVENOUS at 16:18

## 2018-07-30 RX ADMIN — DIPHENHYDRAMINE HYDROCHLORIDE 25 MG: 50 INJECTION, SOLUTION INTRAMUSCULAR; INTRAVENOUS at 13:24

## 2018-07-30 RX ADMIN — MORPHINE SULFATE 2 MG: 4 INJECTION INTRAVENOUS at 13:40

## 2018-07-30 RX ADMIN — FAMOTIDINE 20 MG: 20 TABLET ORAL at 09:03

## 2018-07-30 RX ADMIN — LIDOCAINE HYDROCHLORIDE 0.1 ML: 10 INJECTION, SOLUTION EPIDURAL; INFILTRATION; INTRACAUDAL; PERINEURAL at 09:36

## 2018-07-30 RX ADMIN — Medication 10 ML: at 21:13

## 2018-07-30 RX ADMIN — FENTANYL CITRATE 100 MCG: 50 INJECTION INTRAMUSCULAR; INTRAVENOUS at 09:34

## 2018-07-30 RX ADMIN — FERROUS SULFATE TAB 325 MG (65 MG ELEMENTAL FE) 325 MG: 325 (65 FE) TAB at 16:18

## 2018-07-30 RX ADMIN — ACETAMINOPHEN 1000 MG: 500 TABLET, FILM COATED ORAL at 17:51

## 2018-07-30 RX ADMIN — SODIUM CHLORIDE, SODIUM LACTATE, POTASSIUM CHLORIDE, AND CALCIUM CHLORIDE 75 ML/HR: 600; 310; 30; 20 INJECTION, SOLUTION INTRAVENOUS at 08:58

## 2018-07-30 RX ADMIN — MORPHINE SULFATE 2 MG: 4 INJECTION INTRAVENOUS at 13:24

## 2018-07-30 RX ADMIN — CELECOXIB 200 MG: 100 CAPSULE ORAL at 17:51

## 2018-07-30 RX ADMIN — MORPHINE SULFATE 2 MG: 4 INJECTION INTRAVENOUS at 13:04

## 2018-07-30 RX ADMIN — OXYCODONE HYDROCHLORIDE 15 MG: 15 TABLET ORAL at 20:05

## 2018-07-30 RX ADMIN — CLINDAMYCIN PHOSPHATE 900 MG: 150 INJECTION, SOLUTION INTRAVENOUS at 21:11

## 2018-07-30 RX ADMIN — ONDANSETRON 4 MG: 2 INJECTION, SOLUTION INTRAMUSCULAR; INTRAVENOUS at 12:57

## 2018-07-30 RX ADMIN — CLINDAMYCIN PHOSPHATE 900 MG: 150 INJECTION, SOLUTION, CONCENTRATE INTRAVENOUS at 10:20

## 2018-07-30 RX ADMIN — CELECOXIB 400 MG: 400 CAPSULE ORAL at 09:03

## 2018-07-30 RX ADMIN — FENTANYL CITRATE 50 MCG: 50 INJECTION INTRAMUSCULAR; INTRAVENOUS at 12:26

## 2018-07-30 RX ADMIN — TRANEXAMIC ACID 1 G: 100 INJECTION, SOLUTION INTRAVENOUS at 10:25

## 2018-07-30 RX ADMIN — Medication 3 MG: at 11:48

## 2018-07-30 RX ADMIN — PROPOFOL 150 MG: 10 INJECTION, EMULSION INTRAVENOUS at 10:13

## 2018-07-30 RX ADMIN — MORPHINE SULFATE 2 MG: 4 INJECTION INTRAVENOUS at 13:14

## 2018-07-30 RX ADMIN — FENTANYL CITRATE 50 MCG: 50 INJECTION, SOLUTION INTRAMUSCULAR; INTRAVENOUS at 11:00

## 2018-07-30 RX ADMIN — WARFARIN SODIUM 10 MG: 10 TABLET ORAL at 09:03

## 2018-07-30 NOTE — ROUTINE PROCESS
Bedside and Verbal shift change report given to Myke Hollis (oncoming nurse) by Yeison Tinsley (offgoing nurse). Report included the following information SBAR, Kardex, Intake/Output and MAR.

## 2018-07-30 NOTE — INTERVAL H&P NOTE
H&P Update: Samia Brody was seen and examined. History and physical has been reviewed. The patient has been examined.  There have been no significant clinical changes since the completion of the originally dated History and Physical.    Signed By: Minerva Jean Baptiste MD     July 30, 2018 9:31 AM

## 2018-07-30 NOTE — PROGRESS NOTES
Problem: Mobility Impaired (Adult and Pediatric)  Goal: *Acute Goals and Plan of Care (Insert Text)  Physical Therapy Goals  Initiated 7/30/2018 and to be accomplished within 7 day(s)  1. Patient will move from supine to sit and sit to supine , scoot up and down and roll side to side in bed with supervision/set-up. 2.  Patient will transfer from bed to chair and chair to bed with supervision/set-up using the least restrictive device. 3.  Patient will perform sit to stand with supervision/set-up. 4.  Patient will ambulate with supervision/set-up for >150 feet with the least restrictive device. 5.  Patient will ascend/descend 10 stairs with 1 handrail(s) with supervision. physical Therapy EVALUATION    Patient: Jessica Tellez (60 y.o. female)  Date: 7/30/2018  Primary Diagnosis: Arthritis of right hip [M16.11]  Procedure(s) (LRB):  RIGHT TOTAL HIP ARTHROPLASTY LATERAL/BENJY/FRANCOIS TO ASSIST/NERVE BLOCK (Left) Day of Surgery   Precautions: Hip precautions, WBAT RLE   ASSESSMENT :  Patient is 61 yo F admitted to hospital for NHI and presents today alert and agreeable to therapy. Patient was educated on weight bearing status, hip precautions, and role of therapy. Patient transferred to sitting EOB for objective assessment; required cues for appropriate weight shifting and additional time and cues for hand placement during bed mobility. Patient was given demo with instruction on sit <> stand transfer and gait training. Patient transferred to standing with ModA for two trials as patient demos dizziness and unsteadiness in standing despite orthostatics WNL. Patient was nauseated, though willing to stand 2nd trial and attempt side steps towards HOB. Patient performed with RW and Miranda and was unable to clear either foot from ground when performing sidesteps towards her right/HOB. Patient demonstrated good compliance with precautions throughout session.  At conclusion of session patient transferred supine in bed and was left resting with call bell by the side and SCDs donned. Patient instructed to call for assistance if they needed to get up for any reason and denied need for further assistance. Patient demonstrates decreased strength, mobility, and endurance and will benefit from skilled intervention to address the above impairments. Patients rehabilitation potential is considered to be Good  Factors which may influence rehabilitation potential include:   []         None noted  []         Mental ability/status  [x]         Medical condition  [x]         Home/family situation and support systems  [x]         Safety awareness  [x]         Pain tolerance/management  []         Other:      PLAN :  Recommendations and Planned Interventions:  [x]           Bed Mobility Training             [x]    Neuromuscular Re-Education  [x]           Transfer Training                   []    Orthotic/Prosthetic Training  [x]           Gait Training                          []    Modalities  [x]           Therapeutic Exercises          []    Edema Management/Control  [x]           Therapeutic Activities            [x]    Patient and Family Training/Education  []           Other (comment):    Frequency/Duration: Patient will be followed by physical therapy 1-2 times per day/4-7 days per week to address goals. Discharge Recommendations: Home Health  Further Equipment Recommendations for Discharge: bedside commode, transfer bench and rolling walker     G-CODES     Mobility  Current  CK= 40-59%   Goal  CI= 1-19%.   The severity rating is based on the Level of Assistance required for Functional Mobility and ADLs.        G-CODES     Eval Complexity: History: MEDIUM  Complexity : 1-2 comorbidities / personal factors will impact the outcome/ POC Exam:MEDIUM Complexity : 3 Standardized tests and measures addressing body structure, function, activity limitation and / or participation in recreation  Presentation: LOW Complexity : Stable, uncomplicated  Clinical Decision Making:Low Complexity   Overall Complexity:LOW     SUBJECTIVE:   Patient stated I'm sorry I couldn't do more. Thank you for your help.     OBJECTIVE DATA SUMMARY:     Past Medical History:   Diagnosis Date    Adverse effect of anesthesia     hard to wake up    Chronic pain     Depression     GERD (gastroesophageal reflux disease)     Hypothyroidism     Low back pain     Thromboembolus (HCC)     blood clot in foot during pregnancy    Tobacco abuse     Vertigo      Past Surgical History:   Procedure Laterality Date    HX  SECTION      x3    HX HYSTERECTOMY      HX ORTHOPAEDIC  2002    2 disks removed lower back [facility].    HX ORTHOPAEDIC Right 2017    shoulder surgery    NEUROLOGICAL PROCEDURE UNLISTED       Barriers to Learning/Limitations: None  Compensate with: N/A  Prior Level of Function/Home Situation: Patient lives with daughter in 2 story home with bed/bath on 2nd level with HR along steps. There are 2STE with no HR and patient was using SPC for mobility and was independent with I/ADL's PTA. Patient has RW, SC, BSC at home. Home Situation  Home Environment: Private residence  # Steps to Enter: 1  One/Two Story Residence: Two story  # of Interior Steps: 16  Height of Each Step (in): 0 inches  Interior Rails: Both  Lift Chair Available: No  Living Alone: No  Support Systems: Family member(s)  Patient Expects to be Discharged to[de-identified] Private residence  Current DME Used/Available at Home: Cane, straight  Critical Behavior:   A&Ox4  Strength:    Strength: Generally decreased, functional (Right knee flex/ext 3/5, DF 4-/5, LLE 5/5)   Tone & Sensation:   Tone: Normal (BLE)   Sensation: Intact (BLE)   Range Of Motion:  AROM: Generally decreased, functional (RLE within hip precautions)   Functional Mobility:  Bed Mobility:   Supine to Sit: Moderate assistance  Sit to Supine:  Moderate assistance  Scooting: Minimum assistance  Transfers:  Sit to Stand: Moderate assistance (x2 trasnfers)  Stand to Sit: Moderate assistance    Balance:   Sitting: Intact  Standing: Impaired; With support  Standing - Static: Fair  Standing - Dynamic : Fair  Ambulation/Gait Training:  Distance (ft): 5 Feet (ft)  Assistive Device: Walker, rolling  Ambulation - Level of Assistance: Minimal assistance   Base of Support: Narrowed  Speed/Debbie: Slow  Interventions: Manual cues; Tactile cues; Verbal cues; Visual/Demos      Pain:  Pt reports 3/10 pain or discomfort prior to treatment.    Pt reports 3/10 pain or discomfort post treatment. Activity Tolerance:   Patient demos fair tolerance to activity and was limited by dizziness and nausea; orthostatic BP's WNL, however pt c/o room spinning and unable to safely perform further mobility. Please refer to the flowsheet for vital signs taken during this treatment. After treatment:   []         Patient left in no apparent distress sitting up in chair  [x]         Patient left in no apparent distress in bed  [x]         Call bell left within reach  []         Nursing notified  []         Caregiver present  []         Bed alarm activated  [x]         SCDs in place to B LE     COMMUNICATION/EDUCATION:   [x]         Fall prevention education was provided and the patient/caregiver indicated understanding. [x]         Patient/family have participated as able in goal setting and plan of care. [x]         Patient/family agree to work toward stated goals and plan of care. [x]         Patient understands intent and goals of therapy, but is neutral about his/her participation. []         Patient is unable to participate in goal setting and plan of care.     Thank you for this referral.  Vinson Snellen, PT   Time Calculation: 24 mins

## 2018-07-30 NOTE — IP AVS SNAPSHOT
[address] Patient: Jessica Tellez MRN: [id number] VCY:[id number] About your hospitalization You were admitted on:  July 30, 2018 You last received care in the:  [facility] 1981 You were discharged on:  August 1, 2018 Why you were hospitalized Your primary diagnosis was:  Not on File Your diagnoses also included: Hip Arthritis Follow-up Information Follow up With Details Comments Contact Info 106 Leland Manuel MD On 8/8/2018 Appointment at 10:00am [address] [place] [address] [address] [facility] [address] 
[phone] Olive Aldana MD On 8/13/2018 Appointment at 1:30pm with EDIN Choi [address] [address] 
[phone] Your Scheduled Appointments Thursday August 02, 2018 To Be Determined START OF CARE with Gita Bryant RN  
[facility] CARE SCHEDULING/INTAKE ([facility] HOME HEALTH/ HOSPICE) [facility] CARE SCHEDULING/INTAKE (HR HOME HEALTH/ HOSPICE) Monday August 13, 2018  1:30 PM EDT  
POST OP with Rance Rubinstein, Joaquim Aggar[facility] - Sonia Rodriguez [facility] [address] 1 [facility] [address]  
[phone] Discharge Orders Procedure Order Date Status Priority Quantity Spec Type Associated Dx 200 [facility] 08/01/18 1139 Normal Routine 1  Hip arthritis [[id number]] Comments: Total hip protocol, wbat Home health aide for assistance with ADL's Aspirin therapy  
aquacel ag dressing pod 7 and prn WALKER STANDARD 07/31/18 1019 Normal Routine 1  Hip arthritis [[id number]] ELEVATED TOILET SEAT 07/31/18 1019 Normal Routine 1  Hip arthritis [[id number]] COMMODE CHAIR 07/31/18 1019 Normal Routine 1  Hip arthritis [[id number]] SHOWER CHAIR 07/31/18 1019 Normal Routine 1  Hip arthritis [[id number]] REFERRAL TO HOME HEALTH 07/31/18 1019 Normal Routine 1  Hip arthritis [[id number]] Comments: Total hip protocol, wbat Aspirin therapy  
aquacel ag dressing pod 7 and prn A check aura indicates which time of day the medication should be taken. My Medications START taking these medications Instructions Each Dose to Equal  
 Morning Noon Evening Bedtime  
 buffered aspirin 325 mg tablet Commonly known as:  BUFFERIN Your last dose was: Your next dose is: Take 1 Tab by mouth two (2) times a day. 325 mg  
    
   
   
   
  
 celecoxib 200 mg capsule Commonly known as:  CELEBREX Your last dose was: Your next dose is: Take 1 Cap by mouth two (2) times a day for 90 days. 200 mg  
    
   
   
   
  
 ferrous sulfate 325 mg (65 mg iron) tablet Your last dose was: Your next dose is: Take 1 Tab by mouth two (2) times daily (with meals). 325 mg  
    
   
   
   
  
 ondansetron hcl 4 mg tablet Commonly known as:  Adams Gabbi Your last dose was: Your next dose is: Take 1 Tab by mouth every eight (8) hours as needed for Nausea. 4 mg  
    
   
   
   
  
 oxyCODONE-acetaminophen 7.5-325 mg per tablet Commonly known as:  PERCOCET Your last dose was: Your next dose is: Take 1-2 Tabs by mouth every four (4) hours as needed for Pain. Max Daily Amount: 12 Tabs. 1-2 Tab CONTINUE taking these medications Instructions Each Dose to Equal  
 Morning Noon Evening Bedtime  
 gabapentin 300 mg capsule Commonly known as:  NEURONTIN Your last dose was: Your next dose is:    
   
   
 TK ONE PO  TID  
     
   
   
   
  
 IODINE STRONG (LUGOLS) PO Your last dose was: Your next dose is: Take 25 mg by mouth daily. 25 mg  
    
   
   
   
  
 NP THYROID 15 mg tablet Generic drug:  thyroid (Pork) Your last dose was: Your next dose is:    
   
   
 TK 3 TS PO D  
     
   
   
   
  
 OTHER Your last dose was: Your next dose is:    
   
   
 daily. Selenomethionine 200 mcg Tab Your last dose was: Your next dose is: Take 1 Tab by mouth daily. 1 Tab VITAMIN B-12 1,000 mcg tablet Generic drug:  cyanocobalamin Your last dose was: Your next dose is: Take 1,000 mcg by mouth daily. 1000 mcg VITAMIN D3 2,000 unit Tab Generic drug:  cholecalciferol (vitamin D3) Your last dose was: Your next dose is: Take 1 Tab by mouth daily. 1 Tab Where to Get Your Medications Information on where to get these meds will be given to you by the nurse or doctor. ! Ask your nurse or doctor about these medications  
  buffered aspirin 325 mg tablet  
 celecoxib 200 mg capsule  
 ferrous sulfate 325 mg (65 mg iron) tablet  
 ondansetron hcl 4 mg tablet  
 oxyCODONE-acetaminophen 7.5-325 mg per tablet Opioid Education Prescription Opioids: What You Need to Know: 
 
 
After general anesthesia or intravenous sedation, for 24 hours or while taking prescription Narcotics: · Limit your activities · Do not drive and operate hazardous machinery · Do not make important personal or business decisions · Do  not drink alcoholic beverages · If you have not urinated within 8 hours after discharge, please contact your surgeon on call. Report the following to your surgeon: · Excessive pain, swelling, redness or odor of or around the surgical area · Temperature over 100.5 · Nausea and vomiting lasting longer than 4 hours or if unable to take medications · Any signs of decreased circulation or nerve impairment to extremity: change in color, persistent  numbness, tingling, coldness or increase pain · Any questions What to do at Home: 
Recommended activity: Activity as tolerated and no driving for today, If you experience any of the following symptoms fever, chills, shortness of breath, please follow up with md. 
 
*  Please give a list of your current medications to your Primary Care Provider. *  Please update this list whenever your medications are discontinued, doses are 
    changed, or new medications (including over-the-counter products) are added. *  Please carry medication information at all times in case of emergency situations. These are general instructions for a healthy lifestyle: No smoking/ No tobacco products/ Avoid exposure to second hand smoke Surgeon General's Warning:  Quitting smoking now greatly reduces serious risk to your health. Obesity, smoking, and sedentary lifestyle greatly increases your risk for illness A healthy diet, regular physical exercise & weight monitoring are important for maintaining a healthy lifestyle You may be retaining fluid if you have a history of heart failure or if you experience any of the following symptoms:  Weight gain of 3 pounds or more overnight or 5 pounds in a week, increased swelling in our hands or feet or shortness of breath while lying flat in bed. Please call your doctor as soon as you notice any of these symptoms; do not wait until your next office visit. Recognize signs and symptoms of STROKE: 
 
F-face looks uneven A-arms unable to move or move unevenly S-speech slurred or non-existent T-time-call 911 as soon as signs and symptoms begin-DO NOT go  
 Back to bed or wait to see if you get better-TIME IS BRAIN. Warning Signs of HEART ATTACK Call 911 if you have these symptoms: 
? Chest discomfort. Most heart attacks involve discomfort in the center of the chest that lasts more than a few minutes, or that goes away and comes back. It can feel like uncomfortable pressure, squeezing, fullness, or pain. ? Discomfort in other areas of the upper body. Symptoms can include pain or discomfort in one or both arms, the back, neck, jaw, or stomach. ? Shortness of breath with or without chest discomfort. ? Other signs may include breaking out in a cold sweat, nausea, or lightheadedness. Don't wait more than five minutes to call [address]! Fast action can save your life. Calling 911 is almost always the fastest way to get lifesaving treatment. Emergency Medical Services staff can begin treatment when they arrive  up to an hour sooner than if someone gets to the hospital by car. The discharge information has been reviewed with the patient. The patient verbalized understanding. Discharge medications reviewed with the patientDischarge Instructions for Total Hip Replacement Patients · The dressing on your hip will be changed by the Home Health professional at the appropriate time. Keep your incision clean and dry. Do not apply any ointments to the incision. · You may shower as long as you keep you incision dry. When showering, leave your dressing on. The dressing is waterproof as long as the edges are sealed. · Notify your surgeon if: 
· Your temperature is greater than 100.5 · You have pain not controlled by your pain medication · You have increased drainage from your incision · You have increased redness or swelling in your leg · You have chest pain, shortness of breath, or any other problems · Do your exercises as instructed by the home physical therapist. 
 
· Follow your total hip precautions: · Do not cross your legs or feet · Do not turn your toes inward · Do not bed at your waist more than 90 degrees · Keep a firm pillow between your knees when sleeping or lying down. · You may use ice to your hip as needed. Do not apply the ice pack directly to your skin. Use a barrier such as your pant leg or a thin towel. · Walk once an hour during normal walking hours. · If you have CHANTELLE hose (the white support stockings), remove them at bedtime and re-apply the hose in the morning for the next 2 weeks. Best of luck with your new hip and [address] for choosing the [address]! and appropriate educational materials and side effects teaching were provided. ___________________________________________________________________________________________________________________________________ WeijuharComecer Announcement We are excited to announce that we are making your provider's discharge notes available to you in Cool Lumens. You will see these notes when they are completed and signed by the physician that discharged you from your recent hospital stay. If you have any questions or concerns about any information you see in Cool Lumens, please call the Health Information Department where you were seen or reach out to your Primary Care Provider for more information about your plan of care. Introducing [facility] & HEALTH SERVICES! Dear Ashwin Rai: Thank you for requesting a Cool Lumens account. Our records indicate that you already have an active Cool Lumens account. You can access your account anytime at [email or web address] Did you know that you can access your hospital and ER discharge instructions at any time in Cool Lumens? You can also review all of your test results from your hospital stay or ER visit. Additional Information If you have questions, please visit the Frequently Asked Questions section of the Cool Lumens website at [email or web address] Remember, MyChart is NOT to be used for urgent needs. For medical emergencies, dial 911. Now available from your iPhone and Android! Introducing Magdiel Xavier As a [facility] [employer] [facility] patient, I wanted to make you aware of our electronic visit tool called Magdiel Xavier. [employer] 24/Distra allows you to connect within minutes with a medical provider 24 hours a day, seven days a week via a mobile device or tablet or logging into a secure website from your computer. You can access Magdiel Xavier from anywhere in the [place]. A virtual visit might be right for you when you have a simple condition and feel like you just dont want to get out of bed, or cant get away from work for an appointment, when your regular [facility] provider is not available (evenings, weekends or holidays), or when youre out of town and need minor care. Electronic visits cost only $49 and if the Wilson[employer]/Distra provider determines a prescription is needed to treat your condition, one can be electronically transmitted to a nearby pharmacy*. Please take a moment to enroll today if you have not already done so. The enrollment process is free and takes just a few minutes. To enroll, please download the [employer] 24/Distra kulwant to your tablet or phone, or visit [email or web address] to enroll on your computer. And, as an [address] [place] [address] patient with a [facility] account, the results of your visits will be scanned into your electronic medical record and your primary care provider will be able to view the scanned results. We urge you to continue to see your regular [facility] provider for your ongoing medical care. And while your primary care provider may not be the one available when you seek a Magdiel Xavier virtual visit, the peace of mind you get from getting a real diagnosis real time can be priceless.    
 
For more information on Magdiel Xavier, view our Frequently Asked Questions (FAQs) at [email or web address]. Sincerely, 
 
Mimi Cesar MD 
Chief Medical Officer [employer] *:  certain medications cannot be prescribed via Magdiel Xavier Providers Seen During Your Hospitalization Provider Specialty Primary office phone Bill Payne, [address] [facility] Orthopedic Surgery [phone] Your Primary Care Physician (PCP) Primary Care Physician Office Phone Office Fax Chapis Salazar [phone] You are allergic to the following Allergen Reactions Pcn (Penicillins) Hives Recent Documentation Height Weight Breastfeeding? BMI OB Status Smoking Status 1.626 m 98 kg No 37.08 kg/m2 Hysterectomy Former Smoker Emergency Contacts Name Discharge Info Relation Home Work Mobile Nurse[employer] 13 CAREGIVER [3] Child [2] [phone] [address] [place] [address] CAREGIVER [3] Daughter [21] [phone] Patient Belongings The following personal items are in your possession at time of discharge: 
  Dental Appliances: None  Visual Aid: Glasses, With patient      Home Medications: None   Jewelry: None  Clothing: None    Other Valuables: Cane  Personal Items Sent to Safe: n/a Please provide this summary of care documentation to your next provider. Signatures-by signing, you are acknowledging that this After Visit Summary has been reviewed with you and you have received a copy. Patient Signature:  ____________________________________________________________ Date:  ____________________________________________________________  
  
Rajeevia [facility] Provider Signature:  ____________________________________________________________ Date:  ____________________________________________________________

## 2018-07-30 NOTE — ANESTHESIA PROCEDURE NOTES
Peripheral Block Start time: 7/30/2018 9:34 AM 
End time: 7/30/2018 9:40 AM 
Performed by: Telma Denny Authorized by: Telma Denny Pre-procedure: Indications: at surgeon's request, post-op pain management and procedure for pain Preanesthetic Checklist: patient identified, risks and benefits discussed, site marked, timeout performed, anesthesia consent given and patient being monitored Block Type:  
Block Type:  Lumbar plexus Laterality:  Right Monitoring:  Standard ASA monitoring, heart rate, continuous pulse ox, frequent vital sign checks, oxygen and responsive to questions Injection Technique:  Single shot Procedures: nerve stimulator Prep: chlorhexidine Location:  Sacral area Needle Type:  Stimuplex Needle Gauge:  21 G Needle Localization:  Anatomical landmarks and nerve stimulator Medication Injected:  0.2% 
ropivacaine Volume (mL):  25 
 
Assessment: 
Number of attempts:  1 Injection Assessment:  Incremental injection every 5 mL, negative aspiration for blood, no intravascular symptoms, no paresthesia and negative aspiration for CSF Patient tolerance:  Patient tolerated the procedure well with no immediate complications Location:  PREOP HOLDING Patient given 2 mg IV Versed and 100 mcg IV Fentanyl for sedation.  
 
7/30/2018     9:46 AM     Clarence Rosas MD

## 2018-07-30 NOTE — BRIEF OP NOTE
BRIEF OPERATIVE NOTE    Date of Procedure: 7/30/2018   Preoperative Diagnosis: Arthritis of right hip [M16.11], likely AVN, morbid obesity  Postoperative Diagnosis: Arthritis of right hip [M16.11]  same  Procedure(s):  RIGHT TOTAL HIP ARTHROPLASTY LATERAL/BENJY/FRANCOIS TO ASSIST/NERVE BLOCK  Surgeon(s) and Role:     * Stacey Walker MD - Primary         Surgical Assistant: Yenny Montesinos    Surgical Staff:  Circ-1: Marylu Stevens RN  Circ-2: Roma He RN  Physician Assistant: Rafal Wyatt PA-C  Scrub Tech-1: viDA The[employer]lyOpen Me  Surg Asst-1: [place] Mote  Event Time In   Incision Start 1040   Incision Close      Anesthesia: General   Estimated Blood Loss: 300ml  Specimens:   ID Type Source Tests Collected by Time Destination   1 : Femoral head Preservative Femoral, right  Stacey Walker MD 7/30/2018 1059 Pathology      Findings: same   Complications: none  Implants:   Implant Name Type Inv.  Item Serial No.  Lot No. LRB No. Used Action   TRIDENT II TRITANIUM CLUSTERHOLE ACETABULAR SHELL    [employer] [id number] Left 1 Implanted   6.5 LOW PROFILE HEX SCREW    [employer] [id number] Left 1 Implanted   6.5 LOW PROFILE HEX SCREW    [employer] [id number] Left 1 Implanted   PLUG APCL H ACET SHLL --  - [id number]  PLUG APCL H ACET SHLL --   [employer] HOW [id number] Left 1 Implanted   INSERT ACET ECC 0DEG 36MM E X3 --  - [id number]  INSERT ACET ECC 0DEG 36MM E X3 --   [employer] HOW [id number] Left 1 Implanted   STEM FEM SZ 4 [id number] -- ACCOLADE II V40 - [id number]  STEM FEM SZ 4 127D [id number] -- ACCOLADE II V40  [employer] HOW [id number] Left 1 Implanted   HEAD FEM DELT V40 -5MM NK 36MM -- V40 BIOLOX - [id number]   HEAD FEM DELT V40 -5MM NK 36MM -- V40 BIOLOX   [employer] HOW [id number] Left 1 Implanted

## 2018-07-30 NOTE — ANESTHESIA PREPROCEDURE EVALUATION
Anesthetic History No history of anesthetic complications Review of Systems / Medical History Patient summary reviewed and pertinent labs reviewed Pulmonary Comments: Former smoker - Quit 2015 - 4 pack years Neuro/Psych Psychiatric history Comments: Cervicalgia/Cervical stenosis with radiculopathy Depression Vertigo Cardiovascular Exercise tolerance: >4 METS 
  
GI/Hepatic/Renal 
  
GERD: well controlled Endo/Other Hypothyroidism: well controlled Obesity Other Findings Comments: Documentation of current medication Current medications obtained, documented and obtained? YES Risk Factors for Postoperative nausea/vomiting: 
     History of postoperative nausea/vomiting? NO Female? YES Motion sickness? NO Intended opioid administration for postoperative analgesia? YES Smoking Abstinence: 
Current Smoker? NO Elective Surgery? YES Seen preoperatively by anesthesiologist or proxy prior to day of surgery? YES Pt abstained from smoking 24 hours prior to anesthesia? N/A Preventive care/screening for High Blood Pressure: 
Aged 18 years and older: YES Screened for high blood pressure: YES Patients with high blood pressure referred to primary care provider 
 for BP management: YES Physical Exam 
 
Airway Mallampati: II 
TM Distance: > 6 cm Neck ROM: decreased range of motion Mouth opening: Normal 
 
 Cardiovascular Regular rate and rhythm,  S1 and S2 normal,  no murmur, click, rub, or gallop Dental 
 
 
  
Pulmonary Abdominal 
GI exam deferred Other Findings Anesthetic Plan ASA: 3 Anesthesia type: general 
 
 
Post-op pain plan if not by surgeon: peripheral nerve block single Induction: Intravenous Anesthetic plan and risks discussed with: Patient Pt with R \"frozen shoulder\".   Reports numbness and tingling in arm and fingers.

## 2018-07-30 NOTE — H&P (VIEW-ONLY)
[address] [facility], [address] [place] [address] 
[phone] HISTORY & PHYSICAL Patient: Mercedes Olson                MRN: [id number]       SSN: [id number] YOB: 1960        AGE: 60 y.o. SEX: female Body mass index is 36.98 kg/(m^2). PCP: Lance Stark MD 
07/26/18 CC: right hip end stage OA Problem List Items Addressed This Visit None Visit Diagnoses Pre-op exam    -  Primary Relevant Orders AMB POC X-RAY RADEX HIP UNI WITH PELVIS MIN 4 VIEWS (Completed) Primary osteoarthritis of right hip HPI:  The patient is a pleasant 60 y.o. whom has end stage OA of their Right hip and has failed conservative treatment including but not limited to NSAIDS, cortisone injections, viscosupplementation, PT, and pain medicine. Due to the current findings and affected activities of daily living, surgical intervention is indicated. The alternatives, risks, complications, as well as expected outcome were discussed. These include but are not limited to infection, blood loss, need for blood transfusion, neurovascular damage, DVT, PE,  post-op stiffness and pain, leg length discrepancy, dislocation, anesthetic complications, prothesis longevity, need for more surgery, MI, stroke, and even death. The patient understands and wishes to proceed with surgery. Past Medical History:  
Diagnosis Date  Adverse effect of anesthesia   
 hard to wake up  Chronic pain  Depression  GERD (gastroesophageal reflux disease)  Hypothyroidism  Low back pain  Thromboembolus ([facility] [address] blood clot in foot during pregnancy  Tobacco abuse  Vertigo Current Outpatient Prescriptions:   cyanocobalamin (VITAMIN B-12) 1,000 mcg tablet, Take 1,000 mcg by mouth daily. , Disp: , Rfl:   NP THYROID 15 mg tablet, TK 3 TS PO D, Disp: , Rfl: 1 
  gabapentin (NEURONTIN) 300 mg capsule, TK ONE PO  TID, Disp: , Rfl: 2 
  potassium iodide/iodine (IODINE STRONG, LUGOLS, PO), Take 25 mg by mouth daily. , Disp: , Rfl:  
  Selenomethionine 200 mcg tab, Take 1 Tab by mouth daily. , Disp: , Rfl:  
  cholecalciferol, vitamin D3, (VITAMIN D3) 2,000 unit tab, Take 1 Tab by mouth daily. , Disp: , Rfl:  
 
Allergies Allergen Reactions  Pcn [Penicillins] Hives Social History Social History  Marital status: UNKNOWN Spouse name: N/A  
 Number of children: N/A  
 Years of education: N/A Occupational History  Not on file. Social History Main Topics  Smoking status: Former Smoker Packs/day: 0.25 Years: 4.00 Quit date: 2015  Smokeless tobacco: Never Used  Alcohol use No  
 Drug use: No  
 Sexual activity: Not on file Other Topics Concern  Not on file Social History Narrative 3/2015[de-identified] currently unemployed due to chronic low back pain, used to work renovating houses. Lived with her mother until her mother's death in the summer of . Currently living with her daughters, splits time between [place] and [place]. Past Surgical History:  
Procedure Laterality Date  HX  SECTION    
 x3  
 HX HYSTERECTOMY  HX ORTHOPAEDIC  2002  
 2 disks removed lower back [facility].  
 HX ORTHOPAEDIC Right 2017  
 shoulder surgery  NEUROLOGICAL PROCEDURE UNLISTED Family History:  Non-contributory. PE: 
Visit Vitals  /85  Pulse 71  Temp 96.1 °F (35.6 °C) (Oral)  Resp 18  Ht 5' 4.5\" (1.638 m)  Wt 218 lb 12.8 oz (99.2 kg)  SpO2 100%  BMI 36.98 kg/m2 A&O X3, NAD, well develop, well nourished Heart: S1-S2, rrr 
Lungs: CTA bilat Abd: soft, nt, nt, + bs in all quadrants Ext:  Pos distal pulses to DP, PT Leg lengths show the left LE to be slightly shorter grossly sitting in the chair X-ray: right hip shows end stage OA Labs: labs were reviewed and wnl. Ua neg A:  Right  hip end stage OA 
 
P:  At this point we will move forward with surgery. Again, the alternatives, risks, complications, as well as expected outcome were discussed and the patient wishes to proceed with surgery. Pt has been instructed to stop aspirin, nsaids, rheumatologic medications and blood thinners. They have also been instructed to continue on any heart and bp meds and to take them the morning of surgery with sips of water. Lateral approach. The patient will require in patient admission due to above stated medical conditions as well the the surgical challenges given the anatomy and bone quality. Balbir Sheth

## 2018-07-30 NOTE — IP AVS SNAPSHOT
[address] Patient: Olga Flores MRN: [id number] A check aura indicates which time of day the medication should be taken. My Medications START taking these medications Instructions Each Dose to Equal  
 Morning Noon Evening Bedtime  
 buffered aspirin 325 mg tablet Commonly known as:  BUFFERIN Your last dose was: Your next dose is: Take 1 Tab by mouth two (2) times a day. 325 mg  
    
   
   
   
  
 celecoxib 200 mg capsule Commonly known as:  CELEBREX Your last dose was: Your next dose is: Take 1 Cap by mouth two (2) times a day for 90 days. 200 mg  
    
   
   
   
  
 ferrous sulfate 325 mg (65 mg iron) tablet Your last dose was: Your next dose is: Take 1 Tab by mouth two (2) times daily (with meals). 325 mg  
    
   
   
   
  
 ondansetron hcl 4 mg tablet Commonly known as:  Natalya Manley Your last dose was: Your next dose is: Take 1 Tab by mouth every eight (8) hours as needed for Nausea. 4 mg  
    
   
   
   
  
 oxyCODONE-acetaminophen 7.5-325 mg per tablet Commonly known as:  PERCOCET Your last dose was: Your next dose is: Take 1-2 Tabs by mouth every four (4) hours as needed for Pain. Max Daily Amount: 12 Tabs. 1-2 Tab CONTINUE taking these medications Instructions Each Dose to Equal  
 Morning Noon Evening Bedtime  
 gabapentin 300 mg capsule Commonly known as:  NEURONTIN Your last dose was: Your next dose is:    
   
   
 TK ONE PO  TID  
     
   
   
   
  
 IODINE STRONG (LUGOLS) PO Your last dose was: Your next dose is: Take 25 mg by mouth daily. 25 mg  
    
   
   
   
  
 NP THYROID 15 mg tablet Generic drug:  thyroid (Pork) Your last dose was: Your next dose is:    
   
   
 TK 3 TS PO D  
     
   
   
   
  
 OTHER Your last dose was: Your next dose is:    
   
   
 daily. Selenomethionine 200 mcg Tab Your last dose was: Your next dose is: Take 1 Tab by mouth daily. 1 Tab VITAMIN B-12 1,000 mcg tablet Generic drug:  cyanocobalamin Your last dose was: Your next dose is: Take 1,000 mcg by mouth daily. 1000 mcg VITAMIN D3 2,000 unit Tab Generic drug:  cholecalciferol (vitamin D3) Your last dose was: Your next dose is: Take 1 Tab by mouth daily. 1 Tab Where to Get Your Medications Information on where to get these meds will be given to you by the nurse or doctor. ! Ask your nurse or doctor about these medications  
  buffered aspirin 325 mg tablet  
 celecoxib 200 mg capsule  
 ferrous sulfate 325 mg (65 mg iron) tablet  
 ondansetron hcl 4 mg tablet  
 oxyCODONE-acetaminophen 7.5-325 mg per tablet

## 2018-07-30 NOTE — ANESTHESIA POSTPROCEDURE EVALUATION
Post-Anesthesia Evaluation and Assessment Patient: Cori Antunez MRN: [id number]  SSN: [id number] YOB: 1960  Age: 60 y.o. Sex: female Data from PACU flowsheet Cardiovascular Function/Vital Signs Visit Vitals  /76  Pulse 80  Temp 36.6 °C (97.9 °F)  Resp 20  
 Ht 5' 4\" (1.626 m)  Wt 98 kg (216 lb)  SpO2 99%  BMI 37.08 kg/m2 Patient is status post general anesthesia for Procedure(s): RIGHT TOTAL HIP ARTHROPLASTY LATERAL/BENJY/FRANCOIS TO ASSIST/NERVE BLOCK. Nausea/Vomiting: controlled Postoperative hydration reviewed and adequate. Pain: 
Pain Scale 1: Numeric (0 - 10) (07/30/18 1359) Pain Intensity 1: 5 (07/30/18 1359) Managed Mental Status and Level of Consciousness: Alert and oriented Pulmonary Status:  
O2 Device: Room air (07/30/18 1222) Adequate oxygenation and airway patent Complications related to anesthesia: None Post-anesthesia assessment completed. No concerns Signed By: Yamila Pal MD   
 July 30, 2018

## 2018-07-30 NOTE — PERIOP NOTES
TRANSFER - OUT REPORT:    Verbal report given to [facility] ELADIO RN(name) on Asha Thorpe  being transferred to [address] [place] [address](unit) for routine post - op       Report consisted of patients Situation, Background, Assessment and   Recommendations(SBAR). Information from the following report(s) SBAR, Kardex, OR Summary, Procedure Summary, Intake/Output, MAR, Recent Results and Med Rec Status was reviewed with the receiving nurse. Lines:   Peripheral IV 07/30/18 Left Wrist (Active)   Site Assessment Clean, dry, & intact 7/30/2018 12:22 PM   Phlebitis Assessment 0 7/30/2018 12:22 PM   Infiltration Assessment 0 7/30/2018 12:22 PM   Dressing Status Clean, dry, & intact 7/30/2018 12:22 PM   Dressing Type Tape;Transparent 7/30/2018 12:22 PM   Hub Color/Line Status Pink; Infusing 7/30/2018 12:22 PM        Opportunity for questions and clarification was provided.       Patient transported with:   [facility]

## 2018-07-31 ENCOUNTER — HOME HEALTH ADMISSION (OUTPATIENT)
Dept: HOME HEALTH SERVICES | Facility: HOME HEALTH | Age: 58
End: 2018-07-31
Payer: MEDICAID

## 2018-07-31 PROCEDURE — 74011250636 HC RX REV CODE- 250/636: Performed by: ORTHOPAEDIC SURGERY

## 2018-07-31 PROCEDURE — 97535 SELF CARE MNGMENT TRAINING: CPT

## 2018-07-31 PROCEDURE — 74011250637 HC RX REV CODE- 250/637: Performed by: ORTHOPAEDIC SURGERY

## 2018-07-31 PROCEDURE — 74011250637 HC RX REV CODE- 250/637: Performed by: PHYSICIAN ASSISTANT

## 2018-07-31 PROCEDURE — 97116 GAIT TRAINING THERAPY: CPT

## 2018-07-31 PROCEDURE — 74011250636 HC RX REV CODE- 250/636: Performed by: PHYSICIAN ASSISTANT

## 2018-07-31 PROCEDURE — 97165 OT EVAL LOW COMPLEX 30 MIN: CPT

## 2018-07-31 PROCEDURE — 65270000029 HC RM PRIVATE

## 2018-07-31 PROCEDURE — 74011000258 HC RX REV CODE- 258: Performed by: ORTHOPAEDIC SURGERY

## 2018-07-31 PROCEDURE — 97530 THERAPEUTIC ACTIVITIES: CPT

## 2018-07-31 PROCEDURE — 74011000250 HC RX REV CODE- 250: Performed by: ORTHOPAEDIC SURGERY

## 2018-07-31 RX ORDER — ASPIRIN 325 MG/1
325 TABLET, FILM COATED ORAL 2 TIMES DAILY
Qty: 60 TAB | Refills: 1 | Status: SHIPPED | OUTPATIENT
Start: 2018-07-31 | End: 2021-01-06

## 2018-07-31 RX ORDER — OXYCODONE HYDROCHLORIDE 10 MG/1
10 TABLET ORAL
Status: DISCONTINUED | OUTPATIENT
Start: 2018-07-31 | End: 2018-08-01 | Stop reason: HOSPADM

## 2018-07-31 RX ORDER — SODIUM CHLORIDE 9 MG/ML
500 INJECTION, SOLUTION INTRAVENOUS ONCE
Status: COMPLETED | OUTPATIENT
Start: 2018-07-31 | End: 2018-07-31

## 2018-07-31 RX ORDER — CELECOXIB 200 MG/1
200 CAPSULE ORAL 2 TIMES DAILY
Qty: 60 CAP | Refills: 2 | Status: SHIPPED | OUTPATIENT
Start: 2018-07-31 | End: 2018-10-29

## 2018-07-31 RX ORDER — LANOLIN ALCOHOL/MO/W.PET/CERES
325 CREAM (GRAM) TOPICAL 2 TIMES DAILY WITH MEALS
Qty: 60 TAB | Refills: 1 | Status: SHIPPED | OUTPATIENT
Start: 2018-07-31 | End: 2021-01-06

## 2018-07-31 RX ORDER — ONDANSETRON 4 MG/1
4 TABLET, FILM COATED ORAL
Qty: 30 TAB | Refills: 1 | Status: SHIPPED | OUTPATIENT
Start: 2018-07-31 | End: 2021-01-06

## 2018-07-31 RX ORDER — OXYCODONE AND ACETAMINOPHEN 7.5; 325 MG/1; MG/1
1-2 TABLET ORAL
Qty: 60 TAB | Refills: 0 | Status: SHIPPED | OUTPATIENT
Start: 2018-07-31 | End: 2021-01-06

## 2018-07-31 RX ADMIN — SODIUM CHLORIDE 500 ML: 900 INJECTION, SOLUTION INTRAVENOUS at 10:56

## 2018-07-31 RX ADMIN — OXYCODONE HYDROCHLORIDE 10 MG: 10 TABLET ORAL at 20:04

## 2018-07-31 RX ADMIN — Medication 10 ML: at 21:38

## 2018-07-31 RX ADMIN — ACETAMINOPHEN 1000 MG: 500 TABLET, FILM COATED ORAL at 18:25

## 2018-07-31 RX ADMIN — Medication 10 ML: at 05:53

## 2018-07-31 RX ADMIN — DOCUSATE SODIUM 100 MG: 100 CAPSULE, LIQUID FILLED ORAL at 17:48

## 2018-07-31 RX ADMIN — CELECOXIB 200 MG: 100 CAPSULE ORAL at 17:48

## 2018-07-31 RX ADMIN — PREGABALIN 50 MG: 50 CAPSULE ORAL at 09:25

## 2018-07-31 RX ADMIN — Medication 10 ML: at 17:50

## 2018-07-31 RX ADMIN — ASPIRIN 325 MG: 325 TABLET, FILM COATED ORAL at 17:48

## 2018-07-31 RX ADMIN — DOCUSATE SODIUM 100 MG: 100 CAPSULE, LIQUID FILLED ORAL at 09:25

## 2018-07-31 RX ADMIN — ACETAMINOPHEN 1000 MG: 500 TABLET, FILM COATED ORAL at 09:24

## 2018-07-31 RX ADMIN — OXYCODONE HYDROCHLORIDE 15 MG: 15 TABLET ORAL at 09:24

## 2018-07-31 RX ADMIN — CLINDAMYCIN PHOSPHATE 900 MG: 150 INJECTION, SOLUTION INTRAVENOUS at 05:53

## 2018-07-31 RX ADMIN — OXYCODONE HYDROCHLORIDE 15 MG: 15 TABLET ORAL at 04:02

## 2018-07-31 RX ADMIN — CELECOXIB 200 MG: 100 CAPSULE ORAL at 09:24

## 2018-07-31 RX ADMIN — FERROUS SULFATE TAB 325 MG (65 MG ELEMENTAL FE) 325 MG: 325 (65 FE) TAB at 09:24

## 2018-07-31 RX ADMIN — ASPIRIN 325 MG: 325 TABLET, FILM COATED ORAL at 09:25

## 2018-07-31 RX ADMIN — FERROUS SULFATE TAB 325 MG (65 MG ELEMENTAL FE) 325 MG: 325 (65 FE) TAB at 17:48

## 2018-07-31 RX ADMIN — ACETAMINOPHEN 1000 MG: 500 TABLET, FILM COATED ORAL at 21:38

## 2018-07-31 RX ADMIN — ONDANSETRON 4 MG: 2 INJECTION INTRAMUSCULAR; INTRAVENOUS at 14:04

## 2018-07-31 RX ADMIN — ONDANSETRON 4 MG: 2 INJECTION INTRAMUSCULAR; INTRAVENOUS at 08:44

## 2018-07-31 RX ADMIN — OXYCODONE HYDROCHLORIDE 15 MG: 15 TABLET ORAL at 00:18

## 2018-07-31 RX ADMIN — ACETAMINOPHEN 1000 MG: 500 TABLET, FILM COATED ORAL at 14:08

## 2018-07-31 RX ADMIN — DEXTROSE MONOHYDRATE AND SODIUM CHLORIDE 100 ML/HR: 5; .45 INJECTION, SOLUTION INTRAVENOUS at 04:02

## 2018-07-31 NOTE — OP NOTES
[address] [place] [address]   OPERATIVE REPORT    Lynn Fisher  MR#: [id number]  : 1960  ACCOUNT #: [de-identified]   DATE OF SERVICE: 2018    PREOPERATIVE DIAGNOSIS:  End-stage arthritis involving the right hip with significant obesity. POSTOPERATIVE DIAGNOSIS:  End-stage arthritis involving the right hip with significant obesity. PROCEDURE PERFORMED:  Right total hip replacement using the Joshua Accolade II system with a size 4, 127 high offset femoral component, a 54 mm Trident 2 Tritanium cup, lateralized neutral 36 mm liner, and a 36 -5 ceramic femoral head. COMPLICATIONS:  No complications. SPECIMENS REMOVED:  Femoral head. ESTIMATED BLOOD LOSS:  300 mL. SURGEON:  Francoise Richard MD    ASSISTANT:  Balbir Sanchez, first assistant; Mini Meza, second assistant. ANESTHESIA:  Dr. Jessica Bond; preoperative lumbar plexus block with light general.      IMPLANTS:      Balbir Sanchez was the first assistant and assisted with all phases of the procedure starting with patient positioning, patient prep, patient drape, retractoring, assistance with the surgery itself, positioning of the leg during surgery as well as closure, dressing placement and transfer. DESCRIPTION OF PROCEDURE:  After anesthetic was successfully induced, it was confirmed the patient did receive her antibiotics, the leg lengths determined, the patient placed in the lateral decubitus position, taking great care to pad all sensitive areas and ensure the pelvis was clear, and a timeout was performed. I made a lengthy incision and we needed Adson-Stefany's; there was approximately 3-1/2 inches of adipose tissue prior to getting to the fascia. IT band delineated and incised sharply. Sciatic nerve identified, protected and avoided. Charnley retractor introduced. Bursa released using capsular scissors.   Gluteus medius somewhat attenuated and minimus and capsule divided directly over the femoral neck cutting back to the tip of the greater trochanter and then down on the vastus lateralis. Whole cuff of tissue was taken in one contiguous layer and lesser trochanter identified, pin placed in the superior iliac crest for offset leg length measurement, which was recorded. Hip dislocated. Using appropriate fingerbreadth above the lesser trochanter, femoral neck divided. Posterior capsule reflected using Tsarkey elevator. I instrumented around the acetabular area, protecting the neurovascular structures, identified the medial wall through the foveal notch, debrided the cup in usual fashion, medialized appropriately in appropriate inclination and anteversion, reamed up to accommodate the cup. I trialed this in appropriate inclination and anteversion and implanted the definitive shell, which seated nicely and augmented with a couple of screws due to a somewhat soft bone. some posterior osteophytes and initially with a trial liner, later to return with the apex hole eliminator plug followed by the lateralizing liner, checked it with a [place], protected it with a Ray-Ever gauze later to be accounted for. Leg in a sterile leg bag lateralized with a box osteotome, Pina rongelana and broached our way up to accommodate the size 4. Very snug with this, using the calcar planer, reduced the hip, it was very stable. Combined anteversion angles were atrophic. No impingement, excellent stability and excellent soft tissue management. We referred back to our offset and leg length guide. I implanted definitive femoral component. We trialed  -5,  impacted on again, reducing the hip. Routine closure. Closure of the skin. At the end of the case, instrument, sponge and needle count was correct. No complications. The patient tolerated the procedure well. Blood loss approximately 310 mL. No complications. The patient tolerated the procedure well. Patient brought to recovery room in stable condition without complication.       Keya Pascal Mily Carvalho MD AM / Doroteo Degree  D: 07/31/2018 14:44     T: 07/31/2018 15:09  JOB #: [id number]

## 2018-07-31 NOTE — PROGRESS NOTES
Problem: Mobility Impaired (Adult and Pediatric)  Goal: *Acute Goals and Plan of Care (Insert Text)  Physical Therapy Goals  Initiated 7/30/2018 and to be accomplished within 7 day(s)  1. Patient will move from supine to sit and sit to supine , scoot up and down and roll side to side in bed with supervision/set-up. 2.  Patient will transfer from bed to chair and chair to bed with supervision/set-up using the least restrictive device. 3.  Patient will perform sit to stand with supervision/set-up. 4.  Patient will ambulate with supervision/set-up for >150 feet with the least restrictive device. 5.  Patient will ascend/descend 10 stairs with 1 handrail(s) with supervision. Outcome: Progressing Towards Goal  physical Therapy TREATMENT    Patient: Soniya Dawn (60 y.o. female)  Date: 7/31/2018  Diagnosis: Arthritis of right hip [M16.11] <principal problem not specified>  Procedure(s) (LRB):  RIGHT TOTAL HIP ARTHROPLASTY LATERAL/BENJY/FRANCOIS TO ASSIST/NERVE BLOCK (Left) 1 Day Post-Op  Precautions:     Chart, physical therapy assessment, plan of care and goals were reviewed. OBJECTIVE/ ASSESSMENT:  Patient found seated in recliner willing to work with PT. Pt cont to display nausea at rest, which increases w/ increased activity. Reviewed WBing precautions (WBAT) for right LE and performed sit to stand from chair req cueing for sequencing and alignment. Pt initially stood req min A noting WS to left 2/2 to pain, however improved to CGA w/ cueing and reps. Prior to amb, WS X5 progressing to lifting left heel. Pt amb in room and to pak w/ WC req 1 standing digressing to seated rest break 2/2 to increased nausea and vomiting in pak. Pt cont to amb w/ RW once nausea subsided for a total of 25' mostly CGA w/ instances of min A for navigation when turning to sit in WC. Pt displayed improved step height/length (B), however cont to show increased pain in right LE during stance phase of gait.  Pt returned to room via WC 2/2 increased nausea and fatigue and returned to chair min A stand step transfer. Pt's main limiting factors cont to be pain (starting lateral and traveling to ant/distal quad) and nausea and vomiting. Pt left in room w/ care of MD w/ all needs in reach. Nurse notified of pt status. Education:safety, importance of upright tolerance and mobility at rest, WBing precaution of right LE, importance of WS prior to amb  Progression toward goals:  []      Improving appropriately and progressing toward goals  [x]      Improving slowly and progressing toward goals  []      Not making progress toward goals and plan of care will be adjusted     PLAN:  Patient continues to benefit from skilled intervention to address the above impairments. Continue treatment per established plan of care. Discharge Recommendations:  Home Health  Further Equipment Recommendations for Discharge:  rolling walker     SUBJECTIVE:   Patient stated I feel like it gets stuck when I start moving (referring to vomiting).     OBJECTIVE DATA SUMMARY:   Functional Mobility Training:  Transfers:  Sit to Stand: Minimum assistance;Contact guard assistance  Stand to Sit: Minimum assistance;Contact guard assistance  Balance:  Sitting: Intact  Standing: Impaired; With support  Standing - Static: Fair  Standing - Dynamic : Fair  Ambulation/Gait Training:  Distance (ft): 25 Feet (ft)  Assistive Device: Walker, rolling  Ambulation - Level of Assistance: Minimal assistance;Contact guard assistance  Gait Abnormalities: Decreased step clearance;Trunk sway increased; Step to gait  Base of Support: Narrowed  Speed/Debbie: Slow  Step Length: Right shortened;Left shortened  Interventions: Manual cues; Tactile cues; Verbal cues; Safety awareness training;Visual/Demos    Pre tx pain: 8/10  During tx: 8/10  Post tx pain: 8/10  Pain Scale 1: Visual  Pain Intensity 1: 0  Pain Intervention(s) 1: Medication (see MAR)  Activity Tolerance:   req mult rest breaks 2/2 increased instances of nausea   Please refer to the flowsheet for vital signs taken during this treatment. After treatment:   [x] Patient left in no apparent distress sitting up in chair  [] Patient left in no apparent distress in bed  [x] Call bell left within reach  [x] Nursing notified  [] Caregiver present  [] Bed alarm activated  [x] SCDs applied  [x] Ice applied      Krystle Light PTA   Time Calculation: 45 mins    Mobility  Current  CJ= 20-39%. The severity rating is based on the Level of Assistance required for Functional Mobility and ADLs. Mobility   Goal  CI= 1-19%. The severity rating is based on the Level of Assistance required for Functional Mobility and ADLs.

## 2018-07-31 NOTE — HOME CARE
Rec HC order - d/c noted for today -- [facility] will follow per Dr. Isreal Kraft hip protocol - pt has RW, BSC, and Bath chair at home - family will trasport pt home - D Rickie FOSTER      Noted discharge cancelled for today [facility] central office notified - D Rickie FOSTER

## 2018-07-31 NOTE — PROGRESS NOTES
conducted an initial consultation and Spiritual Assessment for Danielle Ozuna, who is a 60 y.o.,female. Patients Primary Language is: [place]. According to the patients EMR [employer] Affiliation is: Rona Scales.     The reason the Patient came to the hospital is:   Patient Active Problem List    Diagnosis Date Noted    Hip arthritis 07/30/2018    Severe obesity (BMI 35.0-39.9) ([address] 06/12/2018    S/P cervical spinal fusion 04/06/2017    Chronic low back pain 06/19/2014    Depression 06/19/2014    Tobacco abuse 06/19/2014        The  provided the following Interventions:  Initiated a relationship of care and support. Provided information about Spiritual Care Services. Offered prayer and assurance of continued prayers on patient's behalf. Chart reviewed. The following outcomes where achieved:   confirmed Patient's [employer] Affiliation. Patient expressed gratitude for 's visit. Assessment:  Patient does not have any Orthodoxy/cultural needs that will affect patients preferences in health care. There are no spiritual or Orthodoxy issues which require intervention at this time. Plan:  Chaplains will continue to follow and will provide pastoral care on an as needed/requested basis.  recommends bedside caregivers page  on duty if patient shows signs of acute spiritual or emotional distress.     [address]  ([phone])

## 2018-07-31 NOTE — PROGRESS NOTES
Problem: Self Care Deficits Care Plan (Adult)  Goal: *Acute Goals and Plan of Care (Insert Text)  Outcome: Resolved/Met Date Met: 07/31/18  Occupational Therapy EVALUATION/discharge    Patient: Rebekah Gilmore (60 y.o. female)  Date: 7/31/2018  Primary Diagnosis: Arthritis of right hip [M16.11]  Procedure(s) (LRB):  RIGHT TOTAL HIP ARTHROPLASTY LATERAL/BENJY/FRANCOIS TO ASSIST/NERVE BLOCK (Left) 1 Day Post-Op   Precautions:   Fall, Total hip    ASSESSMENT AND RECOMMENDATIONS:  Based on the objective data described below, the patient is able to perform basic self care tasks without assistance using AE (reacher, sock aid, LHS, [address]) given after demonstration/practice while seated. CGA given for functional standing and transfers. Extra time needed for sit to stand and to balance self in standing secondary to dizziness, pain and nausea. Pain meds given prior to session. Will defer to PT for mobility training. Hip precautions reviewed and patient verbalized understanding. Patient has a supportive daughter at home to assist her prn and all needed DME (tub seat) for bathroom safety. Skilled occupational therapy is not indicated at this time. Discharge Recommendations: Home Health safety eval  Further Equipment Recommendations for Discharge: N/A      Barriers to Learning/Limitations: None  Compensate with: visual, verbal, tactile, kinesthetic cues/model     COMPLEXITY     Eval Complexity: History: LOW Complexity : Brief history review ; Examination: LOW Complexity : 1-3 performance deficits relating to physical, cognitive , or psychosocial skils that result in activity limitations and / or participation restrictions ; Decision Making:LOW Complexity : No comorbidities that affect functional and no verbal or physical assistance needed to complete eval tasks  Assessment: Low Complexity        G-CODES:     Self Care  Current  CI= 1-19%   Goal  CI= 1-19%   D/C  CI= 1-19%.   The severity rating is based on the Level of Assistance required for Functional Mobility and ADLs. SUBJECTIVE:   Patient stated I feel dizzy.     OBJECTIVE DATA SUMMARY:     Past Medical History:   Diagnosis Date    Adverse effect of anesthesia     hard to wake up    Chronic pain     Depression     GERD (gastroesophageal reflux disease)     Hypothyroidism     Low back pain     Thromboembolus (HCC)     blood clot in foot during pregnancy    Tobacco abuse     Vertigo      Past Surgical History:   Procedure Laterality Date    HX  SECTION      x3    HX HYSTERECTOMY      HX ORTHOPAEDIC  2002    2 disks removed lower back [facility].    HX ORTHOPAEDIC Right 2017    shoulder surgery    NEUROLOGICAL PROCEDURE UNLISTED       Prior Level of Function/Home Situation: Pt was independent with basic self care tasks and functional mobility PTA. Home Situation  Home Environment: Private residence  # Steps to Enter: 1  One/Two Story Residence: Two story  # of Interior Steps: 16  Height of Each Step (in): 0 inches  Interior Rails: Both  Lift Chair Available: No  Living Alone: No  Support Systems: Family member(s)  Patient Expects to be Discharged to[de-identified] Private residence  Current DME Used/Available at Home: Cane, straight  Tub or Shower Type: Tub/Shower combination (with seat)  [x]     Right hand dominant   []     Left hand dominant  Cognitive/Behavioral Status:  Neurologic State: Alert  Orientation Level: Oriented X4  Cognition: Appropriate decision making; Follows commands  Safety/Judgement: Awareness of environment; Fall prevention    Skin: Intact on UEs    Edema: None noted in UEs    Vision/Perceptual:    Acuity: Within Defined Limits    Corrective Lenses: Glasses    Coordination:  Fine Motor Skills-Upper: Left Intact; Right Intact    Gross Motor Skills-Upper: Left Intact; Right Intact    Balance:  Sitting: Intact  Standing: Impaired; With support  Standing - Static: Fair  Standing - Dynamic : Fair    Strength:  Strength: Generally decreased, functional (UEs; R shoulder limited by prior surgery)    Tone & Sensation:  Tone: Normal (UEs)  Sensation: Intact (UEs)    Range of Motion:  AROM: Generally decreased, functional (UEs; R shoulder flexion limited from prior surgery)    Functional Mobility and Transfers for ADLs:  Bed Mobility:  Supine to Sit:  (Pt up in chair upon arrival.)  Transfers:  Sit to Stand: Minimum assistance;Contact guard assistance   Toilet Transfer : Contact guard assistance     ADL Assessment:  Feeding: Independent    Oral Facial Hygiene/Grooming: Modified Independent    Bathing: Contact guard assistance    Upper Body Dressing: Modified independent    Lower Body Dressing: Contact guard assistance    Toileting: Contact guard assistance    ADL Intervention:  Patient practiced LB dressing with use of AE given (reacher, sock aid) after demonstration while seated in chair. No assist needed after practice. CGA given for standing to pull pants/underwear over hips. Patient also given a long handled sponge and long handled shoe horn after demonstration. Cognitive Retraining  Safety/Judgement: Awareness of environment; Fall prevention    Pain:  Pt reports 7/10 pain or discomfort prior to treatment, in right hip. Pain meds given prior to session.    Pt reports 7/10 pain or discomfort post treatment, in right hip. Patient resting in chair at end of session with ice pack applied to hip. Activity Tolerance:   Fair    Please refer to the flowsheet for vital signs taken during this treatment. After treatment:   [x]  Patient left in no apparent distress sitting up in chair  []  Patient left in no apparent distress in bed  [x]  Call bell left within reach  []  Nursing notified  [x]  Caregiver (daughter) present  []  Bed alarm activated    COMMUNICATION/EDUCATION:   Communication/Collaboration:  [x]      Home safety education was provided and the patient/caregiver indicated understanding.   [x]      Patient/family have participated as able and agree with findings and recommendations. []      Patient is unable to participate in plan of care at this time.     Tana Atkins MS OTR/L  Time Calculation: 40 mins

## 2018-07-31 NOTE — HOME CARE
Rec HC order - plan d/c today or tomorrow - pt has dme at home - RW, [facility], Shower chair - [facility] will follow for Dr. Annie Urias hip protocol - confirmed address/phone -  - LAUREN Damian RN

## 2018-07-31 NOTE — PROGRESS NOTES
Problem: Mobility Impaired (Adult and Pediatric)  Goal: *Acute Goals and Plan of Care (Insert Text)  Physical Therapy Goals  Initiated 7/30/2018 and to be accomplished within 7 day(s)  1. Patient will move from supine to sit and sit to supine , scoot up and down and roll side to side in bed with supervision/set-up. 2.  Patient will transfer from bed to chair and chair to bed with supervision/set-up using the least restrictive device. 3.  Patient will perform sit to stand with supervision/set-up. 4.  Patient will ambulate with supervision/set-up for >150 feet with the least restrictive device. 5.  Patient will ascend/descend 10 stairs with 1 handrail(s) with supervision. Outcome: Progressing Towards Goal  physical Therapy TREATMENT    Patient: Jennifer Blair (60 y.o. female)  Date: 7/31/2018  Diagnosis: Arthritis of right hip [M16.11] <principal problem not specified>  Procedure(s) (LRB):  RIGHT TOTAL HIP ARTHROPLASTY LATERAL/BENJY/FRANCOIS TO ASSIST/NERVE BLOCK (Left) 1 Day Post-Op  Precautions: Fall, Total hip   Chart, physical therapy assessment, plan of care and goals were reviewed. OBJECTIVE/ ASSESSMENT:  Patient found seated on toilet willing to work with PT. Pt req min A sit to stand from elevated toilet seat demonstrating good static and dynamic seated balance w/ toileting. Pt amb w/ RW CGA to pak 25'X2 req rest period 2/2 to increased pain and nausea. Pt instructed on optical focusing during gait to assist in nausea relief, which pt responded well to. Pt amb back to room post vomiting CGA to supine in bed req min A for elevation of right LE and scooting up in bed. Pt's main limiting factor during this tx was increased nausea and vomiting as pt said she had no pain once resting in supine. Pt left in room w/ all needs in reach and daughter at bedside.      Education:safety, importance of mobility   Progression toward goals:  []      Improving appropriately and progressing toward goals  [x] Improving slowly and progressing toward goals  []      Not making progress toward goals and plan of care will be adjusted     PLAN:  Patient continues to benefit from skilled intervention to address the above impairments. Continue treatment per established plan of care. Discharge Recommendations:  Home Health  Further Equipment Recommendations for Discharge:  rolling walker     SUBJECTIVE:   Patient stated The nausea won't go away. It always seems to happen when I move.     OBJECTIVE DATA SUMMARY:   Critical Behavior:  Neurologic State: Alert  Orientation Level: Oriented X4  Cognition: Appropriate decision making, Follows commands  Safety/Judgement: Awareness of environment, Fall prevention  Functional Mobility Training:  Bed Mobility:     Supine to Sit:  (Pt up in chair upon arrival.)  Sit to Supine: Minimum assistance  Scooting: Minimum assistance  Transfers:  Sit to Stand: Minimum assistance;Contact guard assistance  Stand to Sit: Minimum assistance;Contact guard assistance  Balance:  Sitting: Intact  Standing: Impaired; With support  Standing - Static: Fair  Standing - Dynamic : Fair  Ambulation/Gait Training:  Distance (ft): 50 Feet (ft)  Assistive Device: Walker, rolling  Ambulation - Level of Assistance: Contact guard assistance  Gait Abnormalities: Trunk sway increased;Decreased step clearance  Base of Support: Narrowed  Speed/Debbie: Slow  Step Length: Right shortened;Left shortened  Interventions: Manual cues; Safety awareness training;Visual/Demos    Pain: stated none 2/2 in increased amounts of dizziness and vomiting   Activity Tolerance:   Rest mult rest breaks for vomiting   Please refer to the flowsheet for vital signs taken during this treatment.   After treatment:   [] Patient left in no apparent distress sitting up in chair  [x] Patient left in no apparent distress in bed  [x] Call bell left within reach  [x] Nursing notified  [] Caregiver present  [] Bed alarm activated  [x] SCDs applied  [] Ice bridgett Bill PTA   Time Calculation: 40 mins    Mobility  Current  CJ= 20-39%. The severity rating is based on the Level of Assistance required for Functional Mobility and ADLs. Mobility   Goal  CI= 1-19%. The severity rating is based on the Level of Assistance required for Functional Mobility and ADLs.

## 2018-07-31 NOTE — ROUTINE PROCESS
Bedside and Verbal shift change report given to Alyssa Kessler RN (oncoming nurse) by Juan Hodge RN (offgoing nurse). Report included the following information SBAR, Kardex, MAR and Recent Results.     SITUATION:    Code Status: No Order   Reason for Admission: Arthritis of right hip [M16.11]    [facility] day: 1   Problem List:       Hospital Problems  Date Reviewed: 7/26/2018          Codes Class Noted POA    Hip arthritis ICD-10-CM: M16.10  ICD-9-CM: 716.95  7/30/2018 Unknown              BACKGROUND:    Past Medical History:   Past Medical History:   Diagnosis Date    Adverse effect of anesthesia     hard to wake up    Chronic pain     Depression     GERD (gastroesophageal reflux disease)     Hypothyroidism     Low back pain     Thromboembolus (HCC)     blood clot in foot during pregnancy    Tobacco abuse     Vertigo          Patient taking anticoagulants yes     ASSESSMENT:    Changes in Assessment Throughout Shift: post-op     Patient has Central Line: no Reasons if yes:    Patient has Recio Cath: no Reasons if yes:       Last Vitals:     Vitals:    07/30/18 1530 07/30/18 2007 07/30/18 2330 07/31/18 0425   BP: 120/77 (!) 138/92 113/76 121/79   Pulse: 71 77 72 71   Resp: 18 16 16 20   Temp: 98 °F (36.7 °C) 99.6 °F (37.6 °C) 98.9 °F (37.2 °C) 97.7 °F (36.5 °C)   SpO2: 99% 96%  100%   Weight:       Height:            IV and DRAINS (will only show if present)   Peripheral IV 07/30/18 Left Wrist-Site Assessment: Clean, dry, & intact     WOUND (if present)   Wound Type:  hip   Dressing present yes   Wound Concerns/Notes:  none     PAIN    Pain Assessment    Pain Intensity 1: 0 (07/31/18 0456)    Pain Location 1: Incisional, Hip    Pain Intervention(s) 1: Medication (see MAR)    Patient Stated Pain Goal: 2  o Interventions for Pain:  See mar  o Intervention effective: yes  o Time of last intervention: see mar   o Reassessment Completed: yes      Last 3 Weights:  Last 3 Recorded Weights in this Encounter    07/23/18 1037 07/30/18 0835   Weight: 98.4 kg (217 lb) 98 kg (216 lb)     Weight change:      INTAKE/OUPUT    Current Shift: 07/30 1901 - 07/31 0700  In: 1080 [P.O.:480; I.V.:600]  Out: 150 [Urine:150]    Last three shifts: 07/29 0701 - 07/30 1900  In: 1300 [I.V.:1300]  Out: 650 [Urine:200]     LAB RESULTS   No results for input(s): WBC, HGB, HCT, PLT, HGBEXT, HCTEXT, PLTEXT in the last 72 hours. No results for input(s): NA, K, GLU, BUN, CREA, CA, MG, INR in the last 72 hours. No lab exists for component: PT, PTT, INREXT    RECOMMENDATIONS AND DISCHARGE PLANNING     1. Pending tests/procedures/ Plan of Care or Other Needs: none     2. Discharge plan for patient and Needs/Barriers: home    3. Estimated Discharge Date: today Posted on Whiteboard in \[facility] [place] [facility]\"": yes      4. The patient's care plan was reviewed with the oncoming nurse. \"HEALS\" SAFETY CHECK      Fall Risk    Total Score: 4    Safety Measures: Safety Measures: Bed in low position, Bed/Chair-Wheels locked, Call light within reach, Gripper socks, Side rails X 3    A safety check occurred in the patient's room between off going nurse and oncoming nurse listed above. The safety check included the below items  Area Items   H  High Alert Medications - Verify all high alert medication drips (heparin, PCA, etc.)   E  Equipment - Suction is set up for ALL patients (with yanker)  - Red plugs utilized for all equipment (IV pumps, etc.)  - WOWs wiped down at end of shift.  - Room stocked with oxygen, suction, and other unit-specific supplies   A  Alarms - Bed alarm is set for fall risk patients  - Ensure chair alarm is in place and activated if patient is up in a chair   L  Lines - Check IV for any infiltration  - Recio bag is empty if patient has a Recio   - Tubing and IV bags are labeled   S  Safety   - Room is clean, patient is clean, and equipment is clean. - Hallways are clear from equipment besides carts.    - Fall bracelet on for fall risk patients  - Ensure room is clear and free of clutter  - Suction is set up for ALL patients (with arianne)  - Hallways are clear from equipment besides carts.    - Isolation precautions followed, supplies available outside room, sign posted     Ellen Manning RN

## 2018-07-31 NOTE — PROGRESS NOTES
Ortho    Pt. Seen and evaluated. Doing well, up with PT progressing slow  nausea  Denies cp, sob, abd pain    Visit Vitals    BP 98/60 (BP 1 Location: Right arm, BP Patient Position: At rest)    Pulse 81    Temp 98.6 °F (37 °C)    Resp 18    Ht 5' 4\" (1.626 m)    Wt 216 lb (98 kg)    SpO2 100%    Breastfeeding No    BMI 37.08 kg/m2       right total hip replacement  right hip Woundclean, dry, no drainage  Sensory intact to LT  Motor intact  nv intact  Neg calf tenderness. Labs. CBC  @  CBC:   Lab Results   Component Value Date/Time    WBC 5.6 07/23/2018 10:22 AM    RBC 4.08 (L) 07/23/2018 10:22 AM    HGB 11.7 (L) 07/23/2018 10:22 AM    HCT 36.2 07/23/2018 10:22 AM    PLATELET 955 38/81/7159 10:22 AM    and BMP:   Lab Results   Component Value Date/Time    Glucose 87 07/23/2018 10:22 AM    Sodium 145 07/23/2018 10:22 AM    Potassium 4.7 07/23/2018 10:22 AM    Chloride 108 07/23/2018 10:22 AM    CO2 31 07/23/2018 10:22 AM    BUN 12 07/23/2018 10:22 AM    Creatinine 0.77 07/23/2018 10:22 AM    Calcium 9.2 07/23/2018 10:22 AM   @  Coagulation  Lab Results   Component Value Date    INR 1.1 07/23/2018    APTT 29.1 07/23/2018      Basic Metabolic Profile  Lab Results   Component Value Date     07/23/2018    CO2 31 07/23/2018    BUN 12 07/23/2018         Assesment:rightOrthopedic / Rheumatologic: Total Hip Replacement  Past Medical History:   Diagnosis Date    Adverse effect of anesthesia     hard to wake up    Chronic pain     Depression     GERD (gastroesophageal reflux disease)     Hypothyroidism     Low back pain     Thromboembolus (HCC)     blood clot in foot during pregnancy    Tobacco abuse     Vertigo      ASA: 3    Pt is status post joint replacement and at risk for bleeding, blood clots, and infection. Plan:  aspirin, PT, IV fluid bolus. Plan for home if doing well, safe with PT, and ok with medicine.

## 2018-07-31 NOTE — PROGRESS NOTES
[facility] provided for this pt who chose [facility] services, pt and daughter who was at bedside requested a [facility] for pt with [facility], due to pt's need for assistance with ADL,IDLS and other total care needs after two prior services. Pt's daughter will transport this pt home. Pt placed in home health que for [address] [place] [address].

## 2018-07-31 NOTE — PROGRESS NOTES
Problem: Mobility Impaired (Adult and Pediatric)  Goal: *Acute Goals and Plan of Care (Insert Text)  Physical Therapy Goals  Initiated 7/30/2018 and to be accomplished within 7 day(s)  1. Patient will move from supine to sit and sit to supine , scoot up and down and roll side to side in bed with supervision/set-up. 2.  Patient will transfer from bed to chair and chair to bed with supervision/set-up using the least restrictive device. 3.  Patient will perform sit to stand with supervision/set-up. 4.  Patient will ambulate with supervision/set-up for >150 feet with the least restrictive device. 5.  Patient will ascend/descend 10 stairs with 1 handrail(s) with supervision. PT tx attempted at 821. Tx not appropriate at this time per nurse as pt is dizzy and suggests eating prior to tx. PTA agreed and will attempt at later time if appropriate.      Verydominic Connors, LPCAPRICE

## 2018-07-31 NOTE — ROUTINE PROCESS

## 2018-07-31 NOTE — ROUTINE PROCESS
3727 assumed care of pt after bedside verbal report was given by off going nurse, pt resting in bed awake, no acute distress noted, D5.45 normal saline infusing at 100 ml/hr, will monitor     0844 zofran 4 mg PIV given for nausea, will monitor     1056 normal saline bolus of 500 mg given for bp of 98/60    1123 reassessed bp 109/70, pt resting in bed awake, no distress noted    1404 zofran 4 mg PIV given for nausea, will monitor     1529 notified Yogi JESUS that pt was complaining of dizziness and nausea with movement, pt unable to be cleared by PT, orders given to discontinue Lyrica and discharge order, also decreased Roxicodone to 10 mg q 3 hours, will monitor pt    1741 pt awake in bed, pt states she feels better, pt able to eat 100 % of dinner with out nausea    1950 Bedside and Verbal shift change report given to KRISTIN Stanford (oncoming nurse) by KRISTIN Zimmerman (offgoing nurse). Report included the following information SBAR, Kardex and MAR.

## 2018-07-31 NOTE — PROGRESS NOTES
Rounded on patient post total hip replacement. Activity: Reinforced importance of getting OOB for all meals, going to bathroom to help prevent blood clots. VTE prophylaxis: Instructed patient to use their SCD's when not up and walking. To use while in bed and in the chair. Educated re: ankle pumps to assist with circulation when in hospital and at home. Medications: Reviewed pain medications patient is taking and the importance of keeping pain under control to help with getting OOB and therapy. Reminded the patient to always eat a snack with their pain medication to help to prevent nausea. Encouraged patient to monitor for constipation and to take a stool softner/laxative while recovering and on pain medication. Discussed protein consumption to promote healing  Constipation: Educated patient to take a stool softener and/or mild laxative to prevent constipation while on a narcotic. Patient educated that narcotics and decrease mobility can increase constipation so patient educated to continue to take stool softener and or laxative while on narcotics along with drinking 8 glasses of water a day (unless on fluid restriction). Incentive Spirometry: Reinforced use of incentive spirometer with return demonstration by patient. 2000ml x3  Wound Care: Dressing to surgical site dry and intact aquacel. Patient instructed not to take dressing off at home. Patient given CHG wash to use in hospital and at home. Stressed importance of using a clean towel and washcloth daily. Reminded to put on clean clothes and night clothes daily. Ice Protocol: Ice in place per protocol. Patient Safety: Call light and personal belongings in reach. in reach. Patient and family reminded to call for help toget OOB or when leaving bathroom for safety.  Phone and other items also within reach per patient's request.     Diet: Educated patient on the importance of eating three well balanced meals a day with protein to promote bone/muscle healing. Reminded patient to drink lots of fluids to protect kidneys from all the medications being taken currently with recovery. Patient given educational material to remind them to continue doing everything at home to prevent complications and have a successful recovery. Patient and family verbalized understand of all information and education discussed. Patient and family given the opportunity for asking questions. Mobility Intervention:       [] Pt dangled at edge of bed    [] Pt assisted OOB to bedside commode    [x] Pt assisted OOB to chair    [] Pt ambulated to bathroom    [] Patient was ambulated in room/hallway    Assistive Device Utilized:       [x] Rolling walker   [] Crutches   [] Straight Cane   [] Knee immobilizer   [] IV pole    After Mobilization:     [x] Patient left in no apparent distress sitting up in chair  [] Patient left in no apparent distress in bed  [x] Call bell left within reach  [x] SCDs on & machine turned on  [x] Ice applied  [] RN notified  [] Caregiver present  [] Bed alarm activated    Reason patient not mobilized:Nurse notified patient is nauseated      [] Patient refused   [x] Nausea/vomiting   [] Low blood pressure   [] Drowsy/lethargic    Pain Rating:     Left patient with call light, cell phone and personal belongings in reach for safety.

## 2018-07-31 NOTE — CONSULTS
[facility]. Pulmonary, Critical Care, and Sleep Medicine    Initial Patient Consult    Name: Chivo Ann MRN: [id number]   : 1960 Hospital: [address] [place] [address] Dr   Date: 2018        IMPRESSION:   · Right hip arthroplasty POD 1 in the setting of endstage osteoarthritis  · Hypothyroidism  · Obesity: Body mass index is 37.08 kg/(m^2). · Nausea:  Likely caused by pain medication     Patient Active Problem List   Diagnosis Code    Chronic low back pain M54.5, G89.29    Depression F32.9    Tobacco abuse Z72.0    S/P cervical spinal fusion Z98.1    Severe obesity (BMI 35.0-39.9) ([facility]) E66.01    Hip arthritis M16.10      RECOMMENDATIONS:   · Resume NP thyroid  · Antiemetics  · Frequent incentive spirometry  · Supplemental oxygen if needed to maintain SpO2 >88%  · Pain control per primary service  · Aspiration precautions  · Assess home Oxygen needs at discharge  · OT, PT, OOB and ambulate as tolerated  · DVT prophylaxis per primary service     Subjective:     Patient is a 60 y.o. female with hx of OA presented to [facility] with severe right hip pain. Pt underwent elective total hip arthoplasty by Dr. Royer Dangelo on 18. Pt underwent procedure which was unremarkable. Pt reports some nausea started last night and again this morning. Pt reports being unable to hold down her breakfast.  Pt also reports right hip pain today - dull, aching, severe, aggravated by movement, alleiviated mildly by pain medication. Pt denies chest pain, dyspnea, night sweats, fevers, chills, abdominal pain. Pt denies current smoking or occupational exposures.       Past Medical History:   Diagnosis Date    Adverse effect of anesthesia     hard to wake up    Chronic pain     Depression     GERD (gastroesophageal reflux disease)     Hypothyroidism     Low back pain     Thromboembolus (HCC)     blood clot in foot during pregnancy    Tobacco abuse     Vertigo       Past Surgical History:   Procedure Laterality Date    HX  SECTION      x3    HX HYSTERECTOMY      HX ORTHOPAEDIC  2002    2 disks removed lower back [facility].    HX ORTHOPAEDIC Right 2017    shoulder surgery    NEUROLOGICAL PROCEDURE UNLISTED        Prior to Admission medications    Medication Sig Start Date End Date Taking? Authorizing Provider   buffered aspirin (BUFFERIN) 325 mg tablet Take 1 Tab by mouth two (2) times a day. 18  Yes Pako Snow PA-C   celecoxib (CELEBREX) 200 mg capsule Take 1 Cap by mouth two (2) times a day for 90 days. 7/31/18 10/29/18 Yes Pako Snow PA-C   ferrous sulfate 325 mg (65 mg iron) tablet Take 1 Tab by mouth two (2) times daily (with meals). 18  Yes Pako Snow PA-C   oxyCODONE-acetaminophen (PERCOCET) 7.5-325 mg per tablet Take 1-2 Tabs by mouth every four (4) hours as needed for Pain. Max Daily Amount: 12 Tabs. 18  Yes Pako Snow PA-C   ondansetron hcl (ZOFRAN) 4 mg tablet Take 1 Tab by mouth every eight (8) hours as needed for Nausea. 18  Yes Pako Snow PA-C   OTHER daily. Yes Historical Provider   cyanocobalamin (VITAMIN B-12) 1,000 mcg tablet Take 1,000 mcg by mouth daily. Yes Historical Provider   NP THYROID 15 mg tablet TK 3 TS PO D 18  Yes Historical Provider   gabapentin (NEURONTIN) 300 mg capsule TK ONE PO  TID 18  Yes Historical Provider   potassium iodide/iodine (IODINE STRONG, LUGOLS, PO) Take 25 mg by mouth daily. Yes Historical Provider   Selenomethionine 200 mcg tab Take 1 Tab by mouth daily. Yes Historical Provider   cholecalciferol, vitamin D3, (VITAMIN D3) 2,000 unit tab Take 1 Tab by mouth daily.    Yes Historical Provider     Allergies   Allergen Reactions    Pcn [Penicillins] Hives      Social History   Substance Use Topics    Smoking status: Former Smoker     Packs/day: 0.25     Years: 4.00     Quit date: 2015    Smokeless tobacco: Never Used    Alcohol use No      Family History   Problem Relation Age of Onset    Hypertension Mother     Cancer Mother      multiple myeloma    Cancer Father      throat        Current Facility-Administered Medications   Medication Dose Route Frequency    dextrose 5 % - 0.45% NaCl infusion  100 mL/hr IntraVENous CONTINUOUS    sodium chloride (NS) flush 5-10 mL  5-10 mL IntraVENous Q8H    ferrous sulfate tablet 325 mg  1 Tab Oral BID WITH MEALS    acetaminophen (TYLENOL) tablet 1,000 mg  1,000 mg Oral QID    docusate sodium (COLACE) capsule 100 mg  100 mg Oral BID    celecoxib (CELEBREX) capsule 200 mg  200 mg Oral BID    pregabalin (LYRICA) capsule 50 mg  50 mg Oral BID    buffered aspirin (BUFFERIN) tablet 325 mg  325 mg Oral BID       Review of Systems:  A comprehensive ROS was performed as stated in HPI, all others are negative. ROS    Objective:   Vital Signs:    Visit Vitals    /70 (BP 1 Location: Right arm, BP Patient Position: At rest)    Pulse 73    Temp 98.6 °F (37 °C)    Resp 17    Ht 5' 4\" (1.626 m)    Wt 98 kg (216 lb)    SpO2 98%    Breastfeeding No    BMI 37.08 kg/m2       O2 Device: Room air       Temp (24hrs), Av.5 °F (36.9 °C), Min:97.7 °F (36.5 °C), Max:99.6 °F (37.6 °C)       Intake/Output:   Last shift:      701 - 1900  In: 240 [P.O.:240]  Out: 225 [Urine:225]  Last 3 shifts: 1901 -  07  In: 1919 [P.O.:480; I.V.:1900]  Out: 800 [Urine:350]    Intake/Output Summary (Last 24 hours) at 18 1224  Last data filed at 18 0944   Gross per 24 hour   Intake             1620 ml   Output              725 ml   Net              895 ml      Physical Exam:   General:  Alert, cooperative, no distress, appears stated age. Head:  Normocephalic, without obvious abnormality, atraumatic. Eyes:  Conjunctivae/corneas clear. ANicteric, PERRLA, EOMI   Nose: Nares normal. Mucosa normal. No drainage or sinus tenderness. Throat: Lips, mucosa dry.  NO thrush or oral lesions, Mallampati III   Neck: Supple, symmetrical, trachea midline, no adenopathy, no carotid bruit and no JVD. No crepitus   Lungs:   CTABL, no wheezes/rales/rhonchi   Chest wall:  No tenderness or deformity. NO CREPITUS   Heart:  Regular rate and rhythm, S1, S2 normal, no murmur, click, rub or gallop. Abdomen:   Soft, non-tender. Bowel sounds normal. No masses,  No organomegaly. No paradoxical motion   Extremities: Trace edema B/L, dressing over right hip, feet and legs atraumatic, no cyanosis   Skin: Skin color, texture, turgor normal. No rashes or lesions   Neurologic: Grossly nonfocal, normal strength and coordination grossly, AAOx3          Data review:   Labs:  No results found for this or any previous visit (from the past 24 hour(s)). PFT Results  (Last 48 hours)    None        Echo Results  (Last 48 hours)    None        Imaging:  I have personally reviewed the patients radiographs and have reviewed the reports:  CXR Results  (Last 48 hours)    None        CT Results  (Last 48 hours)    None               Above mentioned total time spent on reviewing the case/medical record/data/notes/EMR/patient examination/documentation/coordinating care with nurse/consultants, exclusive of procedures with complex decision making performed and > 50% time spent in face to face evaluation.      Fredy Duke MD/MPH     Pulmonary, Critical Care Medicine  [facility]

## 2018-07-31 NOTE — DISCHARGE SUMMARY
7/30/2018  8:10 AM    7/31/2018, 10:19 AM    Primary Dx:right Orthopedic / Rheumatologic: Total Hip Replacement  Secondary Dx: Etiological Diagnoses: none    HPI:  Pt has end stage OA and had failed conservative treatment. Due to the current findings and affected activity of daily living surgical intervention is indicated.   The alternatives, risks, complications as well as expected outcome were discussed, the patient understands and wishes to proceed with surgery    Past Medical History:   Diagnosis Date    Adverse effect of anesthesia     hard to wake up    Chronic pain     Depression     GERD (gastroesophageal reflux disease)     Hypothyroidism     Low back pain     Thromboembolus (HCC)     blood clot in foot during pregnancy    Tobacco abuse     Vertigo          Current Facility-Administered Medications:     0.9% sodium chloride infusion 500 mL, 500 mL, IntraVENous, ONCE, [employer], PA-C    dextrose 5 % - 0.45% NaCl infusion, 100 mL/hr, IntraVENous, CONTINUOUS, Bill Payne MD, Last Rate: 100 mL/hr at 07/31/18 0402, 100 mL/hr at 07/31/18 0402    sodium chloride (NS) flush 5-10 mL, 5-10 mL, IntraVENous, Q8H, Bill Payne MD, 10 mL at 07/31/18 0553    sodium chloride (NS) flush 5-10 mL, 5-10 mL, IntraVENous, PRN, Bill Payne MD    naloxone [facility]) injection 0.4 mg, 0.4 mg, IntraVENous, PRN, Bill Payne MD    ferrous sulfate tablet 325 mg, 1 Tab, Oral, BID WITH MEALS, Bill Payne MD, 325 mg at 07/31/18 1036    diphenhydrAMINE (BENADRYL) capsule 25 mg, 25 mg, Oral, Q6H PRN, Bill Payne MD    zolpidem (AMBIEN) tablet 5 mg, 5 mg, Oral, QHS PRN, Bill Payne MD    acetaminophen (TYLENOL) tablet 1,000 mg, 1,000 mg, Oral, QID, Bill Payne MD, 1,000 mg at 07/31/18 0924    oxyCODONE IR (OXY-IR) immediate release tablet 15 mg, 15 mg, Oral, Q3H PRN, Bill Payne MD, 15 mg at 07/31/18 0924    ondansetron (ZOFRAN) injection 4 mg, 4 mg, IntraVENous, Q4H PRN, Aníbal Hickman MD Osmani, 4 mg at 07/31/18 0844    docusate sodium (COLACE) capsule 100 mg, 100 mg, Oral, BID, Nancy Cassidy MD, 100 mg at 07/31/18 4532    celecoxib (CELEBREX) capsule 200 mg, 200 mg, Oral, BID, Nancy Cassidy MD, 200 mg at 07/31/18 2935    pregabalin (LYRICA) capsule 50 mg, 50 mg, Oral, BID, Nancy Cassidy MD, 50 mg at 07/31/18 9790    buffered aspirin (BUFFERIN) tablet 325 mg, 325 mg, Oral, BID, Nancy Cassidy MD, 325 mg at 07/31/18 0925      Pcn [penicillins]    Physical Exam:  General A&O x3 NAD, well developed, well nourished, normal affect  Heart: S1-S2, RRR  Lungs: CTA Bilat  Abd: soft NT, ND  Ext: n/v intact    Hospital Course:    Pt. Had rightOrthopedic / Rheumatologic: Total Hip Replacement    Post -op Course: The patient tolerated the procedure well. They were followed by internal medicine for help with medical management. Pt. Was place on Abx pre and post-op for prophylaxis against infection as well as coumadin pre and post-op for prophylaxis against DVT. Vitals signs remained stable, remained af. The wound wasclean, dry, no drainage. Pain was well controlled. Pt. Had negative calf tenderness or swelling, no evidence for DVT. Patient had PT/OT consult for evaluation and treatment. CBC  Lab Results   Component Value Date/Time    WBC 5.6 07/23/2018 10:22 AM    RBC 4.08 (L) 07/23/2018 10:22 AM    HCT 36.2 07/23/2018 10:22 AM    MCV 88.7 07/23/2018 10:22 AM    MCH 28.7 07/23/2018 10:22 AM    MCHC 32.3 07/23/2018 10:22 AM    RDW 14.5 07/23/2018 10:22 AM     Coagulation  Lab Results   Component Value Date    INR 1.1 07/23/2018    APTT 29.1 07/23/2018      Basic Metabolic Profile  Lab Results   Component Value Date     07/23/2018    CO2 31 07/23/2018    BUN 12 07/23/2018       Discharge Plan:  The patient will be d/c'd to home, total hip protocol, WBAT. She will have [facility] PT and nursing. Total joint protocol. Pt safe for homebound transfer, sp Total joint replacement.   A walker, bedside commode, and shower chair will be utilized for ADL's. Follow up with Dr. Odalis Alonzo in 10-12 days. Call with any questions or concerns.

## 2018-08-01 VITALS
TEMPERATURE: 97.2 F | HEART RATE: 85 BPM | HEIGHT: 64 IN | OXYGEN SATURATION: 97 % | SYSTOLIC BLOOD PRESSURE: 112 MMHG | DIASTOLIC BLOOD PRESSURE: 70 MMHG | BODY MASS INDEX: 36.88 KG/M2 | WEIGHT: 216 LBS | RESPIRATION RATE: 18 BRPM

## 2018-08-01 PROCEDURE — 97530 THERAPEUTIC ACTIVITIES: CPT

## 2018-08-01 PROCEDURE — 97116 GAIT TRAINING THERAPY: CPT

## 2018-08-01 PROCEDURE — 74011250637 HC RX REV CODE- 250/637: Performed by: ORTHOPAEDIC SURGERY

## 2018-08-01 PROCEDURE — 74011250637 HC RX REV CODE- 250/637: Performed by: PHYSICIAN ASSISTANT

## 2018-08-01 RX ADMIN — CELECOXIB 200 MG: 100 CAPSULE ORAL at 08:51

## 2018-08-01 RX ADMIN — OXYCODONE HYDROCHLORIDE 10 MG: 10 TABLET ORAL at 13:51

## 2018-08-01 RX ADMIN — ASPIRIN 325 MG: 325 TABLET, FILM COATED ORAL at 08:52

## 2018-08-01 RX ADMIN — Medication 10 ML: at 05:29

## 2018-08-01 RX ADMIN — FERROUS SULFATE TAB 325 MG (65 MG ELEMENTAL FE) 325 MG: 325 (65 FE) TAB at 08:51

## 2018-08-01 RX ADMIN — OXYCODONE HYDROCHLORIDE 10 MG: 10 TABLET ORAL at 02:12

## 2018-08-01 RX ADMIN — OXYCODONE HYDROCHLORIDE 10 MG: 10 TABLET ORAL at 08:52

## 2018-08-01 RX ADMIN — DOCUSATE SODIUM 100 MG: 100 CAPSULE, LIQUID FILLED ORAL at 08:51

## 2018-08-01 RX ADMIN — ACETAMINOPHEN 1000 MG: 500 TABLET, FILM COATED ORAL at 13:51

## 2018-08-01 RX ADMIN — OXYCODONE HYDROCHLORIDE 10 MG: 10 TABLET ORAL at 05:31

## 2018-08-01 NOTE — PROGRESS NOTES
Problem: Mobility Impaired (Adult and Pediatric)  Goal: *Acute Goals and Plan of Care (Insert Text)  Physical Therapy Goals  Initiated 7/30/2018 and to be accomplished within 7 day(s)  1. Patient will move from supine to sit and sit to supine , scoot up and down and roll side to side in bed with supervision/set-up. 2.  Patient will transfer from bed to chair and chair to bed with supervision/set-up using the least restrictive device. 3.  Patient will perform sit to stand with supervision/set-up. 4.  Patient will ambulate with supervision/set-up for >150 feet with the least restrictive device. 5.  Patient will ascend/descend 10 stairs with 1 handrail(s) with supervision. Outcome: Progressing Towards Goal  physical Therapy TREATMENT    Patient: Janki Richter (60 y.o. female)  Date: 8/1/2018  Diagnosis: Arthritis of right hip [M16.11] <principal problem not specified>  Procedure(s) (LRB):  RIGHT TOTAL HIP ARTHROPLASTY LATERAL/BENJY/FRANCOIS TO ASSIST/NERVE BLOCK (Left) 2 Days Post-Op  Precautions: Fall, Total hip   Chart, physical therapy assessment, plan of care and goals were reviewed. OBJECTIVE/ ASSESSMENT:  Patient found seated in recliner willing to work with PT. Pt performed sit <> stand from multiple surfaces SBA req cueing for alignment. Pt amb w/ RW out of room to [facility] SBA noting fair carry over of previous visit demonstrating improved hip and knee flexion during swing phase of gait. Pt transported to stairs for training and negotiated 10 steps utilizing right hand rail only. Pt demonstrated step to pattern ascending and descending as well as lateral descent req cueing for both for sequencing. Pt returned to room, trained on bed mobility to maintain precautions req min A for elevation of right LE into bed and left in room w/ all needs in reach and SCDs donned. Pt cont to improve w/ functional mobility w/ tx and voiced no complaints of nausea during tx.  From a mobility stand point, pt is safe to return home w/ support and is recommended to cont w/ further rehab services to maximize safety of the pt. PT will cont to provide acute care rehab services if needed. Education:safety, importance of cont mobility and support from family, activity pacing   Progression toward goals:  []      Improving appropriately and progressing toward goals  [x]      Improving slowly and progressing toward goals  []      Not making progress toward goals and plan of care will be adjusted     PLAN:  Patient continues to benefit from skilled intervention to address the above impairments. Continue treatment per established plan of care. Discharge Recommendations:  Home Health w/ assistance   Further Equipment Recommendations for Discharge:  Has RW     SUBJECTIVE:   Patient stated I'm ready to go home.     OBJECTIVE DATA SUMMARY:   Critical Behavior:  Neurologic State: Alert  Orientation Level: Oriented X4  Cognition: Appropriate decision making, Follows commands  Safety/Judgement: Awareness of environment, Fall prevention  Functional Mobility Training:  Bed Mobility:  Sit to Supine: Minimum assistance  Scooting: Minimum assistance  Transfers:  Sit to Stand: Stand-by assistance  Stand to Sit: Stand-by assistance  Balance:  Sitting: Intact  Standing: Intact; With support  Standing - Static: Good  Standing - Dynamic : Fair  Ambulation/Gait Training:  Distance (ft): 30 Feet (ft)  Assistive Device: Walker, rolling  Ambulation - Level of Assistance: Stand-by assistance  Gait Abnormalities: Decreased step clearance;Trunk sway increased; Step to gait  Base of Support: Narrowed  Speed/Debbie: Slow  Step Length: Right shortened;Left shortened  Interventions: Safety awareness training;Visual/Demos; Verbal cues  Stairs:  Number of Stairs Trained: 10  Stairs - Level of Assistance: Minimum assistance;Contact guard assistance  Rail Use: Right   Pre tx pain: 5/10   Post tx pain: 7/10  Pain Scale 1: Numeric (0 - 10)  Pain Intensity 1: 5  Pain Location 1: Knee  Pain Orientation 1: Anterior  Pain Description 1: Aching  Pain Intervention(s) 1: Medication (see MAR)  Activity Tolerance:   req no rest breaks w/ no increase in nausea   Please refer to the flowsheet for vital signs taken during this treatment. After treatment:   [] Patient left in no apparent distress sitting up in chair  [x] Patient left in no apparent distress in bed  [x] Call bell left within reach  [x] Nursing notified  [] Caregiver present  [] Bed alarm activated  [x] SCDs applied  [] Ice applied      Perla Cranker, PTA   Time Calculation: 31 mins    Mobility  Current  CJ= 20-39%. The severity rating is based on the Level of Assistance required for Functional Mobility and ADLs. Mobility   Goal  CI= 1-19%. The severity rating is based on the Level of Assistance required for Functional Mobility and ADLs.

## 2018-08-01 NOTE — PROGRESS NOTES
Reason for Admission: OSTEARTHRITIS OF THE RIGHT HIP                    RRAT Score:     7                Plan for utilizing home health:      ORDERED                    Likelihood of Readmission:  LOW                         Transition of Care Plan:  Doc Ventura [phone] [facility]    Care Management Interventions  PCP Verified by CM: Yes (DR. Sarah Pathak)  Palliative Care Criteria Met (RRAT>21 & CHF Dx)?: No  Mode of Transport at Discharge:  Other (see comment) (FAMILY WILL TRANSPORT)  Transition of Care Consult (CM Consult): Home Health, Discharge [address]: Yes  Physical Therapy Consult: Yes  Occupational Therapy Consult: Yes  Current Support Network: Own Home, Lives Alone, Family Lives [place] (PT REQUEST A PERSONAL CAREGIVER FOR ASSISTANCE  )  Confirm Follow Up Transport: Family  Plan discussed with Pt/Family/Caregiver: Yes ([facility])  Freedom of Choice Offered: Yes  [address] Provided?: No  Discharge Location  Discharge Placement: Home with home health

## 2018-08-01 NOTE — CONSULTS
[facility]. Pulmonary, Critical Care, and Sleep Medicine    F/U Patient Consult    Name: Ruben Ledesma MRN: [id number]   : 1960 Hospital: [facility]   Date: 2018        IMPRESSION:   · Right hip arthroplasty POD 2 in the setting of endstage osteoarthritis  · Constipation  · Hypothyroidism  · Obesity: Body mass index is 37.08 kg/(m^2). · Nausea:  Resolved. Likely caused by pain medication     Patient Active Problem List   Diagnosis Code    Chronic low back pain M54.5, G89.29    Depression F32.9    Tobacco abuse Z72.0    S/P cervical spinal fusion Z98.1    Severe obesity (BMI 35.0-39.9) ([facility]) E66.01    Hip arthritis M16.10      RECOMMENDATIONS:   · Initiate bowel regimen. Counseled patient on ongoing bowel regimen at home while on pain medications. Pt expressed understanding  · Resume NP thyroid  · Antiemetics PRN  · Counseled frequent incentive spirometry  · Supplemental oxygen if needed to maintain SpO2 >88%  · Pain control per primary service  · Aspiration precautions  · Assess home Oxygen needs at discharge  · OT, PT, OOB and ambulate as tolerated  · DVT prophylaxis per primary service     Subjective:   Pt seen and examined at bedside this morning. No acute events overnight. Pt reports nausea improved this morning, she was able to eat eggs. Pt reports having some chest pain and neck pain overnight from repeat episodes of dry emesis, which is improving today. Pt reports right hip and thigh pain. Denies N/V/D, chest pain, dyspnea. Pt reports no BM since admission        HPI:  Patient is a 60 y.o. female with hx of OA presented to [facility] with severe right hip pain. Pt underwent elective total hip arthoplasty by Dr. Lila Alvarado on 18. Pt underwent procedure which was unremarkable. Pt reports some nausea started last night and again this morning.   Pt reports being unable to hold down her breakfast.  Pt also reports right hip pain today - dull, aching, severe, aggravated by movement, alleiviated mildly by pain medication. Pt denies chest pain, dyspnea, night sweats, fevers, chills, abdominal pain. Pt denies current smoking or occupational exposures. Past Medical History:   Diagnosis Date    Adverse effect of anesthesia     hard to wake up    Chronic pain     Depression     GERD (gastroesophageal reflux disease)     Hypothyroidism     Low back pain     Thromboembolus (HCC)     blood clot in foot during pregnancy    Tobacco abuse     Vertigo       Past Surgical History:   Procedure Laterality Date    HX  SECTION      x3    HX HYSTERECTOMY      HX ORTHOPAEDIC  2002    2 disks removed lower back [facility].    HX ORTHOPAEDIC Right 2017    shoulder surgery    NEUROLOGICAL PROCEDURE UNLISTED        Prior to Admission medications    Medication Sig Start Date End Date Taking? Authorizing Provider   buffered aspirin (BUFFERIN) 325 mg tablet Take 1 Tab by mouth two (2) times a day. 18  Yes Khoi Llanes PA-C   celecoxib (CELEBREX) 200 mg capsule Take 1 Cap by mouth two (2) times a day for 90 days. 7/31/18 10/29/18 Yes Khoi Llanes PA-C   ferrous sulfate 325 mg (65 mg iron) tablet Take 1 Tab by mouth two (2) times daily (with meals). 18  Yes Khoi Llanes PA-C   oxyCODONE-acetaminophen (PERCOCET) 7.5-325 mg per tablet Take 1-2 Tabs by mouth every four (4) hours as needed for Pain. Max Daily Amount: 12 Tabs. 18  Yes Khoi Llanes PA-C   ondansetron hcl (ZOFRAN) 4 mg tablet Take 1 Tab by mouth every eight (8) hours as needed for Nausea. 18  Yes Khoi Llanes PA-C   OTHER daily. Yes Historical Provider   cyanocobalamin (VITAMIN B-12) 1,000 mcg tablet Take 1,000 mcg by mouth daily.    Yes Historical Provider   NP THYROID 15 mg tablet TK 3 TS PO D 18  Yes Historical Provider   gabapentin (NEURONTIN) 300 mg capsule TK ONE PO  TID 18  Yes Historical Provider   potassium iodide/iodine (IODINE STRONG, LUGOLS, PO) Take 25 mg by mouth daily. Yes Historical Provider   Selenomethionine 200 mcg tab Take 1 Tab by mouth daily. Yes Historical Provider   cholecalciferol, vitamin D3, (VITAMIN D3) 2,000 unit tab Take 1 Tab by mouth daily. Yes Historical Provider     Allergies   Allergen Reactions    Pcn [Penicillins] Hives      Social History   Substance Use Topics    Smoking status: Former Smoker     Packs/day: 0.25     Years: 4.00     Quit date: 2015    Smokeless tobacco: Never Used    Alcohol use No      Family History   Problem Relation Age of Onset    Hypertension Mother     Cancer Mother      multiple myeloma    Cancer Father      throat        Current Facility-Administered Medications   Medication Dose Route Frequency    sodium chloride (NS) flush 5-10 mL  5-10 mL IntraVENous Q8H    ferrous sulfate tablet 325 mg  1 Tab Oral BID WITH MEALS    acetaminophen (TYLENOL) tablet 1,000 mg  1,000 mg Oral QID    docusate sodium (COLACE) capsule 100 mg  100 mg Oral BID    celecoxib (CELEBREX) capsule 200 mg  200 mg Oral BID    buffered aspirin (BUFFERIN) tablet 325 mg  325 mg Oral BID       Review of Systems:  A comprehensive ROS was performed as stated in HPI, all others are negative. ROS    Objective:   Vital Signs:    Visit Vitals    /65 (BP 1 Location: Right arm, BP Patient Position: At rest)    Pulse 89    Temp 98.6 °F (37 °C)    Resp 20    Ht 5' 4\" (1.626 m)    Wt 98 kg (216 lb)    SpO2 98%    Breastfeeding No    BMI 37.08 kg/m2       O2 Device: Room air       Temp (24hrs), Av.5 °F (36.9 °C), Min:97.6 °F (36.4 °C), Max:98.9 °F (37.2 °C)       Intake/Output:   Last shift:      701 - 1900  In: 200 [P.O.:200]  Out: 500 [Urine:500]  Last 3 shifts: 1901 -  0700  In: 2100 [P.O.:1500;  I.V.:600]  Out: 1925 [Urine:1925]    Intake/Output Summary (Last 24 hours) at 18 1112  Last data filed at 18 0730   Gross per 24 hour   Intake              980 ml Output             2050 ml   Net            -1070 ml      Physical Exam:   General:  Alert, cooperative, no distress, appears stated age. Head:  Normocephalic, without obvious abnormality, atraumatic. Eyes:  Conjunctivae/corneas clear. ANicteric, PERRLA, EOMI   Throat: Lips, mucosa dry. NO thrush or oral lesions, Mallampati III   Neck: Supple, symmetrical, trachea midline, no adenopathy, no carotid bruit and no JVD. No crepitus   Lungs:   CTABL, no wheezes/rales/rhonchi   Chest wall:  No tenderness or deformity. NO CREPITUS   Heart:  Regular rate and rhythm, S1, S2 normal, no murmur, click, rub or gallop. Abdomen:   Soft, non-tender. Bowel sounds normal. No masses,  No organomegaly. No paradoxical motion   Extremities: Trace edema on right, dressing over right hip, feet and legs atraumatic, no cyanosis   Skin: Skin color, texture, turgor normal. No rashes or lesions   Neurologic: Grossly nonfocal, normal strength and coordination grossly, AAOx3          Data review:   Labs:  No results found for this or any previous visit (from the past 24 hour(s)). PFT Results  (Last 48 hours)    None        Echo Results  (Last 48 hours)    None        Imaging:  I have personally reviewed the patients radiographs and have reviewed the reports:  CXR Results  (Last 48 hours)    None        CT Results  (Last 48 hours)    None               Above mentioned total time spent on reviewing the case/medical record/data/notes/EMR/patient examination/documentation/coordinating care with nurse/consultants, exclusive of procedures with complex decision making performed and > 50% time spent in face to face evaluation.      Camryn Rios MD/MPH     Pulmonary, Critical Care Medicine  [facility]

## 2018-08-01 NOTE — ROUTINE PROCESS
Bedside and Verbal shift change report given to Demetrius Lopez RN (oncoming nurse) by Sebas Young RN (offgoing nurse). Report included the following information SBAR, Kardex, MAR and Recent Results.     SITUATION:    Code Status: No Order   Reason for Admission: Arthritis of right hip [M16.11]    [facility] day: 2   Problem List:       Hospital Problems  Date Reviewed: 7/26/2018          Codes Class Noted POA    Hip arthritis ICD-10-CM: M16.10  ICD-9-CM: 716.95  7/30/2018 Unknown              BACKGROUND:    Past Medical History:   Past Medical History:   Diagnosis Date    Adverse effect of anesthesia     hard to wake up    Chronic pain     Depression     GERD (gastroesophageal reflux disease)     Hypothyroidism     Low back pain     Thromboembolus (HCC)     blood clot in foot during pregnancy    Tobacco abuse     Vertigo          Patient taking anticoagulants yes     ASSESSMENT:    Changes in Assessment Throughout Shift: post-op day 2     Patient has Central Line: no Reasons if yes:    Patient has Recio Cath: no Reasons if yes:       Last Vitals:     Vitals:    07/31/18 1123 07/31/18 1519 07/31/18 2015 08/01/18 0411   BP: 109/70 128/77 114/66 99/62   Pulse: 73 85 92 83   Resp: 17 18 16 17   Temp: 98.6 °F (37 °C) 97.6 °F (36.4 °C) 98.8 °F (37.1 °C) 98.9 °F (37.2 °C)   SpO2: 98% 99% 99% 97%   Weight:       Height:            IV and DRAINS (will only show if present)   Peripheral IV 07/30/18 Left Wrist-Site Assessment: Clean, dry, & intact     WOUND (if present)   Wound Type:  hip   Dressing present yes   Wound Concerns/Notes:  none     PAIN    Pain Assessment    Pain Intensity 1: 0 (08/01/18 6190)    Pain Location 1: Incisional    Pain Intervention(s) 1: Medication (see MAR)    Patient Stated Pain Goal: 2  o Interventions for Pain:  See mar  o Intervention effective: yes  o Time of last intervention: see mar   o Reassessment Completed: yes      Last 3 Weights:  Last 3 Recorded Weights in this Encounter    07/23/18 1037 07/30/18 0835   Weight: 98.4 kg (217 lb) 98 kg (216 lb)     Weight change:      INTAKE/OUPUT    Current Shift: 07/31 1901 - 08/01 0700  In: 240 [P.O.:240]  Out: 1050 [IHZNA:1051]    Last three shifts: 07/30 0701 - 07/31 1900  In: 1740 [P.O.:1260; I.V.:1900]  Out: 1525 [Urine:1075]     LAB RESULTS   No results for input(s): WBC, HGB, HCT, PLT, HGBEXT, HCTEXT, PLTEXT, HGBEXT, HCTEXT, PLTEXT in the last 72 hours. No results for input(s): NA, K, GLU, BUN, CREA, CA, MG, INR in the last 72 hours. No lab exists for component: PT, PTT, INREXT, INREXT    RECOMMENDATIONS AND DISCHARGE PLANNING     1. Pending tests/procedures/ Plan of Care or Other Needs: none     2. Discharge plan for patient and Needs/Barriers: home    3. Estimated Discharge Date: today Posted on Whiteboard in [facility]: yes      4. The patient's care plan was reviewed with the oncoming nurse. \"HEALS\" SAFETY CHECK      Fall Risk    Total Score: 4    Safety Measures: Safety Measures: Bed/Chair-Wheels locked, Bed in low position, Call light within reach, Side rails X 3    A safety check occurred in the patient's room between off going nurse and oncoming nurse listed above. The safety check included the below items  Area Items   H  High Alert Medications - Verify all high alert medication drips (heparin, PCA, etc.)   E  Equipment - Suction is set up for ALL patients (with yanker)  - Red plugs utilized for all equipment (IV pumps, etc.)  - WOWs wiped down at end of shift.  - Room stocked with oxygen, suction, and other unit-specific supplies   A  Alarms - Bed alarm is set for fall risk patients  - Ensure chair alarm is in place and activated if patient is up in a chair   L  Lines - Check IV for any infiltration  - Recio bag is empty if patient has a Recio   - Tubing and IV bags are labeled   S  Safety   - Room is clean, patient is clean, and equipment is clean. - Hallways are clear from equipment besides carts. - Fall bracelet on for fall risk patients  - Ensure room is clear and free of clutter  - Suction is set up for ALL patients (with arianne)  - Hallways are clear from equipment besides carts.    - Isolation precautions followed, supplies available outside room, sign posted     Dean Hartmann RN

## 2018-08-01 NOTE — DISCHARGE INSTRUCTIONS
DISCHARGE SUMMARY from Nurse    PATIENT INSTRUCTIONS:    After general anesthesia or intravenous sedation, for 24 hours or while taking prescription Narcotics:  · Limit your activities  · Do not drive and operate hazardous machinery  · Do not make important personal or business decisions  · Do  not drink alcoholic beverages  · If you have not urinated within 8 hours after discharge, please contact your surgeon on call. Report the following to your surgeon:  · Excessive pain, swelling, redness or odor of or around the surgical area  · Temperature over 100.5  · Nausea and vomiting lasting longer than 4 hours or if unable to take medications  · Any signs of decreased circulation or nerve impairment to extremity: change in color, persistent  numbness, tingling, coldness or increase pain  · Any questions    What to do at Home:  Recommended activity: Activity as tolerated and no driving for today,     If you experience any of the following symptoms fever, chills, shortness of breath, please follow up with md.    *  Please give a list of your current medications to your Primary Care Provider. *  Please update this list whenever your medications are discontinued, doses are      changed, or new medications (including over-the-counter products) are added. *  Please carry medication information at all times in case of emergency situations. These are general instructions for a healthy lifestyle:    No smoking/ No tobacco products/ Avoid exposure to second hand smoke  Surgeon General's Warning:  Quitting smoking now greatly reduces serious risk to your health.     Obesity, smoking, and sedentary lifestyle greatly increases your risk for illness    A healthy diet, regular physical exercise & weight monitoring are important for maintaining a healthy lifestyle    You may be retaining fluid if you have a history of heart failure or if you experience any of the following symptoms:  Weight gain of 3 pounds or more overnight or 5 pounds in a week, increased swelling in our hands or feet or shortness of breath while lying flat in bed. Please call your doctor as soon as you notice any of these symptoms; do not wait until your next office visit. Recognize signs and symptoms of STROKE:    F-face looks uneven    A-arms unable to move or move unevenly    S-speech slurred or non-existent    T-time-call 911 as soon as signs and symptoms begin-DO NOT go       Back to bed or wait to see if you get better-TIME IS BRAIN. Warning Signs of HEART ATTACK     Call 911 if you have these symptoms:   Chest discomfort. Most heart attacks involve discomfort in the center of the chest that lasts more than a few minutes, or that goes away and comes back. It can feel like uncomfortable pressure, squeezing, fullness, or pain.  Discomfort in other areas of the upper body. Symptoms can include pain or discomfort in one or both arms, the back, neck, jaw, or stomach.  Shortness of breath with or without chest discomfort.  Other signs may include breaking out in a cold sweat, nausea, or lightheadedness. Don't wait more than five minutes to call 911 - MINUTES MATTER! Fast action can save your life. Calling 911 is almost always the fastest way to get lifesaving treatment. Emergency Medical Services staff can begin treatment when they arrive -- up to an hour sooner than if someone gets to the hospital by car. The discharge information has been reviewed with the patient. The patient verbalized understanding. Discharge medications reviewed with the patientDischarge Instructions for Total Hip Replacement Patients    · The dressing on your hip will be changed by the Home Health professional at the appropriate time. Keep your incision clean and dry. Do not apply any ointments to the incision. · You may shower as long as you keep you incision dry. When showering, leave your dressing on.  The dressing is waterproof as long as the edges are sealed. · Notify your surgeon if:  · Your temperature is greater than 100.5  · You have pain not controlled by your pain medication  · You have increased drainage from your incision  · You have increased redness or swelling in your leg  · You have chest pain, shortness of breath, or any other problems    · Do your exercises as instructed by the home physical therapist.    · Follow your total hip precautions:  · Do not cross your legs or feet  · Do not turn your toes inward  · Do not bed at your waist more than 90 degrees    · Keep a firm pillow between your knees when sleeping or lying down. · You may use ice to your hip as needed. Do not apply the ice pack directly to your skin. Use a barrier such as your pant leg or a thin towel. · Walk once an hour during normal walking hours. · If you have CHANTELLE hose (the white support stockings), remove them at bedtime and re-apply the hose in the morning for the next 2 weeks. Best of luck with your new hip and [address] for choosing the [address]! and appropriate educational materials and side effects teaching were provided.   ___________________________________________________________________________________________________________________________________

## 2018-08-01 NOTE — PROGRESS NOTES
Problem: Mobility Impaired (Adult and Pediatric)  Goal: *Acute Goals and Plan of Care (Insert Text)  Physical Therapy Goals  Initiated 7/30/2018 and to be accomplished within 7 day(s)  1. Patient will move from supine to sit and sit to supine , scoot up and down and roll side to side in bed with supervision/set-up. 2.  Patient will transfer from bed to chair and chair to bed with supervision/set-up using the least restrictive device. 3.  Patient will perform sit to stand with supervision/set-up. 4.  Patient will ambulate with supervision/set-up for >150 feet with the least restrictive device. 5.  Patient will ascend/descend 10 stairs with 1 handrail(s) with supervision. PT tx attempted at [phone]. Pt willing to participate, however requests pain medication prior to activity. Will follow up w/ pt at later time if appropriate.     YSABEL Taylor

## 2018-08-01 NOTE — PROGRESS NOTES
Problem: Mobility Impaired (Adult and Pediatric)  Goal: *Acute Goals and Plan of Care (Insert Text)  Physical Therapy Goals  Initiated 7/30/2018 and to be accomplished within 7 day(s)  1. Patient will move from supine to sit and sit to supine , scoot up and down and roll side to side in bed with supervision/set-up. 2.  Patient will transfer from bed to chair and chair to bed with supervision/set-up using the least restrictive device. 3.  Patient will perform sit to stand with supervision/set-up. 4.  Patient will ambulate with supervision/set-up for >150 feet with the least restrictive device. 5.  Patient will ascend/descend 10 stairs with 1 handrail(s) with supervision. Outcome: Progressing Towards Goal  physical Therapy TREATMENT    Patient: Chandana Hdz (60 y.o. female)  Date: 8/1/2018  Diagnosis: Arthritis of right hip [M16.11] <principal problem not specified>  Procedure(s) (LRB):  RIGHT TOTAL HIP ARTHROPLASTY LATERAL/BENJY/FRANCOIS TO ASSIST/NERVE BLOCK (Left) 2 Days Post-Op  Precautions: Fall, Total hip   Chart, physical therapy assessment, plan of care and goals were reviewed. OBJECTIVE/ ASSESSMENT:  Patient found in standing at sink w/ daughter w/ RW willing to work with PT. Upon arrival, PTA noticed immediate improvement from previous tx in functional mobility and over all well being. Pt slowly amb in room, performed sit <> stand CGA at standard chair for safety for PTA to gain supplies. Pt req cueing for alignment to chair as well as sequencing to maintain hip precautions. Pt amb from room in pak 100' w/ RW initially CGA progressing to SBA. Pt req mult cues to increase step height/length by increasing hip/knee flexion during swing phase of gait, which increased gait speed and efficiency. Pt returned to room w/o need of rest break and returned to recliner.  Diaphoresis noted near end of amb, however pt did not feel faint and recovered well w/ seated rest. Pt progressed well in over all functional mobility compared to previous visit, however remains very slow 2/2 to being cautious and pain. W/ proper cueing and increased confidence, pt was able to demonstrate good carry over further improving over all quality. Pt was provided w/ there-ex, further education, and left in room w/ all needs in reach. Plan to trial stair training NV if appropriate and safe to prepare for home and community. Education:home navigation and safety, gait training, importance of cont mobility in chair, importance of positioning and maintaining hip precautions   Progression toward goals:  []      Improving appropriately and progressing toward goals  [x]      Improving slowly and progressing toward goals  []      Not making progress toward goals and plan of care will be adjusted     PLAN:  Patient continues to benefit from skilled intervention to address the above impairments. Continue treatment per established plan of care. Discharge Recommendations:  Home Health  Further Equipment Recommendations for Discharge:  Has RW     SUBJECTIVE:   Patient's daughter stated If needed she can stay on the first floor until she gets strong enough to get up to her bed room.   Patient stated: \"I feel much better today, I don't feel nauseas anymore and my pain is better. \"  OBJECTIVE DATA SUMMARY:   Critical Behavior:  Neurologic State: Alert  Orientation Level: Oriented X4  Cognition: Appropriate decision making, Follows commands  Safety/Judgement: Awareness of environment, Fall prevention  Functional Mobility Training:  Transfers:  Sit to Stand: Contact guard assistance  Stand to Sit: Contact guard assistance;Stand-by assistance  Balance:  Sitting: Intact  Standing: Impaired; With support  Standing - Static: Fair;Good  Standing - Dynamic : Fair  Ambulation/Gait Training:  Distance (ft): 100 Feet (ft)  Assistive Device: Walker, rolling  Ambulation - Level of Assistance: Contact guard assistance;Stand-by assistance  Gait Abnormalities: Trunk sway increased;Decreased step clearance;Hip Hike  Base of Support: Narrowed  Speed/Debbie: Slow  Step Length: Right shortened;Left shortened  Interventions: Safety awareness training;Visual/Demos; Verbal cues    Therapeutic Exercises:       EXERCISE   Sets   Reps   Active Active Assist   Passive Self- assited ROM   Comments   Ankle Pumps 1 10  [x] [] [] []    Quad Sets   [] [] [] []    Glut Sets   [] [] [] []    Short Arc Quads   [] [] [] []    Heel Slides   [] [] [] []    Straight Leg Raises   [] [] [] []    Hip Abd   [] [] [] []    Long Arc Quads 1 10 [x] [] [] []    Seated Marching   [] [] [] []    Seated Knee Flexion   [] [] [] []    Standing Marching   [] [] [] []      Pain: did not verbalize number, noted improvement from previous visit   Activity Tolerance:   amb w/o need of rest break  Please refer to the flowsheet for vital signs taken during this treatment. After treatment:   [x] Patient left in no apparent distress sitting up in chair  [] Patient left in no apparent distress in bed  [x] Call bell left within reach  [x] Nursing notified  [x] Caregiver present  [] Bed alarm activated  [x] SCDs applied  [] Ice applied      Lawerance Pillion, PTA   Time Calculation: 39 mins    Mobility  Current  CI= 1-19%. The severity rating is based on the Level of Assistance required for Functional Mobility and ADLs. Mobility   Goal  CI= 1-19%. The severity rating is based on the Level of Assistance required for Functional Mobility and ADLs.

## 2018-08-01 NOTE — ROUTINE PROCESS
Pt given discharge instructions. Pt verbalized understanding. Pt daughter verified that each page matched her name. .  IV d/cd no signs of infection noted. Pt stable.

## 2018-08-01 NOTE — PROGRESS NOTES
Ortho    Pt. Seen and evaluated. Doing well, feeling better  Nausea improved  Progressing well with PT  Denies cp, sob, abd pain    Visit Vitals    /65 (BP 1 Location: Right arm, BP Patient Position: At rest)    Pulse 89    Temp 98.6 °F (37 °C)    Resp 20    Ht 5' 4\" (1.626 m)    Wt 216 lb (98 kg)    SpO2 98%    Breastfeeding No    BMI 37.08 kg/m2       right total hip replacement  right hip Woundclean, dry, no drainage  Sensory intact to LT  Motor intact  nv intact  Neg calf tenderness. Labs. CBC  @  CBC:   Lab Results   Component Value Date/Time    WBC 5.6 07/23/2018 10:22 AM    RBC 4.08 (L) 07/23/2018 10:22 AM    HGB 11.7 (L) 07/23/2018 10:22 AM    HCT 36.2 07/23/2018 10:22 AM    PLATELET 633 96/30/4556 10:22 AM    and BMP:   Lab Results   Component Value Date/Time    Glucose 87 07/23/2018 10:22 AM    Sodium 145 07/23/2018 10:22 AM    Potassium 4.7 07/23/2018 10:22 AM    Chloride 108 07/23/2018 10:22 AM    CO2 31 07/23/2018 10:22 AM    BUN 12 07/23/2018 10:22 AM    Creatinine 0.77 07/23/2018 10:22 AM    Calcium 9.2 07/23/2018 10:22 AM   @  Coagulation  Lab Results   Component Value Date    INR 1.1 07/23/2018    APTT 29.1 07/23/2018      Basic Metabolic Profile  Lab Results   Component Value Date     07/23/2018    CO2 31 07/23/2018    BUN 12 07/23/2018         Assesment:rightOrthopedic / Rheumatologic: Total Hip Replacement  Past Medical History:   Diagnosis Date    Adverse effect of anesthesia     hard to wake up    Chronic pain     Depression     GERD (gastroesophageal reflux disease)     Hypothyroidism     Low back pain     Thromboembolus (HCC)     blood clot in foot during pregnancy    Tobacco abuse     Vertigo      ASA: 3    Pt is status post joint replacement and at risk for bleeding, blood clots, and infection. Plan: aspirin, PT, home today if safe with PT and ok with medicine.

## 2018-08-01 NOTE — PROGRESS NOTES
Rounded on patient post total hip replacement. Activity: Reinforced importance of getting OOB for all meals, going to bathroom to help prevent blood clots. VTE prophylaxis: Instructed patient to use their SCD's when not up and walking. To use while in bed and in the chair. Educated re: ankle pumps to assist with circulation when in hospital and at home. Medications: Reviewed pain medications patient is taking and the importance of keeping pain under control to help with getting OOB and therapy. Reminded the patient to always eat a snack with their pain medication to help to prevent nausea. Encouraged patient to monitor for constipation and to take a stool softner/laxative while recovering and on pain medication. Discussed protein consumption to promote healing  Constipation: Educated patient to take a stool softener and/or mild laxative to prevent constipation while on a narcotic. Patient educated that narcotics and decrease mobility can increase constipation so patient educated to continue to take stool softener and or laxative while on narcotics along with drinking 8 glasses of water a day (unless on fluid restriction). Incentive Spirometry: Reinforced use of incentive spirometer with return demonstration by patient. Wound Care: Dressing to surgical site aquacel intact and has small amount of old drainage noted. Patient instructed not to take dressing off at home. Patient given CHG wash to use in hospital and at home. Stressed importance of using a clean towel and washcloth daily. Reminded to put on clean clothes and night clothes daily. Ice Protocol: Ice in place per protocol. Patient Safety: Call light and personal belongings in reach. in reach. Patient  reminded to call for help toget OOB or when leaving bathroom for safety.  Phone and other items also within reach per patient's request.     Diet: Educated patient on the importance of eating three well balanced meals a day with protein to promote bone/muscle healing. Reminded patient to drink lots of fluids to protect kidneys from all the medications being taken currently with recovery. Patient given educational material to remind them to continue doing everything at home to prevent complications and have a successful recovery. Patient  verbalized understand of all information and education discussed. Patient  given the opportunity for asking questions. Mobility Intervention:       [] Pt dangled at edge of bed    [] Pt assisted OOB to bedside commode    [x] Pt assisted OOB to chair    [x] Pt ambulated to bathroom    [] Patient was ambulated in room/hallway    Assistive Device Utilized:       [x] Rolling walker   [] Crutches   [] Straight Cane   [] Knee immobilizer   [] IV pole    After Mobilization:     [x] Patient left in no apparent distress sitting up in chair  [] Patient left in no apparent distress in bed  [x] Call bell left within reach  [x] SCDs on & machine turned on  [x] Ice applied  [] RN notified  [] Caregiver present  [] Bed alarm activated    Reason patient not mobilized:      [] Patient refused   [] Nausea/vomiting   [] Low blood pressure   [] Drowsy/lethargic    Pain Rating:     Left patient with call light, cell phone and personal belongings in reach for safety.

## 2018-08-01 NOTE — HOME CARE
Discharge noted for today ,[facility] will follow for SN,PT,HH Osmani Castanon Hip protocol, pt states she has her RW,shower chair and BSC,  Pt states her daughter (Roger Ramirez) will be assisting her at home after discharge;  [place] referral called over to [facility] central intake Siri Clements) for processing. KWAME GROVE.

## 2018-08-02 ENCOUNTER — HOME CARE VISIT (OUTPATIENT)
Dept: HOME HEALTH SERVICES | Facility: HOME HEALTH | Age: 58
End: 2018-08-02

## 2018-08-02 ENCOUNTER — HOME CARE VISIT (OUTPATIENT)
Dept: SCHEDULING | Facility: HOME HEALTH | Age: 58
End: 2018-08-02
Payer: MEDICAID

## 2018-08-02 PROCEDURE — G0299 HHS/HOSPICE OF RN EA 15 MIN: HCPCS

## 2018-08-02 PROCEDURE — A6255 ABSORPT DRG >16<=48 IN W/BDR: HCPCS

## 2018-08-02 PROCEDURE — 400013 HH SOC

## 2018-08-02 PROCEDURE — G0151 HHCP-SERV OF PT,EA 15 MIN: HCPCS

## 2018-08-03 ENCOUNTER — HOME CARE VISIT (OUTPATIENT)
Dept: SCHEDULING | Facility: HOME HEALTH | Age: 58
End: 2018-08-03
Payer: MEDICAID

## 2018-08-03 VITALS
OXYGEN SATURATION: 98 % | TEMPERATURE: 98.1 F | SYSTOLIC BLOOD PRESSURE: 160 MMHG | HEART RATE: 90 BPM | DIASTOLIC BLOOD PRESSURE: 82 MMHG

## 2018-08-03 VITALS
OXYGEN SATURATION: 98 % | HEART RATE: 90 BPM | SYSTOLIC BLOOD PRESSURE: 160 MMHG | DIASTOLIC BLOOD PRESSURE: 82 MMHG | TEMPERATURE: 98.1 F | RESPIRATION RATE: 16 BRPM

## 2018-08-03 VITALS
TEMPERATURE: 96.9 F | HEART RATE: 70 BPM | DIASTOLIC BLOOD PRESSURE: 70 MMHG | SYSTOLIC BLOOD PRESSURE: 110 MMHG | RESPIRATION RATE: 17 BRPM

## 2018-08-03 PROCEDURE — G0151 HHCP-SERV OF PT,EA 15 MIN: HCPCS

## 2018-08-04 ENCOUNTER — HOME CARE VISIT (OUTPATIENT)
Dept: SCHEDULING | Facility: HOME HEALTH | Age: 58
End: 2018-08-04
Payer: MEDICAID

## 2018-08-04 VITALS
TEMPERATURE: 97.8 F | DIASTOLIC BLOOD PRESSURE: 80 MMHG | SYSTOLIC BLOOD PRESSURE: 120 MMHG | HEART RATE: 76 BPM | RESPIRATION RATE: 17 BRPM

## 2018-08-04 PROCEDURE — G0151 HHCP-SERV OF PT,EA 15 MIN: HCPCS

## 2018-08-05 ENCOUNTER — HOME CARE VISIT (OUTPATIENT)
Dept: SCHEDULING | Facility: HOME HEALTH | Age: 58
End: 2018-08-05
Payer: MEDICAID

## 2018-08-05 PROCEDURE — G0151 HHCP-SERV OF PT,EA 15 MIN: HCPCS

## 2018-08-06 ENCOUNTER — HOME CARE VISIT (OUTPATIENT)
Dept: HOME HEALTH SERVICES | Facility: HOME HEALTH | Age: 58
End: 2018-08-06
Payer: MEDICAID

## 2018-08-06 ENCOUNTER — HOME CARE VISIT (OUTPATIENT)
Dept: SCHEDULING | Facility: HOME HEALTH | Age: 58
End: 2018-08-06
Payer: MEDICAID

## 2018-08-06 VITALS
OXYGEN SATURATION: 96 % | TEMPERATURE: 98.2 F | HEART RATE: 76 BPM | DIASTOLIC BLOOD PRESSURE: 78 MMHG | SYSTOLIC BLOOD PRESSURE: 122 MMHG

## 2018-08-06 PROCEDURE — G0300 HHS/HOSPICE OF LPN EA 15 MIN: HCPCS

## 2018-08-06 PROCEDURE — G0157 HHC PT ASSISTANT EA 15: HCPCS

## 2018-08-07 ENCOUNTER — HOME CARE VISIT (OUTPATIENT)
Dept: SCHEDULING | Facility: HOME HEALTH | Age: 58
End: 2018-08-07
Payer: MEDICAID

## 2018-08-07 ENCOUNTER — HOME CARE VISIT (OUTPATIENT)
Dept: HOME HEALTH SERVICES | Facility: HOME HEALTH | Age: 58
End: 2018-08-07
Payer: MEDICAID

## 2018-08-07 VITALS
DIASTOLIC BLOOD PRESSURE: 80 MMHG | HEART RATE: 75 BPM | SYSTOLIC BLOOD PRESSURE: 122 MMHG | TEMPERATURE: 98.1 F | OXYGEN SATURATION: 97 %

## 2018-08-07 PROCEDURE — G0157 HHC PT ASSISTANT EA 15: HCPCS

## 2018-08-08 ENCOUNTER — HOME CARE VISIT (OUTPATIENT)
Dept: SCHEDULING | Facility: HOME HEALTH | Age: 58
End: 2018-08-08
Payer: MEDICAID

## 2018-08-08 ENCOUNTER — HOME CARE VISIT (OUTPATIENT)
Dept: HOME HEALTH SERVICES | Facility: HOME HEALTH | Age: 58
End: 2018-08-08
Payer: MEDICAID

## 2018-08-08 VITALS
TEMPERATURE: 97.5 F | DIASTOLIC BLOOD PRESSURE: 82 MMHG | SYSTOLIC BLOOD PRESSURE: 128 MMHG | HEART RATE: 80 BPM | OXYGEN SATURATION: 97 %

## 2018-08-08 PROCEDURE — G0157 HHC PT ASSISTANT EA 15: HCPCS

## 2018-08-09 ENCOUNTER — TELEPHONE (OUTPATIENT)
Dept: ORTHOPEDIC SURGERY | Facility: CLINIC | Age: 58
End: 2018-08-09

## 2018-08-09 ENCOUNTER — HOME CARE VISIT (OUTPATIENT)
Dept: SCHEDULING | Facility: HOME HEALTH | Age: 58
End: 2018-08-09
Payer: MEDICAID

## 2018-08-09 VITALS
HEART RATE: 61 BPM | OXYGEN SATURATION: 100 % | DIASTOLIC BLOOD PRESSURE: 80 MMHG | TEMPERATURE: 97.3 F | SYSTOLIC BLOOD PRESSURE: 120 MMHG

## 2018-08-09 PROCEDURE — G0300 HHS/HOSPICE OF LPN EA 15 MIN: HCPCS

## 2018-08-09 PROCEDURE — G0157 HHC PT ASSISTANT EA 15: HCPCS

## 2018-08-09 NOTE — TELEPHONE ENCOUNTER
Faith from [place] [id number] is calling stating she is with the patient now and the patient is complaining about chest pain that she has had since Monday and is not being helped by pain med. Patient had sx 07-30-18 right knee.  Patient can be reached at [phone]

## 2018-08-09 NOTE — TELEPHONE ENCOUNTER
Tried calling patient but no answer.  Left message giving info and to call back so we can make sure that she received the message

## 2018-08-10 ENCOUNTER — HOME CARE VISIT (OUTPATIENT)
Dept: SCHEDULING | Facility: HOME HEALTH | Age: 58
End: 2018-08-10
Payer: MEDICAID

## 2018-08-11 ENCOUNTER — HOME CARE VISIT (OUTPATIENT)
Dept: SCHEDULING | Facility: HOME HEALTH | Age: 58
End: 2018-08-11
Payer: MEDICAID

## 2018-08-11 VITALS
SYSTOLIC BLOOD PRESSURE: 110 MMHG | HEART RATE: 81 BPM | TEMPERATURE: 98.4 F | DIASTOLIC BLOOD PRESSURE: 72 MMHG | OXYGEN SATURATION: 97 %

## 2018-08-11 VITALS
DIASTOLIC BLOOD PRESSURE: 80 MMHG | SYSTOLIC BLOOD PRESSURE: 126 MMHG | OXYGEN SATURATION: 98 % | HEART RATE: 82 BPM | TEMPERATURE: 98.2 F

## 2018-08-11 PROCEDURE — G0157 HHC PT ASSISTANT EA 15: HCPCS

## 2018-08-12 ENCOUNTER — HOME CARE VISIT (OUTPATIENT)
Dept: SCHEDULING | Facility: HOME HEALTH | Age: 58
End: 2018-08-12
Payer: MEDICAID

## 2018-08-12 VITALS
HEART RATE: 75 BPM | SYSTOLIC BLOOD PRESSURE: 115 MMHG | OXYGEN SATURATION: 97 % | TEMPERATURE: 98.5 F | DIASTOLIC BLOOD PRESSURE: 78 MMHG

## 2018-08-12 PROCEDURE — G0157 HHC PT ASSISTANT EA 15: HCPCS

## 2018-08-13 ENCOUNTER — HOME CARE VISIT (OUTPATIENT)
Dept: HOME HEALTH SERVICES | Facility: HOME HEALTH | Age: 58
End: 2018-08-13
Payer: MEDICAID

## 2018-08-13 ENCOUNTER — HOME CARE VISIT (OUTPATIENT)
Dept: SCHEDULING | Facility: HOME HEALTH | Age: 58
End: 2018-08-13
Payer: MEDICAID

## 2018-08-13 ENCOUNTER — OFFICE VISIT (OUTPATIENT)
Dept: ORTHOPEDIC SURGERY | Facility: CLINIC | Age: 58
End: 2018-08-13

## 2018-08-13 VITALS
OXYGEN SATURATION: 100 % | HEART RATE: 82 BPM | TEMPERATURE: 97.8 F | BODY MASS INDEX: 37.36 KG/M2 | WEIGHT: 218.8 LBS | SYSTOLIC BLOOD PRESSURE: 126 MMHG | RESPIRATION RATE: 18 BRPM | DIASTOLIC BLOOD PRESSURE: 78 MMHG | HEIGHT: 64 IN

## 2018-08-13 VITALS
TEMPERATURE: 98.2 F | OXYGEN SATURATION: 97 % | SYSTOLIC BLOOD PRESSURE: 120 MMHG | HEART RATE: 81 BPM | DIASTOLIC BLOOD PRESSURE: 80 MMHG

## 2018-08-13 DIAGNOSIS — G89.18 POST-OP PAIN: ICD-10-CM

## 2018-08-13 DIAGNOSIS — Z96.641 STATUS POST TOTAL REPLACEMENT OF RIGHT HIP: Primary | ICD-10-CM

## 2018-08-13 PROCEDURE — G0157 HHC PT ASSISTANT EA 15: HCPCS

## 2018-08-13 PROCEDURE — G0300 HHS/HOSPICE OF LPN EA 15 MIN: HCPCS

## 2018-08-13 RX ORDER — OXYCODONE AND ACETAMINOPHEN 7.5; 325 MG/1; MG/1
1 TABLET ORAL
Qty: 40 TAB | Refills: 0 | Status: SHIPPED | OUTPATIENT
Start: 2018-08-13 | End: 2018-08-30 | Stop reason: SDUPTHER

## 2018-08-13 NOTE — PROGRESS NOTES
Patient: Radha Lanier  YOB: 1960       HISTORY:  The patient presents for reevaluation of her s/p right total hip arthroplasty on 7/30/18. Patient is improved, states pain is a 4 out of 10.  she has participated in H/H physical therapy. has been doing hip exercises at home. Patient denies any fever, chills, chest pain, shortness of breath or calf pain. There are no new illness or injuries to report since last seen in the office. PHYSICAL EXAMINATION:    Visit Vitals    /78    Pulse 82    Temp 97.8 °F (36.6 °C) (Oral)    Resp 18    Ht 5' 4\" (1.626 m)    Wt 218 lb 12.8 oz (99.2 kg)    SpO2 100%    BMI 37.56 kg/m2     The patient is a well-developed, well-nourished female in no acute distress. The patient is alert and oriented times three. The patient appears to be well groomed. Mood and affect are normal.   ORTHOPEDIC EXAM of Right Hip:  Inspection: Effusion present,  incisions clean, dry intact, staples in place  Walk: with a walker today  TTP: incisional  Range of motion: hip rotates without pain  Stability: Stable   Strength: N/A  2+ distal pulses      IMPRESSION:  Status post Right total hip arthroplasty. Lateral Approach    PLAN:  Pt doing well post operatively  Incisions cleaned. Staples removed and steri strips applied  Stressed to patient that nothing causes an increase in pain or swelling. Patient is weight bearing as tolerated. OK to transition from walker to cane. Will set up outpt physical therapy. Given an order today. Patient given a refill of pain medication. Percocet 7.5 mg Patient given pain medication for short term acute pain relief. Goal is to treat patient according to above plan and to ultimately have patient off all pain medications once appropriate. If chronic pain management is required beyond what is expected for current orthopedic problem, will refer patient to pain management.   was reviewed and will be reviewed with every medication refill request.   Patient will follow up in 2 weeks.  ROM check    FEDERICA [phone]

## 2018-08-14 ENCOUNTER — HOME CARE VISIT (OUTPATIENT)
Dept: HOME HEALTH SERVICES | Facility: HOME HEALTH | Age: 58
End: 2018-08-14
Payer: MEDICAID

## 2018-08-15 ENCOUNTER — HOME CARE VISIT (OUTPATIENT)
Dept: SCHEDULING | Facility: HOME HEALTH | Age: 58
End: 2018-08-15
Payer: MEDICAID

## 2018-08-15 VITALS
OXYGEN SATURATION: 98 % | DIASTOLIC BLOOD PRESSURE: 78 MMHG | SYSTOLIC BLOOD PRESSURE: 110 MMHG | HEART RATE: 82 BPM | TEMPERATURE: 98.2 F

## 2018-08-15 PROCEDURE — G0151 HHCP-SERV OF PT,EA 15 MIN: HCPCS

## 2018-08-16 VITALS
SYSTOLIC BLOOD PRESSURE: 153 MMHG | DIASTOLIC BLOOD PRESSURE: 93 MMHG | OXYGEN SATURATION: 98 % | RESPIRATION RATE: 18 BRPM | HEART RATE: 65 BPM | OXYGEN SATURATION: 97 % | HEART RATE: 79 BPM

## 2018-08-21 ENCOUNTER — HOSPITAL ENCOUNTER (OUTPATIENT)
Dept: PHYSICAL THERAPY | Age: 58
Discharge: HOME | End: 2018-08-21
Payer: MEDICAID

## 2018-08-21 PROCEDURE — 97110 THERAPEUTIC EXERCISES: CPT

## 2018-08-21 PROCEDURE — 97162 PT EVAL MOD COMPLEX 30 MIN: CPT

## 2018-08-21 NOTE — PROGRESS NOTES
PT DAILY TREATMENT NOTE     Patient Name: Chandana Hdz  Date:2018  : 1960  [x]  Patient  Verified  Payor: [facility] MEDICAID / Plan: [place] [employer] / Product Type: Managed Care Medicaid /    In time:314  Out time:358  Total Treatment Time (min): 44  Visit #: 1 of 12    Treatment Area: Presence of right artificial hip joint [Z96.641]    SUBJECTIVE  Pain Level (0-10 scale): 0 with meds  Any medication changes, allergies to medications, adverse drug reactions, diagnosis change, or new procedure performed?: [x] No    [] Yes (see summary sheet for update)  Subjective functional status/changes:   [] No changes reported  See eval    OBJECTIVE    20 min [x]Eval                  []Re-Eval       24 min Therapeutic Exercise:  [] See flow sheet :   Rationale: increase ROM and increase strength to improve the patients ability to perform daily activities          With   [] TE   [] TA   [] neuro   [] other: Patient Education: [x] Review HEP    [] Progressed/Changed HEP based on:   [] positioning   [] body mechanics   [] transfers   [] heat/ice application    [] other:      Other Objective/Functional Measures:      Pain Level (0-10 scale) post treatment: 0    ASSESSMENT/Changes in Function: see POC    Patient will continue to benefit from skilled PT services to modify and progress therapeutic interventions, address functional mobility deficits, address ROM deficits, address strength deficits, analyze and address soft tissue restrictions, analyze and cue movement patterns and address imbalance/dizziness to attain remaining goals. [x]  See Plan of Care  []  See progress note/recertification  []  See Discharge Summary         Progress towards goals / Updated goals:  Short Term Goals: To be accomplished in 2 weeks:                         1. I and compliant with HEP for self management of symptoms.    2. Patient will ambulate with SPC >500' in clinic to increase ease and stability with household distances. Long Term Goals: To be accomplished in 6 weeks:                         1. Improve FOTO to 61 to indicate improved function with daily activities. 2. Increase right hip flexor strength to 4/5 to enable patient to transfer in/out of bathtub (I).   3. Increase right hip ABD/EXT strength to 4/5 to enable patient to ambulate community distances with [address]  []  Upgrade activities as tolerated     [x]  Continue plan of care  []  Update interventions per flow sheet       []  Discharge due to:_  []  Other:_      Tony Mom, PT 8/21/2018  4:22 PM    Future Appointments  Date Time Provider Radha Chaudhary   8/30/2018 2:00 PM FEDERICA [place] [address]

## 2018-08-30 ENCOUNTER — OFFICE VISIT (OUTPATIENT)
Dept: ORTHOPEDIC SURGERY | Age: 58
End: 2018-08-30

## 2018-08-30 VITALS
HEART RATE: 75 BPM | DIASTOLIC BLOOD PRESSURE: 81 MMHG | BODY MASS INDEX: 38.24 KG/M2 | SYSTOLIC BLOOD PRESSURE: 123 MMHG | RESPIRATION RATE: 16 BRPM | HEIGHT: 64 IN | TEMPERATURE: 97.3 F | WEIGHT: 224 LBS | OXYGEN SATURATION: 95 %

## 2018-08-30 DIAGNOSIS — Z96.641 HISTORY OF TOTAL RIGHT HIP REPLACEMENT: Primary | ICD-10-CM

## 2018-08-30 NOTE — PROGRESS NOTES
[address] [place] [address]  [phone]           Patient: Janet Escudero                MRN: [id number]       SSN: [id number]  YOB: 1960        AGE: 60 y.o. SEX: female  Body mass index is 38.45 kg/(m^2). PCP: Autumn Manuel MD  08/30/18      This office note has been dictated. REVIEW OF SYSTEMS:  Constitutional: Negative for fever, chills, weight loss and malaise/fatigue. HENT: Negative. Eyes: Negative. Respiratory: Negative. Cardiovascular: Negative. Gastrointestinal: No bowel incontinence or constipation. Genitourinary: No bladder incontinence or saddle anesthesia. Skin: Negative. Neurological: Negative. Endo/Heme/Allergies: Negative. Psychiatric/Behavioral: Negative. Musculoskeletal: As per HPI above. Past Medical History:   Diagnosis Date    Adverse effect of anesthesia     hard to wake up    Chronic pain     Depression     GERD (gastroesophageal reflux disease)     Hypothyroidism     Low back pain     Thromboembolus (HCC)     blood clot in foot during pregnancy    Tobacco abuse     Vertigo          Current Outpatient Prescriptions:     multivit,iron,min/green tea xt (DAILY MULTIPLE WEIGHT LOSS PO), Take 3 Tabs by mouth daily. Relacore weight loss tablets, Disp: , Rfl:     calcium carbonate 500 mg calcium (1,250 mg) chewable tablet, Take 2 Tabs by mouth daily as needed for Indigestion (As needed for upset stomach). , Disp: , Rfl:     levothyroxine (SYNTHROID) 50 mcg tablet, Take 50 mcg by mouth Daily (before breakfast). , Disp: , Rfl:     celecoxib (CELEBREX) 200 mg capsule, Take 1 Cap by mouth two (2) times a day for 90 days. , Disp: 60 Cap, Rfl: 2    ferrous sulfate 325 mg (65 mg iron) tablet, Take 1 Tab by mouth two (2) times daily (with meals). , Disp: 60 Tab, Rfl: 1    oxyCODONE-acetaminophen (PERCOCET) 7.5-325 mg per tablet, Take 1-2 Tabs by mouth every four (4) hours as needed for Pain. Max Daily Amount: 12 Tabs., Disp: 60 Tab, Rfl: 0    ondansetron hcl (ZOFRAN) 4 mg tablet, Take 1 Tab by mouth every eight (8) hours as needed for Nausea., Disp: 30 Tab, Rfl: 1    OTHER, daily. , Disp: , Rfl:     cyanocobalamin (VITAMIN B-12) 1,000 mcg tablet, Take 1,000 mcg by mouth daily. , Disp: , Rfl:     NP THYROID 15 mg tablet, TK 3 TS PO D, Disp: , Rfl: 1    gabapentin (NEURONTIN) 300 mg capsule, Take 1 tab by mouth three times daily, Disp: , Rfl: 2    potassium iodide/iodine (IODINE STRONG, LUGOLS, PO), Take 25 mg by mouth daily. , Disp: , Rfl:     Selenomethionine 200 mcg tab, Take 1 Tab by mouth daily. , Disp: , Rfl:     cholecalciferol, vitamin D3, (VITAMIN D3) 2,000 unit tab, Take 1 Tab by mouth daily. , Disp: , Rfl:     polyethylene glycol (MIRALAX) 17 gram/dose powder, Take 17 g by mouth daily. , Disp: , Rfl:     buffered aspirin (BUFFERIN) 325 mg tablet, Take 1 Tab by mouth two (2) times a day., Disp: 60 Tab, Rfl: 1    Allergies   Allergen Reactions    Pcn [Penicillins] Hives       Social History     Social History    Marital status: UNKNOWN     Spouse name: N/A    Number of children: N/A    Years of education: N/A     Occupational History    Not on file. Social History Main Topics    Smoking status: Former Smoker     Packs/day: 0.25     Years: 4.00     Quit date: 2015    Smokeless tobacco: Never Used    Alcohol use No    Drug use: No    Sexual activity: Not on file     Other Topics Concern    Not on file     Social History Narrative    3/2015[de-identified] currently unemployed due to chronic low back pain, used to work renovating houses. Lived with her mother until her mother's death in the summer of . Currently living with her daughters, splits time between [place] and [place].        Past Surgical History:   Procedure Laterality Date    HX  SECTION      x3    HX HIP REPLACEMENT      HX HYSTERECTOMY      HX ORTHOPAEDIC  2002    2 disks removed lower back [facility].    HX ORTHOPAEDIC Right 2017    shoulder surgery    NEUROLOGICAL PROCEDURE UNLISTED               We did see Ms. Pamela Connors for followup with regards to her right lateral approach hip replacement. She is now four weeks status post surgery and is progressing quite nicely. She is quite happy with the results of the hip replacement. She has had no troubles with the wound and no fevers, chills, systemic changes, or injuries to report, and no chest pain or shortness of breath. She has started outpatient physical therapy without complications. PHYSICAL EXAMINATION:  In general, the patient is alert and oriented x 3 in no acute distress. The patient is well-developed, well-nourished, with a normal affect. The patient is afebrile. HEENT:  Head is normocephalic and atraumatic. Pupils are equally round and reactive to light and accommodation. Extraocular eye movements are intact. Neck is supple. Trachea is midline. No JVD is present. Breathing is nonlabored. Examination of the right hip reveals the skin is intact. The surgical wounds are healed nicely. There is no erythema, no ecchymosis, no warmth, and no signs of infection or cellulitis present. Leg lengths look good. Abduction strength is progressing. ASSESSMENT:  Status post right total hip replacement. PLAN:  At this point, the patient is doing quite well. She will continue activities as tolerated and continue with outpatient physical therapy. We discussed her medications. We will see her back in the office in about three weeks time for evaluation and x-ray of her right hip upon her return. She will call with any questions or concerns that shall arise.                JR Mathew TAVERAS, PAMaggieC, ATC

## 2018-08-31 ENCOUNTER — HOSPITAL ENCOUNTER (OUTPATIENT)
Dept: PHYSICAL THERAPY | Age: 58
Discharge: HOME | End: 2018-08-31
Payer: MEDICAID

## 2018-08-31 PROCEDURE — 97110 THERAPEUTIC EXERCISES: CPT

## 2018-08-31 PROCEDURE — 97112 NEUROMUSCULAR REEDUCATION: CPT

## 2018-08-31 NOTE — PROGRESS NOTES
PT DAILY TREATMENT NOTE     Patient Name: Jessica Tellez  Date:2018  : 1960  [x]  Patient  Verified  Payor: [facility] MEDICAID / Plan: [address] / Product Type: Managed Care Medicaid /    In time:10:30  Out time:11;02  Total Treatment Time (min): 32  Visit #: 2 of 12    Treatment Area: Presence of right artificial hip joint [Z96.641]    SUBJECTIVE  Pain Level (0-10 scale): 0/10  Any medication changes, allergies to medications, adverse drug reactions, diagnosis change, or new procedure performed?: [x] No    [] Yes (see summary sheet for update)  Subjective functional status/changes:   [] No changes reported  Pt reports no new complaints of pain. Pt reports     OBJECTIVE    24 min Therapeutic Exercise:  [x] See flow sheet :   Rationale: increase ROM and increase strength to improve the patients ability to tolerate ADLs. 8 min Manual Therapy:  PROM right hip. Rationale: decrease pain, increase ROM and increase tissue extensibility to improve tolerance to ADLs. With   [] TE   [] TA   [] neuro   [] other: Patient Education: [x] Review HEP    [] Progressed/Changed HEP based on:   [] positioning   [] body mechanics   [] transfers   [] heat/ice application    [] other:      Other Objective/Functional Measures: initiated exercises as per flow sheet. Pain Level (0-10 scale) post treatment: 0/10    ASSESSMENT/Changes in Function: Pt demonstrates continued antalgic gait pattern when ambulating with SPC. Patient will continue to benefit from skilled PT services to modify and progress therapeutic interventions, address functional mobility deficits, address ROM deficits, address strength deficits, analyze and address soft tissue restrictions, analyze and cue movement patterns and analyze and modify body mechanics/ergonomics to attain remaining goals.      []  See Plan of Care  []  See progress note/recertification  []  See Discharge Summary         Progress towards goals / Updated goals:  Short Term Goals: To be accomplished in 2 weeks:                         5. I and compliant with HEP for self management of symptoms. - Initiated per patient report. 8/31/18  2. Patient will ambulate with SPC >500' in clinic to increase ease and stability with household distances. Long Term Goals: To be accomplished in 6 weeks:                         1. Improve FOTO to 61 to indicate improved function with daily activities. 2. Increase right hip flexor strength to 4/5 to enable patient to transfer in/out of bathtub (I).   3. Increase right hip ABD/EXT strength to 4/5 to enable patient to ambulate community distances with [place]  []  Upgrade activities as tolerated     [x]  Continue plan of care  []  Update interventions per flow sheet       []  Discharge due to:_  []  Other:_      Brittany Rush PTA 8/31/2018  10:49 AM    Future Appointments  Date Time Provider Radha Chaudhary   9/4/2018 12:00 PM [facility]ardenas [facility]   9/6/2018 9:30 AM Brittany Rush PTA [facility]   9/20/2018 1:30 PM FEDERICA Mcwilliams [address]

## 2018-09-04 ENCOUNTER — HOSPITAL ENCOUNTER (OUTPATIENT)
Dept: PHYSICAL THERAPY | Age: 58
Discharge: HOME | End: 2018-09-04
Payer: MEDICAID

## 2018-09-04 PROCEDURE — 97110 THERAPEUTIC EXERCISES: CPT

## 2018-09-04 PROCEDURE — 97140 MANUAL THERAPY 1/> REGIONS: CPT

## 2018-09-04 NOTE — PROGRESS NOTES
PT DAILY TREATMENT NOTE     Patient Name: Lance Sparrow  Date:2018  : 1960  [x]  Patient  Verified  Payor: [facility] MEDICAID / Plan: [address] / Product Type: Managed Care Medicaid /    In time:1200  Out time:1248  Total Treatment Time (min): 48  Visit #: 3 of 12    Treatment Area: Presence of right artificial hip joint [Z96.641]    SUBJECTIVE  Pain Level (0-10 scale): 0  Any medication changes, allergies to medications, adverse drug reactions, diagnosis change, or new procedure performed?: [x] No    [] Yes (see summary sheet for update)  Subjective functional status/changes:   [] No changes reported  I've been feeling really dizzy since . OBJECTIVE    40 min Therapeutic Exercise:  [] See flow sheet :   Rationale: increase ROM and increase strength to improve the patients ability to perform daily activities       8 min Manual Therapy:  Per flow sheet   Rationale: decrease pain, increase ROM and increase tissue extensibility to increase ease with ADLs          With   [] TE   [] TA   [] neuro   [] other: Patient Education: [x] Review HEP    [] Progressed/Changed HEP based on:   [] positioning   [] body mechanics   [] transfers   [] heat/ice application    [] other:      Other Objective/Functional Measures:  /88; pulse 70    Pain Level (0-10 scale) post treatment: 0    ASSESSMENT/Changes in Function: Added scar massage. Held some standing exercises 2/2 dizziness. Instructed patient    Patient will continue to benefit from skilled PT services to modify and progress therapeutic interventions, address functional mobility deficits, address ROM deficits, address strength deficits, analyze and address soft tissue restrictions, analyze and cue movement patterns and address imbalance/dizziness to attain remaining goals.      []  See Plan of Care  []  See progress note/recertification  []  See Discharge Summary         Progress towards goals / Updated goals:  Short Term Goals: To be accomplished in 2 weeks:                         1. I and compliant with HEP for self management of symptoms. - Initiated per patient report. 8/31/18  2. Patient will ambulate with SPC >500' in clinic to increase ease and stability with household distances. Long Term Goals: To be accomplished in 6 weeks:                         1. Improve FOTO to 61 to indicate improved function with daily activities. 2. Increase right hip flexor strength to 4/5 to enable patient to transfer in/out of bathtub (I).   3. Increase right hip ABD/EXT strength to 4/5 to enable patient to ambulate community distances with [place]  []  Upgrade activities as tolerated     [x]  Continue plan of care  []  Update interventions per flow sheet       []  Discharge due to:_  []  Other:_      Nina Garner, PT 9/4/2018  1:45 PM    Future Appointments  Date Time Provider Radha Chaudhary   9/6/2018 9:30 AM Joanna Lee PTA [facility]   9/20/2018 1:30 PM FEDERICA Hernandez

## 2018-09-06 ENCOUNTER — HOSPITAL ENCOUNTER (OUTPATIENT)
Dept: PHYSICAL THERAPY | Age: 58
Discharge: HOME | End: 2018-09-06
Payer: MEDICAID

## 2018-09-06 PROCEDURE — 97110 THERAPEUTIC EXERCISES: CPT

## 2018-09-06 NOTE — PROGRESS NOTES
PT DAILY TREATMENT NOTE     Patient Name: Oswaldo Polk  Date:2018  : 1960  [x]  Patient  Verified  Payor: [facility] MEDICAID / Plan: [address] / Product Type: Managed Care Medicaid /    In time:9:30  Out time:10:05  Total Treatment Time (min): 35  Visit #: 4 of 12    Treatment Area: Presence of right artificial hip joint [Z96.641]    SUBJECTIVE  Pain Level (0-10 scale): 0/10  Any medication changes, allergies to medications, adverse drug reactions, diagnosis change, or new procedure performed?: [x] No    [] Yes (see summary sheet for update)  Subjective functional status/changes:   [] No changes reported  Pt reports no pain. Pt denies any dizziness. OBJECTIVE    35 min Therapeutic Exercise:  [x] See flow sheet :   Rationale: increase ROM and increase strength to improve the patients ability to tolerate ADLs. With   [] TE   [] TA   [] neuro   [] other: Patient Education: [x] Review HEP    [] Progressed/Changed HEP based on:   [] positioning   [] body mechanics   [] transfers   [] heat/ice application    [] other:      Other Objective/Functional Measures: Initiated step ups. Pain Level (0-10 scale) post treatment: 0/10    ASSESSMENT/Changes in Function: Pt demonstrates continued antalgic gait pattern due to weakness in right hip. Patient will continue to benefit from skilled PT services to modify and progress therapeutic interventions, address functional mobility deficits, address ROM deficits, address strength deficits, analyze and address soft tissue restrictions, analyze and cue movement patterns and analyze and modify body mechanics/ergonomics to attain remaining goals.      []  See Plan of Care  []  See progress note/recertification  []  See Discharge Summary         Progress towards goals / Updated goals:  Short Term Goals: To be accomplished in 2 weeks:                         6. I and compliant with HEP for self management of symptoms. - Initiated per patient report. 8/31/18  2. Patient will ambulate with SPC >500' in clinic to increase ease and stability with household distances. Long Term Goals: To be accomplished in 6 weeks:                         1. Improve FOTO to 61 to indicate improved function with daily activities. 2. Increase right hip flexor strength to 4/5 to enable patient to transfer in/out of bathtub (I). 3. Increase right hip ABD/EXT strength to 4/5 to enable patient to ambulate community distances with LRAD.     PLAN  []  Upgrade activities as tolerated     [x]  Continue plan of care  []  Update interventions per flow sheet       []  Discharge due to:_  []  Other:_      Ophelia Sol, PTA 9/6/2018  9:34 AM    Future Appointments  Date Time Provider Radha Chaudhary   9/12/2018 9:30 AM Bacilio Oswald, PT [facility]   9/14/2018 10:00 AM Ophelia Sol, PTA [facility]   9/18/2018 9:30 AM Ophelia Sol, PTA [facility]   9/20/2018 1:30 PM FEDERICA Jimenez [address]   9/21/2018 9:30 AM Ophelia Sol, PTA [facility]   9/25/2018 9:30 AM Ophelia Sol, PTA [facility]   9/28/2018 9:30 AM Bacilio Oswald PT [facility]   10/2/2018 9:30 AM Ophelia Sol, PTA [facility]   10/4/2018 9:30 AM Ophelia Sol, PTA [facility]

## 2018-09-12 ENCOUNTER — HOSPITAL ENCOUNTER (OUTPATIENT)
Dept: PHYSICAL THERAPY | Age: 58
Discharge: HOME | End: 2018-09-12
Payer: MEDICAID

## 2018-09-12 PROCEDURE — 97110 THERAPEUTIC EXERCISES: CPT

## 2018-09-12 PROCEDURE — 97112 NEUROMUSCULAR REEDUCATION: CPT

## 2018-09-12 NOTE — PROGRESS NOTES
PT DAILY TREATMENT NOTE     Patient Name: Chivo Ann  Date:2018  : 1960  [x]  Patient  Verified  Payor: [facility] MEDICAID / Plan: [address] / Product Type: Managed Care Medicaid /    In time:930  Out time:1018  Total Treatment Time (min): 48  Visit #: 5 of 12    Treatment Area: Presence of right artificial hip joint [Z96.641]    SUBJECTIVE  Pain Level (0-10 scale): 0  Any medication changes, allergies to medications, adverse drug reactions, diagnosis change, or new procedure performed?: [x] No    [] Yes (see summary sheet for update)  Subjective functional status/changes:   [] No changes reported  I've been putting cocoa butter on my scar at home. OBJECTIVE    38 min Therapeutic Exercise:  [] See flow sheet :   Rationale: increase ROM and increase strength to improve the patients ability to perform daily activities     10 min Neuromuscular Re-education:  []  See flow sheet :   Rationale: increase strength, improve coordination, improve balance and increase proprioception  to improve the patients ability to ambulate community distances without risk for falls       With   [] TE   [] TA   [] neuro   [] other: Patient Education: [x] Review HEP    [] Progressed/Changed HEP based on:   [] positioning   [] body mechanics   [] transfers   [] heat/ice application    [] other:      Other Objective/Functional Measures: FOTO 47     Pain Level (0-10 scale) post treatment: 0    ASSESSMENT/Changes in Function: No change in FOTO; however, strength has improved significantly as patient is now able to abduct against gravity and ascend and descend stairs reciprocally. Patient will continue to benefit from skilled PT services to modify and progress therapeutic interventions, address functional mobility deficits, address ROM deficits, address strength deficits, analyze and address soft tissue restrictions and analyze and cue movement patterns to attain remaining goals.      []  See Plan of Care  []  See progress note/recertification  []  See Discharge Summary         Progress towards goals / Updated goals:  Short Term Goals: To be accomplished in 2 weeks:                         7. I and compliant with HEP for self management of symptoms. - Initiated per patient report. 8/31/18  2. Patient will ambulate with SPC >500' in clinic to increase ease and stability with household distances. Long Term Goals: To be accomplished in 6 weeks:                         1. Improve FOTO to 61 to indicate improved function with daily activities. 2. Increase right hip flexor strength to 4/5 to enable patient to transfer in/out of bathtub (I). 3. Increase right hip ABD/EXT strength to 4/5 to enable patient to ambulate community distances with LRAD.     PLAN  []  Upgrade activities as tolerated     [x]  Continue plan of care  []  Update interventions per flow sheet       []  Discharge due to:_  []  Other:_      Henrry Schroeder, PT 9/12/2018  9:38 AM    Future Appointments  Date Time Provider Radha Milleri   9/14/2018 10:00 AM Dionte Bruce PTA [facility]   9/18/2018 9:30 AM Dionte Bruce, SUSHIL [facility]   9/20/2018 1:30 PM FEDERICA Kramer [address]   9/21/2018 9:30 AM Dionte Bruce, SUSHIL [facility]   9/25/2018 9:30 AM Dionte Bruce, SUSHIL [facility]   9/28/2018 9:30 AM Henrry Schroeder, MICKEY [facility]   10/2/2018 9:30 AM Dionte Bruce PTA [facility]   10/4/2018 9:30 AM Dionte Bruce, PTA [facility]

## 2018-09-18 ENCOUNTER — HOSPITAL ENCOUNTER (OUTPATIENT)
Dept: PHYSICAL THERAPY | Age: 58
Discharge: HOME | End: 2018-09-18
Payer: MEDICAID

## 2018-09-18 PROCEDURE — 97112 NEUROMUSCULAR REEDUCATION: CPT

## 2018-09-18 PROCEDURE — 97110 THERAPEUTIC EXERCISES: CPT

## 2018-09-18 NOTE — PROGRESS NOTES
PT DAILY TREATMENT NOTE     Patient Name: Samia Brody  Date:2018  : 1960  [x]  Patient  Verified  Payor: [facility] MEDICAID / Plan: [address] / Product Type: Managed Care Medicaid /    In time:9:30  Out time:10:00  Total Treatment Time (min): 30  Visit #: 6 of 12    Treatment Area: Presence of right artificial hip joint [Z96.641]    SUBJECTIVE  Pain Level (0-10 scale): 3/10  Any medication changes, allergies to medications, adverse drug reactions, diagnosis change, or new procedure performed?: [x] No    [] Yes (see summary sheet for update)  Subjective functional status/changes:   [] No changes reported  Pt repots some pain this morning. Pt reports intermittent difficulty sleeping at night. OBJECTIVE    22 min Therapeutic Exercise:  [x] See flow sheet :   Rationale: increase ROM and increase strength to improve the patients ability to tolerate ADLs. 8 min Neuromuscular Re-education:  [x]  See flow sheet :   Rationale: increase strength, improve coordination and increase proprioception  to improve the patients ability to tolerate ADLs. With   [] TE   [] TA   [] neuro   [] other: Patient Education: [x] Review HEP    [] Progressed/Changed HEP based on:   [] positioning   [] body mechanics   [] transfers   [] heat/ice application    [] other:      Other Objective/Functional Measures: Pt is able to perform sidelying hip abudction AG. Pain Level (0-10 scale) post treatment: 0/10    ASSESSMENT/Changes in Function: Pt demonstrates improved gait mechanics when ambulating through treatment area. Patient will continue to benefit from skilled PT services to modify and progress therapeutic interventions, address functional mobility deficits, address ROM deficits, address strength deficits, analyze and address soft tissue restrictions, analyze and cue movement patterns and analyze and modify body mechanics/ergonomics to attain remaining goals.      []  See Plan of Care  []  See progress note/recertification  []  See Discharge Summary         Progress towards goals / Updated goals:  Short Term Goals: To be accomplished in 2 weeks:                         5. I and compliant with HEP for self management of symptoms. - Initiated per patient report. 8/31/18  2. Patient will ambulate with SPC >500' in clinic to increase ease and stability with household distances. - Met. 9/18/18  Long Term Goals: To be accomplished in 6 weeks:                         1. Improve FOTO to 61 to indicate improved function with daily activities. - not met score 47.  9/12/18  2. Increase right hip flexor strength to 4/5 to enable patient to transfer in/out of bathtub (I).   3. Increase right hip ABD/EXT strength to 4/5 to enable patient to ambulate community distances with [address]  []  Upgrade activities as tolerated     [x]  Continue plan of care  []  Update interventions per flow sheet       []  Discharge due to:_  []  Other:_      Nancy Hutton PTA 9/18/2018  9:39 AM    Future Appointments  Date Time Provider Radha Chaudhary   9/20/2018 1:30 PM FEDERICA Hernandez [address]   9/21/2018 9:30 AM Nancy Hutton PTA [facility]   9/25/2018 9:30 AM Nancy Hutton PTA [facility]   9/28/2018 9:30 AM Yamel Fagan PT [facility]   10/2/2018 9:30 AM Nancy Hutton PTA [facility]   10/4/2018 9:30 AM Nancy Hutton PTA [facility]

## 2018-09-20 ENCOUNTER — OFFICE VISIT (OUTPATIENT)
Dept: ORTHOPEDIC SURGERY | Age: 58
End: 2018-09-20

## 2018-09-20 VITALS
HEIGHT: 64 IN | WEIGHT: 218 LBS | HEART RATE: 73 BPM | TEMPERATURE: 98 F | OXYGEN SATURATION: 98 % | BODY MASS INDEX: 37.22 KG/M2 | RESPIRATION RATE: 16 BRPM | DIASTOLIC BLOOD PRESSURE: 82 MMHG | SYSTOLIC BLOOD PRESSURE: 131 MMHG

## 2018-09-20 DIAGNOSIS — Z96.641 STATUS POST TOTAL HIP REPLACEMENT, RIGHT: Primary | ICD-10-CM

## 2018-09-20 NOTE — PROGRESS NOTES
[address] [place] [address]  [phone]           Patient: Mohit Rogers                MRN: [id number]       SSN: [id number]  YOB: 1960        AGE: 60 y.o. SEX: female  Body mass index is 37.42 kg/(m^2). PCP: Vicky Monge MD  09/20/18      This office note has been dictated. REVIEW OF SYSTEMS:  Constitutional: Negative for fever, chills, weight loss and malaise/fatigue. HENT: Negative. Eyes: Negative. Respiratory: Negative. Cardiovascular: Negative. Gastrointestinal: No bowel incontinence or constipation. Genitourinary: No bladder incontinence or saddle anesthesia. Skin: Negative. Neurological: Negative. Endo/Heme/Allergies: Negative. Psychiatric/Behavioral: Negative. Musculoskeletal: As per HPI above. Past Medical History:   Diagnosis Date    Adverse effect of anesthesia     hard to wake up    Chronic pain     Depression     GERD (gastroesophageal reflux disease)     Hypothyroidism     Low back pain     Thromboembolus (HCC)     blood clot in foot during pregnancy    Tobacco abuse     Vertigo          Current Outpatient Prescriptions:     multivit,iron,min/green tea xt (DAILY MULTIPLE WEIGHT LOSS PO), Take 3 Tabs by mouth daily. Relacore weight loss tablets, Disp: , Rfl:     calcium carbonate 500 mg calcium (1,250 mg) chewable tablet, Take 2 Tabs by mouth daily as needed for Indigestion (As needed for upset stomach). , Disp: , Rfl:     levothyroxine (SYNTHROID) 50 mcg tablet, Take 50 mcg by mouth Daily (before breakfast). , Disp: , Rfl:     polyethylene glycol (MIRALAX) 17 gram/dose powder, Take 17 g by mouth daily. , Disp: , Rfl:     buffered aspirin (BUFFERIN) 325 mg tablet, Take 1 Tab by mouth two (2) times a day., Disp: 60 Tab, Rfl: 1    celecoxib (CELEBREX) 200 mg capsule, Take 1 Cap by mouth two (2) times a day for 90 days. , Disp: 60 Cap, Rfl: 2    ferrous sulfate 325 mg (65 mg iron) tablet, Take 1 Tab by mouth two (2) times daily (with meals). , Disp: 60 Tab, Rfl: 1    oxyCODONE-acetaminophen (PERCOCET) 7.5-325 mg per tablet, Take 1-2 Tabs by mouth every four (4) hours as needed for Pain. Max Daily Amount: 12 Tabs., Disp: 60 Tab, Rfl: 0    ondansetron hcl (ZOFRAN) 4 mg tablet, Take 1 Tab by mouth every eight (8) hours as needed for Nausea., Disp: 30 Tab, Rfl: 1    OTHER, daily. , Disp: , Rfl:     cyanocobalamin (VITAMIN B-12) 1,000 mcg tablet, Take 1,000 mcg by mouth daily. , Disp: , Rfl:     NP THYROID 15 mg tablet, TK 3 TS PO D, Disp: , Rfl: 1    gabapentin (NEURONTIN) 300 mg capsule, Take 1 tab by mouth three times daily, Disp: , Rfl: 2    potassium iodide/iodine (IODINE STRONG, LUGOLS, PO), Take 25 mg by mouth daily. , Disp: , Rfl:     Selenomethionine 200 mcg tab, Take 1 Tab by mouth daily. , Disp: , Rfl:     cholecalciferol, vitamin D3, (VITAMIN D3) 2,000 unit tab, Take 1 Tab by mouth daily. , Disp: , Rfl:     Allergies   Allergen Reactions    Pcn [Penicillins] Hives       Social History     Social History    Marital status: UNKNOWN     Spouse name: N/A    Number of children: N/A    Years of education: N/A     Occupational History    Not on file. Social History Main Topics    Smoking status: Former Smoker     Packs/day: 0.25     Years: 4.00     Quit date: 2015    Smokeless tobacco: Never Used    Alcohol use No    Drug use: No    Sexual activity: Not on file     Other Topics Concern    Not on file     Social History Narrative    3/2015[de-identified] currently unemployed due to chronic low back pain, used to work renovating houses. Lived with her mother until her mother's death in the summer of . Currently living with her daughters, splits time between [facility] and [place].        Past Surgical History:   Procedure Laterality Date    HX  SECTION      x3    HX HIP REPLACEMENT      HX HYSTERECTOMY      HX ORTHOPAEDIC  2002    2 disks removed lower back [facility].    HX ORTHOPAEDIC Right 2017    shoulder surgery    NEUROLOGICAL PROCEDURE UNLISTED             We did see Ms. Geovanny Hua for followup with regards to her right lap hip replacement. The patient is now approaching two months status post surgery and is doing extremely well. She is very happy with the results of the hip replacement. She is having no troubles with the wound and no fevers, chills, systemic changes, or injuries to report, and no chest pain or shortness of breath. She does continue with physical therapy without complications. She takes Tylenol for pain. PHYSICAL EXAMINATION:  In general, the patient is alert and oriented x 3 in no acute distress. The patient is well-developed, well-nourished, with a normal affect. The patient is afebrile. Examination of the right hip reveals the skin is intact. The surgical wounds are healed nicely. There is no erythema, no ecchymosis, no warmth, and no signs of infection or cellulitis present. There is no pain with rotation of the hip. There is a little tenderness to palpation to the trochanteric bursal region. There is no calf tenderness. There is a negative Violetta's sign. There are no signs for DVT present. Leg lengths look good. Abduction strength is 4+/5 on the right and 5/5 on the left. RADIOGRAPHS:  Radiographs in the office today, including AP of the pelvis and AP and cross table of the right hip show total hip components are well-fixed. There is no evidence of loosening or fracture noted. ASSESSMENT:  Status post right knee replacement. PLAN:  At this point, the patient is doing extremely well. She will continue with a home exercise program, as well as outpatient physical therapy. She denies the need for analgesics. She will followup with us in four weeks time for evaluation and strength check. She will call with any questions or concerns that shall arise.                   Reza TAVERAS PA-C, ATC

## 2018-09-21 ENCOUNTER — HOSPITAL ENCOUNTER (OUTPATIENT)
Dept: PHYSICAL THERAPY | Age: 58
Discharge: HOME | End: 2018-09-21
Payer: MEDICAID

## 2018-09-21 PROCEDURE — 97112 NEUROMUSCULAR REEDUCATION: CPT

## 2018-09-21 PROCEDURE — 97116 GAIT TRAINING THERAPY: CPT

## 2018-09-21 PROCEDURE — 97110 THERAPEUTIC EXERCISES: CPT

## 2018-09-21 NOTE — PROGRESS NOTES
PT DAILY TREATMENT NOTE     Patient Name: Yoly Sanchez  Date:2018  : 1960  [x]  Patient  Verified  Payor: [facility] MEDICAID / Plan: [address] / Product Type: Managed Care Medicaid /    In time:9:28  Out time:10:02  Total Treatment Time (min): 34  Visit #: 7 of 12    Treatment Area: Presence of right artificial hip joint [Z96.641]    SUBJECTIVE  Pain Level (0-10 scale): 0/10  Any medication changes, allergies to medications, adverse drug reactions, diagnosis change, or new procedure performed?: [x] No    [] Yes (see summary sheet for update)  Subjective functional status/changes:   [] No changes reported  Pt reports no new complaints. Pt denies any pain currently. OBJECTIVE    16 min Therapeutic Exercise:  [x] See flow sheet :   Rationale: increase ROM and increase strength to improve the patients ability to tolerate ADLs. 10 min Neuromuscular Re-education:  [x]  See flow sheet :   Rationale: increase strength, improve coordination and increase proprioception  to improve the patients ability to perform functional activities. 8 min Gait Training:  Dynamic gait activities, 60 ft each. Rationale: With   [] TE   [] TA   [] neuro   [] other: Patient Education: [x] Review HEP    [] Progressed/Changed HEP based on:   [] positioning   [] body mechanics   [] transfers   [] heat/ice application    [] other:      Other Objective/Functional Measures: right hip flexor 3+/5; hip abd and ext 3/5. Pain Level (0-10 scale) post treatment: 0/10    ASSESSMENT/Changes in Function: Pt demonstrates continue right hip weakness with difficulty ambulating with normal gait pattern without AD.      Patient will continue to benefit from skilled PT services to modify and progress therapeutic interventions, address functional mobility deficits, address ROM deficits, address strength deficits, analyze and address soft tissue restrictions, analyze and cue movement patterns and analyze and modify body mechanics/ergonomics to attain remaining goals. []  See Plan of Care  []  See progress note/recertification  []  See Discharge Summary         Progress towards goals / Updated goals:  Short Term Goals: To be accomplished in 2 weeks:                         9. I and compliant with HEP for self management of symptoms. - Initiated per patient report. 8/31/18  2. Patient will ambulate with SPC >500' in clinic to increase ease and stability with household distances. - Met. 9/18/18  Long Term Goals: To be accomplished in 6 weeks:                         1. Improve FOTO to 61 to indicate improved function with daily activities. - not met score 47.  9/12/18  2. Increase right hip flexor strength to 4/5 to enable patient to transfer in/out of bathtub (I). -not met right hip flexor 3+/5. 9/21/18  3. Increase right hip ABD/EXT strength to 4/5 to enable patient to ambulate community distances with LRAD.  - not met 3/5. 9/21/18    PLAN  []  Upgrade activities as tolerated     [x]  Continue plan of care  []  Update interventions per flow sheet       []  Discharge due to:_  []  Other:_      Willis Duverney, PTA 9/21/2018  9:35 AM    Future Appointments  Date Time Provider Radha Chaudhary   9/25/2018 9:30 AM Willis Duverney, PTA [facility]   9/28/2018 9:30 AM Dionne Blanca, MICKEY [facility]   10/2/2018 9:30 AM Willis Duverney, PTA [facility]   10/4/2018 9:30 AM Willis Duverney, PTA [facility]   10/18/2018 1:40 PM FEDERICA Lyons 68

## 2018-09-25 ENCOUNTER — HOSPITAL ENCOUNTER (OUTPATIENT)
Dept: PHYSICAL THERAPY | Age: 58
Discharge: HOME | End: 2018-09-25
Payer: MEDICAID

## 2018-09-25 PROCEDURE — 97110 THERAPEUTIC EXERCISES: CPT

## 2018-09-25 PROCEDURE — 97116 GAIT TRAINING THERAPY: CPT

## 2018-09-25 PROCEDURE — 97112 NEUROMUSCULAR REEDUCATION: CPT

## 2018-09-25 NOTE — PROGRESS NOTES
[facility]  [address] [place] [address] [address]  [phone] fax    Physical Therapy Progress Note  Patient name: Renuka Porras Start of Care: 18   Referral source: Jessica Hinson MD : 1960   Medical/Treatment Diagnosis: Presence of right artificial hip joint [Z96.641] Onset Date:18     Prior Hospitalization: see medical history Provider#: [id number]   Medications: Verified on Patient Summary List    Comorbidities: depression; right shoulder pathology   Prior Level of Function: required assistance from daughter with all ADLs; used [facility] for ambulation; lives with daughter  Visits from Start of Care: 8    Missed Visits: 0      Established Goals:        Excellent         Good         Limited            None  [] Increased ROM   []  []  []  []  [x] Increased Strength  []  []  [x]  []  [x] Increased Mobility  []  [x]  []  []   [x] Decreased Pain   []  [x]  []  []  [] Decreased Swelling  []  []  []  []    Key Functional Changes: Ambulating with SPC;however, patient is very apprehensive with stairs and dynamic balance. Updated Goals: to be achieved in 4 weeks:   1. Improve FOTO to 61 to indicate improved function with daily activities. - not met score 47.  18  2. Increase right hip flexor strength to 4/5 to enable patient to transfer in/out of bathtub (I). -not met right hip flexor 3+/5. 18  3. Increase right hip ABD/EXT strength to 4/5 to enable patient to ambulate community distances with LRAD.  - not met 3/5. 18    ASSESSMENT/RECOMMENDATIONS:  [x]Continue therapy per initial plan/protocol at a frequency of  2 x per week for 4 weeks  []Continue therapy with the following recommended changes:_____________________      _____________________________________________________________________  []Discontinue therapy progressing towards or have reached established goals  []Discontinue therapy due to lack of appreciable progress towards goals  []Discontinue therapy due to lack of attendance or compliance  []Await Physician's recommendations/decisions regarding therapy  []Other:________________________________________________________________    Thank you for this referral.    Bill Lan, PT 9/25/2018 1:32 PM  NOTE TO PHYSICIAN:  PLEASE COMPLETE THE ORDERS BELOW AND   FAX TO [facility]: [phone]  If you are unable to process this request in 24 hours please contact our office: [phone]    ? I have read the above report and request that my patient continue as recommended. ? I have read the above report and request that my patient continue therapy with the following changes/special instructions:__________________________________________________________  ? I have read the above report and request that my patient be discharged from therapy.     Physicians signature: ______________________________Date: ______Time:______

## 2018-09-25 NOTE — PROGRESS NOTES
PT DAILY TREATMENT NOTE     Patient Name: Kayleigh Workman  Date:2018  : 1960  [x]  Patient  Verified  Payor: [facility] MEDICAID / Plan: [address] / Product Type: Managed Care Medicaid /    In time:9:32  Out time:10:08  Total Treatment Time (min): 36  Visit #: 1 of 8    Treatment Area: Presence of right artificial hip joint [Z96.641]    SUBJECTIVE  Pain Level (0-10 scale): 0/10  Any medication changes, allergies to medications, adverse drug reactions, diagnosis change, or new procedure performed?: [x] No    [] Yes (see summary sheet for update)  Subjective functional status/changes:   [] No changes reported  Pt reports no new complaints of pain. Pt reports compliance with HEP. OBJECTIVE    18 min Therapeutic Exercise:  [x] See flow sheet :   Rationale: increase ROM and increase strength to improve the patients ability to tolerate ADLs. 10 min Neuromuscular Re-education:  [x]  See flow sheet :   Rationale: increase strength, improve coordination and increase proprioception  to improve the patients ability to perform functional activities. 8 min Gait Training:  Dynamic gait activities, 60 ft each with SBA supervision. Rationale: With   [] TE   [] TA   [] neuro   [] other: Patient Education: [x] Review HEP    [] Progressed/Changed HEP based on:   [] positioning   [] body mechanics   [] transfers   [] heat/ice application    [] other:      Other Objective/Functional Measures: Pt hesitates to step up on stairs with right LE. Pain Level (0-10 scale) post treatment: 0/10    ASSESSMENT/Changes in Function: Pt demonstrates continued fear avoidance with stairs and ambulating without AD.      Patient will continue to benefit from skilled PT services to modify and progress therapeutic interventions, address functional mobility deficits, address ROM deficits, address strength deficits, analyze and address soft tissue restrictions, analyze and cue movement patterns and analyze and modify body mechanics/ergonomics to attain remaining goals. []  See Plan of Care  []  See progress note/recertification  []  See Discharge Summary         Progress towards goals / Updated goals:  Short Term Goals: To be accomplished in 2 weeks:                         9. I and compliant with HEP for self management of symptoms. - Initiated per patient report. 8/31/18  2. Patient will ambulate with SPC >500' in clinic to increase ease and stability with household distances. - Met. 9/18/18  Long Term Goals: To be accomplished in 6 weeks:                         1. Improve FOTO to 61 to indicate improved function with daily activities. - not met score 47.  9/12/18  2. Increase right hip flexor strength to 4/5 to enable patient to transfer in/out of bathtub (I). -not met right hip flexor 3+/5. 9/21/18  3. Increase right hip ABD/EXT strength to 4/5 to enable patient to ambulate community distances with LRAD.  - not met 3/5. 9/21/18    PLAN  []  Upgrade activities as tolerated     [x]  Continue plan of care  []  Update interventions per flow sheet       []  Discharge due to:_  []  Other:_      Karen Powers PTA 9/25/2018  9:43 AM    Future Appointments  Date Time Provider Radha Chaudhary   9/28/2018 9:30 AM [facility]   10/2/2018 9:30 AM [facility] Powers [facility]   10/4/2018 9:30 AM [facility] Powers [facility]   10/18/2018 1:40 PM FEDERICA [address] 68

## 2018-09-28 ENCOUNTER — APPOINTMENT (OUTPATIENT)
Dept: PHYSICAL THERAPY | Age: 58
End: 2018-09-28
Payer: MEDICAID

## 2018-10-02 ENCOUNTER — HOSPITAL ENCOUNTER (OUTPATIENT)
Dept: PHYSICAL THERAPY | Age: 58
Discharge: HOME | End: 2018-10-02
Payer: MEDICAID

## 2018-10-02 PROCEDURE — 97112 NEUROMUSCULAR REEDUCATION: CPT

## 2018-10-02 PROCEDURE — 97116 GAIT TRAINING THERAPY: CPT

## 2018-10-02 PROCEDURE — 97110 THERAPEUTIC EXERCISES: CPT

## 2018-10-04 ENCOUNTER — HOSPITAL ENCOUNTER (OUTPATIENT)
Dept: PHYSICAL THERAPY | Age: 58
Discharge: HOME | End: 2018-10-04
Payer: MEDICAID

## 2018-10-04 PROCEDURE — 97112 NEUROMUSCULAR REEDUCATION: CPT

## 2018-10-04 PROCEDURE — 97110 THERAPEUTIC EXERCISES: CPT

## 2018-10-04 PROCEDURE — 97116 GAIT TRAINING THERAPY: CPT

## 2018-10-04 NOTE — PROGRESS NOTES
PT DAILY TREATMENT NOTE  Patient Name: Sandro De Los Santos Date:10/4/2018 : 1960 [x]  Patient  Verified Payor: [facility] MEDICAID / Plan: [address] / Product Type: Managed Care Medicaid / In time:9:30  Out time:10:05 Total Treatment Time (min): 35 Visit #: 3 of 8 Treatment Area: Presence of right artificial hip joint [Z96.641] SUBJECTIVE Pain Level (0-10 scale): 0/10 Any medication changes, allergies to medications, adverse drug reactions, diagnosis change, or new procedure performed?: [x] No    [] Yes (see summary sheet for update) Subjective functional status/changes:   [] No changes reported Pt reports no new complaints of pain. Pt reports compliance with HEP. OBJECTIVE 17 min Therapeutic Exercise:  [x] See flow sheet :  
Rationale: increase ROM and increase strength to improve the patients ability to tolerate ADLs. 10 min Neuromuscular Re-education:  [x]  See flow sheet :  
Rationale: increase strength, improve coordination and increase proprioception  to improve the patients ability to perform functional activities. 8 min Gait Training: forward, backward, lateral walkin feet each with no device on level surfaces with SBA level of assist  
Rationale: With 
 [] TE 
 [] TA 
 [] neuro 
 [] other: Patient Education: [x] Review HEP [] Progressed/Changed HEP based on:  
[] positioning   [] body mechanics   [] transfers   [] heat/ice application   
[] other:   
 
Other Objective/Functional Measures: Hip abduction MMT 3-/5 Pain Level (0-10 scale) post treatment: 0/10 ASSESSMENT/Changes in Function: Pt demonstrates fair control performing right hip abduction/flexion AG.  
 
Patient will continue to benefit from skilled PT services to modify and progress therapeutic interventions, address functional mobility deficits, address ROM deficits, address strength deficits, analyze and address soft tissue restrictions, analyze and cue movement patterns, analyze and modify body mechanics/ergonomics and assess and modify postural abnormalities to attain remaining goals. []  See Plan of Care 
[]  See progress note/recertification 
[]  See Discharge Summary Progress towards goals / Updated goals: 
Short Term Goals: To be accomplished in 2 weeks: 
                       9. I and compliant with HEP for self management of symptoms. - Initiated per patient report. 8/31/18 2. Patient will ambulate with SPC >500' in clinic to increase ease and stability with household distances. - Met. 9/18/18 Long Term Goals: To be accomplished in 6 weeks: 
                       1. Improve FOTO to 61 to indicate improved function with daily activities. - not met score 47.  9/12/18 2. Increase right hip flexor strength to 4/5 to enable patient to transfer in/out of bathtub (I). -not met right hip flexor 3+/5. 9/21/18 
3. Increase right hip ABD/EXT strength to 4/5 to enable patient to ambulate community distances with LRAD. - not met 3/5. 9/21/18 
  
 
PLAN 
[]  Upgrade activities as tolerated     [x]  Continue plan of care 
[]  Update interventions per flow sheet      
[]  Discharge due to:_ 
[]  Other:_ Salome Ramos PTA 10/4/2018  9:42 AM 
 
Future Appointments Date Time Provider Radha Chaudhary 10/10/2018 9:00 AM Salome Ramos PTA [id number]  
10/18/2018 1:40 PM FEDERICA Min Jaxon 68

## 2018-10-10 ENCOUNTER — HOSPITAL ENCOUNTER (OUTPATIENT)
Dept: PHYSICAL THERAPY | Age: 58
Discharge: HOME | End: 2018-10-10
Payer: MEDICAID

## 2018-10-10 PROCEDURE — 97110 THERAPEUTIC EXERCISES: CPT

## 2018-10-10 PROCEDURE — 97116 GAIT TRAINING THERAPY: CPT

## 2018-10-10 PROCEDURE — 97112 NEUROMUSCULAR REEDUCATION: CPT

## 2018-10-10 NOTE — PROGRESS NOTES
PT DAILY TREATMENT NOTE  Patient Name: Tsering Sommer Date:10/10/2018 : 1960 [x]  Patient  Verified Payor: [facility] MEDICAID / Plan: [place]  / Product Type: Managed Care Medicaid / In time:9:00  Out time:9:30 Total Treatment Time (min): 30 Visit #: 4 of 8 Treatment Area: Presence of right artificial hip joint [Z96.641] SUBJECTIVE Pain Level (0-10 scale): 0/10 Any medication changes, allergies to medications, adverse drug reactions, diagnosis change, or new procedure performed?: [x] No    [] Yes (see summary sheet for update) Subjective functional status/changes:   [] No changes reported Pt reports no new complaints. Pt reports continued difficulty performing stairs. OBJECTIVE 12 min Therapeutic Exercise:  [x] See flow sheet :  
Rationale: increase ROM and increase strength to improve the patients ability to tolerate ADLs. 10 min Neuromuscular Re-education:  [x]  See flow sheet :  
Rationale: increase strength, improve coordination and increase proprioception  to improve the patients ability to tolerate ADLs. 8 min Gait Training:  Stair training, use of bilateral hand rails, reciprocal gait pattern x4 Rationale: With 
 [] TE 
 [] TA 
 [] neuro 
 [] other: Patient Education: [x] Review HEP [] Progressed/Changed HEP based on:  
[] positioning   [] body mechanics   [] transfers   [] heat/ice application   
[] other:   
 
Other Objective/Functional Measures: FOTO:46 Pain Level (0-10 scale) post treatment: 0/10 ASSESSMENT/Changes in Function: Pt continues to make limited progress toward goals with fair control of hip flexors and quad with extreme difficulty performing supine SLR and advancing her right LE when ascending stairs.   
 
Patient will continue to benefit from skilled PT services to modify and progress therapeutic interventions, address functional mobility deficits, address ROM deficits, address strength deficits, analyze and address soft tissue restrictions, analyze and cue movement patterns and analyze and modify body mechanics/ergonomics to attain remaining goals. []  See Plan of Care 
[]  See progress note/recertification 
[]  See Discharge Summary Progress towards goals / Updated goals: 
Short Term Goals: To be accomplished in 2 weeks: 
                       8. I and compliant with HEP for self management of symptoms. - Initiated per patient report. 8/31/18 2. Patient will ambulate with SPC >500' in clinic to increase ease and stability with household distances. - Met. 9/18/18 Long Term Goals: To be accomplished in 6 weeks: 
                       1. Improve FOTO to 61 to indicate improved function with daily activities. - not met score 47.  9/12/18 2. Increase right hip flexor strength to 4/5 to enable patient to transfer in/out of bathtub (I). -not met right hip flexor 3+/5. 9/21/18 
3. Increase right hip ABD/EXT strength to 4/5 to enable patient to ambulate community distances with LRAD. - not met 3/5. 9/21/18 
  
PLAN 
[]  Upgrade activities as tolerated     [x]  Continue plan of care 
[]  Update interventions per flow sheet      
[]  Discharge due to:_ 
[]  Other:_ Dorene Mcneil PTA 10/10/2018  9:20 AM 
 
Future Appointments Date Time Provider Radha Chaudhary 10/18/2018 1:40 PM FEDERICA Madrigal J[address] 68

## 2018-10-17 ENCOUNTER — HOSPITAL ENCOUNTER (OUTPATIENT)
Dept: PHYSICAL THERAPY | Age: 58
Discharge: HOME | End: 2018-10-17
Payer: MEDICAID

## 2018-10-17 PROCEDURE — 97112 NEUROMUSCULAR REEDUCATION: CPT

## 2018-10-17 PROCEDURE — 97110 THERAPEUTIC EXERCISES: CPT

## 2018-10-17 NOTE — PROGRESS NOTES
PT DAILY TREATMENT NOTE  Patient Name: Luis E Mascorro Date:10/17/2018 : 1960 [x]  Patient  Verified Payor: [facility] MEDICAID / Plan: [address] / Product Type: Managed Care Medicaid / In time:2:00  Out time:2:33 Total Treatment Time (min): 33 Visit #: 5 of 8 Treatment Area: Pain in right hip [M25.551] Presence of right artificial hip joint [Z96.641] SUBJECTIVE Pain Level (0-10 scale): 0/10 Any medication changes, allergies to medications, adverse drug reactions, diagnosis change, or new procedure performed?: [x] No    [] Yes (see summary sheet for update) Subjective functional status/changes:   [] No changes reported Pt reports no new complaints of pain. Pt reports performing HEP daily. OBJECTIVE 23 min Therapeutic Exercise:  [x] See flow sheet :  
Rationale: increase ROM and increase strength to improve the patients ability to tolerate ADLs. 10 min Neuromuscular Re-education:  [x]  See flow sheet :  
Rationale: increase strength, improve coordination and increase proprioception  to improve the patients ability to perform functional activities. With 
 [] TE 
 [] TA 
 [] neuro 
 [] other: Patient Education: [x] Review HEP [] Progressed/Changed HEP based on:  
[] positioning   [] body mechanics   [] transfers   [] heat/ice application   
[] other:   
 
Other Objective/Functional Measures: Continued difficulty performing step up on right. Pt demonstrates minimal ability to perform supine SLR and sidelying hip abduction. Pain Level (0-10 scale) post treatment: 0/10 ASSESSMENT/Changes in Function: Pt affect demonstrating maximum effort when attempting to perform step ups, supine SLR and sidelying hip abduction but continues to make little to no progress performing these exercises. Instructed patient to make sure she is performing these specific exercises at home to increase ease of performing ADLs. Patient will continue to benefit from skilled PT services to modify and progress therapeutic interventions, address functional mobility deficits, address ROM deficits, address strength deficits, analyze and address soft tissue restrictions, analyze and cue movement patterns and analyze and modify body mechanics/ergonomics to attain remaining goals. []  See Plan of Care 
[]  See progress note/recertification 
[]  See Discharge Summary Progress towards goals / Updated goals: 
Short Term Goals: To be accomplished in 2 weeks: 
                       6. I and compliant with HEP for self management of symptoms. - Initiated per patient report. 8/31/18 2. Patient will ambulate with SPC >500' in clinic to increase ease and stability with household distances. - Met. 9/18/18 Long Term Goals: To be accomplished in 6 weeks: 
                       1. Improve FOTO to 61 to indicate improved function with daily activities. - not met score 47.  9/12/18 2. Increase right hip flexor strength to 4/5 to enable patient to transfer in/out of bathtub (I). -not met right hip flexor 3+/5. 9/21/18 
3. Increase right hip ABD/EXT strength to 4/5 to enable patient to ambulate community distances with LRAD. - not met 3/5. 9/21/18 PLAN 
[]  Upgrade activities as tolerated     [x]  Continue plan of care 
[]  Update interventions per flow sheet      
[]  Discharge due to:_ 
[]  Other:_ Diego Benz PTA 10/17/2018  2:00 PM 
 
Future Appointments Date Time Provider Radha Chaudhary 10/18/2018  1:40 PM FEDERICA Swan [address]  
10/19/2018 10:30 AM [facility]  
10/23/2018  9:30 AM [facility]  
10/25/2018 10:00 AM Zarina Root PTA [facility]  
10/30/2018  9:30 AM Zarina Root PTA [facility]

## 2018-10-19 ENCOUNTER — HOSPITAL ENCOUNTER (OUTPATIENT)
Dept: PHYSICAL THERAPY | Age: 58
Discharge: HOME | End: 2018-10-19
Payer: MEDICAID

## 2018-10-19 PROCEDURE — 97110 THERAPEUTIC EXERCISES: CPT

## 2018-10-19 PROCEDURE — 97112 NEUROMUSCULAR REEDUCATION: CPT

## 2018-10-19 NOTE — PROGRESS NOTES
PT DAILY TREATMENT NOTE  Patient Name: Nav Johnson Date:10/19/2018 : 1960 [x]  Patient  Verified Payor: [facility] MEDICAID / Plan: [address] / Product Type: Managed Care Medicaid / In time:1033  Out time:1115 Total Treatment Time (min): 42 Visit #: 6 of 8 Treatment Area: Pain in right hip [M25.551] Presence of right artificial hip joint [Z96.641] SUBJECTIVE Pain Level (0-10 scale): 0 Any medication changes, allergies to medications, adverse drug reactions, diagnosis change, or new procedure performed?: [x] No    [] Yes (see summary sheet for update) Subjective functional status/changes:   [x] No changes reported OBJECTIVE 34 min Therapeutic Exercise:  [] See flow sheet :  
Rationale: increase ROM and increase strength to improve the patients ability to perform daily activities 8 min Neuromuscular Re-education:  []  See flow sheet :  
Rationale: increase strength, improve coordination, improve balance and increase proprioception  to improve the patients ability to ambulate community distances With 
 [] TE 
 [] TA 
 [] neuro 
 [] other: Patient Education: [x] Review HEP [] Progressed/Changed HEP based on:  
[] positioning   [] body mechanics   [] transfers   [] heat/ice application   
[] other:   
 
Other Objective/Functional Measures: FOTO 54 Pain Level (0-10 scale) post treatment: 0 
 
ASSESSMENT/Changes in Function: Unable to perform SLR, but able to perform marches and extend leg out into SLR. Will add feet in [place] next visit to focus on hip strength. Patient will continue to benefit from skilled PT services to modify and progress therapeutic interventions, address functional mobility deficits, address ROM deficits, address strength deficits, analyze and address soft tissue restrictions and analyze and cue movement patterns to attain remaining goals. []  See Plan of Care 
[]  See progress note/recertification []  See Discharge Summary Progress towards goals / Updated goals: 
Short Term Goals: To be accomplished in 2 weeks: 
                       9. I and compliant with HEP for self management of symptoms. - Initiated per patient report. 8/31/18 2. Patient will ambulate with SPC >500' in clinic to increase ease and stability with household distances. - Met. 9/18/18 Long Term Goals: To be accomplished in 6 weeks: 
                       1. Improve FOTO to 61 to indicate improved function with daily activities. - not met score 47.  9/12/18; progressing- 54 10/19/18 
2. Increase right hip flexor strength to 4/5 to enable patient to transfer in/out of bathtub (I). -not met right hip flexor 3+/5. 9/21/18 
3. Increase right hip ABD/EXT strength to 4/5 to enable patient to ambulate community distances with LRAD. - not met 3/5. 9/21/18 PLAN 
[]  Upgrade activities as tolerated     [x]  Continue plan of care 
[]  Update interventions per flow sheet      
[]  Discharge due to:_ 
[]  Other:_ Ck Shepherd, PT 10/19/2018  11:54 AM 
 
Future Appointments Date Time Provider Radha Chaudhary 10/23/2018  9:30 AM Josefa Martines PTA [facility]  
10/25/2018 10:00 AM Josefa Martines PTA [facility]  
10/30/2018  9:30 AM Josefa Martines PTA [facility]  
11/1/2018  3:20 PM [facility]

## 2018-10-23 ENCOUNTER — APPOINTMENT (OUTPATIENT)
Dept: PHYSICAL THERAPY | Age: 58
End: 2018-10-23
Payer: MEDICAID

## 2018-10-25 ENCOUNTER — APPOINTMENT (OUTPATIENT)
Dept: PHYSICAL THERAPY | Age: 58
End: 2018-10-25
Payer: MEDICAID

## 2018-10-30 ENCOUNTER — HOSPITAL ENCOUNTER (OUTPATIENT)
Dept: PHYSICAL THERAPY | Age: 58
End: 2018-10-30
Payer: MEDICAID

## 2018-11-01 ENCOUNTER — OFFICE VISIT (OUTPATIENT)
Dept: ORTHOPEDIC SURGERY | Age: 58
End: 2018-11-01

## 2018-11-01 VITALS
TEMPERATURE: 96.8 F | HEART RATE: 82 BPM | OXYGEN SATURATION: 99 % | DIASTOLIC BLOOD PRESSURE: 86 MMHG | WEIGHT: 222.8 LBS | SYSTOLIC BLOOD PRESSURE: 127 MMHG | RESPIRATION RATE: 16 BRPM | HEIGHT: 64 IN | BODY MASS INDEX: 38.04 KG/M2

## 2018-11-01 DIAGNOSIS — M25.551 RIGHT HIP PAIN: Primary | ICD-10-CM

## 2018-11-01 DIAGNOSIS — Z96.641 HISTORY OF TOTAL RIGHT HIP REPLACEMENT: ICD-10-CM

## 2018-11-01 DIAGNOSIS — M65.9 SYNOVITIS: ICD-10-CM

## 2018-11-01 RX ORDER — DICLOFENAC SODIUM 10 MG/G
4 GEL TOPICAL 4 TIMES DAILY
Qty: 5 EACH | Refills: 0 | Status: SHIPPED | OUTPATIENT
Start: 2018-11-01 | End: 2018-12-14 | Stop reason: SDUPTHER

## 2018-11-01 NOTE — PROGRESS NOTES
[address], [address] [place] [address]  [phone]           Patient: Eldora Bloch                MRN: [id number]       SSN: [id number]  YOB: 1960        AGE: 60 y.o. SEX: female  Body mass index is 38.24 kg/m². PCP: Consuelo Pryor MD  11/01/18      This office note has been dictated. REVIEW OF SYSTEMS:  Constitutional: Negative for fever, chills, weight loss and malaise/fatigue. HENT: Negative. Eyes: Negative. Respiratory: Negative. Cardiovascular: Negative. Gastrointestinal: No bowel incontinence or constipation. Genitourinary: No bladder incontinence or saddle anesthesia. Skin: Negative. Neurological: Negative. Endo/Heme/Allergies: Negative. Psychiatric/Behavioral: Negative. Musculoskeletal: As per HPI above. Past Medical History:   Diagnosis Date    Adverse effect of anesthesia     hard to wake up    Chronic pain     Depression     GERD (gastroesophageal reflux disease)     Hypothyroidism     Low back pain     Thromboembolus (HCC)     blood clot in foot during pregnancy    Tobacco abuse     Vertigo          Current Outpatient Medications:     multivit,iron,min/green tea xt (DAILY MULTIPLE WEIGHT LOSS PO), Take 3 Tabs by mouth daily. Relacore weight loss tablets, Disp: , Rfl:     calcium carbonate 500 mg calcium (1,250 mg) chewable tablet, Take 2 Tabs by mouth daily as needed for Indigestion (As needed for upset stomach). , Disp: , Rfl:     levothyroxine (SYNTHROID) 50 mcg tablet, Take 50 mcg by mouth Daily (before breakfast). , Disp: , Rfl:     polyethylene glycol (MIRALAX) 17 gram/dose powder, Take 17 g by mouth daily. , Disp: , Rfl:     buffered aspirin (BUFFERIN) 325 mg tablet, Take 1 Tab by mouth two (2) times a day., Disp: 60 Tab, Rfl: 1    ferrous sulfate 325 mg (65 mg iron) tablet, Take 1 Tab by mouth two (2) times daily (with meals). , Disp: 60 Tab, Rfl: 1    ondansetron hcl (ZOFRAN) 4 mg tablet, Take 1 Tab by mouth every eight (8) hours as needed for Nausea., Disp: 30 Tab, Rfl: 1    OTHER, daily. , Disp: , Rfl:     cyanocobalamin (VITAMIN B-12) 1,000 mcg tablet, Take 1,000 mcg by mouth daily. , Disp: , Rfl:     NP THYROID 15 mg tablet, TK 3 TS PO D, Disp: , Rfl: 1    gabapentin (NEURONTIN) 300 mg capsule, Take 1 tab by mouth three times daily, Disp: , Rfl: 2    potassium iodide/iodine (IODINE STRONG, LUGOLS, PO), Take 25 mg by mouth daily. , Disp: , Rfl:     Selenomethionine 200 mcg tab, Take 1 Tab by mouth daily. , Disp: , Rfl:     cholecalciferol, vitamin D3, (VITAMIN D3) 2,000 unit tab, Take 1 Tab by mouth daily. , Disp: , Rfl:     oxyCODONE-acetaminophen (PERCOCET) 7.5-325 mg per tablet, Take 1-2 Tabs by mouth every four (4) hours as needed for Pain. Max Daily Amount: 12 Tabs., Disp: 60 Tab, Rfl: 0    Allergies   Allergen Reactions    Pcn [Penicillins] Hives       Social History     Socioeconomic History    Marital status: UNKNOWN     Spouse name: Not on file    Number of children: Not on file    Years of education: Not on file    Highest education level: Not on file   Social Needs    Financial resource strain: Not on file    Food insecurity - worry: Not on file    Food insecurity - inability: Not on file   [employer] needs - medical: Not on file   [employer] needs - non-medical: Not on file   Occupational History    Not on file   Tobacco Use    Smoking status: Former Smoker     Packs/day: 0.25     Years: 4.00     Pack years: 1.00     Last attempt to quit: 6/4/2015     Years since quitting: 3.4    Smokeless tobacco: Never Used   Substance and Sexual Activity    Alcohol use: No    Drug use: No    Sexual activity: Not on file   Other Topics Concern    Not on file   Social History Narrative    3/2015[de-identified] currently unemployed due to chronic low back pain, used to work renovating houses.   Lived with her mother until her mother's death in the summer of .  Currently living with her daughters, splits time between [place] and [facility]. Past Surgical History:   Procedure Laterality Date    HX  SECTION      x3    HX HIP REPLACEMENT      HX HYSTERECTOMY      HX ORTHOPAEDIC  2002    2 disks removed lower back [facility].    HX ORTHOPAEDIC Right 2017    shoulder surgery    NEUROLOGICAL PROCEDURE UNLISTED             We did see Ms. Butch Roque for followup with regards to her right hip. She is now a little over three months status post surgery. She is doing well. There is just a little soreness residually and a little stiffness after being seated. She has had no troubles with the wound and no fevers, chills, systemic changes, or injuries to report, and no chest pain or shortness of breath. She does continue on anti-inflammatory medications. She has a little Celebrex left at home. She has no radiating pain or numbness down the lower extremities and no change in her bowel or bladder habits. She is doing some exercises at home. She has not really been doing abduction exercises, however. PHYSICAL EXAMINATION:  In general, the patient is alert and oriented x 3 in no acute distress. The patient is well-developed, well-nourished, with a normal affect. The patient is afebrile. HEENT:  Head is normocephalic and atraumatic. Pupils are equally round and reactive to light and accommodation. Extraocular eye movements are intact. Neck is supple. Trachea is midline. No JVD is present. Breathing is nonlabored. Examination of the lower extremities reveals pain-free range of motion of the hips. She does have a little tenderness to palpation of the greater trochanteric bursal region to the wound itself, which is healed up nicely. There are no signs for infection or cellulitis present. Leg lengths look good. Abduction strength is 4+/5 on the right and 5/5 on the left. There is a little discomfort with hip flexion.   There is good strength, however. ASSESSMENT:  Right lateral approach hip replacement. PLAN:  At this point, the patient is doing well. She does have residual soreness to the hip. We re going to obtain some basic labs, CBC, ESR, CRP, IL-6, and uric acid. I expect these will come back as negative. She will continue on her Celebrex. She was given a prescription for Voltaren Gel to apply to the lateral aspet of her hip about four times a day. She will continue on Tylenol. She is taking Gabapentin. We will plan on seeing her back in the office in about three or four weeks time for evaluation. I have asked her to get her blood work done about one week before she comes back and sees us, so we can review it and possibly do an injection for the bursitis.                  JR Mathew TAVERAS, PAMaggieC, ATC

## 2018-11-02 NOTE — PROGRESS NOTES
In [address]  [address] [place] [address] [place], [address] 
[phone] fax Physical Therapy Discharge Summary Patient name: Kenna Dave Start of Care: 18 Referral source: Kevin Price MD : 1960 Medical/Treatment Diagnosis: Pain in right hip [M25.551] Presence of right artificial hip joint [Z96.641] Payor: [facility] MEDICAID / Plan: [address] / Product Type: Managed Care Medicaid /  Onset Date:18 Prior Hospitalization: see medical history Provider#: [id number] Medications: Verified on Patient Summary List   
Comorbidities: depression; right shoulder pathology 
 Prior Level of Function: required assistance from daughter with all ADLs; used [facility] for ambulation; lives with daughter Visits from Start of Care: 13    Missed Visits: 1 Reporting Period : 18 to 10/19/18 Summary of Care: 
Short Term Goals: To be accomplished in 2 weeks: 
                       3. I and compliant with HEP for self management of symptoms. - Initiated per patient report. 18 2. Patient will ambulate with SPC >500' in clinic to increase ease and stability with household distances. - Met. 18 Long Term Goals: To be accomplished in 6 weeks: 
                       1. Improve FOTO to 61 to indicate improved function with daily activities. - not met score 47.  18; progressing- 54 10/19/18 
2. Increase right hip flexor strength to 4/5 to enable patient to transfer in/out of bathtub (I). -not met right hip flexor 3+/5. 18 
3. Increase right hip ABD/EXT strength to 4/5 to enable patient to ambulate community distances with LRAD. - not met 3/5. 18 
 Patient has been discharged per MD recommendation ASSESSMENT/RECOMMENDATIONS: 
[x]Discontinue therapy: []Patient has reached or is progressing toward set goals [x]Patient is non-compliant or has abdicated []Due to lack of appreciable progress towards set goals Delmi Garrido, PT 11/2/2018 12:30 PM

## 2018-11-09 ENCOUNTER — HOSPITAL ENCOUNTER (OUTPATIENT)
Dept: LAB | Age: 58
Discharge: HOME | End: 2018-11-09
Payer: MEDICAID

## 2018-11-09 DIAGNOSIS — M65.9 SYNOVITIS: ICD-10-CM

## 2018-11-09 LAB
BASOPHILS # BLD: 0 K/UL (ref 0–0.1)
BASOPHILS NFR BLD: 0 % (ref 0–2)
CRP SERPL-MCNC: <0.3 MG/DL (ref 0–0.3)
DIFFERENTIAL METHOD BLD: ABNORMAL
EOSINOPHIL # BLD: 0.2 K/UL (ref 0–0.4)
EOSINOPHIL NFR BLD: 5 % (ref 0–5)
ERYTHROCYTE [DISTWIDTH] IN BLOOD BY AUTOMATED COUNT: 14 % (ref 11.6–14.5)
ERYTHROCYTE [SEDIMENTATION RATE] IN BLOOD: 15 MM/HR (ref 0–30)
HCT VFR BLD AUTO: 36.4 % (ref 35–45)
HGB BLD-MCNC: 11.6 G/DL (ref 12–16)
LYMPHOCYTES # BLD: 2.2 K/UL (ref 0.9–3.6)
LYMPHOCYTES NFR BLD: 45 % (ref 21–52)
MCH RBC QN AUTO: 28.2 PG (ref 24–34)
MCHC RBC AUTO-ENTMCNC: 31.9 G/DL (ref 31–37)
MCV RBC AUTO: 88.6 FL (ref 74–97)
MONOCYTES # BLD: 0.2 K/UL (ref 0.05–1.2)
MONOCYTES NFR BLD: 5 % (ref 3–10)
NEUTS SEG # BLD: 2.2 K/UL (ref 1.8–8)
NEUTS SEG NFR BLD: 45 % (ref 40–73)
PLATELET # BLD AUTO: 250 K/UL (ref 135–420)
PMV BLD AUTO: 9.5 FL (ref 9.2–11.8)
RBC # BLD AUTO: 4.11 M/UL (ref 4.2–5.3)
URATE SERPL-MCNC: 5.1 MG/DL (ref 2.6–7.2)
WBC # BLD AUTO: 4.9 K/UL (ref 4.6–13.2)

## 2018-11-09 PROCEDURE — 83520 IMMUNOASSAY QUANT NOS NONAB: CPT

## 2018-11-09 PROCEDURE — 86140 C-REACTIVE PROTEIN: CPT

## 2018-11-09 PROCEDURE — 85025 COMPLETE CBC W/AUTO DIFF WBC: CPT

## 2018-11-09 PROCEDURE — 36415 COLL VENOUS BLD VENIPUNCTURE: CPT

## 2018-11-09 PROCEDURE — 84550 ASSAY OF BLOOD/URIC ACID: CPT

## 2018-11-09 PROCEDURE — 85652 RBC SED RATE AUTOMATED: CPT

## 2018-11-13 LAB — IL6 SERPL-MCNC: 1.6 PG/ML (ref 0–15.5)

## 2018-12-14 ENCOUNTER — OFFICE VISIT (OUTPATIENT)
Dept: ORTHOPEDIC SURGERY | Age: 58
End: 2018-12-14

## 2018-12-14 VITALS
RESPIRATION RATE: 16 BRPM | HEIGHT: 64 IN | SYSTOLIC BLOOD PRESSURE: 135 MMHG | BODY MASS INDEX: 37.46 KG/M2 | HEART RATE: 75 BPM | TEMPERATURE: 97.1 F | DIASTOLIC BLOOD PRESSURE: 104 MMHG | WEIGHT: 219.4 LBS

## 2018-12-14 DIAGNOSIS — M70.61 TROCHANTERIC BURSITIS OF RIGHT HIP: ICD-10-CM

## 2018-12-14 DIAGNOSIS — M25.551 RIGHT HIP PAIN: ICD-10-CM

## 2018-12-14 DIAGNOSIS — Z96.641 HISTORY OF TOTAL RIGHT HIP REPLACEMENT: Primary | ICD-10-CM

## 2018-12-14 RX ORDER — DICLOFENAC SODIUM 10 MG/G
4 GEL TOPICAL 4 TIMES DAILY
Qty: 5 EACH | Refills: 0 | Status: SHIPPED | OUTPATIENT
Start: 2018-12-14 | End: 2018-12-20 | Stop reason: SDUPTHER

## 2018-12-14 NOTE — PROGRESS NOTES
1. Have you been to the ER, urgent care clinic since your last visit? Hospitalized since your last visit? No    2. Have you seen or consulted any other health care providers outside of the [address] [place] [address] since your last visit? Include any pap smears or colon screening.  No

## 2018-12-14 NOTE — PROGRESS NOTES
[address] [place] [address]  [phone]           Patient: Naseem Rob                MRN: [id number]       SSN: [id number]  YOB: 1960        AGE: 60 y.o. SEX: female  Body mass index is 37.66 kg/m². PCP: Suzette Pelayo MD  12/14/18      This office note has been dictated. REVIEW OF SYSTEMS:  Constitutional: Negative for fever, chills, weight loss and malaise/fatigue. HENT: Negative. Eyes: Negative. Respiratory: Negative. Cardiovascular: Negative. Gastrointestinal: No bowel incontinence or constipation. Genitourinary: No bladder incontinence or saddle anesthesia. Skin: Negative. Neurological: Negative. Endo/Heme/Allergies: Negative. Psychiatric/Behavioral: Negative. Musculoskeletal: As per HPI above. Past Medical History:   Diagnosis Date    Adverse effect of anesthesia     hard to wake up    Chronic pain     Depression     GERD (gastroesophageal reflux disease)     Hypothyroidism     Low back pain     Thromboembolus (HCC)     blood clot in foot during pregnancy    Tobacco abuse     Vertigo          Current Outpatient Medications:     calcium carbonate 500 mg calcium (1,250 mg) chewable tablet, Take 2 Tabs by mouth daily as needed for Indigestion (As needed for upset stomach). , Disp: , Rfl:     levothyroxine (SYNTHROID) 50 mcg tablet, Take 50 mcg by mouth Daily (before breakfast). , Disp: , Rfl:     polyethylene glycol (MIRALAX) 17 gram/dose powder, Take 17 g by mouth daily. , Disp: , Rfl:     ferrous sulfate 325 mg (65 mg iron) tablet, Take 1 Tab by mouth two (2) times daily (with meals). , Disp: 60 Tab, Rfl: 1    ondansetron hcl (ZOFRAN) 4 mg tablet, Take 1 Tab by mouth every eight (8) hours as needed for Nausea., Disp: 30 Tab, Rfl: 1    OTHER, daily. , Disp: , Rfl:     NP THYROID 15 mg tablet, TK 3 TS PO D, Disp: , Rfl: 1    gabapentin (NEURONTIN) 300 mg capsule, Take 1 tab by mouth three times daily, Disp: , Rfl: 2    potassium iodide/iodine (IODINE STRONG, LUGOLS, PO), Take 25 mg by mouth daily. , Disp: , Rfl:     Selenomethionine 200 mcg tab, Take 1 Tab by mouth daily. , Disp: , Rfl:     cholecalciferol, vitamin D3, (VITAMIN D3) 2,000 unit tab, Take 1 Tab by mouth daily. , Disp: , Rfl:     diclofenac (VOLTAREN) 1 % gel, Apply 4 g to affected area four (4) times daily. Maximum 16 grams per joint per day. Dispense 5 100 gram tubes, Disp: 5 Each, Rfl: 0    multivit,iron,min/green tea xt (DAILY MULTIPLE WEIGHT LOSS PO), Take 3 Tabs by mouth daily. Relacore weight loss tablets, Disp: , Rfl:     buffered aspirin (BUFFERIN) 325 mg tablet, Take 1 Tab by mouth two (2) times a day., Disp: 60 Tab, Rfl: 1    oxyCODONE-acetaminophen (PERCOCET) 7.5-325 mg per tablet, Take 1-2 Tabs by mouth every four (4) hours as needed for Pain. Max Daily Amount: 12 Tabs., Disp: 60 Tab, Rfl: 0    cyanocobalamin (VITAMIN B-12) 1,000 mcg tablet, Take 1,000 mcg by mouth daily. , Disp: , Rfl:     Allergies   Allergen Reactions    Pcn [Penicillins] Hives       Social History     Socioeconomic History    Marital status: UNKNOWN     Spouse name: Not on file    Number of children: Not on file    Years of education: Not on file    Highest education level: Not on file   Social Needs    Financial resource strain: Not on file    Food insecurity - worry: Not on file    Food insecurity - inability: Not on file    Transportation needs - medical: Not on file   [employer] needs - non-medical: Not on file   Occupational History    Not on file   Tobacco Use    Smoking status: Former Smoker     Packs/day: 0.25     Years: 4.00     Pack years: 1.00     Last attempt to quit: 6/4/2015     Years since quitting: 3.5    Smokeless tobacco: Never Used   Substance and Sexual Activity    Alcohol use: No    Drug use: No    Sexual activity: Not on file   Other Topics Concern    Not on file   Social History Narrative    3/2015[de-identified] currently unemployed due to chronic low back pain, used to work renovating houses. Lived with her mother until her mother's death in the summer of . Currently living with her daughters, splits time between [place] and [place]. Past Surgical History:   Procedure Laterality Date    HX  SECTION      x3    HX HIP REPLACEMENT      HX HYSTERECTOMY      HX ORTHOPAEDIC  2002    2 disks removed lower back [facility].    HX ORTHOPAEDIC Right 2017    shoulder surgery    NEUROLOGICAL PROCEDURE UNLISTED               We did see Ms. Effie Hassan for followup with regards to her right hip replacement. The patient is now about five months status post surgery, and she is doing well. She is improving. She does report a little laterally-based discomfort when she lies on the right side at night. She has no groin pain, no thigh pain, and no radiating pain or numbness down the lower extremities. She has had no fevers, chills, systemic changes, or injuries to report. I sent her for blood work. She returns today for reevaluation. PHYSICAL EXAMINATION:  In general, the patient is alert and oriented x 3 in no acute distress. The patient is well-developed, well-nourished, with a normal affect. The patient is afebrile. HEENT:  Head is normocephalic and atraumatic. Pupils are equally round and reactive to light and accommodation. Extraocular eye movements are intact. Neck is supple. Trachea is midline. No JVD is present. Breathing is nonlabored. Examination of the lower extremities reveals pain-free range of motion of the hips. She does have discomfort to palpation of the greater trochanteric bursae on the right side. There is negative straight leg raise. There is negative calf tenderness. There is negative Violettas. There is no evidence of DVT present. LABORATORY STUDIES:  Review of labs shows CBC white count is 4.9. CRP is less than 0.3. IL-6 is 1.6.   Sedimentation rate is 15, and uric acid is 5.1. ASSESSMENT:      1. Status post right hip replacement. 2. Trochanteric bursitis right hip. PLAN:  At this point, we discussed treatment options. We will try some anti-inflammatory gel, Voltaren Gel, to apply to the hip about four times a day and see if this can decrease some of her symptomatology. If not, we can try a cortisone injection for her. We will plan on seeing her back in three months time for evaluation or sooner if necessary.                 JR Mathew TAVERAS, PA-C, ATC

## 2018-12-19 ENCOUNTER — TELEPHONE (OUTPATIENT)
Dept: ORTHOPEDIC SURGERY | Age: 58
End: 2018-12-19

## 2018-12-19 DIAGNOSIS — M25.551 RIGHT HIP PAIN: ICD-10-CM

## 2018-12-19 DIAGNOSIS — M70.61 TROCHANTERIC BURSITIS OF RIGHT HIP: ICD-10-CM

## 2018-12-19 NOTE — TELEPHONE ENCOUNTER
Vega Ellis w/ [facility] called as the patient ins is requiring the Voltaren 1% gel to be dispensed as brand name only. Must re-submit and marked at bottom as DAW1 and signed by provider.   Please call Vega Ellis at [phone] with an questions

## 2018-12-20 RX ORDER — DICLOFENAC SODIUM 10 MG/G
4 GEL TOPICAL 4 TIMES DAILY
Qty: 5 EACH | Refills: 0 | Status: SHIPPED | OUTPATIENT
Start: 2018-12-20

## 2019-02-22 ENCOUNTER — HOSPITAL ENCOUNTER (OUTPATIENT)
Dept: MAMMOGRAPHY | Age: 59
Discharge: HOME | End: 2019-02-22
Attending: INTERNAL MEDICINE
Payer: MEDICAID

## 2019-02-22 DIAGNOSIS — Z12.31 VISIT FOR SCREENING MAMMOGRAM: ICD-10-CM

## 2019-02-22 PROCEDURE — 77063 BREAST TOMOSYNTHESIS BI: CPT

## 2019-03-22 ENCOUNTER — OFFICE VISIT (OUTPATIENT)
Dept: ORTHOPEDIC SURGERY | Age: 59
End: 2019-03-22

## 2019-03-22 VITALS
WEIGHT: 216.8 LBS | TEMPERATURE: 97.3 F | DIASTOLIC BLOOD PRESSURE: 85 MMHG | HEART RATE: 83 BPM | OXYGEN SATURATION: 99 % | HEIGHT: 64 IN | RESPIRATION RATE: 16 BRPM | BODY MASS INDEX: 37.01 KG/M2 | SYSTOLIC BLOOD PRESSURE: 152 MMHG

## 2019-03-22 DIAGNOSIS — M70.61 TROCHANTERIC BURSITIS OF RIGHT HIP: ICD-10-CM

## 2019-03-22 DIAGNOSIS — Z96.641 HISTORY OF TOTAL RIGHT HIP REPLACEMENT: ICD-10-CM

## 2019-03-22 DIAGNOSIS — M25.551 RIGHT HIP PAIN: Primary | ICD-10-CM

## 2019-03-22 NOTE — PROGRESS NOTES
[address] [place] [address]  [phone]           Patient: Eduardo Anderson                MRN: [id number]       SSN: [id number]  YOB: 1960        AGE: 60 y.o. SEX: female  Body mass index is 37.21 kg/m². PCP: Janessa Tobin MD  03/22/19      This office note has been dictated. REVIEW OF SYSTEMS:  Constitutional: Negative for fever, chills, weight loss and malaise/fatigue. HENT: Negative. Eyes: Negative. Respiratory: Negative. Cardiovascular: Negative. Gastrointestinal: No bowel incontinence or constipation. Genitourinary: No bladder incontinence or saddle anesthesia. Skin: Negative. Neurological: Negative. Endo/Heme/Allergies: Negative. Psychiatric/Behavioral: Negative. Musculoskeletal: As per HPI above. Past Medical History:   Diagnosis Date    Adverse effect of anesthesia     hard to wake up    Chronic pain     Depression     GERD (gastroesophageal reflux disease)     Hypothyroidism     Low back pain     Thromboembolus (HCC)     blood clot in foot during pregnancy    Tobacco abuse     Vertigo          Current Outpatient Medications:     VOLTAREN 1 % gel, Apply 4 g to affected area four (4) times daily. Maximum 16 grams per joint per day. Dispense 5 100 gram tubes, Disp: 5 Each, Rfl: 0    levothyroxine (SYNTHROID) 50 mcg tablet, Take 50 mcg by mouth Daily (before breakfast). , Disp: , Rfl:     gabapentin (NEURONTIN) 300 mg capsule, Take 1 tab by mouth three times daily, Disp: , Rfl: 2    multivit,iron,min/green tea xt (DAILY MULTIPLE WEIGHT LOSS PO), Take 3 Tabs by mouth daily. Relacore weight loss tablets, Disp: , Rfl:     calcium carbonate 500 mg calcium (1,250 mg) chewable tablet, Take 2 Tabs by mouth daily as needed for Indigestion (As needed for upset stomach). , Disp: , Rfl:     polyethylene glycol (MIRALAX) 17 gram/dose powder, Take 17 g by mouth daily. , Disp: , Rfl:   buffered aspirin (BUFFERIN) 325 mg tablet, Take 1 Tab by mouth two (2) times a day., Disp: 60 Tab, Rfl: 1    ferrous sulfate 325 mg (65 mg iron) tablet, Take 1 Tab by mouth two (2) times daily (with meals). , Disp: 60 Tab, Rfl: 1    oxyCODONE-acetaminophen (PERCOCET) 7.5-325 mg per tablet, Take 1-2 Tabs by mouth every four (4) hours as needed for Pain. Max Daily Amount: 12 Tabs., Disp: 60 Tab, Rfl: 0    ondansetron hcl (ZOFRAN) 4 mg tablet, Take 1 Tab by mouth every eight (8) hours as needed for Nausea., Disp: 30 Tab, Rfl: 1    OTHER, daily. , Disp: , Rfl:     cyanocobalamin (VITAMIN B-12) 1,000 mcg tablet, Take 1,000 mcg by mouth daily. , Disp: , Rfl:     NP THYROID 15 mg tablet, TK 3 TS PO D, Disp: , Rfl: 1    potassium iodide/iodine (IODINE STRONG, LUGOLS, PO), Take 25 mg by mouth daily. , Disp: , Rfl:     Selenomethionine 200 mcg tab, Take 1 Tab by mouth daily. , Disp: , Rfl:     cholecalciferol, vitamin D3, (VITAMIN D3) 2,000 unit tab, Take 1 Tab by mouth daily. , Disp: , Rfl:     Allergies   Allergen Reactions    Pcn [Penicillins] Hives       Social History     Socioeconomic History    Marital status: SINGLE     Spouse name: Not on file    Number of children: Not on file    Years of education: Not on file    Highest education level: Not on file   Occupational History    Not on file   Social Needs    Financial resource strain: Not on file    Food insecurity:     Worry: Not on file     Inability: Not on file    Transportation needs:     Medical: Not on file     Non-medical: Not on file   Tobacco Use    Smoking status: Former Smoker     Packs/day: 0.25     Years: 4.00     Pack years: 1.00     Last attempt to quit: 6/4/2015     Years since quitting: 3.8    Smokeless tobacco: Never Used   Substance and Sexual Activity    Alcohol use: No    Drug use: No    Sexual activity: Not on file   Lifestyle    Physical activity:     Days per week: Not on file     Minutes per session: Not on file    Stress: Not on file   Relationships    Social connections:     Talks on phone: Not on file     Gets together: Not on file     Attends Cheondoism service: Not on file     Active member of club or organization: Not on file     Attends meetings of clubs or organizations: Not on file     Relationship status: Not on file    Intimate partner violence:     Fear of current or ex partner: Not on file     Emotionally abused: Not on file     Physically abused: Not on file     Forced sexual activity: Not on file   Other Topics Concern    Not on file   Social History Narrative    3/2015[de-identified] currently unemployed due to chronic low back pain, used to work renovating houses. Lived with her mother until her mother's death in the summer of . Currently living with her daughters, splits time between [place] and [place]. Past Surgical History:   Procedure Laterality Date    HX  SECTION      x3    HX HIP REPLACEMENT      HX HYSTERECTOMY      HX ORTHOPAEDIC      2 disks removed lower back [facility].    HX ORTHOPAEDIC Right 2017    shoulder surgery    NEUROLOGICAL PROCEDURE UNLISTED               We did see Ms. Justine Melton for followup with regards to her right hip replacement. The patient is now about eight or nine months status post total hip replacement. She is doing quite well. She is quite happy with the results of the hip replacement. She does report still having a bit of a limp. She has no pain associated with it, however. She has bot been doing her side-lying abduction exercises at home. There have been no recent fevers, chills, systemic changes, or injuries to report, and no chest pain or shortness of breath. PHYSICAL EXAMINATION:  In general, the patient is alert and oriented x 3 in no acute distress. The patient is well-developed, well-nourished, with a normal affect. The patient is afebrile. HEENT:  Head is normocephalic and atraumatic.   Pupils are equally round and reactive to light and accommodation. Extraocular eye movements are intact. Neck is supple. Trachea is midline. No JVD is present. Breathing is nonlabored. Examination of the lower extremities reveals pain-free range of motion of the hips. There is no pain to palpation of the greater trochanteric bursae on the left side and just slight tenderness on the right side. The surgical wounds are healed up nicely. Leg lengths look perfect. Abduction strength is 4+/5 on the right and 5/5 on the left. ASSESSMENT:      1. Status post right total hip replacement. 2. Mild trochanteric bursitis right hip. PLAN:  At this point, the patient has done extremely well with her hip replacement. She is quite happy with the results. She was instructed on side-lying abduction exercises working up to five pounds of weight. She has some Voltaren Gel at home, which she will continue to use for the bursitis and followup with us in July for further reevaluation, x-ray of the right hip, as well as strength check.                JR Mathew TAVERAS, [place], ATC

## 2019-07-12 ENCOUNTER — OFFICE VISIT (OUTPATIENT)
Dept: ORTHOPEDIC SURGERY | Age: 59
End: 2019-07-12

## 2019-07-12 VITALS
SYSTOLIC BLOOD PRESSURE: 124 MMHG | OXYGEN SATURATION: 97 % | WEIGHT: 220 LBS | HEART RATE: 90 BPM | HEIGHT: 64 IN | TEMPERATURE: 98.1 F | BODY MASS INDEX: 37.56 KG/M2 | DIASTOLIC BLOOD PRESSURE: 86 MMHG

## 2019-07-12 DIAGNOSIS — Z96.641 HISTORY OF TOTAL RIGHT HIP REPLACEMENT: ICD-10-CM

## 2019-07-12 DIAGNOSIS — M54.12 CERVICAL RADICULOPATHY: ICD-10-CM

## 2019-07-12 DIAGNOSIS — M54.2 NECK PAIN: Primary | ICD-10-CM

## 2019-07-12 RX ORDER — CYCLOBENZAPRINE HCL 10 MG
TABLET ORAL
COMMUNITY
End: 2021-01-06

## 2019-07-12 RX ORDER — LIOTHYRONINE SODIUM 5 UG/1
5 TABLET ORAL DAILY
Refills: 0 | COMMUNITY
Start: 2019-06-28

## 2019-07-12 RX ORDER — CLOTRIMAZOLE AND BETAMETHASONE DIPROPIONATE 10; .64 MG/G; MG/G
CREAM TOPICAL
Refills: 1 | COMMUNITY
Start: 2019-05-30 | End: 2021-01-06

## 2019-07-12 NOTE — PROGRESS NOTES
[facility] [address]  [phone]           Patient: Lissy Mcclain                MRN: [id number]       SSN: [id number]  YOB: 1960        AGE: 60 y.o. SEX: female  Body mass index is 37.76 kg/m². PCP: Jennifer Zurita MD  07/12/19      This office note has been dictated. REVIEW OF SYSTEMS:  Constitutional: Negative for fever, chills, weight loss and malaise/fatigue. HENT: Negative. Eyes: Negative. Respiratory: Negative. Cardiovascular: Negative. Gastrointestinal: No bowel incontinence or constipation. Genitourinary: No bladder incontinence or saddle anesthesia. Skin: Negative. Neurological: Negative. Endo/Heme/Allergies: Negative. Psychiatric/Behavioral: Negative. Musculoskeletal: As per HPI above. Past Medical History:   Diagnosis Date    Adverse effect of anesthesia     hard to wake up    Chronic pain     Depression     GERD (gastroesophageal reflux disease)     Hypothyroidism     Low back pain     Thromboembolus (HCC)     blood clot in foot during pregnancy    Tobacco abuse     Vertigo          Current Outpatient Medications:     cyclobenzaprine (FLEXERIL) 10 mg tablet, Take  by mouth three (3) times daily as needed for Muscle Spasm(s). , Disp: , Rfl:     clotrimazole-betamethasone (LOTRISONE) topical cream, APPLY 2 TIMES DAILY, Disp: , Rfl: 1    liothyronine (CYTOMEL) 5 mcg tablet, TAKE 1 TABLET BY MOUTH TWICE DAILY, Disp: , Rfl: 0    VOLTAREN 1 % gel, Apply 4 g to affected area four (4) times daily. Maximum 16 grams per joint per day. Dispense 5 100 gram tubes, Disp: 5 Each, Rfl: 0    multivit,iron,min/green tea xt (DAILY MULTIPLE WEIGHT LOSS PO), Take 3 Tabs by mouth daily. Relacore weight loss tablets, Disp: , Rfl:     calcium carbonate 500 mg calcium (1,250 mg) chewable tablet, Take 2 Tabs by mouth daily as needed for Indigestion (As needed for upset stomach). , Disp: , Rfl:     levothyroxine (SYNTHROID) 50 mcg tablet, Take 50 mcg by mouth Daily (before breakfast). , Disp: , Rfl:     polyethylene glycol (MIRALAX) 17 gram/dose powder, Take 17 g by mouth daily. , Disp: , Rfl:     buffered aspirin (BUFFERIN) 325 mg tablet, Take 1 Tab by mouth two (2) times a day., Disp: 60 Tab, Rfl: 1    ferrous sulfate 325 mg (65 mg iron) tablet, Take 1 Tab by mouth two (2) times daily (with meals). , Disp: 60 Tab, Rfl: 1    oxyCODONE-acetaminophen (PERCOCET) 7.5-325 mg per tablet, Take 1-2 Tabs by mouth every four (4) hours as needed for Pain. Max Daily Amount: 12 Tabs., Disp: 60 Tab, Rfl: 0    ondansetron hcl (ZOFRAN) 4 mg tablet, Take 1 Tab by mouth every eight (8) hours as needed for Nausea., Disp: 30 Tab, Rfl: 1    OTHER, daily. , Disp: , Rfl:     cyanocobalamin (VITAMIN B-12) 1,000 mcg tablet, Take 1,000 mcg by mouth daily. , Disp: , Rfl:     NP THYROID 15 mg tablet, TK 3 TS PO D, Disp: , Rfl: 1    gabapentin (NEURONTIN) 300 mg capsule, Take 1 tab by mouth three times daily, Disp: , Rfl: 2    potassium iodide/iodine (IODINE STRONG, LUGOLS, PO), Take 25 mg by mouth daily. , Disp: , Rfl:     Selenomethionine 200 mcg tab, Take 1 Tab by mouth daily. , Disp: , Rfl:     cholecalciferol, vitamin D3, (VITAMIN D3) 2,000 unit tab, Take 1 Tab by mouth daily. , Disp: , Rfl:     Allergies   Allergen Reactions    Pcn [Penicillins] Hives       Social History     Socioeconomic History    Marital status: SINGLE     Spouse name: Not on file    Number of children: Not on file    Years of education: Not on file    Highest education level: Not on file   Occupational History    Not on file   Social Needs    Financial resource strain: Not on file    Food insecurity:     Worry: Not on file     Inability: Not on file    Transportation needs:     Medical: Not on file     Non-medical: Not on file   Tobacco Use    Smoking status: Former Smoker     Packs/day: 0.25     Years: 4.00     Pack years: 1.00 Last attempt to quit: 2015     Years since quittin.1    Smokeless tobacco: Never Used   Substance and Sexual Activity    Alcohol use: No    Drug use: No    Sexual activity: Not on file   Lifestyle    Physical activity:     Days per week: Not on file     Minutes per session: Not on file    Stress: Not on file   Relationships    Social connections:     Talks on phone: Not on file     Gets together: Not on file     Attends [employer] service: Not on file     Active member of club or organization: Not on file     Attends meetings of clubs or organizations: Not on file     Relationship status: Not on file    Intimate partner violence:     Fear of current or ex partner: Not on file     Emotionally abused: Not on file     Physically abused: Not on file     Forced sexual activity: Not on file   Other Topics Concern    Not on file   Social History Narrative    3/2015[de-identified] currently unemployed due to chronic low back pain, used to work renovating houses. Lived with her mother until her mother's death in the summer of . Currently living with her daughters, splits time between [place] and [place]. Past Surgical History:   Procedure Laterality Date    HX  SECTION      x3    HX HIP REPLACEMENT      HX HYSTERECTOMY      HX ORTHOPAEDIC  2002    2 disks removed lower back [facility].    HX ORTHOPAEDIC Right 2017    shoulder surgery    NEUROLOGICAL PROCEDURE UNLISTED           We did see Ms. Emelina Silverman for followup with regards to her right hip. She is status post right hip replacement. She did very well with it. She is very happy with the results of the hip replacement. She does continue exercises without complications. Recently, she has been having some discomfort in her neck and shoulder blade extending down her upper extremity with numbness and tingling and pain. She has increased discomfort with certain movements of her neck.   She does have a history of cervical spine surgery in the past done up in [place]. There has been no loss of balance, no trouble swallowing, and no chest pain or shortness of breath. She has had no change in her bowel or bladder habits. PHYSICAL EXAMINATION:  In general, the patient is alert and oriented x 3 in no acute distress. The patient is well-developed, well-nourished, with a normal affect. The patient is afebrile. HEENT:  Head is normocephalic and atraumatic. Pupils are equally round and reactive to light and accommodation. Extraocular eye movements are intact. Neck is supple. Trachea is midline. No JVD is present. Breathing is nonlabored. Examination of the neck reveals good range of motion of the cervical spine. There is a negative Spurling's. Sensory and motor is intact to the upper extremities and symmetric bilaterally. C5-T2 is intact to myotomes and dermatomes. Examination of the lower extremities reveals pain-free range of motion of the hips. There is no pain to palpation of the greater trochanteric bursae. There is negative straight leg raise. There is negative calf tenderness. There is negative Violetta's. There is no evidence of DVT present. There is no pain to palpation to the trochanteric bursitis. Leg lengths look good. Abduction strength is -5/5 on the right and 5/5 on the left. RADIOGRAPHS:  Radiographs in the office today including AP of the pelvis and lateral of the right hip show the total hip components are well-fixed with no evidence of loosening or fracture. AP and lateral of the cervical spine show a previous fusion at C5-6-7. She doses have degenerative changes and spondylosis noted above, as well as around the fusion. No acute abnormality is noted. ASSESSMENT:      1. Cervical radiculopathy. 2. Status post right hip replacement. PLAN:  At this point, we are going to order a CT scan of the cervical spine.   We will get a referral to The [address] [place] [address] for further evaluation and treatment with regards to her neck. She will continue with side-lying abduction exercises with regards to the hip. She will see us back in about three months' time for evaluation and strength check. She will call with any questions or concerns that shall arise.                   JR Mathew TAVERAS, PAMaggieC, ATC

## 2019-10-24 ENCOUNTER — OFFICE VISIT (OUTPATIENT)
Dept: ORTHOPEDIC SURGERY | Age: 59
End: 2019-10-24

## 2019-10-24 VITALS
RESPIRATION RATE: 16 BRPM | TEMPERATURE: 96.6 F | OXYGEN SATURATION: 99 % | BODY MASS INDEX: 37.97 KG/M2 | HEIGHT: 64 IN | DIASTOLIC BLOOD PRESSURE: 75 MMHG | WEIGHT: 222.4 LBS | SYSTOLIC BLOOD PRESSURE: 127 MMHG | HEART RATE: 93 BPM

## 2019-10-24 DIAGNOSIS — Z96.641 HISTORY OF TOTAL RIGHT HIP REPLACEMENT: Primary | ICD-10-CM

## 2019-10-24 DIAGNOSIS — M54.50 LUMBAR PAIN: ICD-10-CM

## 2019-10-24 DIAGNOSIS — M25.551 RIGHT HIP PAIN: ICD-10-CM

## 2019-10-24 RX ORDER — ACETAMINOPHEN 500 MG
500 TABLET ORAL
COMMUNITY

## 2019-10-24 NOTE — PROGRESS NOTES
1. Have you been to the ER, urgent care clinic since your last visit? Hospitalized since your last visit? No    2. Have you seen or consulted any other health care providers outside of the [address] [place] [address] since your last visit? Include any pap smears or colon screening.  No

## 2019-10-24 NOTE — PROGRESS NOTES
[address] [place] [address]  [phone]           Patient: Garth Domínguez                MRN: [id number]       SSN: [id number]  YOB: 1960        AGE: 60 y.o. SEX: female  Body mass index is 38.17 kg/m². PCP: Pablo Mckoy MD  10/24/19      This office note has been dictated. REVIEW OF SYSTEMS:  Constitutional: Negative for fever, chills, weight loss and malaise/fatigue. HENT: Negative. Eyes: Negative. Respiratory: Negative. Cardiovascular: Negative. Gastrointestinal: No bowel incontinence or constipation. Genitourinary: No bladder incontinence or saddle anesthesia. Skin: Negative. Neurological: Negative. Endo/Heme/Allergies: Negative. Psychiatric/Behavioral: Negative. Musculoskeletal: As per HPI above. Past Medical History:   Diagnosis Date    Adverse effect of anesthesia     hard to wake up    Chronic pain     Depression     GERD (gastroesophageal reflux disease)     Hypothyroidism     Low back pain     Thromboembolus (HCC)     blood clot in foot during pregnancy    Tobacco abuse     Vertigo          Current Outpatient Medications:     acetaminophen (TYLENOL EXTRA STRENGTH) 500 mg tablet, Take  by mouth every six (6) hours as needed for Pain., Disp: , Rfl:     cyclobenzaprine (FLEXERIL) 10 mg tablet, Take  by mouth three (3) times daily as needed for Muscle Spasm(s). , Disp: , Rfl:     clotrimazole-betamethasone (LOTRISONE) topical cream, APPLY 2 TIMES DAILY, Disp: , Rfl: 1    liothyronine (CYTOMEL) 5 mcg tablet, TAKE 1 TABLET BY MOUTH TWICE DAILY, Disp: , Rfl: 0    multivit,iron,min/green tea xt (DAILY MULTIPLE WEIGHT LOSS PO), Take 3 Tabs by mouth daily. Relacore weight loss tablets, Disp: , Rfl:     levothyroxine (SYNTHROID) 50 mcg tablet, Take 50 mcg by mouth Daily (before breakfast). , Disp: , Rfl:     cyanocobalamin (VITAMIN B-12) 1,000 mcg tablet, Take 1,000 mcg by mouth daily. , Disp: , Rfl:     cholecalciferol, vitamin D3, (VITAMIN D3) 2,000 unit tab, Take 1 Tab by mouth daily. , Disp: , Rfl:     VOLTAREN 1 % gel, Apply 4 g to affected area four (4) times daily. Maximum 16 grams per joint per day. Dispense 5 100 gram tubes, Disp: 5 Each, Rfl: 0    calcium carbonate 500 mg calcium (1,250 mg) chewable tablet, Take 2 Tabs by mouth daily as needed for Indigestion (As needed for upset stomach). , Disp: , Rfl:     polyethylene glycol (MIRALAX) 17 gram/dose powder, Take 17 g by mouth daily. , Disp: , Rfl:     buffered aspirin (BUFFERIN) 325 mg tablet, Take 1 Tab by mouth two (2) times a day., Disp: 60 Tab, Rfl: 1    ferrous sulfate 325 mg (65 mg iron) tablet, Take 1 Tab by mouth two (2) times daily (with meals). , Disp: 60 Tab, Rfl: 1    oxyCODONE-acetaminophen (PERCOCET) 7.5-325 mg per tablet, Take 1-2 Tabs by mouth every four (4) hours as needed for Pain. Max Daily Amount: 12 Tabs., Disp: 60 Tab, Rfl: 0    ondansetron hcl (ZOFRAN) 4 mg tablet, Take 1 Tab by mouth every eight (8) hours as needed for Nausea., Disp: 30 Tab, Rfl: 1    OTHER, daily. , Disp: , Rfl:     NP THYROID 15 mg tablet, TK 3 TS PO D, Disp: , Rfl: 1    gabapentin (NEURONTIN) 300 mg capsule, Take 1 tab by mouth three times daily, Disp: , Rfl: 2    potassium iodide/iodine (IODINE STRONG, LUGOLS, PO), Take 25 mg by mouth daily. , Disp: , Rfl:     Selenomethionine 200 mcg tab, Take 1 Tab by mouth daily. , Disp: , Rfl:     Allergies   Allergen Reactions    Pcn [Penicillins] Hives       Social History     Socioeconomic History    Marital status: SINGLE     Spouse name: Not on file    Number of children: Not on file    Years of education: Not on file    Highest education level: Not on file   Occupational History    Not on file   Social Needs    Financial resource strain: Not on file    Food insecurity:     Worry: Not on file     Inability: Not on file    Transportation needs:     Medical: Not on file Non-medical: Not on file   Tobacco Use    Smoking status: Former Smoker     Packs/day: 0.25     Years: 4.00     Pack years: 1.00     Last attempt to quit: 2015     Years since quittin.3    Smokeless tobacco: Never Used   Substance and Sexual Activity    Alcohol use: No    Drug use: No    Sexual activity: Not on file   Lifestyle    Physical activity:     Days per week: Not on file     Minutes per session: Not on file    Stress: Not on file   Relationships    Social connections:     Talks on phone: Not on file     Gets together: Not on file     Attends Druze service: Not on file     Active member of club or organization: Not on file     Attends meetings of clubs or organizations: Not on file     Relationship status: Not on file    Intimate partner violence:     Fear of current or ex partner: Not on file     Emotionally abused: Not on file     Physically abused: Not on file     Forced sexual activity: Not on file   Other Topics Concern    Not on file   Social History Narrative    3/2015[de-identified] currently unemployed due to chronic low back pain, used to work renovating houses. Lived with her mother until her mother's death in the summer of . Currently living with her daughters, splits time between [facility]. Past Surgical History:   Procedure Laterality Date    HX  SECTION      x3    HX HIP REPLACEMENT      HX HYSTERECTOMY      HX ORTHOPAEDIC  2002    2 disks removed lower back [facility].    HX ORTHOPAEDIC Right 2017    shoulder surgery    NEUROLOGICAL PROCEDURE UNLISTED             We did see Ms. James Sandoval for followup with regards to her right hip. She is status post right hip replacement. She is now about eight months out. She is doing well from the hip replacement standpoint. She does have a little bursitis of the hip and uses Voltaren Gel, which seems to be helping her slightly.      She had previous troubles with her right shoulder and had a frozen shoulder and some weakness of her hand. She denies any real neck pain. She does have a little back pain. She also reports a little bit of pain with coughing and sneezing into the right lower extremity into the thigh. She has had no change in her bowel or bladder habits and no fevers or chills. PHYSICAL EXAMINATION:  In general, the patient is alert and oriented x 3 in no acute distress. The patient is well-developed, well-nourished, with a normal affect. The patient is afebrile. HEENT:  Head is normocephalic and atraumatic. Pupils are equally round and reactive to light and accommodation. Extraocular eye movements are intact. Neck is supple. Trachea is midline. No JVD is present. Breathing is nonlabored. She has good range of motion of the cervical spine. There is negative Spurling's. Neurovascular status is intact. She does have a little bit of weakness to the extension of the digits on the right hand to the third and fourth digit. There is a little bit of weakness in wrist extension. Examination of the lower extremities reveals pain-free range of motion of the hips. She does have pain to palpation of the greater trochanteric bursae on the right side, none on the left. Leg lengths look good. Abduction strength is progressing nicely at -5/5 on the right and 5/5 on the left. RADIOGRAPHS:  Review of previous radiographs show the total hip components are well-fixed with no evidence for loosening or fracture noted. ASSESSMENT:      1. Neck pain and back pain. 2. Status post right hip replacement. 3. Trochanteric bursitis right hip. PLAN:  At this point, she will continue activities as tolerated and continue with Voltaren Gel for the right hip for the bursitis.   We are going to refer her to The [address] [place] [address] for further evaluation and treatment with regards to her neck and her low back and see if her weakness in her hands is associated to her neck and also see if there are any discs herniated in the lower lumbar spine causing radicular symptomatology. She is going to follow with us in about six months' time for evaluation. We will plan on x-ray of the hip upon her return.                     JR Mathew TAVERAS, [place], ATC

## 2019-11-11 ENCOUNTER — OFFICE VISIT (OUTPATIENT)
Dept: ORTHOPEDIC SURGERY | Age: 59
End: 2019-11-11

## 2019-11-11 VITALS
HEIGHT: 64 IN | SYSTOLIC BLOOD PRESSURE: 121 MMHG | WEIGHT: 219 LBS | HEART RATE: 109 BPM | BODY MASS INDEX: 37.39 KG/M2 | DIASTOLIC BLOOD PRESSURE: 79 MMHG

## 2019-11-11 DIAGNOSIS — M79.2 NEURITIS: ICD-10-CM

## 2019-11-11 DIAGNOSIS — Z98.1 S/P CERVICAL SPINAL FUSION: ICD-10-CM

## 2019-11-11 DIAGNOSIS — M48.02 CERVICAL SPINAL STENOSIS: ICD-10-CM

## 2019-11-11 DIAGNOSIS — M62.838 MUSCLE SPASM: ICD-10-CM

## 2019-11-11 DIAGNOSIS — M54.12 CERVICAL RADICULOPATHY: Primary | ICD-10-CM

## 2019-11-11 DIAGNOSIS — R29.898 RIGHT ARM WEAKNESS: ICD-10-CM

## 2019-11-11 RX ORDER — METHYLPREDNISOLONE 4 MG/1
TABLET ORAL
Qty: 1 DOSE PACK | Refills: 0 | Status: SHIPPED | OUTPATIENT
Start: 2019-11-11 | End: 2020-01-20 | Stop reason: ALTCHOICE

## 2019-11-11 RX ORDER — GABAPENTIN 300 MG/1
CAPSULE ORAL
Qty: 60 CAP | Refills: 1 | Status: SHIPPED | OUTPATIENT
Start: 2019-11-11 | End: 2021-01-06

## 2019-11-11 NOTE — LETTER
11/12/19 Patient: Jet Ferris YOB: 1960 Date of Visit: 11/11/2019 Ryanne Perla, [address] [facility] [address] [facility] [address] VIA Facsimile: [phone] Juan M Michaels PA-C 
[address] [facility] [address] [facility] [address] VIA In Basket Dear MD Juan M Eden PA-C, Thank you for referring Ms. Emy Veloz to [facility] for evaluation. My notes for this consultation are attached. If you have questions, please do not hesitate to call me. I look forward to following your patient along with you. Sincerely, Austyn Echevarria MD

## 2019-11-11 NOTE — PROGRESS NOTES
[facility]  [address], [facility] [address]  Phone: [phone]  Fax: [phone]    Pt's YOB: 1960    ASSESSMENT   Diagnoses and all orders for this visit:    1. Cervical radiculopathy  -     methylPREDNISolone (MEDROL DOSEPACK) 4 mg tablet; Per dose pack instructions.  -     MRI CERV SPINE WO CONT; Future    2. Right arm weakness  -     methylPREDNISolone (MEDROL DOSEPACK) 4 mg tablet; Per dose pack instructions.  -     MRI CERV SPINE WO CONT; Future    3. Neuritis  -     gabapentin (NEURONTIN) 300 mg capsule; Take 1 cap by mouth in the morning and 2 at night as directed. -     MRI CERV SPINE WO CONT; Future    4. Cervical spinal stenosis  -     MRI CERV SPINE WO CONT; Future    5. Muscle spasm  -     MRI CERV SPINE WO CONT; Future    6. S/P cervical spinal fusion  -     MRI CERV SPINE WO CONT; Future         IMPRESSION AND PLAN:  Lauren Sorensen is a 61 y.o. female with history of lumbar and cervical. She complains of cervical pain that extends to her shoulders. Pt takes Neurontin 300 mg 1 tab QAM, 1 tab in the afternoon, and 2 tabs QHS. 1) Pt was given information on cervical and shoulder arthritis exercises. 2) Cervical spine was ordered due to continues pain after physical therapy. 3)  She was prescribed Neurontin 300 mg take 2 in the evening as directed then increase to 2.  4) She received a Medrol Dosepak. 5) Ms. Ron Dixon has a reminder for a \"due or due soon\" health maintenance. I have asked that she contact her primary care provider, Oxana Hartley MD, for follow-up on this health maintenance. 6)  demonstrated consistency with prescribing. 7) Pt will follow-up in 3 weeks      HISTORY OF PRESENT ILLNESS:  Kylah Herr is a 60 y.o. female with history of lumbar and cervical pain. She presents to the office today for follow up. She complains of right shoulder pain.  She notes locking, numbness, and tingling in her right arm. She underwent a right hip replacement 18, which she notes excellent relief with but notices some trouble with gait. Pt has been prescribed Neurontin 300 mg and takes 1 tab QAM, 1 tab in the afternoon, and 2 tabs QHS. Dr. Elan Mayberry discontinued her Neurontin due to relief with replacement. She notes they injured her thyroid with a previous surgery. Pt at this time desires to proceed with cervical spine MRI.      Pain Scale: 5/10    PCP: Dilan Buitrago MD     Past Medical History:   Diagnosis Date    Adverse effect of anesthesia     hard to wake up    Chronic pain     Depression     GERD (gastroesophageal reflux disease)     Hypothyroidism     Low back pain     Thromboembolus (HCC)     blood clot in foot during pregnancy    Tobacco abuse     Vertigo         Social History     Socioeconomic History    Marital status: SINGLE     Spouse name: Not on file    Number of children: Not on file    Years of education: Not on file    Highest education level: Not on file   Occupational History    Not on file   Social Needs    Financial resource strain: Not on file    Food insecurity:     Worry: Not on file     Inability: Not on file    Transportation needs:     Medical: Not on file     Non-medical: Not on file   Tobacco Use    Smoking status: Former Smoker     Packs/day: 0.25     Years: 4.00     Pack years: 1.00     Last attempt to quit: 2015     Years since quittin.4    Smokeless tobacco: Never Used   Substance and Sexual Activity    Alcohol use: No    Drug use: No    Sexual activity: Not on file   Lifestyle    Physical activity:     Days per week: Not on file     Minutes per session: Not on file    Stress: Not on file   Relationships    Social connections:     Talks on phone: Not on file     Gets together: Not on file     Attends Alevism service: Not on file     Active member of club or organization: Not on file     Attends meetings of clubs or organizations: Not on file Relationship status: Not on file    Intimate partner violence:     Fear of current or ex partner: Not on file     Emotionally abused: Not on file     Physically abused: Not on file     Forced sexual activity: Not on file   Other Topics Concern    Not on file   Social History Narrative    3/2015[de-identified] currently unemployed due to chronic low back pain, used to work renovating houses. Lived with her mother until her mother's death in the summer of 2014. Currently living with her daughters, splits time between [place] and [place]. Current Outpatient Medications   Medication Sig Dispense Refill    methylPREDNISolone (MEDROL DOSEPACK) 4 mg tablet Per dose pack instructions. 1 Dose Pack 0    gabapentin (NEURONTIN) 300 mg capsule Take 1 cap by mouth in the morning and 2 at night as directed. 60 Cap 1    acetaminophen (TYLENOL EXTRA STRENGTH) 500 mg tablet Take  by mouth every six (6) hours as needed for Pain.  cyclobenzaprine (FLEXERIL) 10 mg tablet Take  by mouth three (3) times daily as needed for Muscle Spasm(s).  clotrimazole-betamethasone (LOTRISONE) topical cream APPLY 2 TIMES DAILY  1    liothyronine (CYTOMEL) 5 mcg tablet TAKE 1 TABLET BY MOUTH TWICE DAILY  0    levothyroxine (SYNTHROID) 50 mcg tablet Take 50 mcg by mouth Daily (before breakfast).  Selenomethionine 200 mcg tab Take 1 Tab by mouth daily.  VOLTAREN 1 % gel Apply 4 g to affected area four (4) times daily. Maximum 16 grams per joint per day. Dispense 5 100 gram tubes (Patient not taking: Reported on 11/11/2019) 5 Each 0    multivit,iron,min/green tea xt (DAILY MULTIPLE WEIGHT LOSS PO) Take 3 Tabs by mouth daily. Relacore weight loss tablets      calcium carbonate 500 mg calcium (1,250 mg) chewable tablet Take 2 Tabs by mouth daily as needed for Indigestion (As needed for upset stomach).  polyethylene glycol (MIRALAX) 17 gram/dose powder Take 17 g by mouth daily.       buffered aspirin (BUFFERIN) 325 mg tablet Take 1 Tab by mouth two (2) times a day. (Patient not taking: Reported on 11/11/2019) 60 Tab 1    ferrous sulfate 325 mg (65 mg iron) tablet Take 1 Tab by mouth two (2) times daily (with meals). (Patient not taking: Reported on 11/11/2019) 60 Tab 1    oxyCODONE-acetaminophen (PERCOCET) 7.5-325 mg per tablet Take 1-2 Tabs by mouth every four (4) hours as needed for Pain. Max Daily Amount: 12 Tabs. (Patient not taking: Reported on 11/11/2019) 60 Tab 0    ondansetron hcl (ZOFRAN) 4 mg tablet Take 1 Tab by mouth every eight (8) hours as needed for Nausea. (Patient not taking: Reported on 11/11/2019) 30 Tab 1    OTHER daily.  cyanocobalamin (VITAMIN B-12) 1,000 mcg tablet Take 1,000 mcg by mouth daily.  NP THYROID 15 mg tablet TK 3 TS PO D  1    gabapentin (NEURONTIN) 300 mg capsule Take 1 tab by mouth three times daily  2    potassium iodide/iodine (IODINE STRONG, LUGOLS, PO) Take 25 mg by mouth daily.  cholecalciferol, vitamin D3, (VITAMIN D3) 2,000 unit tab Take 1 Tab by mouth daily. Allergies   Allergen Reactions    Pcn [Penicillins] Hives         REVIEW OF SYSTEMS    Constitutional: Negative for fever, chills, or weight change. Respiratory: Negative for cough or shortness of breath. Cardiovascular: Negative for chest pain or palpitations. Gastrointestinal: Negative for acid reflux, change in bowel habits, or constipation. Genitourinary: Negative for dysuria and flank pain. Musculoskeletal: Positive for lumbar and right hip pain. Skin: Negative for rash. Neurological: Negative for headaches, dizziness, or numbness. Endo/Heme/Allergies: Negative for increased bruising. Psychiatric/Behavioral: Negative for difficulty with sleep. PHYSICAL EXAMINATION  Visit Vitals  /79   Pulse (!) 109   Ht 5' 4\" (1.626 m)   Wt 219 lb (99.3 kg)   BMI 37.59 kg/m²       Constitutional: Awake, alert, and in no acute distress.   Neurological: 1+ symmetrical DTRs in the upper extremities. 1+ symmetrical DTRs in the lower extremities. Sensation to light touch is intact. Negative Florence's sign bilaterally. Skin: warm, dry, and intact. Musculoskeletal: Decreased side to side cervical flexion. Weakness with wrist extension. No pain with extension, axial loading, or forward flexion. Pain and limited range of motion with internal rotation of her right hip. Negative straight leg raise bilaterally. Biceps  Triceps Deltoids Wrist Ext Wrist Flex Hand Intrin   Right  4/5  4/5  4/5  4/5 with weakness  4/5 -4/5   Left +4/5 +4/5 +4/5 +4/5 +4/5 +4/5        Hip Flex  Quads Hamstrings Ankle DF EHL Ankle PF   Right +3 to -4/5  4/5  4/5 +4/5 +4/5 +4/5   Left +4/5 +4/5 +4/5 +4/5 +4/5 +4/5     IMAGING:    Right hip MRI from 06/22/2018 was personally reviewed with the patient and demonstrated:  Results from [place] encounter on 06/22/18   MRI HIP  RT  WO CONT    Narrative EXAM: MRI of the pelvis  and right hip     INDICATION: Right hip pain    TECHNIQUE: Multiplanar multisequence MR imaging of the right  hip and pelvis    IV Contrast: None    COMPARISON: None    FINDINGS:   Osseous Structures: The osseous marrow of the pelvis is unremarkable. The  femoral heads grossly demonstrate normal marrow signal. A small likely benign  chondroid lesion is noted in the left femoral head. Joints/Cartilage: The right hip has a small joint effusion. There is  significant asymmetric joint space narrowing. There is subchondral cyst is noted  anteriorly. Significant cartilage loss and osteophyte formation are noted in the  acetabulum and femoral head. There is a large area of likely grade IV  chondromalacia of the anterior superior acetabulum. The left hip likely has mild  to moderate degenerative changes but are incompletely evaluated. Ligaments: No gross ligamentous pathology appreciated. Tendons/Muscle: Gluteus muscles and tendons are unremarkable bilaterally.  The  iliopsoas tendon and muscle are unremarkable bilaterally. No evidence of  iliopsoas bursitis. The hamstring origins are unremarkable. No muscular atrophy  identified. Soft Tissues/Other:  Small of fluid is noted at the right greater trochanteric  bursa. Impression IMPRESSION:   1. Severe likely grade 4 osteoarthritis of the right hip with large area of  grade IV chondromalacia, osteophyte formation and asymmetric joint space  narrowing as well as a small joint effusion. 2.  Mild right greater trochanteric bursitis.         Lumbar spine x-rays from 06/12/2018 were personally reviewed with the patient and demonstrated:  Multilevel degenerative facets. Severe degenerative disc at L5-S1. No instability. Cervical spine x-rays from 12/07/2017 were personally reviewed with the patient and demonstrated:  Impression: C4-C7 in good alignment with no evidence of hardware failure   or loosening.     Cervical spine MRI from 10/20/2016 was personally reviewed and demonstrated:  Results from Hospital Encounter on 10/20/16   MRI CERV SPINE WO CONT    Narrative Study: MRI CERV SPINE WO CONT    Indication:  shoulder weakness    Additional clinical history: Muscle weakness (generalized)    Comparison:  none available. Contrast: None administered    Technique:  Magnetic resonance images of the cervical spine were obtained. The  following sequences were obtained: Sagittal T1, T2, T2 STIR; axial T1 and T2. Findings:  Alignment is anatomic without subluxation. Moderate disc space narrowing is seen  at the C6-C7 level. Osteophytic bony endplate changes are also noted at this  level. Concerning signal changes are seen in the osseous structures. The  craniocervical junction is normal in appearance. The spinal cord is normal in  caliber and signal. There is mild developmental narrowing of the spinal canal.  No concerning signal changes are seen in the visualized paraspinal soft tissues.     C2-C3: Mild right facet arthropathy and uncovertebral joint hypertrophy. Mild  right neuroforaminal narrowing. No spinal canal stenosis. C3-C4: Minimal central disc protrusion. Moderate right and mild left facet  arthropathy. Bilateral uncovertebral joint hypertrophy. Severe left and  moderate/severe right neuroforaminal narrowing. Mild spinal canal stenosis. C4-C5: Small disc bulge. Mild/moderate bilateral facet arthropathy. Bilateral  uncovertebral joint hypertrophy, right worse than left. Moderate/severe right  and moderate left neuroforaminal narrowing. Mild/moderate spinal canal stenosis. C5-C6: Small disc bulge. Left greater than right facet arthropathy and right  greater than left uncovertebral joint hypertrophy. Severe right and  mild/moderate left neuroforaminal narrowing. Mild/moderate spinal canal  stenosis. C6-C7: Large disc osteophyte complex deforming the ventral cord. Bilateral  uncovertebral joint hypertrophy. Focal ligamentum flavum thickening. Severe  bilateral neuroforaminal narrowing. Severe spinal canal stenosis. C7-T1: Mild bilateral facet arthropathy. No neuroforaminal narrowing or  significant spinal canal stenosis.          Impression Impression:  Mild developed mental narrowing of the spinal canal. Multilevel degenerative  disc disease, worst at C6-C7. Multilevel severe and moderate/severe  neuroforaminal narrowing. Severe spinal canal stenosis at C6-C7.          Written by Amanda Harris, as dictated by Aubrie Ríos MD.  I, Dr. Aubrie Ríos confirm that all documentation is accurate.

## 2019-11-11 NOTE — PATIENT INSTRUCTIONS
Neck Arthritis: Exercises  Introduction  Here are some examples of exercises for you to try. The exercises may be suggested for a condition or for rehabilitation. Start each exercise slowly. Ease off the exercises if you start to have pain. You will be told when to start these exercises and which ones will work best for you. How to do the exercises  Neck stretches to the side    1. This stretch works best if you keep your shoulder down as you lean away from it. To help you remember to do this, start by relaxing your shoulders and lightly holding on to your thighs or your chair. 2. Tilt your head toward your shoulder and hold for 15 to 30 seconds. Let the weight of your head stretch your muscles. 3. Repeat 2 to 4 times toward each shoulder. Chin tuck    1. Lie on the floor with a rolled-up towel under your neck. Your head should be touching the floor. 2. Slowly bring your chin toward your chest.  3. Hold for a count of 6, and then relax for up to 10 seconds. 4. Repeat 8 to 12 times. Active cervical rotation    1. Sit in a firm chair, or stand up straight. 2. Keeping your chin level, turn your head to the right, and hold for 15 to 30 seconds. 3. Turn your head to the left and hold for 15 to 30 seconds. 4. Repeat 2 to 4 times to each side. Shoulder blade squeeze    1. While standing, squeeze your shoulder blades together. 2. Do not raise your shoulders up as you are squeezing. 3. Hold for 6 seconds. 4. Repeat 8 to 12 times. Shoulder rolls    1. Sit comfortably with your feet shoulder-width apart. You can also do this exercise standing up. 2. Roll your shoulders up, then back, and then down in a smooth, circular motion. 3. Repeat 2 to 4 times. Follow-up care is a key part of your treatment and safety. Be sure to make and go to all appointments, and call your doctor if you are having problems.  It's also a good idea to know your test results and keep a list of the medicines you take.  Where can you learn more? Go to http://ramirez-sidra.info/. Enter G716 in the search box to learn more about \"Neck Arthritis: Exercises. \"  Current as of: June 26, 2019  Content Version: 12.2  © 8438-4673 [employer]. Care instructions adapted under license by [employer] (which disclaims liability or warranty for this information). If you have questions about a medical condition or this instruction, always ask your healthcare professional. [address] any warranty or liability for your use of this information. Shoulder Arthritis: Exercises  Introduction  Here are some examples of exercises for you to try. The exercises may be suggested for a condition or for rehabilitation. Start each exercise slowly. Ease off the exercises if you start to have pain. You will be told when to start these exercises and which ones will work best for you. How to do the exercises  Shoulder flexion (lying down)    4. Lie on your back, holding a wand with both hands. Your palms should face down as you hold the wand. 5. Keeping your elbows straight, slowly raise your arms over your head. Raise them until you feel a stretch in your shoulders, upper back, and chest.  6. Hold for 15 to 30 seconds. 7. Repeat 2 to 4 times. Shoulder rotation (lying down)    5. Lie on your back. Hold a wand with both hands with your elbows bent and palms up. 6. Keep your elbows close to your body, and move the wand across your body toward the sore arm. 7. Hold for 8 to 12 seconds. 8. Repeat 2 to 4 times. Shoulder internal rotation with towel    5. Hold a towel above and behind your head with the arm that is not sore. 6. With your sore arm, reach behind your back and grasp the towel. 7. With the arm above your head, pull the towel upward. Do this until you feel a stretch on the front and outside of your sore shoulder. 8. Hold 15 to 30 seconds.   9. Repeat 2 to 4 times.    Shoulder blade squeeze    5. Stand with your arms at your sides, and squeeze your shoulder blades together. Do not raise your shoulders up as you squeeze. 6. Hold 6 seconds. 7. Repeat 8 to 12 times. Resisted rows    4. Put the band around a solid object at about waist level. (A bedpost will work well.) Each hand should hold an end of the band. 5. With your elbows at your sides and bent to 90 degrees, pull the band back. Your shoulder blades should move toward each other. Return to the starting position. 6. Repeat 8 to 12 times. External rotator strengthening exercise    1. Start by tying a piece of elastic exercise material to a doorknob. You can use surgical tubing or Thera-Band. (You may also hold one end of the band in each hand.)  2. Stand or sit with your shoulder relaxed and your elbow bent 90 degrees. Your upper arm should rest comfortably against your side. Squeeze a rolled towel between your elbow and your body for comfort. This will help keep your arm at your side. 3. Hold one end of the elastic band with the hand of the painful arm. 4. Start with your forearm across your belly. Slowly rotate the forearm out away from your body. Keep your elbow and upper arm tucked against the towel roll or the side of your body until you begin to feel tightness in your shoulder. Slowly move your arm back to where you started. 5. Repeat 8 to 12 times. Internal rotator strengthening exercise    1. Start by tying a piece of elastic exercise material to a doorknob. You can use surgical tubing or Thera-Band. 2. Stand or sit with your shoulder relaxed and your elbow bent 90 degrees. Your upper arm should rest comfortably against your side. Squeeze a rolled towel between your elbow and your body for comfort. This will help keep your arm at your side. 3. Hold one end of the elastic band in the hand of the painful arm. 4. Slowly rotate your forearm toward your body until it touches your belly.  Slowly move it back to where you started. 5. Keep your elbow and upper arm firmly tucked against the towel roll or at your side. 6. Repeat 8 to 12 times. Pendulum swing    1. Hold on to a table or the back of a chair with your good arm. Then bend forward a little and let your sore arm hang straight down. This exercise does not use the arm muscles. Rather, use your legs and your hips to create movement that makes your arm swing freely. 2. Use the movement from your hips and legs to guide the slightly swinging arm back and forth like a pendulum (or elephant trunk). Then guide it in circles that start small (about the size of a dinner plate). Make the circles a bit larger each day, as your pain allows. 3. Do this exercise for 5 minutes, 5 to 7 times each day. 4. As you have less pain, try bending over a little farther to do this exercise. This will increase the amount of movement at your shoulder. Follow-up care is a key part of your treatment and safety. Be sure to make and go to all appointments, and call your doctor if you are having problems. It's also a good idea to know your test results and keep a list of the medicines you take. Where can you learn more? Go to http://ramirez-sidra.info/. Enter H562 in the search box to learn more about \"Shoulder Arthritis: Exercises. \"  Current as of: June 26, 2019  Content Version: 12.2  © 1732-7019 [employer]. Care instructions adapted under license by [employer] (which disclaims liability or warranty for this information). If you have questions about a medical condition or this instruction, always ask your healthcare professional. [place] [address] any warranty or liability for your use of this information.

## 2019-11-21 ENCOUNTER — HOSPITAL ENCOUNTER (OUTPATIENT)
Age: 59
Discharge: HOME | End: 2019-11-21
Attending: PHYSICAL MEDICINE & REHABILITATION

## 2019-11-21 ENCOUNTER — TELEPHONE (OUTPATIENT)
Dept: ORTHOPEDIC SURGERY | Age: 59
End: 2019-11-21

## 2019-11-21 DIAGNOSIS — M54.12 CERVICAL RADICULOPATHY: Primary | ICD-10-CM

## 2019-11-21 DIAGNOSIS — M62.838 MUSCLE SPASM: ICD-10-CM

## 2019-11-21 DIAGNOSIS — M48.02 CERVICAL SPINAL STENOSIS: ICD-10-CM

## 2019-11-21 DIAGNOSIS — M79.2 NEURITIS: ICD-10-CM

## 2019-11-21 DIAGNOSIS — Z98.1 S/P CERVICAL SPINAL FUSION: ICD-10-CM

## 2019-11-21 DIAGNOSIS — R29.898 RIGHT ARM WEAKNESS: ICD-10-CM

## 2019-11-21 DIAGNOSIS — M54.12 CERVICAL RADICULOPATHY: ICD-10-CM

## 2019-11-21 RX ORDER — DIAZEPAM 5 MG/1
TABLET ORAL
Qty: 2 TAB | Refills: 0 | Status: SHIPPED | OUTPATIENT
Start: 2019-11-21 | End: 2021-01-06

## 2019-11-21 NOTE — TELEPHONE ENCOUNTER
Patient couldn't go through with MRI scheduled for today and is requesting rx to relax her so she can reschedule the MRI. Patient requests we call in to [facility] on file. Please advise once approved at [phone].

## 2019-12-06 ENCOUNTER — HOSPITAL ENCOUNTER (OUTPATIENT)
Age: 59
Discharge: HOME | End: 2019-12-06
Attending: PHYSICAL MEDICINE & REHABILITATION
Payer: MEDICAID

## 2019-12-06 PROCEDURE — 72141 MRI NECK SPINE W/O DYE: CPT

## 2020-01-20 ENCOUNTER — OFFICE VISIT (OUTPATIENT)
Dept: ORTHOPEDIC SURGERY | Age: 60
End: 2020-01-20

## 2020-01-20 VITALS
HEIGHT: 64 IN | TEMPERATURE: 98.2 F | BODY MASS INDEX: 37.56 KG/M2 | RESPIRATION RATE: 18 BRPM | WEIGHT: 220 LBS | OXYGEN SATURATION: 98 % | DIASTOLIC BLOOD PRESSURE: 90 MMHG | HEART RATE: 76 BPM | SYSTOLIC BLOOD PRESSURE: 141 MMHG

## 2020-01-20 DIAGNOSIS — Z98.1 S/P CERVICAL SPINAL FUSION: ICD-10-CM

## 2020-01-20 DIAGNOSIS — M79.2 NEURITIS: ICD-10-CM

## 2020-01-20 DIAGNOSIS — R29.898 RIGHT ARM WEAKNESS: ICD-10-CM

## 2020-01-20 DIAGNOSIS — G89.29 CHRONIC RIGHT SHOULDER PAIN: ICD-10-CM

## 2020-01-20 DIAGNOSIS — M25.511 CHRONIC RIGHT SHOULDER PAIN: ICD-10-CM

## 2020-01-20 DIAGNOSIS — M48.02 CERVICAL SPINAL STENOSIS: ICD-10-CM

## 2020-01-20 DIAGNOSIS — M62.838 MUSCLE SPASM: ICD-10-CM

## 2020-01-20 DIAGNOSIS — M54.12 CERVICAL RADICULOPATHY: Primary | ICD-10-CM

## 2020-01-20 NOTE — LETTER
1/21/20 Patient: Meryl Lopez YOB: 1960 Date of Visit: 1/20/2020 Shavonne Loco, [address] [address] VIA Facsimile: [phone] Dear Shavonne Loco MD, Thank you for referring Ms. Nicolasa Pittman to [facility] for evaluation. My notes for this consultation are attached. If you have questions, please do not hesitate to call me. I look forward to following your patient along with you. Sincerely, Magaly Contreras MD

## 2020-01-20 NOTE — PATIENT INSTRUCTIONS
Neck Arthritis: Exercises Introduction Here are some examples of exercises for you to try. The exercises may be suggested for a condition or for rehabilitation. Start each exercise slowly. Ease off the exercises if you start to have pain. You will be told when to start these exercises and which ones will work best for you. How to do the exercises Neck stretches to the side 1. This stretch works best if you keep your shoulder down as you lean away from it. To help you remember to do this, start by relaxing your shoulders and lightly holding on to your thighs or your chair. 2. Tilt your head toward your shoulder and hold for 15 to 30 seconds. Let the weight of your head stretch your muscles. 3. Repeat 2 to 4 times toward each shoulder. Chin tuck 1. Lie on the floor with a rolled-up towel under your neck. Your head should be touching the floor. 2. Slowly bring your chin toward your chest. 
3. Hold for a count of 6, and then relax for up to 10 seconds. 4. Repeat 8 to 12 times. Active cervical rotation 1. Sit in a firm chair, or stand up straight. 2. Keeping your chin level, turn your head to the right, and hold for 15 to 30 seconds. 3. Turn your head to the left and hold for 15 to 30 seconds. 4. Repeat 2 to 4 times to each side. Shoulder blade squeeze 1. While standing, squeeze your shoulder blades together. 2. Do not raise your shoulders up as you are squeezing. 3. Hold for 6 seconds. 4. Repeat 8 to 12 times. Shoulder rolls 1. Sit comfortably with your feet shoulder-width apart. You can also do this exercise standing up. 2. Roll your shoulders up, then back, and then down in a smooth, circular motion. 3. Repeat 2 to 4 times. Follow-up care is a key part of your treatment and safety. Be sure to make and go to all appointments, and call your doctor if you are having problems. It's also a good idea to know your test results and keep a list of the medicines you take. Where can you learn more? Go to http://ramirez-sidra.info/. Enter B322 in the search box to learn more about \"Neck Arthritis: Exercises. \" Current as of: June 26, 2019 Content Version: 12.2 © 6130-7747 [employer]. Care instructions adapted under license by [employer] (which disclaims liability or warranty for this information). If you have questions about a medical condition or this instruction, always ask your healthcare professional. [place] [address] any warranty or liability for your use of this information.

## 2020-01-20 NOTE — PROGRESS NOTES
[facility]  [address] [place] [address], [facility] [address]  Phone: [phone]  Fax: [phone]    Pt's YOB: 1960    ASSESSMENT   Diagnoses and all orders for this visit:    1. Cervical radiculopathy  -     EMG ONE EXTREMITY UPPER RT; Future    2. Right arm weakness  -     EMG ONE EXTREMITY UPPER RT; Future    3. Neuritis  -     EMG ONE EXTREMITY UPPER RT; Future    4. Cervical spinal stenosis    5. Chronic right shoulder pain    6. Muscle spasm    7. S/P cervical spinal fusion         IMPRESSION AND PLAN:  Meryl Lopez is a 60 y.o. female with history of lumbar and cervical and lumbar pain. Pt complains of pain in the neck that extends into the right arm with pain and numbness extending into the finger tips. Pt has been prescribed Neurontin 300 mg and takes 1 tab QAM and 2 tabs QHS. 1) Pt was given information on cervical arthritis exercises. 2) A right upper extremity EMG was ordered. She has weakness and limited range of motion. 3) Pt will continue taking Neurontin 300 mg as prescribed and does not need a refill at this time. 4) Ms. Madelaine Plummer has a reminder for a \"due or due soon\" health maintenance. I have asked that she contact her primary care provider, Dilma Wilks MD, for follow-up on this health maintenance. 5)  demonstrated consistency with prescribing. 6) Recommend consideration for right shoulder MRI to assess rotator cuff after EMG/NCS  Follow-up and Dispositions    · Return in about 4 weeks (around 2/17/2020) for Diagnostic Test follow up. HISTORY OF PRESENT ILLNESS:  Meryl Lopez is a 60 y.o. female with history of lumbar and cervical and lumbar pain and presents to the office today for cervical MRI follow up. She complains of pain in the neck that extends into the right arm with pain and numbness extending into the finger tips.  Pt reports that her right arm/shoulder occasionally becomes \"locked/stuck\" with overhead motion. She also complains of difficulty with manual dexterity, particularly when writing and cooking. Pt notes that her arm pain started to worsen about 1 year ago. She denies any pain radiating down the left arm at this time. Pt reports a previous surgery with a surgeon at [facility] in [place] (while she was living there). She denies any previous EMGs and notes that she as attended physical therapy. Pt has been prescribed Neurontin 300 mg and takes 1 tab QAM and 2 tabs QHS. She admits to sedation when taking the Neurontin, most severe at night. Pt is unable to recall if the Medrol Dosepak was effective. Pt at this time desires to proceed with right upper extremity EMG. Pt notes that she plans to move back to [place] in a couple months. She is living with her daughter and her children (10, 15 and 12years old) at this time.     Pain Scale: 0 - No pain/10    PCP: Queen Albertina MD     Past Medical History:   Diagnosis Date    Adverse effect of anesthesia     hard to wake up    Chronic pain     Depression     GERD (gastroesophageal reflux disease)     Hypothyroidism     Low back pain     Thromboembolus (HCC)     blood clot in foot during pregnancy    Tobacco abuse     Vertigo         Social History     Socioeconomic History    Marital status: SINGLE     Spouse name: Not on file    Number of children: Not on file    Years of education: Not on file    Highest education level: Not on file   Occupational History    Not on file   Social Needs    Financial resource strain: Not on file    Food insecurity:     Worry: Not on file     Inability: Not on file    Transportation needs:     Medical: Not on file     Non-medical: Not on file   Tobacco Use    Smoking status: Former Smoker     Packs/day: 0.25     Years: 4.00     Pack years: 1.00     Last attempt to quit: 2015     Years since quittin.6    Smokeless tobacco: Never Used   Substance and Sexual Activity    Alcohol use: No    Drug use: No    Sexual activity: Not on file   Lifestyle    Physical activity:     Days per week: Not on file     Minutes per session: Not on file    Stress: Not on file   Relationships    Social connections:     Talks on phone: Not on file     Gets together: Not on file     Attends [employer] service: Not on file     Active member of club or organization: Not on file     Attends meetings of clubs or organizations: Not on file     Relationship status: Not on file    Intimate partner violence:     Fear of current or ex partner: Not on file     Emotionally abused: Not on file     Physically abused: Not on file     Forced sexual activity: Not on file   Other Topics Concern    Not on file   Social History Narrative    3/2015[de-identified] currently unemployed due to chronic low back pain, used to work renovating houses. Lived with her mother until her mother's death in the summer of 2014. Currently living with her daughters, splits time between [facility]. Current Outpatient Medications   Medication Sig Dispense Refill    gabapentin (NEURONTIN) 300 mg capsule Take 1 cap by mouth in the morning and 2 at night as directed. 60 Cap 1    acetaminophen (TYLENOL EXTRA STRENGTH) 500 mg tablet Take  by mouth every six (6) hours as needed for Pain.  clotrimazole-betamethasone (LOTRISONE) topical cream APPLY 2 TIMES DAILY  1    liothyronine (CYTOMEL) 5 mcg tablet TAKE 1 TABLET BY MOUTH TWICE DAILY  0    levothyroxine (SYNTHROID) 50 mcg tablet Take 50 mcg by mouth Daily (before breakfast).  gabapentin (NEURONTIN) 300 mg capsule Take 1 tab by mouth three times daily  2    diazePAM (VALIUM) 5 mg tablet Take 1 tab 30 min prior to MRI, may repeat x1. No driving, must have  2 Tab 0    cyclobenzaprine (FLEXERIL) 10 mg tablet Take  by mouth three (3) times daily as needed for Muscle Spasm(s).  VOLTAREN 1 % gel Apply 4 g to affected area four (4) times daily.  Maximum 16 grams per joint per day. Dispense 5 100 gram tubes (Patient not taking: Reported on 11/11/2019) 5 Each 0    multivit,iron,min/green tea xt (DAILY MULTIPLE WEIGHT LOSS PO) Take 3 Tabs by mouth daily. Relacore weight loss tablets      calcium carbonate 500 mg calcium (1,250 mg) chewable tablet Take 2 Tabs by mouth daily as needed for Indigestion (As needed for upset stomach).  polyethylene glycol (MIRALAX) 17 gram/dose powder Take 17 g by mouth daily.  buffered aspirin (BUFFERIN) 325 mg tablet Take 1 Tab by mouth two (2) times a day. (Patient not taking: Reported on 11/11/2019) 60 Tab 1    ferrous sulfate 325 mg (65 mg iron) tablet Take 1 Tab by mouth two (2) times daily (with meals). (Patient not taking: Reported on 11/11/2019) 60 Tab 1    oxyCODONE-acetaminophen (PERCOCET) 7.5-325 mg per tablet Take 1-2 Tabs by mouth every four (4) hours as needed for Pain. Max Daily Amount: 12 Tabs. (Patient not taking: Reported on 11/11/2019) 60 Tab 0    ondansetron hcl (ZOFRAN) 4 mg tablet Take 1 Tab by mouth every eight (8) hours as needed for Nausea. (Patient not taking: Reported on 11/11/2019) 30 Tab 1    OTHER daily.  cyanocobalamin (VITAMIN B-12) 1,000 mcg tablet Take 1,000 mcg by mouth daily.  NP THYROID 15 mg tablet TK 3 TS PO D  1    potassium iodide/iodine (IODINE STRONG, LUGOLS, PO) Take 25 mg by mouth daily.  Selenomethionine 200 mcg tab Take 1 Tab by mouth daily.  cholecalciferol, vitamin D3, (VITAMIN D3) 2,000 unit tab Take 1 Tab by mouth daily. Allergies   Allergen Reactions    Pcn [Penicillins] Hives         REVIEW OF SYSTEMS    Constitutional: Negative for fever, chills, or weight change. Respiratory: Negative for cough or shortness of breath. Cardiovascular: Negative for chest pain or palpitations. Gastrointestinal: Negative for acid reflux, change in bowel habits, or constipation. Genitourinary: Negative for dysuria and flank pain.    Musculoskeletal: Positive for cervical pain and right arm weakness. Skin: Negative for rash. Neurological: Negative for headaches, dizziness, or numbness. Endo/Heme/Allergies: Negative for increased bruising. Psychiatric/Behavioral: Positive for difficulty with sleep. PHYSICAL EXAMINATION  Visit Vitals  /90   Pulse 76   Temp 98.2 °F (36.8 °C)   Resp 18   Ht 5' 4\" (1.626 m)   Wt 220 lb (99.8 kg)   SpO2 98%   BMI 37.76 kg/m²       Constitutional: Awake, alert, and in no acute distress. Neurological: 1+ symmetrical DTRs in the upper extremities. 1+ symmetrical DTRs in the lower extremities. Decreased sensation to light touch in the right hand, otherwise sensation is intact. Negative Esparza's sign bilaterally. Skin: warm, dry, and intact. Musculoskeletal: Pain with abduction of the right shoulder. Good passive range of motion of both shoulders. Positive empty can test on the right; negative on the left. Good strength with resisted abduction and adduction of both arms. Decreased side to side cervical flexion. No pain with extension, axial loading, or forward flexion. No pain with internal or external rotation of her hips. Negative straight leg raise bilaterally. Biceps  Triceps Deltoids Wrist Ext Wrist Flex Hand Intrin   Right  4/5  4/5  4/5  4/5 +4/5 +4/5   Left +4/5 +4/5 +4/5 +4/5 -4/5 -4/5      Hip Flex  Quads Hamstrings Ankle DF EHL Ankle PF   Right +4/5 +4/5 +4/5 +4/5 +4/5 +4/5   Left +4/5 +4/5 +4/5 +4/5 +4/5 +4/5     IMAGING:    Cervical spine MRI from 11/21/2019 was personally reviewed with the patient and demonstrated:  Results from [place] encounter on 11/21/19   MRI CERV SPINE WO CONT    Narrative MR CERVICAL SPINE WITHOUT CONTRAST    HISTORY: Neck pain, cervical spine    COMPARISON: None    TECHNIQUE: Multisequence multiplanar imaging through the cervical spine. FINDINGS:     Mildly motion degraded examination. Alignment: Intact alignment.  ACDF C4-C7 with hardware susceptibility artifact  and field inhomogeneity mildly limiting the evaluation. Vertebral body height: Normal  Marrow signal: No suspicious lesion. Cervicomedullary Junction: Patent  Cervical cord: No cord expansion or atrophy. Further discussed below where  relevant. There is diffuse developmental narrowing of the cervical spinal canal.    On axial imaging, findings at each level are as follows:    C2/C3: Mild disc height loss and disc bulge. Partial effacement of the ventral  CSF space. Ligamentum flavum thickening effacing dorsal CSF space. AP canal  diameter 8 mm. Patent foramina. Mild left facet hypertrophy. C3/C4: Disc osteophyte complex partially effacing ventral CSF space. Ligamentum  flavum thickening. AP canal diameter 8 mm. Uncovertebral spurring. Moderate  right and mild left facet hypertrophy. High-grade foraminal stenosis. C4/C5: Fusion level. AP canal diameter 8 mm. Uncovertebral spurring. High-grade  foraminal stenosis. C5/C6: Fusion level. AP canal diameter about 8-9 mm. Patent foramina. C6/C7: Fusion level. AP canal diameter 8 mm. Uncovertebral spurring. High-grade  foraminal stenosis. C7/T1: Minimal disc bulge. AP canal diameter 8-9 mm. Mild facet hypertrophy. High-grade foraminal stenosis. Other structures: Unremarkable      Impression IMPRESSION:    1. Motion degraded examination. ACDF C4-C7. 2. Diffuse  mild developmental spinal canal stenosis. No high-grade central  canal stenosis. No myelopathy. 3. Multilevel high-grade neural foraminal stenosis due to uncovertebral spurring  although evaluation is suboptimal due to motion and hardware artifact.      Right hip MRI from 06/22/2018 was personally reviewed and demonstrated:       Results from Hospital Encounter on 06/22/18   MRI HIP  RT  WO CONT     Narrative EXAM: MRI of the pelvis  and right hip      INDICATION: Right hip pain     TECHNIQUE: Multiplanar multisequence MR imaging of the right  hip and pelvis     IV Contrast: None     COMPARISON: None     FINDINGS:   Osseous Structures: The osseous marrow of the pelvis is unremarkable. The  femoral heads grossly demonstrate normal marrow signal. A small likely benign  chondroid lesion is noted in the left femoral head.     Joints/Cartilage: The right hip has a small joint effusion. There is  significant asymmetric joint space narrowing. There is subchondral cyst is noted  anteriorly. Significant cartilage loss and osteophyte formation are noted in the  acetabulum and femoral head. There is a large area of likely grade IV  chondromalacia of the anterior superior acetabulum. The left hip likely has mild  to moderate degenerative changes but are incompletely evaluated.     Ligaments: No gross ligamentous pathology appreciated.     Tendons/Muscle: Gluteus muscles and tendons are unremarkable bilaterally. The  iliopsoas tendon and muscle are unremarkable bilaterally. No evidence of  iliopsoas bursitis. The hamstring origins are unremarkable. No muscular atrophy  identified.     Soft Tissues/Other:  Small of fluid is noted at the right greater trochanteric  bursa.        Impression IMPRESSION:   1. Severe likely grade 4 osteoarthritis of the right hip with large area of  grade IV chondromalacia, osteophyte formation and asymmetric joint space  narrowing as well as a small joint effusion.     2.  Mild right greater trochanteric bursitis.          Lumbar spine x-rays from 06/12/2018 were personally reviewed with the patient and demonstrated:  Multilevel degenerative facets. Severe degenerative disc at L5-S1. No instability.      Cervical spine x-rays from 12/07/2017 were personally reviewed with the patient and demonstrated:  Impression: C4-C7 in good alignment with no evidence of hardware failure   or loosening. Written by Luis Felipe Mccord, as dictated by Viktoria Andrade MD.  I, Dr. Viktoria Andarde confirm that all documentation is accurate.

## 2020-02-05 ENCOUNTER — OFFICE VISIT (OUTPATIENT)
Dept: ORTHOPEDIC SURGERY | Age: 60
End: 2020-02-05

## 2020-02-05 VITALS
RESPIRATION RATE: 20 BRPM | OXYGEN SATURATION: 97 % | BODY MASS INDEX: 36.06 KG/M2 | HEIGHT: 65 IN | SYSTOLIC BLOOD PRESSURE: 113 MMHG | DIASTOLIC BLOOD PRESSURE: 73 MMHG | TEMPERATURE: 97.9 F | HEART RATE: 64 BPM | WEIGHT: 216.4 LBS

## 2020-02-05 DIAGNOSIS — M79.601 RIGHT ARM PAIN: Primary | ICD-10-CM

## 2020-02-05 NOTE — LETTER
2/5/20 Patient: Salome Lopez YOB: 1960 Date of Visit: 2/5/2020 Felicia Ambrose, [address] [facility] [address] [facility] [address] VIA Facsimile: [phone] Maryellen Ward, [address] [facility] [address] [facility] [address] VIA In Basket Dear MD Maryellen Almonte MD, Thank you for referring Ms. Jessica Tellez to [facility] for evaluation. My notes for this consultation are attached. If you have questions, please do not hesitate to call me. I look forward to following your patient along with you.  
 
 
Sincerely, 
 
Santo Rincon MD

## 2020-02-05 NOTE — PROGRESS NOTES
[address]  [address]  [address]  Phone: [phone]  Fax: [phone]        Enid Neeraj  : 1960  PCP: Susie Lott MD  2020    ELECTROMYOGRAPHY AND NERVE CONDUCTION STUDIES    Geovany Jaramillo was referred by Dr. Toby Mathis for electrodiagnostic evaluation of RUE paraesthesia. NCV & EMG Findings:  Evaluation of the right median motor nerve showed prolonged distal onset latency (4.5 ms). The right median sensory nerve showed prolonged distal peak latency (4.0 ms) and decreased conduction velocity (Wrist-2nd Digit, 35 m/s). All remaining nerves (as indicated in the following tables) were within normal limits. Needle evaluation of the right biceps muscle showed increased motor unit amplitude and moderately increased polyphasic potentials. The right triceps muscle showed increased motor unit amplitude. The right pronator teres muscle showed increased insertional activity and increased spontaneous activity. The right Ext Digitorum muscle showed increased insertional activity, increased spontaneous activity, and diminished recruitment. All remaining muscles (as indicated in the following table) showed no evidence of electrical instability. INTERPRETATION  This is an abnormal electrodiagnostic examination. These findings may be consistent with:  1. Moderate median mononeuropathy at the right wrist (carpal tunnel syndrome)  2. Ongoing/acute right C6/7 radiculopathy. 3. Chronic, inactive right C5/6 radiculopathy    CLINICAL INTERPRETATION  Her electrodiagnostic findings of cervical radiculopathy and CTS are likely related to her right arm symptoms. HISTORY OF PRESENT ILLNESS  Geovany Jaramillo is a 60 y.o. female. Pt presents today for RUE EMG evaluation of neck and right shoulder pain extending into the RUE to the finger tips.  Pt reports that her right shoulder occasionally becomes \"stuck\" with overhead motions. She has some difficulty with manual dexterity, especially with writing and cooking. She has h/o ACDF C4-C7 in 2016 (in [place]). PAST MEDICAL HISTORY   Past Medical History:   Diagnosis Date    Adverse effect of anesthesia     hard to wake up    Chronic pain     Depression     GERD (gastroesophageal reflux disease)     Hypothyroidism     Low back pain     Thromboembolus (HCC)     blood clot in foot during pregnancy    Tobacco abuse     Vertigo        Past Surgical History:   Procedure Laterality Date    HX  SECTION      x3    HX HIP REPLACEMENT      HX HYSTERECTOMY      HX ORTHOPAEDIC  2002    2 disks removed lower back [facility].    HX ORTHOPAEDIC Right 2017    shoulder surgery    NEUROLOGICAL PROCEDURE UNLISTED     . MEDICATIONS    Current Outpatient Medications   Medication Sig Dispense Refill    diazePAM (VALIUM) 5 mg tablet Take 1 tab 30 min prior to MRI, may repeat x1. No driving, must have  2 Tab 0    gabapentin (NEURONTIN) 300 mg capsule Take 1 cap by mouth in the morning and 2 at night as directed. 60 Cap 1    acetaminophen (TYLENOL EXTRA STRENGTH) 500 mg tablet Take  by mouth every six (6) hours as needed for Pain.  cyclobenzaprine (FLEXERIL) 10 mg tablet Take  by mouth three (3) times daily as needed for Muscle Spasm(s).  clotrimazole-betamethasone (LOTRISONE) topical cream APPLY 2 TIMES DAILY  1    liothyronine (CYTOMEL) 5 mcg tablet TAKE 1 TABLET BY MOUTH TWICE DAILY  0    VOLTAREN 1 % gel Apply 4 g to affected area four (4) times daily. Maximum 16 grams per joint per day. Dispense 5 100 gram tubes (Patient not taking: Reported on 2019) 5 Each 0    multivit,iron,min/green tea xt (DAILY MULTIPLE WEIGHT LOSS PO) Take 3 Tabs by mouth daily. Relacore weight loss tablets      calcium carbonate 500 mg calcium (1,250 mg) chewable tablet Take 2 Tabs by mouth daily as needed for Indigestion (As needed for upset stomach).       levothyroxine (SYNTHROID) 50 mcg tablet Take 50 mcg by mouth Daily (before breakfast).  polyethylene glycol (MIRALAX) 17 gram/dose powder Take 17 g by mouth daily.  buffered aspirin (BUFFERIN) 325 mg tablet Take 1 Tab by mouth two (2) times a day. (Patient not taking: Reported on 2019) 60 Tab 1    ferrous sulfate 325 mg (65 mg iron) tablet Take 1 Tab by mouth two (2) times daily (with meals). (Patient not taking: Reported on 2019) 60 Tab 1    oxyCODONE-acetaminophen (PERCOCET) 7.5-325 mg per tablet Take 1-2 Tabs by mouth every four (4) hours as needed for Pain. Max Daily Amount: 12 Tabs. (Patient not taking: Reported on 2019) 60 Tab 0    ondansetron hcl (ZOFRAN) 4 mg tablet Take 1 Tab by mouth every eight (8) hours as needed for Nausea. (Patient not taking: Reported on 2019) 30 Tab 1    OTHER daily.  cyanocobalamin (VITAMIN B-12) 1,000 mcg tablet Take 1,000 mcg by mouth daily.  NP THYROID 15 mg tablet TK 3 TS PO D  1    gabapentin (NEURONTIN) 300 mg capsule Take 1 tab by mouth three times daily  2    potassium iodide/iodine (IODINE STRONG, LUGOLS, PO) Take 25 mg by mouth daily.  Selenomethionine 200 mcg tab Take 1 Tab by mouth daily.  cholecalciferol, vitamin D3, (VITAMIN D3) 2,000 unit tab Take 1 Tab by mouth daily.           ALLERGIES  Allergies   Allergen Reactions    Pcn [Penicillins] Hives          SOCIAL HISTORY    Social History     Socioeconomic History    Marital status: SINGLE     Spouse name: Not on file    Number of children: Not on file    Years of education: Not on file    Highest education level: Not on file   Tobacco Use    Smoking status: Former Smoker     Packs/day: 0.25     Years: 4.00     Pack years: 1.00     Last attempt to quit: 2015     Years since quittin.6    Smokeless tobacco: Never Used   Substance and Sexual Activity    Alcohol use: No    Drug use: No   Social History Narrative    3/2015[de-identified] currently unemployed due to chronic low back pain, used to work renovating houses. Lived with her mother until her mother's death in the summer of 2014. Currently living with her daughters, splits time between [place] and [place]. FAMILY HISTORY  Family History   Problem Relation Age of Onset    Hypertension Mother     Cancer Mother         multiple myeloma    Cancer Father         throat    Cancer Sister          PHYSICAL EXAMINATION  Visit Vitals  /73 (BP 1 Location: Right arm, BP Patient Position: Sitting)   Pulse 64   Temp 97.9 °F (36.6 °C) (Oral)   Resp 20   Ht 5' 5\" (1.651 m)   Wt 216 lb 6.4 oz (98.2 kg)   SpO2 97%   BMI 36.01 kg/m²       Pain Assessment  2/5/2020   Location of Pain Arm   Pain Location Comment -   Location Modifiers Right   Severity of Pain 3   Quality of Pain (No Data)   Quality of Pain Comment \"NUMBNESS, TINGLING, WEAKNESS IN ARM\"   Duration of Pain Persistent   Duration of Pain Comment -   Frequency of Pain Constant   Frequency of Pain Comment -   Aggravating Factors -   Aggravating Factors Comment -   Limiting Behavior -   Relieving Factors -   Relieving Factors Comment -   Result of Injury No           Constitutional:  Well developed, well nourished, in no acute distress. Psychiatric: Affect and mood are appropriate. Integumentary: No rashes or abrasions noted on exposed areas. SPINE/MUSCULOSKELETAL EXAM    On brief examination: None.       NCV & EMG Findings:  Nerve Conduction Studies  Anti Sensory Summary Table     Stim Site NR Peak (ms) Norm Peak (ms) P-T Amp (µV) Norm P-T Amp Site1 Site2 Delta-P (ms) Dist (cm) Srinivasa (m/s) Norm Srinivasa (m/s)   Right Median Anti Sensory (2nd Digit)   Wrist    4.0 <3.6 67.5 >10 Wrist 2nd Digit 4.0 14.0 35 >39   Right Radial Anti Sensory (Base 1st Digit)   Wrist    2.3 <3.1 89.6  Wrist Base 1st Digit 2.3 0.0     Right Ulnar Anti Sensory (5th Digit)   Wrist    3.4 <3.7 58.5 >15.0 Wrist 5th Digit 3.4 14.0 41 >38     Motor Summary Table     Stim Site NR Onset (ms) Norm Onset (ms) O-P Amp (mV) Norm O-P Amp Site1 Site2 Delta-0 (ms) Dist (cm) Srinivasa (m/s) Norm Srinivasa (m/s)   Right Median Motor (Abd Poll Brev)   Wrist    4.5 <4.2 10.2 >5 Elbow Wrist 3.9 22.0 56 >50   Elbow    8.4  9.6          Right Ulnar Motor (Abd Dig Min)   Wrist    3.2 <4.2 10.4 >3 B Elbow Wrist 3.1 19.0 61 >53   B Elbow    6.3  9.5  A Elbow B Elbow 1.7 10.0 59 >53   A Elbow    8.0  9.5            EMG     Side Muscle Nerve Root Ins Act Fibs Psw Amp Dur Poly Recrt Int Neri Magaly Comment   Right Biceps Musculocut C5-6 Nml Nml Nml Incr Nml 2+ Nml Nml    Right Triceps Radial C6-7-8 Nml Nml Nml Incr Nml 0 Nml Nml    Right PronatorTeres Median C6-7 Incr 2+ 3+ Nml Nml 0 Nml Nml    Right Ext Digitorum Radial (Post Int) C7-8 Incr 2+ 3+ Nml Nml 0 Reduced Nml    Right Abd Poll Brev Median C8-T1 Nml Nml Nml Nml Nml 0 Nml Nml    Right 1stDorInt Ulnar C8-T1 Nml Nml Nml Nml Nml 0 Nml Nml    Right Cervical Parasp Up Rami C1-3 Nml Nml Nml         Right Cervical Parasp Mid Rami C4-6 Nml Nml Nml         Right Cervical Parasp Low Rami C7-8 Nml Nml Nml             Nerve Conduction Studies  Anti Sensory Left/Right Comparison     Stim Site L Lat (ms) R Lat (ms) L-R Lat (ms) L Amp (µV) R Amp (µV) L-R Amp (%) Site1 Site2 L Srinivasa (m/s) R Srinivasa (m/s) L-R Srinivasa (m/s)   Median Anti Sensory (2nd Digit)   Wrist  4.0   67.5  Wrist 2nd Digit  35    Radial Anti Sensory (Base 1st Digit)   Wrist  2.3   89.6  Wrist Base 1st Digit      Ulnar Anti Sensory (5th Digit)   Wrist  3.4   58.5  Wrist 5th Digit  41      Motor Left/Right Comparison     Stim Site L Lat (ms) R Lat (ms) L-R Lat (ms) L Amp (mV) R Amp (mV) L-R Amp (%) Site1 Site2 L Srinivasa (m/s) R Srinivasa (m/s) L-R Srinivaas (m/s)   Median Motor (Abd Poll Brev)   Wrist  4.5   10.2  Elbow Wrist  56    Elbow  8.4   9.6         Ulnar Motor (Abd Dig Min)   Wrist  3.2   10.4  B Elbow Wrist  61    B Elbow  6.3   9.5  A Elbow B Elbow  59    A Elbow  8.0   9.5               Waveforms:                     [facility]  OFFICE PROCEDURE PROGRESS NOTE        Chart reviewed for the following:   IHo, have reviewed the History, Physical and updated the Allergic reactions for [address] performed immediately prior to start of procedure:   Sp Huizar, have performed the following reviews on [address] [facility] [address] prior to the start of the procedure:            * Patient was identified by name and date of birth   * Agreement on procedure being performed was verified  * Risks and Benefits explained to the patient  * Procedure site verified and marked as necessary  * Patient was positioned for comfort  * Consent was signed and verified     Time: 12:30 PM      Date of procedure: 2/5/2020    Procedure performed by:  Jasmin Flores MD    Provider accompanied by: Scribe. Patient accompanied by: Self.     How tolerated by patient: tolerated the procedure well with no complications    Post Procedural Pain Scale: 0 - No Hurt    Comments: none    Written by Iram Galdamez, [address] as dictated by Ho Mills MD

## 2020-02-17 NOTE — PROGRESS NOTES
[facility]  [address] [place], [facility] [address]  Phone: [phone]  Fax: [phone]    Pt's YOB: 1960    ASSESSMENT   Diagnoses and all orders for this visit:    1. Cervical radiculopathy  -     REFERRAL TO SPINE SURGERY  -     pregabalin (LYRICA) 75 mg capsule; Take 1 cap by mouth in the morning and 2 at night as directed. 2. Right arm weakness  -     REFERRAL TO SPINE SURGERY  -     REFERRAL TO ORTHOPEDICS    3. Right carpal tunnel syndrome  -     REFERRAL TO ORTHOPEDICS  -     AMB SUPPLY ORDER    4. Muscle spasm  -     REFERRAL TO SPINE SURGERY    5. Cervical spinal stenosis  -     REFERRAL TO SPINE SURGERY    6. Chronic right shoulder pain         IMPRESSION AND PLAN:  Eligio Pabon is a 60 y.o. right hand dominant female with history of lumbar and cervical pain. She complains of pain in the neck that extends into the right arm with pain and numbness extending into the finger tips. Pt also complains of weakness and a heavy sensation in the entire right arm. She takes Neurontin 300 mg 1 cap and 2 caps QHS with sedation and is unsure if it is effective. 1) Pt was given information on cervical arthritis exercises. 2) She was referred to Dr. Telma Dunn for surgical evaluation. 3) Pt was also referred to Dr. Ramya Mcdermott for carpal tunnel evaluation. 4) She will discontinue Neurontin as directed as it was not effective. 5) Pt was prescribed Lyrica 75 mg 1 cap QAM and 2 caps QHS, tapering up as directed, to take in place of the Neurontin. 6) She was prescribed a right wrist splint. 7) Ms. Delicia Coleman has a reminder for a \"due or due soon\" health maintenance. I have asked that she contact her primary care provider, Kleber Faye MD, for follow-up on this health maintenance. 8)  demonstrated consistency with prescribing.  .  Follow-up and Dispositions    · Return for 2 weeks with Dr Ramya Mcdermott and 4 weeks with Dr Telma Dunn and 6 weeks with me for medication follow up. HISTORY OF PRESENT ILLNESS:  Mark Ventura is a 60 y.o. right hand dominant female with history of lumbar and cervical pain and presents to the office today for EMG follow up. She complains of pain in the neck that extends into the right arm with pain and numbness extending into the finger tips. Pt also complains of weakness in the entire right arm and notes that she generally has to use her left hand when trying to lift the right arm. She notes a heavy sensation in the entire right arm. Pt denies any left-sided symptoms at this time. She has previously attended physical therapy. Pt takes Neurontin 300 mg 1 cap and 2 caps QHS with sedation and is unsure if it is effective. She has never taken Topamax or Lyrica and denies a history of glaucoma or kidney stones. Pt at this time desires to proceed with surgical evaluation with Dr. Devendra Mitchell and Dr. Javier Gray.     Pain Scale: /10    PCP: Taylor Devries MD     Past Medical History:   Diagnosis Date    Adverse effect of anesthesia     hard to wake up    Chronic pain     Depression     GERD (gastroesophageal reflux disease)     Hypothyroidism     Low back pain     Thromboembolus (HCC)     blood clot in foot during pregnancy    Tobacco abuse     Vertigo         Social History     Socioeconomic History    Marital status: SINGLE     Spouse name: Not on file    Number of children: Not on file    Years of education: Not on file    Highest education level: Not on file   Occupational History    Not on file   Social Needs    Financial resource strain: Not on file    Food insecurity:     Worry: Not on file     Inability: Not on file    Transportation needs:     Medical: Not on file     Non-medical: Not on file   Tobacco Use    Smoking status: Former Smoker     Packs/day: 0.25     Years: 4.00     Pack years: 1.00     Last attempt to quit: 2015     Years since quittin.7    Smokeless tobacco: Never Used Substance and Sexual Activity    Alcohol use: No    Drug use: No    Sexual activity: Not on file   Lifestyle    Physical activity:     Days per week: Not on file     Minutes per session: Not on file    Stress: Not on file   Relationships    Social connections:     Talks on phone: Not on file     Gets together: Not on file     Attends Nondenominational service: Not on file     Active member of club or organization: Not on file     Attends meetings of clubs or organizations: Not on file     Relationship status: Not on file    Intimate partner violence:     Fear of current or ex partner: Not on file     Emotionally abused: Not on file     Physically abused: Not on file     Forced sexual activity: Not on file   Other Topics Concern    Not on file   Social History Narrative    3/2015[de-identified] currently unemployed due to chronic low back pain, used to work renovating houses. Lived with her mother until her mother's death in the summer of 2014. Currently living with her daughters, splits time between [place] and [place]. Current Outpatient Medications   Medication Sig Dispense Refill    pregabalin (LYRICA) 75 mg capsule Take 1 cap by mouth in the morning and 2 at night as directed. 90 Cap 2    gabapentin (NEURONTIN) 300 mg capsule Take 1 cap by mouth in the morning and 2 at night as directed. (Patient taking differently: Take 300 mg by mouth two (2) times a day. Take 1 cap by mouth in the morning and 2 at night as directed.) 60 Cap 1    acetaminophen (TYLENOL EXTRA STRENGTH) 500 mg tablet Take 500 mg by mouth every six (6) hours as needed for Pain.  liothyronine (CYTOMEL) 5 mcg tablet Take 5 mcg by mouth daily. 0    levothyroxine (SYNTHROID) 50 mcg tablet Take 50 mcg by mouth Daily (before breakfast).  diazePAM (VALIUM) 5 mg tablet Take 1 tab 30 min prior to MRI, may repeat x1.  No driving, must have  (Patient not taking: Reported on 2/18/2020) 2 Tab 0    cyclobenzaprine (FLEXERIL) 10 mg tablet Take  by mouth three (3) times daily as needed for Muscle Spasm(s).  clotrimazole-betamethasone (LOTRISONE) topical cream APPLY 2 TIMES DAILY  1    VOLTAREN 1 % gel Apply 4 g to affected area four (4) times daily. Maximum 16 grams per joint per day. Dispense 5 100 gram tubes (Patient not taking: Reported on 11/11/2019) 5 Each 0    multivit,iron,min/green tea xt (DAILY MULTIPLE WEIGHT LOSS PO) Take 3 Tabs by mouth daily. Relacore weight loss tablets      calcium carbonate 500 mg calcium (1,250 mg) chewable tablet Take 2 Tabs by mouth daily as needed for Indigestion (As needed for upset stomach).  polyethylene glycol (MIRALAX) 17 gram/dose powder Take 17 g by mouth daily.  buffered aspirin (BUFFERIN) 325 mg tablet Take 1 Tab by mouth two (2) times a day. (Patient not taking: Reported on 11/11/2019) 60 Tab 1    ferrous sulfate 325 mg (65 mg iron) tablet Take 1 Tab by mouth two (2) times daily (with meals). (Patient not taking: Reported on 11/11/2019) 60 Tab 1    oxyCODONE-acetaminophen (PERCOCET) 7.5-325 mg per tablet Take 1-2 Tabs by mouth every four (4) hours as needed for Pain. Max Daily Amount: 12 Tabs. (Patient not taking: Reported on 11/11/2019) 60 Tab 0    ondansetron hcl (ZOFRAN) 4 mg tablet Take 1 Tab by mouth every eight (8) hours as needed for Nausea. (Patient not taking: Reported on 11/11/2019) 30 Tab 1    OTHER daily.  cyanocobalamin (VITAMIN B-12) 1,000 mcg tablet Take 1,000 mcg by mouth daily.  NP THYROID 15 mg tablet TK 3 TS PO D  1    gabapentin (NEURONTIN) 300 mg capsule Take 1 tab by mouth three times daily  2    potassium iodide/iodine (IODINE STRONG, LUGOLS, PO) Take 25 mg by mouth daily.  Selenomethionine 200 mcg tab Take 1 Tab by mouth daily.  cholecalciferol, vitamin D3, (VITAMIN D3) 2,000 unit tab Take 1 Tab by mouth daily.          Allergies   Allergen Reactions    Pcn [Penicillins] Hives         REVIEW OF SYSTEMS    Constitutional: Negative for fever, chills, or weight change. Respiratory: Negative for cough or shortness of breath. Cardiovascular: Negative for chest pain or palpitations. Gastrointestinal: Negative for acid reflux, change in bowel habits, or constipation. Genitourinary: Negative for dysuria and flank pain. Musculoskeletal: Positive for cervical and shoulder pain and right arm weakness. Skin: Negative for rash. Neurological: Negative for headaches or dizziness. Positive for numbness in the right arm. Endo/Heme/Allergies: Negative for increased bruising. Psychiatric/Behavioral: Positive for difficulty with sleep. PHYSICAL EXAMINATION  Visit Vitals  /79 (BP 1 Location: Right arm, BP Patient Position: Sitting)   Pulse 89   Temp 98.3 °F (36.8 °C) (Oral)   Ht 5' 5\" (1.651 m)   Wt 214 lb 6.4 oz (97.3 kg)   SpO2 97%   BMI 35.68 kg/m²       Constitutional: Awake, alert, and in no acute distress. Neurological: 1+ symmetrical DTRs in the upper extremities. 1+ symmetrical DTRs in the lower extremities. Decreased sensation to light touch in the right arm, otherwise sensation is intact. Negative Esparza's sign bilaterally. Skin: warm, dry, and intact. Musculoskeletal: Pain with abduction of the right shoulder. Very tight across the upper trapezius with scattered trigger points. Decreased side to side cervical flexion. No pain with extension, axial loading, or forward flexion. No pain with internal or external rotation of her hips. Negative straight leg raise bilaterally.        Biceps  Triceps Deltoids Wrist Ext Wrist Flex Hand Intrin   Right  4/5 -4/5  4/5  4/5  4/5  4/5   Left +4/5 +4/5 +4/5 +4/5 +4/5 +4/5      Hip Flex  Quads Hamstrings Ankle DF EHL Ankle PF   Right +4/5 +4/5 +4/5 +4/5 +4/5 +4/5   Left +4/5 +4/5 +4/5 +4/5 +4/5 +4/5     IMAGING:    Cervical spine MRI from 11/21/2019 was personally reviewed with the patient and demonstrated:       Results from [facility] on 11/21/19   MRI CERV SPINE WO CONT     Narrative MR CERVICAL SPINE WITHOUT CONTRAST     HISTORY: Neck pain, cervical spine     COMPARISON: None     TECHNIQUE: Multisequence multiplanar imaging through the cervical spine.     FINDINGS:      Mildly motion degraded examination.      Alignment: Intact alignment. ACDF C4-C7 with hardware susceptibility artifact  and field inhomogeneity mildly limiting the evaluation. Vertebral body height: Normal  Marrow signal: No suspicious lesion. Cervicomedullary Junction: Patent  Cervical cord: No cord expansion or atrophy. Further discussed below where  relevant. There is diffuse developmental narrowing of the cervical spinal canal.     On axial imaging, findings at each level are as follows:     C2/C3: Mild disc height loss and disc bulge. Partial effacement of the ventral  CSF space. Ligamentum flavum thickening effacing dorsal CSF space. AP canal  diameter 8 mm. Patent foramina. Mild left facet hypertrophy.     C3/C4: Disc osteophyte complex partially effacing ventral CSF space. Ligamentum  flavum thickening. AP canal diameter 8 mm. Uncovertebral spurring. Moderate  right and mild left facet hypertrophy. High-grade foraminal stenosis.     C4/C5: Fusion level. AP canal diameter 8 mm. Uncovertebral spurring. High-grade  foraminal stenosis.     C5/C6: Fusion level. AP canal diameter about 8-9 mm. Patent foramina.     C6/C7: Fusion level. AP canal diameter 8 mm. Uncovertebral spurring. High-grade  foraminal stenosis.     C7/T1: Minimal disc bulge. AP canal diameter 8-9 mm. Mild facet hypertrophy. High-grade foraminal stenosis.     Other structures: Unremarkable        Impression IMPRESSION:     1. Motion degraded examination. ACDF C4-C7. 2. Diffuse  mild developmental spinal canal stenosis. No high-grade central  canal stenosis. No myelopathy.   3. Multilevel high-grade neural foraminal stenosis due to uncovertebral spurring  although evaluation is suboptimal due to motion and hardware artifact.        Lumbar spine x-rays from 06/12/2018 were personally reviewed with the patient and demonstrated:  Multilevel degenerative facets. Severe degenerative disc at L5-S1. No instability.      Cervical spine x-rays from 12/07/2017 were personally reviewed with the patient and demonstrated:  Impression: C4-C7 in good alignment with no evidence of hardware failure   or loosening. Right upper extremity NCV & EMG from 02/05/2020 was personally reviewed with the patient and demonstrated:  Evaluation of the right median motor nerve showed prolonged distal onset latency (4.5 ms). The right median sensory nerve showed prolonged distal peak latency (4.0 ms) and decreased conduction velocity (Wrist-2nd Digit, 35 m/s). All remaining nerves (as indicated in the following tables) were within normal limits.       Needle evaluation of the right biceps muscle showed increased motor unit amplitude and moderately increased polyphasic potentials. The right triceps muscle showed increased motor unit amplitude. The right pronator teres muscle showed increased insertional activity and increased spontaneous activity. The right Ext Digitorum muscle showed increased insertional activity, increased spontaneous activity, and diminished recruitment. All remaining muscles (as indicated in the following table) showed no evidence of electrical instability.          INTERPRETATION  This is an abnormal electrodiagnostic examination. These findings may be consistent with:  1. Moderate median mononeuropathy at the right wrist (carpal tunnel syndrome)  2. Ongoing/acute right C6/7 radiculopathy. 3. Chronic, inactive right C5/6 radiculopathy     CLINICAL INTERPRETATION  Her electrodiagnostic findings of cervical radiculopathy and CTS are likely related to her right arm symptoms. Written by Hattie Howard, as dictated by Robert Ramos MD.  I, Dr. Robert Ramos confirm that all documentation is accurate.

## 2020-02-18 ENCOUNTER — OFFICE VISIT (OUTPATIENT)
Dept: ORTHOPEDIC SURGERY | Age: 60
End: 2020-02-18

## 2020-02-18 VITALS
TEMPERATURE: 98.3 F | SYSTOLIC BLOOD PRESSURE: 122 MMHG | WEIGHT: 214.4 LBS | BODY MASS INDEX: 35.72 KG/M2 | HEART RATE: 89 BPM | OXYGEN SATURATION: 97 % | DIASTOLIC BLOOD PRESSURE: 79 MMHG | HEIGHT: 65 IN

## 2020-02-18 DIAGNOSIS — M48.02 CERVICAL SPINAL STENOSIS: ICD-10-CM

## 2020-02-18 DIAGNOSIS — M54.12 CERVICAL RADICULOPATHY: Primary | ICD-10-CM

## 2020-02-18 DIAGNOSIS — R29.898 RIGHT ARM WEAKNESS: ICD-10-CM

## 2020-02-18 DIAGNOSIS — G89.29 CHRONIC RIGHT SHOULDER PAIN: ICD-10-CM

## 2020-02-18 DIAGNOSIS — G56.01 RIGHT CARPAL TUNNEL SYNDROME: ICD-10-CM

## 2020-02-18 DIAGNOSIS — M25.511 CHRONIC RIGHT SHOULDER PAIN: ICD-10-CM

## 2020-02-18 DIAGNOSIS — M62.838 MUSCLE SPASM: ICD-10-CM

## 2020-02-18 RX ORDER — PREGABALIN 75 MG/1
CAPSULE ORAL
Qty: 90 CAP | Refills: 2 | Status: SHIPPED | OUTPATIENT
Start: 2020-02-18 | End: 2021-01-06

## 2020-02-18 NOTE — PATIENT INSTRUCTIONS
Neck Arthritis: Exercises  Introduction  Here are some examples of exercises for you to try. The exercises may be suggested for a condition or for rehabilitation. Start each exercise slowly. Ease off the exercises if you start to have pain. You will be told when to start these exercises and which ones will work best for you. How to do the exercises  Neck stretches to the side    1. This stretch works best if you keep your shoulder down as you lean away from it. To help you remember to do this, start by relaxing your shoulders and lightly holding on to your thighs or your chair. 2. Tilt your head toward your shoulder and hold for 15 to 30 seconds. Let the weight of your head stretch your muscles. 3. Repeat 2 to 4 times toward each shoulder. Chin tuck    1. Lie on the floor with a rolled-up towel under your neck. Your head should be touching the floor. 2. Slowly bring your chin toward your chest.  3. Hold for a count of 6, and then relax for up to 10 seconds. 4. Repeat 8 to 12 times. Active cervical rotation    1. Sit in a firm chair, or stand up straight. 2. Keeping your chin level, turn your head to the right, and hold for 15 to 30 seconds. 3. Turn your head to the left and hold for 15 to 30 seconds. 4. Repeat 2 to 4 times to each side. Shoulder blade squeeze    1. While standing, squeeze your shoulder blades together. 2. Do not raise your shoulders up as you are squeezing. 3. Hold for 6 seconds. 4. Repeat 8 to 12 times. Shoulder rolls    1. Sit comfortably with your feet shoulder-width apart. You can also do this exercise standing up. 2. Roll your shoulders up, then back, and then down in a smooth, circular motion. 3. Repeat 2 to 4 times. Follow-up care is a key part of your treatment and safety. Be sure to make and go to all appointments, and call your doctor if you are having problems.  It's also a good idea to know your test results and keep a list of the medicines you take.  Where can you learn more? Go to http://ramirez-sidra.info/. Enter V832 in the search box to learn more about \"Neck Arthritis: Exercises. \"  Current as of: June 26, 2019  Content Version: 12.2  © 4176-0505 [employer]. Care instructions adapted under license by [employer] (which disclaims liability or warranty for this information). If you have questions about a medical condition or this instruction, always ask your healthcare professional. [address] any warranty or liability for your use of this information. Carpal Tunnel Syndrome: Exercises  Introduction  Here are some examples of exercises for you to try. The exercises may be suggested for a condition or for rehabilitation. Start each exercise slowly. Ease off the exercises if you start to have pain. You will be told when to start these exercises and which ones will work best for you. Warm-up stretches  When you no longer have pain or numbness, you can do exercises to help prevent carpal tunnel syndrome from coming back. Do not do any stretch or movement that is uncomfortable or painful. 1. Rotate your wrist up, down, and from side to side. Repeat 4 times. 2. Stretch your fingers far apart. Relax them, and then stretch them again. Repeat 4 times. 3. Stretch your thumb by pulling it back gently, holding it, and then releasing it. Repeat 4 times. How to do the exercises  Prayer stretch    1. Start with your palms together in front of your chest just below your chin. 2. Slowly lower your hands toward your waistline, keeping your hands close to your stomach and your palms together until you feel a mild to moderate stretch under your forearms. 3. Hold for at least 15 to 30 seconds. Repeat 2 to 4 times. Wrist flexor stretch    1. Extend your arm in front of you with your palm up. 2. Bend your wrist, pointing your hand toward the floor.   3. With your other hand, gently bend your wrist farther until you feel a mild to moderate stretch in your forearm. 4. Hold for at least 15 to 30 seconds. Repeat 2 to 4 times. Wrist extensor stretch    1. Repeat steps 1 through 4 of the stretch above, but begin with your extended hand palm down. Follow-up care is a key part of your treatment and safety. Be sure to make and go to all appointments, and call your doctor if you are having problems. It's also a good idea to know your test results and keep a list of the medicines you take. Where can you learn more? Go to http://ramirez-sidra.info/. Enter K030 in the search box to learn more about \"Carpal Tunnel Syndrome: Exercises. \"  Current as of: June 26, 2019  Content Version: 12.2  © 6605-7453 [employer]. Care instructions adapted under license by [employer] (which disclaims liability or warranty for this information). If you have questions about a medical condition or this instruction, always ask your healthcare professional. [address] any warranty or liability for your use of this information.

## 2020-02-18 NOTE — LETTER
2/19/20 Patient: Feroz Munoz YOB: 1960 Date of Visit: 2/18/2020 Kirby Schmidt, [address] [address] VIA Facsimile: [phone] Dear Kirby Schmidt MD, Thank you for referring Ms. Keith Hernandez to [facility] for evaluation. My notes for this consultation are attached. If you have questions, please do not hesitate to call me. I look forward to following your patient along with you. Sincerely, Wanda Gonzales MD

## 2020-03-09 ENCOUNTER — OFFICE VISIT (OUTPATIENT)
Dept: ORTHOPEDIC SURGERY | Age: 60
End: 2020-03-09

## 2020-03-09 ENCOUNTER — HOSPITAL ENCOUNTER (OUTPATIENT)
Dept: LAB | Age: 60
Discharge: HOME | End: 2020-03-09

## 2020-03-09 ENCOUNTER — HOSPITAL ENCOUNTER (OUTPATIENT)
Dept: LAB | Age: 60
Discharge: HOME | End: 2020-03-09
Payer: MEDICAID

## 2020-03-09 VITALS
BODY MASS INDEX: 36.25 KG/M2 | DIASTOLIC BLOOD PRESSURE: 102 MMHG | OXYGEN SATURATION: 95 % | SYSTOLIC BLOOD PRESSURE: 117 MMHG | WEIGHT: 217.6 LBS | TEMPERATURE: 97.3 F | HEART RATE: 75 BPM | HEIGHT: 65 IN | RESPIRATION RATE: 15 BRPM

## 2020-03-09 DIAGNOSIS — M54.12 CERVICAL RADICULOPATHY: ICD-10-CM

## 2020-03-09 DIAGNOSIS — Z01.818 PREOP EXAMINATION: ICD-10-CM

## 2020-03-09 DIAGNOSIS — G56.01 RIGHT CARPAL TUNNEL SYNDROME: Primary | ICD-10-CM

## 2020-03-09 DIAGNOSIS — G56.01 RIGHT CARPAL TUNNEL SYNDROME: ICD-10-CM

## 2020-03-09 LAB
ATRIAL RATE: 60 BPM
CALCULATED P AXIS, ECG09: 44 DEGREES
CALCULATED R AXIS, ECG10: 9 DEGREES
CALCULATED T AXIS, ECG11: 21 DEGREES
DIAGNOSIS, 93000: NORMAL
P-R INTERVAL, ECG05: 166 MS
Q-T INTERVAL, ECG07: 380 MS
QRS DURATION, ECG06: 72 MS
QTC CALCULATION (BEZET), ECG08: 380 MS
SENTARA SPECIMEN COL,SENBCF: NORMAL
VENTRICULAR RATE, ECG03: 60 BPM

## 2020-03-09 PROCEDURE — 99001 SPECIMEN HANDLING PT-LAB: CPT

## 2020-03-09 PROCEDURE — 93005 ELECTROCARDIOGRAM TRACING: CPT

## 2020-03-09 NOTE — PATIENT INSTRUCTIONS
Carpal Tunnel Syndrome: Care Instructions  Your Care Instructions    Carpal tunnel syndrome is a nerve problem. It can cause tingling, numbness, weakness, or pain in the fingers, thumb, and hand. The median nerve and several tough tissues called tendons run through a space in the wrist called the carpal tunnel. The repeated hand motions used in work and some hobbies and sports can put pressure on the nerve. Pregnancy and several conditions, including diabetes, arthritis, and an underactive thyroid, also can cause carpal tunnel syndrome. You may be able to limit an activity or do it differently to reduce your symptoms. You also can take other steps to feel better. If your symptoms are mild, 1 to 2 weeks of home treatment are likely to ease your pain. Surgery is needed only if other treatments do not work. Follow-up care is a key part of your treatment and safety. Be sure to make and go to all appointments, and call your doctor if you are having problems. It's also a good idea to know your test results and keep a list of the medicines you take. How can you care for yourself at home? · If possible, stop or reduce the activity that causes your symptoms. If you cannot stop the activity, take frequent breaks to rest and stretch or change hand positions to do a task. Try switching hands, such as when using a computer mouse. · Try to avoid bending or twisting your wrists. · Ask your doctor if you can take an over-the-counter pain medicine, such as acetaminophen (Tylenol), ibuprofen (Advil, Motrin), or naproxen (Aleve). Be safe with medicines. Read and follow all instructions on the label. · If your doctor prescribes corticosteroid medicine to help reduce pain and swelling, take it exactly as prescribed. Call your doctor if you think you are having a problem with your medicine. · Put ice or a cold pack on your wrist for 10 to 20 minutes at a time to ease pain.  Put a thin cloth between the ice and your skin.  · If your doctor or your physical or occupational therapist tells you to wear a wrist splint, wear it as directed to keep your wrist in a neutral position. This also eases pressure on your median nerve. · Ask your doctor whether you should have physical or occupational therapy to learn how to do tasks differently. · Try a yoga class to stretch your muscles and build strength in your hands and wrists. Yoga has been shown to ease carpal tunnel symptoms. To prevent carpal tunnel  · When working at a computer, keep your hands and wrists in line with your forearms. Hold your elbows close to your sides. Take a break every 10 to 15 minutes. · Try these exercises:  ? Warm up: Rotate your wrist up, down, and from side to side. Repeat this 4 times. Stretch your fingers far apart, relax them, then stretch them again. Repeat 4 times. Stretch your thumb by pulling it back gently, holding it, and then releasing it. Repeat 4 times. ? Prayer stretch: Start with your palms together in front of your chest just below your chin. Slowly lower your hands toward your waistline while keeping your hands close to your stomach and your palms together until you feel a mild to moderate stretch under your forearms. Hold for 10 to 20 seconds. Repeat 4 times. ? Wrist flexor stretch: Hold your arm in front of you with your palm up. Bend your wrist, pointing your hand toward the floor. With your other hand, gently bend your wrist further until you feel a mild to moderate stretch in your forearm. Hold for 10 to 20 seconds. Repeat 4 times. ? Wrist extensor stretch: Repeat the steps for the wrist flexor stretch, but begin with your extended hand palm down. · Squeeze a rubber exercise ball several times a day to keep your hands and fingers strong. · Avoid holding objects (such as a book) in one position for a long time. When possible, use your whole hand to grasp an object.  Using just the thumb and index finger can put stress on the wrist.  · Do not smoke. It can make this condition worse by reducing blood flow to the median nerve. If you need help quitting, talk to your doctor about stop-smoking programs and medicines. These can increase your chances of quitting for good. When should you call for help? Watch closely for changes in your health, and be sure to contact your doctor if:    · Your pain or other problems do not get better with home care.     · You want more information about physical or occupational therapy.     · You have side effects of your corticosteroid medicine, such as:  ? Weight gain. ? Mood changes. ? Trouble sleeping. ? Bruising easily.     · You have any other problems with your medicine. Where can you learn more? Go to http://ramirez-sidra.info/. Enter R432 in the search box to learn more about \"Carpal Tunnel Syndrome: Care Instructions. \"  Current as of: June 26, 2019  Content Version: 12.2  © 9335-5229 [employer]. Care instructions adapted under license by [employer] (which disclaims liability or warranty for this information). If you have questions about a medical condition or this instruction, always ask your healthcare professional. [place] [address] any warranty or liability for your use of this information.

## 2020-03-09 NOTE — PROGRESS NOTES
Salome Lopez is a 60 y.o. female right handed retiree. Worker's Compensation and legal considerations: none filed. Vitals:    20 0948   BP: (!) 117/102   Pulse: 75   Resp: 15   Temp: 97.3 °F (36.3 °C)   TempSrc: Oral   SpO2: 95%   Weight: 217 lb 9.6 oz (98.7 kg)   Height: 5' 5\" (1.651 m)   PainSc:   3   PainLoc: Arm           Chief Complaint   Patient presents with    Arm Pain     right arm pain    Wrist Pain     right wrist pain         HPI: She comes in today as a referral regarding a positive EMG result of carpal tunnel syndrome. She also has a history of cervical spine disease. This has previously been treated with surgery.     Date of onset:      Injury: No    Prior Treatment:  No    Numbness/ Tingling: Yes: Comment: right hand radial sided digits    ROS: Review of Systems - General ROS: negative  Psychological ROS: negative  Allergy and Immunology ROS: negative  Hematological and Lymphatic ROS: negative  Respiratory ROS: no cough, shortness of breath, or wheezing  Cardiovascular ROS: no chest pain or dyspnea on exertion  Gastrointestinal ROS: no abdominal pain, change in bowel habits, or black or bloody stools  Musculoskeletal ROS: positive for - pain in hand - right  Neurological ROS: positive for - numbness/tingling  Dermatological ROS: negative    Past Medical History:   Diagnosis Date    Adverse effect of anesthesia     hard to wake up    Chronic pain     Depression     GERD (gastroesophageal reflux disease)     Hypothyroidism     Low back pain     Thromboembolus (HCC)     blood clot in foot during pregnancy    Tobacco abuse     Vertigo        Past Surgical History:   Procedure Laterality Date    HX  SECTION      x3    HX HIP REPLACEMENT      HX HYSTERECTOMY      HX ORTHOPAEDIC  2002    2 disks removed lower back [facility].    HX ORTHOPAEDIC Right 2017    shoulder surgery    NEUROLOGICAL PROCEDURE UNLISTED         Current Outpatient Medications Medication Sig Dispense Refill    pregabalin (LYRICA) 75 mg capsule Take 1 cap by mouth in the morning and 2 at night as directed. 90 Cap 2    gabapentin (NEURONTIN) 300 mg capsule Take 1 cap by mouth in the morning and 2 at night as directed. (Patient taking differently: Take 300 mg by mouth two (2) times a day. Take 1 cap by mouth in the morning and 2 at night as directed.) 60 Cap 1    acetaminophen (TYLENOL EXTRA STRENGTH) 500 mg tablet Take 500 mg by mouth every six (6) hours as needed for Pain.  liothyronine (CYTOMEL) 5 mcg tablet Take 5 mcg by mouth daily. 0    levothyroxine (SYNTHROID) 50 mcg tablet Take 50 mcg by mouth Daily (before breakfast).  NP THYROID 15 mg tablet TK 3 TS PO D  1    diazePAM (VALIUM) 5 mg tablet Take 1 tab 30 min prior to MRI, may repeat x1. No driving, must have  (Patient not taking: Reported on 2/18/2020) 2 Tab 0    cyclobenzaprine (FLEXERIL) 10 mg tablet Take  by mouth three (3) times daily as needed for Muscle Spasm(s).  clotrimazole-betamethasone (LOTRISONE) topical cream APPLY 2 TIMES DAILY  1    VOLTAREN 1 % gel Apply 4 g to affected area four (4) times daily. Maximum 16 grams per joint per day. Dispense 5 100 gram tubes (Patient not taking: Reported on 11/11/2019) 5 Each 0    multivit,iron,min/green tea xt (DAILY MULTIPLE WEIGHT LOSS PO) Take 3 Tabs by mouth daily. Relacore weight loss tablets      calcium carbonate 500 mg calcium (1,250 mg) chewable tablet Take 2 Tabs by mouth daily as needed for Indigestion (As needed for upset stomach).  polyethylene glycol (MIRALAX) 17 gram/dose powder Take 17 g by mouth daily.  buffered aspirin (BUFFERIN) 325 mg tablet Take 1 Tab by mouth two (2) times a day. (Patient not taking: Reported on 11/11/2019) 60 Tab 1    ferrous sulfate 325 mg (65 mg iron) tablet Take 1 Tab by mouth two (2) times daily (with meals).  (Patient not taking: Reported on 11/11/2019) 60 Tab 1    oxyCODONE-acetaminophen (PERCOCET) 7.5-325 mg per tablet Take 1-2 Tabs by mouth every four (4) hours as needed for Pain. Max Daily Amount: 12 Tabs. (Patient not taking: Reported on 11/11/2019) 60 Tab 0    ondansetron hcl (ZOFRAN) 4 mg tablet Take 1 Tab by mouth every eight (8) hours as needed for Nausea. (Patient not taking: Reported on 11/11/2019) 30 Tab 1    OTHER daily.  cyanocobalamin (VITAMIN B-12) 1,000 mcg tablet Take 1,000 mcg by mouth daily.  gabapentin (NEURONTIN) 300 mg capsule Take 1 tab by mouth three times daily  2    potassium iodide/iodine (IODINE STRONG, LUGOLS, PO) Take 25 mg by mouth daily.  Selenomethionine 200 mcg tab Take 1 Tab by mouth daily.  cholecalciferol, vitamin D3, (VITAMIN D3) 2,000 unit tab Take 1 Tab by mouth daily. Allergies   Allergen Reactions    Pcn [Penicillins] Hives         PE:     NEUROVASCULAR    Examination L R Examination L R   Carpal Comp. - + Pronator Comp. - -   Carpal Tinel - + Pronator Tinel - -   Phalen's - + Pronator Stress - -   Cubital Comp. - - Guyon Comp. - -   Cubital Tinel - - Guyon Tinel - -   Elbow Hyperflexion - - Adson's - -   Spurling's - - SC Comp. - -   PCB Median abn - - SC Tinel - -   Radial Tinel - - IC Comp. - -   Digital Tinel - - IC Tinel - -   Radial 2-Pt WNL WNL Ulnar 2-Pt WNL WNL     Radial Pulse: 2+  Capillary Refill: < 2 sec  Ronald: Not Performed  [employer]: Not Performed      NCV & EMG Findings:  Evaluation of the right median motor nerve showed prolonged distal onset latency (4.5 ms). The right median sensory nerve showed prolonged distal peak latency (4.0 ms) and decreased conduction velocity (Wrist-2nd Digit, 35 m/s). All remaining nerves (as indicated in the following tables) were within normal limits.       Needle evaluation of the right biceps muscle showed increased motor unit amplitude and moderately increased polyphasic potentials. The right triceps muscle showed increased motor unit amplitude.   The right pronator teres muscle showed increased insertional activity and increased spontaneous activity. The right Ext Digitorum muscle showed increased insertional activity, increased spontaneous activity, and diminished recruitment. All remaining muscles (as indicated in the following table) showed no evidence of electrical instability.          INTERPRETATION  This is an abnormal electrodiagnostic examination. These findings may be consistent with:  1. Moderate median mononeuropathy at the right wrist (carpal tunnel syndrome)  2. Ongoing/acute right C6/7 radiculopathy. 3. Chronic, inactive right C5/6 radiculopathy     CLINICAL INTERPRETATION  Her electrodiagnostic findings of cervical radiculopathy and CTS are likely related to her right arm symptoms. Imaging: none indicated        ICD-10-CM ICD-9-CM    1. Right carpal tunnel syndrome G56.01 354.0 EKG, 12 LEAD, INITIAL      CBC WITH AUTOMATED DIFF      METABOLIC PANEL, COMPREHENSIVE      AMB SUPPLY ORDER   2. Cervical radiculopathy M54.12 723.4    3. Preop examination Z01.818 V72.84 EKG, 12 LEAD, INITIAL      CBC WITH AUTOMATED DIFF      METABOLIC PANEL, COMPREHENSIVE       Plan: We will set the patient up for a right carpal tunnel release. I had a thorough discussion with the patient that this may resolve most of her symptoms however not all considering her previous cervical spine history. We will get all labs and clearances as indicated. Follow-up 2 weeks postop.     Plan was reviewed with patient, who verbalized agreement and understanding of the plan

## 2020-03-09 NOTE — LETTER
[facility]    [facility]  Fax to [phone]  Telephone: [phone] or [phone]    To be completed by Physician Office:    Date: 3/10/2020    Requested by: Moraima Contreras    Phone No: [phone]  Fax No:  [phone]    Surgery Date: 3/16/2020   Requested Time: Fourth Case          Surgeon: Fady Graham DO. Assistant/2nd Surgeon:      CPT CODE  Procedure   08862 Right Carpal Tunnel Release              Diagnosis:  Right Carpal Tunnel Syndrome G56.01    Patient Information:    Name: Yesica Mackenzie    SSN: [id number]  : 1960   Male/Female: female    Home Phone No: [phone] (home)     Primary Insurance: [address] [place] [address] Number: [id number]      Allergies:    Allergies   Allergen Reactions    Pcn [Penicillins] Hives       Admission: outpatient    Anesthesia Type:  MAC, Local    Comments/Special Equipment and/or :    Hand Table, Lead Hand, Heiss Retractor

## 2020-03-09 NOTE — LETTER
Patient: Christian Richard                        PROCEDURE: Right Carpal Tunnel Release    PRE OPERATIVE INSTRUCTIONS:  Five (5) days prior to surgery STOP taking any hormonal medications, aspirin and/or anti-inflammatory medications. If you are taking blood thinner medication (such as Coumadin, Plavix, Heparin or others) you will need special instructions from the prescribing physician. LAB: [facility] to [facility]"" [facility] 14 days before your surgery date for bloodwork and EKG. (Your orders for these tests are in [facility] at the [facility])    o It doesn't matter if you have eaten before going to the Lab. o If required labs and EKG are not completed Surgery will be canceled. Surgery Date: 3/16/2020  Time: 3:00pm  Report to [facility] on the First Floor Admitting at: 1:00pm    THE DAY OF SURGERY:         1. Do not eat, chew gum or drink anything after Midnight prior to the date of your surgery. 2. Take your regular medications with small sips of water unless otherwise instructed. (This means blood pressure and/or heart medicine) If you are insulin dependent, bring your insulin with you, unless otherwise instructed. 3. Bring a list of your medications and the dosage to the hospital including  vitamins. 4. Do not wear nail polish, make-up, jewelry, perfumes or Skin Creams. 5. Do not bring valuables or money to the hospital.        6. Must have a responsible adult to accompany you and stay during your surgery so they may drive you home following your surgery and stay with you 24 hours after surgery. Post op visit  appointment is scheduled with Dr. Tg Villanueva       on 3/30/2020 @ 9:20am at the [facility] office.       Guadlupe Fleischer, Surgery Scheduler  [phone]

## 2020-03-10 LAB
A-G RATIO,AGRAT: 2.3 RATIO (ref 1.1–2.6)
ABSOLUTE LYMPHOCYTE COUNT, 10803: 2.1 K/UL (ref 1–4.8)
ALBUMIN SERPL-MCNC: 4.4 G/DL (ref 3.5–5)
ALP SERPL-CCNC: 53 U/L (ref 40–120)
ALT SERPL-CCNC: 24 U/L (ref 5–40)
ANION GAP SERPL CALC-SCNC: 15 MMOL/L
AST SERPL W P-5'-P-CCNC: 22 U/L (ref 10–37)
BASOPHILS # BLD: 0 K/UL (ref 0–0.2)
BASOPHILS NFR BLD: 0 % (ref 0–2)
BILIRUB SERPL-MCNC: 0.3 MG/DL (ref 0.2–1.2)
BUN SERPL-MCNC: 9 MG/DL (ref 6–22)
CALCIUM SERPL-MCNC: 9.3 MG/DL (ref 8.4–10.5)
CHLORIDE SERPL-SCNC: 107 MMOL/L (ref 98–110)
CO2 SERPL-SCNC: 23 MMOL/L (ref 20–32)
CREAT SERPL-MCNC: 0.7 MG/DL (ref 0.8–1.4)
EOSINOPHIL # BLD: 0.1 K/UL (ref 0–0.5)
EOSINOPHIL NFR BLD: 2 % (ref 0–6)
ERYTHROCYTE [DISTWIDTH] IN BLOOD BY AUTOMATED COUNT: 15 % (ref 10–15.5)
GFRAA, 66117: >60
GFRNA, 66118: >60
GLOBULIN,GLOB: 1.9 G/DL (ref 2–4)
GLUCOSE SERPL-MCNC: 80 MG/DL (ref 70–99)
GRANULOCYTES,GRANS: 46 % (ref 40–75)
HCT VFR BLD AUTO: 39.2 % (ref 35.1–48)
HGB BLD-MCNC: 11.7 G/DL (ref 11.7–16)
LYMPHOCYTES, LYMLT: 43 % (ref 20–45)
MCH RBC QN AUTO: 29 PG (ref 26–34)
MCHC RBC AUTO-ENTMCNC: 30 G/DL (ref 31–36)
MCV RBC AUTO: 95 FL (ref 81–99)
MONOCYTES # BLD: 0.4 K/UL (ref 0.1–1)
MONOCYTES NFR BLD: 9 % (ref 3–12)
NEUTROPHILS # BLD AUTO: 2.2 K/UL (ref 1.8–7.7)
PLATELET # BLD AUTO: 301 K/UL (ref 140–440)
PMV BLD AUTO: 9.8 FL (ref 9–13)
POTASSIUM SERPL-SCNC: 4.4 MMOL/L (ref 3.5–5.5)
PROT SERPL-MCNC: 6.3 G/DL (ref 6.2–8.1)
RBC # BLD AUTO: 4.11 M/UL (ref 3.8–5.2)
SODIUM SERPL-SCNC: 145 MMOL/L (ref 133–145)
WBC # BLD AUTO: 4.8 K/UL (ref 4–11)

## 2020-03-16 DIAGNOSIS — G56.01 RIGHT CARPAL TUNNEL SYNDROME: Primary | ICD-10-CM

## 2020-03-16 DIAGNOSIS — G56.01 RIGHT CARPAL TUNNEL SYNDROME: ICD-10-CM

## 2020-03-16 DIAGNOSIS — Z98.890 S/P CARPAL TUNNEL RELEASE: ICD-10-CM

## 2020-03-16 DIAGNOSIS — G89.4 CHRONIC PAIN SYNDROME: ICD-10-CM

## 2020-03-16 DIAGNOSIS — Z01.818 PREOP EXAMINATION: ICD-10-CM

## 2020-03-16 RX ORDER — HYDROCODONE BITARTRATE AND ACETAMINOPHEN 5; 325 MG/1; MG/1
1 TABLET ORAL
Qty: 12 TAB | Refills: 0 | Status: SHIPPED | OUTPATIENT
Start: 2020-03-16 | End: 2020-03-19

## 2020-03-16 RX ORDER — NALOXONE HYDROCHLORIDE 4 MG/.1ML
SPRAY NASAL
Qty: 1 EACH | Refills: 0 | Status: SHIPPED | OUTPATIENT
Start: 2020-03-16 | End: 2021-01-06

## 2020-03-17 ENCOUNTER — OFFICE VISIT (OUTPATIENT)
Dept: ORTHOPEDIC SURGERY | Age: 60
End: 2020-03-17

## 2020-03-17 VITALS
HEART RATE: 74 BPM | WEIGHT: 215 LBS | RESPIRATION RATE: 16 BRPM | DIASTOLIC BLOOD PRESSURE: 67 MMHG | OXYGEN SATURATION: 94 % | SYSTOLIC BLOOD PRESSURE: 118 MMHG | HEIGHT: 65 IN | BODY MASS INDEX: 35.82 KG/M2 | TEMPERATURE: 97.7 F

## 2020-03-17 DIAGNOSIS — M54.12 CERVICAL RADICULOPATHY: Primary | ICD-10-CM

## 2020-03-17 NOTE — PROGRESS NOTES
[address]  [address], [address]  [address]  Phone: [phone]  Fax: [phone]  INITIAL CONSULTATION  Patient: Mary Wills                MRN: [id number]       SSN: [id number]  YOB: 1960        AGE: 60 y.o. SEX: female  Body mass index is 35.78 kg/m². PCP: Felicitas Pressley MD  03/17/20    Chief Complaint   Patient presents with    Neck Pain     cervical pain, Sx consult     Back Pain     thoracic Sx consult         HISTORY OF PRESENT ILLNESS, RADIOGRAPHS, and PLAN:         Surgical consultation Francisco Lewis. Ms. Veronica Levin is seen today at request of Dr. Aayush Cesar. Ms. Veronica Levin is a 70-year-old female generally healthy several years ago she had onset of a cervical radiculopathy would appear neck and radiating right arm pain and numbness. She was treated in the [address] [place] [address] part of the [facility] and underwent a multilevel cervical decompression and fusion. She says it did not do much to resolve her pain numbness and weakness. Her symptoms have continued she is also had a previous right hip replacement with cause some nerve damage in her right leg. She presents today with this complaint of increasing pain in her arm. She had a carpal tunnel release done yesterday. She had had an EMG which demonstrated median nerve compression at the wrist was seen by hand surgery who felt a good part of her pain may be due to this and underwent surgery. She has had an MRI of her cervical spine. It is nearly nondiagnostic. There is this technique of surgery she had done using metal interbody cages in addition to an anterior cervical plate and there is much artifact from the instrumentation. At this point there is little to be done we need to see how she recovers from her carpal tunnel syndrome and hopefully the bulk of her symptoms may dissipate.   But she is having much neck and radiating upper arm pain which is unlikely secondary to carpal tunnel. With that in mind and given her previous surgery we will order a myelogram with post myelogram CAT scan of the cervical spine to see if there is any resolved residual foraminal stenosis or other areas of cord compression that would explain her continuing pain. I will see her back following her myelogram and post myelogram CAT scan. This dictation was created utilizing voice recognition software. Errors may be present. Past Medical History:   Diagnosis Date    Adverse effect of anesthesia     hard to wake up    Chronic pain     Depression     GERD (gastroesophageal reflux disease)     Hypothyroidism     Low back pain     Thromboembolus (HCC)     blood clot in foot during pregnancy    Tobacco abuse     Vertigo        Family History   Problem Relation Age of Onset    Hypertension Mother     Cancer Mother         multiple myeloma    Cancer Father         throat    Cancer Sister        Current Outpatient Medications   Medication Sig Dispense Refill    HYDROcodone-acetaminophen (Norco) 5-325 mg per tablet Take 1 Tab by mouth every six (6) hours as needed for Pain for up to 3 days. Max Daily Amount: 4 Tabs. 12 Tab 0    naloxone (NARCAN) 4 mg/actuation nasal spray Use 1 spray intranasally, then discard. Repeat with new spray every 2 min as needed for opioid overdose symptoms, alternating nostrils. 1 Each 0    acetaminophen (TYLENOL EXTRA STRENGTH) 500 mg tablet Take 500 mg by mouth every six (6) hours as needed for Pain.  liothyronine (CYTOMEL) 5 mcg tablet Take 5 mcg by mouth daily. 0    levothyroxine (SYNTHROID) 50 mcg tablet Take 50 mcg by mouth Daily (before breakfast).  pregabalin (LYRICA) 75 mg capsule Take 1 cap by mouth in the morning and 2 at night as directed. 90 Cap 2    diazePAM (VALIUM) 5 mg tablet Take 1 tab 30 min prior to MRI, may repeat x1.  No driving, must have  (Patient not taking: Reported on 2/18/2020) 2 Tab 0    gabapentin (NEURONTIN) 300 mg capsule Take 1 cap by mouth in the morning and 2 at night as directed. (Patient taking differently: Take 300 mg by mouth two (2) times a day. Take 1 cap by mouth in the morning and 2 at night as directed.) 60 Cap 1    cyclobenzaprine (FLEXERIL) 10 mg tablet Take  by mouth three (3) times daily as needed for Muscle Spasm(s).  clotrimazole-betamethasone (LOTRISONE) topical cream APPLY 2 TIMES DAILY  1    VOLTAREN 1 % gel Apply 4 g to affected area four (4) times daily. Maximum 16 grams per joint per day. Dispense 5 100 gram tubes (Patient not taking: Reported on 11/11/2019) 5 Each 0    multivit,iron,min/green tea xt (DAILY MULTIPLE WEIGHT LOSS PO) Take 3 Tabs by mouth daily. Relacore weight loss tablets      calcium carbonate 500 mg calcium (1,250 mg) chewable tablet Take 2 Tabs by mouth daily as needed for Indigestion (As needed for upset stomach).  polyethylene glycol (MIRALAX) 17 gram/dose powder Take 17 g by mouth daily.  buffered aspirin (BUFFERIN) 325 mg tablet Take 1 Tab by mouth two (2) times a day. (Patient not taking: Reported on 11/11/2019) 60 Tab 1    ferrous sulfate 325 mg (65 mg iron) tablet Take 1 Tab by mouth two (2) times daily (with meals). (Patient not taking: Reported on 11/11/2019) 60 Tab 1    oxyCODONE-acetaminophen (PERCOCET) 7.5-325 mg per tablet Take 1-2 Tabs by mouth every four (4) hours as needed for Pain. Max Daily Amount: 12 Tabs. (Patient not taking: Reported on 11/11/2019) 60 Tab 0    ondansetron hcl (ZOFRAN) 4 mg tablet Take 1 Tab by mouth every eight (8) hours as needed for Nausea. (Patient not taking: Reported on 11/11/2019) 30 Tab 1    OTHER daily.  cyanocobalamin (VITAMIN B-12) 1,000 mcg tablet Take 1,000 mcg by mouth daily.  NP THYROID 15 mg tablet TK 3 TS PO D  1    gabapentin (NEURONTIN) 300 mg capsule Take 1 tab by mouth three times daily  2    potassium iodide/iodine (IODINE STRONG, LUGOLS, PO) Take 25 mg by mouth daily.       Selenomethionine 200 mcg tab Take 1 Tab by mouth daily.  cholecalciferol, vitamin D3, (VITAMIN D3) 2,000 unit tab Take 1 Tab by mouth daily.          Allergies   Allergen Reactions    Pcn [Penicillins] Hives       Past Surgical History:   Procedure Laterality Date    HX  SECTION      x3    HX HIP REPLACEMENT      HX HYSTERECTOMY      HX ORTHOPAEDIC  2002    2 disks removed lower back [facility].    HX ORTHOPAEDIC Right 2017    shoulder surgery    NEUROLOGICAL PROCEDURE UNLISTED         Past Medical History:   Diagnosis Date    Adverse effect of anesthesia     hard to wake up    Chronic pain     Depression     GERD (gastroesophageal reflux disease)     Hypothyroidism     Low back pain     Thromboembolus (HCC)     blood clot in foot during pregnancy    Tobacco abuse     Vertigo        Social History     Socioeconomic History    Marital status: SINGLE     Spouse name: Not on file    Number of children: Not on file    Years of education: Not on file    Highest education level: Not on file   Occupational History    Not on file   Social Needs    Financial resource strain: Not on file    Food insecurity     Worry: Not on file     Inability: Not on file    Transportation needs     Medical: Not on file     Non-medical: Not on file   Tobacco Use    Smoking status: Former Smoker     Packs/day: 0.25     Years: 4.00     Pack years: 1.00     Last attempt to quit: 2015     Years since quittin.7    Smokeless tobacco: Never Used   Substance and Sexual Activity    Alcohol use: No    Drug use: No    Sexual activity: Not on file   Lifestyle    Physical activity     Days per week: Not on file     Minutes per session: Not on file    Stress: Not on file   Relationships    Social connections     Talks on phone: Not on file     Gets together: Not on file     Attends Buddhist service: Not on file     Active member of club or organization: Not on file     Attends meetings of clubs or organizations: Not on file     Relationship status: Not on file    Intimate partner violence     Fear of current or ex partner: Not on file     Emotionally abused: Not on file     Physically abused: Not on file     Forced sexual activity: Not on file   Other Topics Concern    Not on file   Social History Narrative    3/2015[de-identified] currently unemployed due to chronic low back pain, used to work renovating houses. Lived with her mother until her mother's death in the summer of 2014. Currently living with her daughters, splits time between [place] and [place]. REVIEW OF SYSTEMS:   CONSTITUTIONAL SYMPTOMS:  Negative. EYES:  Negative. EARS, NOSE, THROAT AND MOUTH:  Negative. CARDIOVASCULAR:  Negative. RESPIRATORY:  Negative. GENITOURINARY: Per HPI. GASTROINTESTINAL:  Per HPI. INTEGUMENTARY (SKIN AND/OR BREAST):  Negative. MUSCULOSKELETAL: Per HPI.   ENDOCRINE/RHEUMATOLOGIC:  Negative. NEUROLOGICAL:  Per HPI. HEMATOLOGIC/LYMPHATIC:  Negative. ALLERGIC/IMMUNOLOGIC:  Negative. PSYCHIATRIC:  Negative. PHYSICAL EXAMINATION:   Visit Vitals  /67 (BP 1 Location: Left arm, BP Patient Position: Sitting)   Pulse 74   Temp 97.7 °F (36.5 °C) (Oral)   Resp 16   Ht 5' 5\" (1.651 m)   Wt 215 lb (97.5 kg)   SpO2 94%   BMI 35.78 kg/m²    PAIN SCALE: 6/10    CONSTITUTIONAL: The patient is in no apparent distress and is alert and oriented x 3. HEENT: Normocephalic. Hearing grossly intact. NECK: Supple and symmetric. no tenderness, or masses were felt. RESPIRATORY: No labored breathing. CARDIOVASCULAR: The carotid pulses were normal. Peripheral pulses were 2+. CHEST: Normal AP diameter and normal contour without any kyphoscoliosis. LYMPHATIC: No lymphadenopathy was appreciated in the neck, axillae or groin. SKIN:  Negative for scars, rashes, lesions, or ulcers on the right upper, right lower, left upper, left lower and trunk. NEUROLOGICAL: Alert and oriented x 3. Ambulation without assistive device. FWB.  EXTREMITIES:  See musculoskeletal.  MUSCULOSKELETAL:   Head and Neck:  Negative for misalignment, asymmetry, crepitation, defects, tenderness masses or effusions.  Left Upper Extremity: Inspection, percussion and palpation performed. Esparzas sign is negative.  Right Upper Extremity: Inspection, percussion and palpation performed. Esparzas sign is negative.  Spine, Ribs and Pelvis: Inspection, percussion and palpation performed. Negative for misalignment, asymmetry, crepitation, defects, tenderness masses or effusions.  Left Lower Extremity: Inspection, percussion and palpation performed. Negative straight leg raise.  Right Lower Extremity: Inspection, percussion and palpation performed. Negative straight leg raise. SPINE EXAM:     Cervical spine: Neck is midline. Normal muscle tone. No focal atrophy is noted. Lumbar spine: No rash, ecchymosis, or gross obliquity. No focal atrophy is noted. ASSESSMENT    ICD-10-CM ICD-9-CM    1. Cervical radiculopathy M54.12 723.4 CT SPINE CERV W CONT      XR MYELO CERVICAL       Written by Georg Phalen, as dictated by Ginny Walton MD.    I, Dr. Ginny Walton MD, confirm that all documentation is accurate.

## 2020-03-18 ENCOUNTER — TELEPHONE (OUTPATIENT)
Dept: ORTHOPEDIC SURGERY | Age: 60
End: 2020-03-18

## 2020-03-18 NOTE — TELEPHONE ENCOUNTER
Spoke to patient, she states no visible drainage on her dressing, that she has a green color on her thumb, I stated it could be bruising, to keep dressing covered and follow up as scheduled.

## 2020-03-18 NOTE — TELEPHONE ENCOUNTER
Post op patient called for Gigi Bagley. Patient said she is having some drainage on her Right hand and is requesting to speak to someone about this as soon as possible. Patient tel. [phone].

## 2020-03-30 ENCOUNTER — VIRTUAL VISIT (OUTPATIENT)
Dept: ORTHOPEDIC SURGERY | Age: 60
End: 2020-03-30

## 2020-03-30 DIAGNOSIS — G56.01 RIGHT CARPAL TUNNEL SYNDROME: Primary | ICD-10-CM

## 2020-03-30 DIAGNOSIS — Z98.890 S/P CARPAL TUNNEL RELEASE: ICD-10-CM

## 2020-03-30 NOTE — PROGRESS NOTES
Antony Robbins is a 60 y.o. female evaluated via telephone on 3/30/2020. Consent:  She and/or health care decision maker is aware that that she may receive a bill for this telephone service, depending on her insurance coverage, and has provided verbal consent to proceed: Yes      Documentation:  I communicated with the patient and/or health care decision maker about right wrist pain. Details of this discussion including any medical advice provided:       I affirm this is a Patient Initiated Episode with an Established Patient who has not had a related appointment within my department in the past 7 days or scheduled within the next 24 hours. Note: not billable if this call serves to triage the patient into an appointment for the relevant concern    Since your last office visit have you had any    1. New medical diagnoses No  2. Changes in your medications  No  3. Surgical procedures No  4. Changes in your Allergies to medication No  5.  What is your pain level today 2/10     Gladys Connors

## 2020-03-31 ENCOUNTER — HOSPITAL ENCOUNTER (OUTPATIENT)
Dept: GENERAL RADIOLOGY | Age: 60
Discharge: HOME | End: 2020-03-31
Attending: RADIOLOGY | Admitting: RADIOLOGY
Payer: MEDICAID

## 2020-03-31 ENCOUNTER — APPOINTMENT (OUTPATIENT)
Dept: CT IMAGING | Age: 60
End: 2020-03-31
Attending: ORTHOPAEDIC SURGERY
Payer: MEDICAID

## 2020-03-31 VITALS
DIASTOLIC BLOOD PRESSURE: 71 MMHG | BODY MASS INDEX: 37.05 KG/M2 | RESPIRATION RATE: 16 BRPM | HEIGHT: 65 IN | WEIGHT: 222.4 LBS | HEART RATE: 63 BPM | OXYGEN SATURATION: 100 % | TEMPERATURE: 97.8 F | SYSTOLIC BLOOD PRESSURE: 148 MMHG

## 2020-03-31 DIAGNOSIS — M54.12 CERVICAL RADICULOPATHY: ICD-10-CM

## 2020-03-31 LAB
ANION GAP SERPL CALC-SCNC: 6 MMOL/L (ref 3–18)
APTT PPP: 27.9 SEC (ref 23–36.4)
BUN SERPL-MCNC: 10 MG/DL (ref 7–18)
BUN/CREAT SERPL: 13 (ref 12–20)
CALCIUM SERPL-MCNC: 9.5 MG/DL (ref 8.5–10.1)
CHLORIDE SERPL-SCNC: 111 MMOL/L (ref 100–111)
CO2 SERPL-SCNC: 27 MMOL/L (ref 21–32)
CREAT SERPL-MCNC: 0.76 MG/DL (ref 0.6–1.3)
ERYTHROCYTE [DISTWIDTH] IN BLOOD BY AUTOMATED COUNT: 14.1 % (ref 11.6–14.5)
GLUCOSE SERPL-MCNC: 98 MG/DL (ref 74–99)
HCT VFR BLD AUTO: 37.8 % (ref 35–45)
HGB BLD-MCNC: 12.6 G/DL (ref 12–16)
INR PPP: 1 (ref 0.8–1.2)
MCH RBC QN AUTO: 29.2 PG (ref 24–34)
MCHC RBC AUTO-ENTMCNC: 33.3 G/DL (ref 31–37)
MCV RBC AUTO: 87.5 FL (ref 74–97)
PLATELET # BLD AUTO: 299 K/UL (ref 135–420)
PMV BLD AUTO: 9.7 FL (ref 9.2–11.8)
POTASSIUM SERPL-SCNC: 4.2 MMOL/L (ref 3.5–5.5)
PROTHROMBIN TIME: 12.8 SEC (ref 11.5–15.2)
RBC # BLD AUTO: 4.32 M/UL (ref 4.2–5.3)
SODIUM SERPL-SCNC: 144 MMOL/L (ref 136–145)
WBC # BLD AUTO: 5.6 K/UL (ref 4.6–13.2)

## 2020-03-31 PROCEDURE — 85027 COMPLETE CBC AUTOMATED: CPT

## 2020-03-31 PROCEDURE — 85610 PROTHROMBIN TIME: CPT

## 2020-03-31 PROCEDURE — 74011250636 HC RX REV CODE- 250/636: Performed by: ORTHOPAEDIC SURGERY

## 2020-03-31 PROCEDURE — 72126 CT NECK SPINE W/DYE: CPT

## 2020-03-31 PROCEDURE — 74011000250 HC RX REV CODE- 250: Performed by: RADIOLOGY

## 2020-03-31 PROCEDURE — 85730 THROMBOPLASTIN TIME PARTIAL: CPT

## 2020-03-31 PROCEDURE — 74011636320 HC RX REV CODE- 636/320: Performed by: RADIOLOGY

## 2020-03-31 PROCEDURE — 62302 MYELOGRAPHY LUMBAR INJECTION: CPT

## 2020-03-31 PROCEDURE — 80048 BASIC METABOLIC PNL TOTAL CA: CPT

## 2020-03-31 RX ORDER — ACETAMINOPHEN 325 MG/1
650 TABLET ORAL
Status: DISCONTINUED | OUTPATIENT
Start: 2020-03-31 | End: 2020-03-31 | Stop reason: HOSPADM

## 2020-03-31 RX ORDER — SODIUM CHLORIDE 9 MG/ML
20 INJECTION, SOLUTION INTRAVENOUS CONTINUOUS
Status: DISCONTINUED | OUTPATIENT
Start: 2020-03-31 | End: 2020-03-31 | Stop reason: HOSPADM

## 2020-03-31 RX ORDER — LIDOCAINE HYDROCHLORIDE 10 MG/ML
30 INJECTION, SOLUTION EPIDURAL; INFILTRATION; INTRACAUDAL; PERINEURAL ONCE
Status: COMPLETED | OUTPATIENT
Start: 2020-03-31 | End: 2020-03-31

## 2020-03-31 RX ADMIN — SODIUM CHLORIDE 20 ML/HR: 900 INJECTION, SOLUTION INTRAVENOUS at 10:56

## 2020-03-31 RX ADMIN — LIDOCAINE HYDROCHLORIDE 10 ML: 10 INJECTION, SOLUTION EPIDURAL; INFILTRATION; INTRACAUDAL; PERINEURAL at 12:30

## 2020-03-31 RX ADMIN — IOPAMIDOL 15 ML: 612 INJECTION, SOLUTION INTRATHECAL at 12:30

## 2020-03-31 NOTE — DISCHARGE INSTRUCTIONS
[address] POST MYELOGRAM DISCHARGE INSTRUCTIONS      Myelogram:   A Myelogram involves a lumbar puncture, and instead of removing fluid, contrast will be injected into the sac surrounding the spinal column. It is done to visualize the spinal column, nerve roots, spinal canal, vertebral discs and disc space. It is usually done to diagnose back pain with unknown cause or in preparation for surgery. After the injection, a CT scan will be done, usually within two hours of the injection. Call If:   You should call your Physician and/or the Radiology Nurse if you develop a headache that is not relieved by Tylenol, and worsens when you stand and eases when you lie down, you need to call. You may have developed what is referred to as a spinal headache. Our physicians will probably advise you to be on strict bed rest for 24 hours, to drink lots of fluids and caffeine. If this does not help the head pain, call again the next day. You should call if you have bleeding other than a small spot on your bandage. You should call if you have any numbness, tingling, weakness, fever, chills, urinary retention, severe itching, rash, welts, swelling, or confusion. Follow-up Instructions: See the doctor who ordered your procedure as he/she has instructed. If you had a Lumbar Puncture or Myelogram, your results should be available to your ordering doctor in 3-5 business days. You can remove your dressing in 24 hours and shower regularly. Do not bathe or swim for 72 hours.            DISCHARGE SUMMARY from Nurse    PATIENT INSTRUCTIONS:    After general anesthesia or intravenous sedation, for 24 hours or while taking prescription Narcotics:  · Limit your activities  · Do not drive and operate hazardous machinery  · Do not make important personal or business decisions  · Do  not drink alcoholic beverages  · If you have not urinated within 8 hours after discharge, please contact your surgeon on call.    Report the following to your surgeon:  · Excessive pain, swelling, redness or odor of or around the surgical area  · Temperature over 100.5  · Nausea and vomiting lasting longer than 4 hours or if unable to take medications  · Any signs of decreased circulation or nerve impairment to extremity: change in color, persistent  numbness, tingling, coldness or increase pain  · Any questions    What to do at Home:  Recommended activity: Activity as tolerated and no driving for today. *  Please give a list of your current medications to your Primary Care Provider. *  Please update this list whenever your medications are discontinued, doses are      changed, or new medications (including over-the-counter products) are added. *  Please carry medication information at all times in case of emergency situations. These are general instructions for a healthy lifestyle:    No smoking/ No tobacco products/ Avoid exposure to second hand smoke  Surgeon General's Warning:  Quitting smoking now greatly reduces serious risk to your health. Obesity, smoking, and sedentary lifestyle greatly increases your risk for illness    A healthy diet, regular physical exercise & weight monitoring are important for maintaining a healthy lifestyle    You may be retaining fluid if you have a history of heart failure or if you experience any of the following symptoms:  Weight gain of 3 pounds or more overnight or 5 pounds in a week, increased swelling in our hands or feet or shortness of breath while lying flat in bed. Please call your doctor as soon as you notice any of these symptoms; do not wait until your next office visit. The discharge information has been reviewed with the patient. The patient verbalized understanding.   Discharge medications reviewed with the patient and appropriate educational materials and side effects teaching were provided.   ___________________________________________________________________________________________________________________________________

## 2020-04-14 ENCOUNTER — OFFICE VISIT (OUTPATIENT)
Dept: ORTHOPEDIC SURGERY | Age: 60
End: 2020-04-14

## 2020-04-14 VITALS
RESPIRATION RATE: 16 BRPM | SYSTOLIC BLOOD PRESSURE: 133 MMHG | WEIGHT: 218 LBS | BODY MASS INDEX: 36.32 KG/M2 | HEIGHT: 65 IN | HEART RATE: 78 BPM | TEMPERATURE: 98 F | DIASTOLIC BLOOD PRESSURE: 88 MMHG

## 2020-04-14 DIAGNOSIS — M48.02 FORAMINAL STENOSIS OF CERVICAL REGION: ICD-10-CM

## 2020-04-14 DIAGNOSIS — M25.511 RIGHT SHOULDER PAIN, UNSPECIFIED CHRONICITY: Primary | ICD-10-CM

## 2020-04-14 NOTE — PROGRESS NOTES
[facility]  [address], [address]  [place], [facility] [address]  Phone: [phone]  Fax: [phone]  PROGRESS NOTE  Patient: Aubrie De La Cruz                MRN: [id number]       SSN: [id number]  YOB: 1960        AGE: 60 y.o. SEX: female  Body mass index is 36.28 kg/m². PCP: Gemma Morocho MD  04/14/20    Chief Complaint   Patient presents with    Neck Pain     CT fu         HISTORY OF PRESENT ILLNESS, RADIOGRAPHS, and PLAN:       Belkis Naik is seen today. She is recently had a carpal tunnel surgery done. He has noted some improvement in her digits and it appears to be healing well. She still has numbness in her median nerve distribution in her right hand. Her main complaint is proximal arm pain and shoulder pain. I reviewed her CT myelogram of her cervical spine. Her fusion appears to be solid with good decompression. She has some junctional foraminal stenosis at 3 4 and at 6 7 but neither of which would appear to be either that severe or in the right distribution to explain her arm pain. Her arm pain on exam today is quite mechanical.  She has had a previous subacromial injection that did little for her pain her Lyrica and and other neuropathic medications is also doing little for her pain at this point I do not really believe this is a cervically mediated syndrome. Obtain an MRI of her right shoulder and have her see 1 of our shoulder specialists for further evaluation. Hopefully they will have an answer as to what could be done to assist her with this mechanical shoulder syndrome. It is possible her pain is just neuropathic from chronic nerve compression either at her carpal tunnel and/or her neck. If that is the case I do not have much else to offer her and other than medication management. This dictation was created utilizing voice recognition software. Errors may be present.      Past Medical History:   Diagnosis Date    Adverse effect of anesthesia     hard to wake up    Chronic pain     Depression     GERD (gastroesophageal reflux disease)     Hypothyroidism     Low back pain     Thromboembolus (HCC)     blood clot in foot during pregnancy    Tobacco abuse     Vertigo        Family History   Problem Relation Age of Onset    Hypertension Mother     Cancer Mother         multiple myeloma    Cancer Father         throat    Cancer Sister        Current Outpatient Medications   Medication Sig Dispense Refill    naloxone (NARCAN) 4 mg/actuation nasal spray Use 1 spray intranasally, then discard. Repeat with new spray every 2 min as needed for opioid overdose symptoms, alternating nostrils. 1 Each 0    pregabalin (LYRICA) 75 mg capsule Take 1 cap by mouth in the morning and 2 at night as directed. 90 Cap 2    acetaminophen (TYLENOL EXTRA STRENGTH) 500 mg tablet Take 500 mg by mouth every six (6) hours as needed for Pain.  liothyronine (CYTOMEL) 5 mcg tablet Take 5 mcg by mouth daily. 0    VOLTAREN 1 % gel Apply 4 g to affected area four (4) times daily. Maximum 16 grams per joint per day. Dispense 5 100 gram tubes 5 Each 0    levothyroxine (SYNTHROID) 50 mcg tablet Take 50 mcg by mouth Daily (before breakfast).  buffered aspirin (BUFFERIN) 325 mg tablet Take 1 Tab by mouth two (2) times a day. 60 Tab 1    ferrous sulfate 325 mg (65 mg iron) tablet Take 1 Tab by mouth two (2) times daily (with meals). 60 Tab 1    oxyCODONE-acetaminophen (PERCOCET) 7.5-325 mg per tablet Take 1-2 Tabs by mouth every four (4) hours as needed for Pain. Max Daily Amount: 12 Tabs. 60 Tab 0    ondansetron hcl (ZOFRAN) 4 mg tablet Take 1 Tab by mouth every eight (8) hours as needed for Nausea. 30 Tab 1    diazePAM (VALIUM) 5 mg tablet Take 1 tab 30 min prior to MRI, may repeat x1.  No driving, must have  (Patient not taking: Reported on 2/18/2020) 2 Tab 0    gabapentin (NEURONTIN) 300 mg capsule Take 1 cap by mouth in the morning and 2 at night as directed. (Patient taking differently: Take 300 mg by mouth two (2) times a day. Take 1 cap by mouth in the morning and 2 at night as directed.) 60 Cap 1    cyclobenzaprine (FLEXERIL) 10 mg tablet Take  by mouth three (3) times daily as needed for Muscle Spasm(s).  clotrimazole-betamethasone (LOTRISONE) topical cream APPLY 2 TIMES DAILY  1    multivit,iron,min/green tea xt (DAILY MULTIPLE WEIGHT LOSS PO) Take 3 Tabs by mouth daily. Relacore weight loss tablets      calcium carbonate 500 mg calcium (1,250 mg) chewable tablet Take 2 Tabs by mouth daily as needed for Indigestion (As needed for upset stomach).  polyethylene glycol (MIRALAX) 17 gram/dose powder Take 17 g by mouth daily.  OTHER daily.  cyanocobalamin (VITAMIN B-12) 1,000 mcg tablet Take 1,000 mcg by mouth daily.  NP THYROID 15 mg tablet TK 3 TS PO D  1    gabapentin (NEURONTIN) 300 mg capsule Take 1 tab by mouth three times daily  2    potassium iodide/iodine (IODINE STRONG, LUGOLS, PO) Take 25 mg by mouth daily.  Selenomethionine 200 mcg tab Take 1 Tab by mouth daily.  cholecalciferol, vitamin D3, (VITAMIN D3) 2,000 unit tab Take 1 Tab by mouth daily.          Allergies   Allergen Reactions    Pcn [Penicillins] Hives       Past Surgical History:   Procedure Laterality Date    HX  SECTION      x3    HX HIP REPLACEMENT      HX HYSTERECTOMY      HX ORTHOPAEDIC  2002    2 disks removed lower back [facility].    HX ORTHOPAEDIC Right 2017    shoulder surgery    NEUROLOGICAL PROCEDURE UNLISTED         Past Medical History:   Diagnosis Date    Adverse effect of anesthesia     hard to wake up    Chronic pain     Depression     GERD (gastroesophageal reflux disease)     Hypothyroidism     Low back pain     Thromboembolus (HCC)     blood clot in foot during pregnancy    Tobacco abuse     Vertigo        Social History     Socioeconomic History    Marital status: SINGLE Spouse name: Not on file    Number of children: Not on file    Years of education: Not on file    Highest education level: Not on file   Occupational History    Not on file   Social Needs    Financial resource strain: Not on file    Food insecurity     Worry: Not on file     Inability: Not on file    Transportation needs     Medical: Not on file     Non-medical: Not on file   Tobacco Use    Smoking status: Former Smoker     Packs/day: 0.25     Years: 4.00     Pack years: 1.00     Last attempt to quit: 2015     Years since quittin.8    Smokeless tobacco: Never Used   Substance and Sexual Activity    Alcohol use: No    Drug use: No    Sexual activity: Not on file   Lifestyle    Physical activity     Days per week: Not on file     Minutes per session: Not on file    Stress: Not on file   Relationships    Social connections     Talks on phone: Not on file     Gets together: Not on file     Attends Tenriism service: Not on file     Active member of club or organization: Not on file     Attends meetings of clubs or organizations: Not on file     Relationship status: Not on file    Intimate partner violence     Fear of current or ex partner: Not on file     Emotionally abused: Not on file     Physically abused: Not on file     Forced sexual activity: Not on file   Other Topics Concern    Not on file   Social History Narrative    3/2015[de-identified] currently unemployed due to chronic low back pain, used to work renovating houses. Lived with her mother until her mother's death in the summer of . Currently living with her daughters, splits time between [place] and [place]. REVIEW OF SYSTEMS:   CONSTITUTIONAL SYMPTOMS:  Negative. EYES:  Negative. EARS, NOSE, THROAT AND MOUTH:  Negative. CARDIOVASCULAR:  Negative. RESPIRATORY:  Negative. GENITOURINARY: Per HPI. GASTROINTESTINAL:  Per HPI. INTEGUMENTARY (SKIN AND/OR BREAST):  Negative.    MUSCULOSKELETAL: Per HPI.   ENDOCRINE/RHEUMATOLOGIC:  Negative. NEUROLOGICAL:  Per HPI. HEMATOLOGIC/LYMPHATIC:  Negative. ALLERGIC/IMMUNOLOGIC:  Negative. PSYCHIATRIC:  Negative. PHYSICAL EXAMINATION:   Visit Vitals  /88 (BP 1 Location: Left arm, BP Patient Position: Sitting)   Pulse 78   Temp 98 °F (36.7 °C) (Oral)   Resp 16   Ht 5' 5\" (1.651 m)   Wt 218 lb (98.9 kg)   BMI 36.28 kg/m²    PAIN SCALE: 4/10    CONSTITUTIONAL: The patient is in no apparent distress and is alert and oriented x 3. HEENT: Normocephalic. Hearing grossly intact. NECK: Supple and symmetric. no tenderness, or masses were felt. RESPIRATORY: No labored breathing. CARDIOVASCULAR: The carotid pulses were normal. Peripheral pulses were 2+. CHEST: Normal AP diameter and normal contour without any kyphoscoliosis. LYMPHATIC: No lymphadenopathy was appreciated in the neck, axillae or groin. SKIN:  Negative for scars, rashes, lesions, or ulcers on the right upper, right lower, left upper, left lower and trunk. NEUROLOGICAL: Alert and oriented x 3. Ambulation without assistive device. FWB. EXTREMITIES:  See musculoskeletal.  MUSCULOSKELETAL:   Head and Neck:  Negative for misalignment, asymmetry, crepitation, defects, tenderness masses or effusions.  Left Upper Extremity: Inspection, percussion and palpation performed. Esparzas sign is negative.  Right Upper Extremity: Painful ROM R shoulder. Numbness in fingers. Inspection, percussion and palpation performed. Esparzas sign is negative.  Spine, Ribs and Pelvis: Inspection, percussion and palpation performed. Negative for misalignment, asymmetry, crepitation, defects, tenderness masses or effusions.  Left Lower Extremity: Inspection, percussion and palpation performed. Negative straight leg raise.  Right Lower Extremity: Inspection, percussion and palpation performed. Negative straight leg raise. SPINE EXAM:     Cervical spine: Neck is midline. Normal muscle tone.  No focal atrophy is noted. Lumbar spine: No rash, ecchymosis, or gross obliquity. No focal atrophy is noted. ASSESSMENT    ICD-10-CM ICD-9-CM    1. Right shoulder pain, unspecified chronicity M25.511 719.41        Written by Wily Ding, as dictated by Crow Cotton MD.    I, Dr. Crow Cotton MD, confirm that all documentation is accurate.

## 2020-04-14 NOTE — LETTER
4/14/20 Patient: Calin Myers YOB: 1960 Date of Visit: 4/14/2020 Cande James, [address] [address] VIA Facsimile: [phone] Dear Cande James MD, Thank you for referring Ms. Dion Donohue to [facility] for evaluation. My notes for this consultation are attached. If you have questions, please do not hesitate to call me. I look forward to following your patient along with you.  
 
 
Sincerely, 
 
John Adams MD

## 2020-04-24 ENCOUNTER — VIRTUAL VISIT (OUTPATIENT)
Dept: ORTHOPEDIC SURGERY | Age: 60
End: 2020-04-24

## 2020-04-24 DIAGNOSIS — Z96.641 HISTORY OF TOTAL RIGHT HIP REPLACEMENT: Primary | ICD-10-CM

## 2020-04-24 NOTE — PROGRESS NOTES
Patient: Wu Jacome                MRN: [id number]       SSN: [id number]  YOB: 1960        AGE: 60 y.o. SEX: female  There is no height or weight on file to calculate BMI. PCP: Corina Miranda MD  04/24/20  10:44 AM    Consent: Wu Jacome and/or health care decision maker is aware that that they may receive a bill for this virtual service, depending on their insurance coverage, and has provided verbal consent to proceed: Yes    Wu Jacome is a 60 y.o. female who was seen by synchronous (real-time) audio-video technology (doxy. me) on 4/24/2020. Patient was at home and provider in the [facility] office while conducting this encounter. I spent at least 10 minutes with this established patient, and >50% of the time was spent counseling and/or coordinating care regarding right total hip replacement    REVIEW OF SYSTEMS:      CON: negative  EYE: negative   ENT: negative  RESP: negative  GI:    negative   :  negative  MSK: Positive  A twelve point review of systems was completed, positives noted and all other systems were reviewed and are negative          Past Medical History:   Diagnosis Date    Adverse effect of anesthesia     hard to wake up    Chronic pain     Depression     GERD (gastroesophageal reflux disease)     Hypothyroidism     Low back pain     Thromboembolus (HCC)     blood clot in foot during pregnancy    Tobacco abuse     Vertigo        Family History   Problem Relation Age of Onset    Hypertension Mother     Cancer Mother         multiple myeloma    Cancer Father         throat    Cancer Sister        Current Outpatient Medications   Medication Sig Dispense Refill    gabapentin (NEURONTIN) 300 mg capsule Take 1 cap by mouth in the morning and 2 at night as directed. (Patient taking differently: Take 300 mg by mouth two (2) times a day.  Take 1 cap by mouth in the morning and 2 at night as directed.) 60 Cap 1    acetaminophen (TYLENOL EXTRA STRENGTH) 500 mg tablet Take 500 mg by mouth every six (6) hours as needed for Pain.  multivit,iron,min/green tea xt (DAILY MULTIPLE WEIGHT LOSS PO) Take 3 Tabs by mouth daily. Relacore weight loss tablets      levothyroxine (SYNTHROID) 50 mcg tablet Take 50 mcg by mouth Daily (before breakfast).  naloxone (NARCAN) 4 mg/actuation nasal spray Use 1 spray intranasally, then discard. Repeat with new spray every 2 min as needed for opioid overdose symptoms, alternating nostrils. 1 Each 0    pregabalin (LYRICA) 75 mg capsule Take 1 cap by mouth in the morning and 2 at night as directed. 90 Cap 2    diazePAM (VALIUM) 5 mg tablet Take 1 tab 30 min prior to MRI, may repeat x1. No driving, must have  (Patient not taking: Reported on 2/18/2020) 2 Tab 0    cyclobenzaprine (FLEXERIL) 10 mg tablet Take  by mouth three (3) times daily as needed for Muscle Spasm(s).  clotrimazole-betamethasone (LOTRISONE) topical cream APPLY 2 TIMES DAILY  1    liothyronine (CYTOMEL) 5 mcg tablet Take 5 mcg by mouth daily. 0    VOLTAREN 1 % gel Apply 4 g to affected area four (4) times daily. Maximum 16 grams per joint per day. Dispense 5 100 gram tubes 5 Each 0    calcium carbonate 500 mg calcium (1,250 mg) chewable tablet Take 2 Tabs by mouth daily as needed for Indigestion (As needed for upset stomach).  polyethylene glycol (MIRALAX) 17 gram/dose powder Take 17 g by mouth daily.  buffered aspirin (BUFFERIN) 325 mg tablet Take 1 Tab by mouth two (2) times a day. 60 Tab 1    ferrous sulfate 325 mg (65 mg iron) tablet Take 1 Tab by mouth two (2) times daily (with meals). 60 Tab 1    oxyCODONE-acetaminophen (PERCOCET) 7.5-325 mg per tablet Take 1-2 Tabs by mouth every four (4) hours as needed for Pain. Max Daily Amount: 12 Tabs. 60 Tab 0    ondansetron hcl (ZOFRAN) 4 mg tablet Take 1 Tab by mouth every eight (8) hours as needed for Nausea. 30 Tab 1    OTHER daily.       cyanocobalamin (VITAMIN B-12) 1,000 mcg tablet Take 1,000 mcg by mouth daily.  NP THYROID 15 mg tablet TK 3 TS PO D  1    gabapentin (NEURONTIN) 300 mg capsule Take 1 tab by mouth three times daily  2    potassium iodide/iodine (IODINE STRONG, LUGOLS, PO) Take 25 mg by mouth daily.  Selenomethionine 200 mcg tab Take 1 Tab by mouth daily.  cholecalciferol, vitamin D3, (VITAMIN D3) 2,000 unit tab Take 1 Tab by mouth daily.          Allergies   Allergen Reactions    Pcn [Penicillins] Hives       Past Surgical History:   Procedure Laterality Date    HAND/FINGER SURGERY UNLISTED      right thumb    HX  SECTION      x3    HX HIP REPLACEMENT      HX HYSTERECTOMY      HX ORTHOPAEDIC      2 disks removed lower back [facility].    HX ORTHOPAEDIC Right 2017    shoulder surgery    HX WRIST FRACTURE TX      R CTR    NEUROLOGICAL PROCEDURE UNLISTED         Social History     Socioeconomic History    Marital status: SINGLE     Spouse name: Not on file    Number of children: Not on file    Years of education: Not on file    Highest education level: Not on file   Occupational History    Not on file   Social Needs    Financial resource strain: Not on file    Food insecurity     Worry: Not on file     Inability: Not on file    Transportation needs     Medical: Not on file     Non-medical: Not on file   Tobacco Use    Smoking status: Former Smoker     Packs/day: 0.25     Years: 4.00     Pack years: 1.00     Last attempt to quit: 2015     Years since quittin.8    Smokeless tobacco: Never Used   Substance and Sexual Activity    Alcohol use: No    Drug use: No    Sexual activity: Not on file   Lifestyle    Physical activity     Days per week: Not on file     Minutes per session: Not on file    Stress: Not on file   Relationships    Social connections     Talks on phone: Not on file     Gets together: Not on file     Attends Hoahaoism service: Not on file     Active member of club or organization: Not on file     Attends meetings of clubs or organizations: Not on file     Relationship status: Not on file    Intimate partner violence     Fear of current or ex partner: Not on file     Emotionally abused: Not on file     Physically abused: Not on file     Forced sexual activity: Not on file   Other Topics Concern    Not on file   Social History Narrative    3/2015[de-identified] currently unemployed due to chronic low back pain, used to work renovating houses. Lived with her mother until her mother's death in the summer of 2014. Currently living with her daughters, splits time between [facility]. There were no vitals taken for this visit. Patient seen and evaluated today via telehealth, virtual visit, doxy. me regarding her right hip placement. The patient now is approaching 2 years status post hip replacement surgery. She is doing extremely well and very happy with results. She is had no troubles the wound. She no injuries. She ambulates with no assistive devices. Patient denies recent fevers, chills, chest pain, SOB, or injuries. No recent systemic changes noted. A 12-point review of systems is performed today. Pertinent positives are noted. All other systems reviewed and otherwise are negative. PHYSICAL EXAMINATION:  GENERAL: Alert and oriented x3, in no acute distress, well-developed, well-nourished, afebrile. HEART: No JVD. EYES: No scleral icterus   NECK: No significant lymphadenopathy   LUNGS: No respiratory compromise or indrawing    And ambulation patient displays no antalgic or Trendelenburg component to her gait. Able to stand and abduct without discomfort. Able to plantarflex and dorsiflex her foot without increasing calf discomfort. Assessment: Status post right hip replacement    Plan: At this point, she will continue activity as tolerated. She done very well with her replacement very happy with results.   We will plan on seeing her back in July or so for reevaluation and x-ray of the right hip. She will call with any questions or concerns arise. Due to this being a TeleHealth evaluation, many elements of the physical examination are unable to be assessed. Pursuant to the emergency declaration under the [facility] waiver authority and the [employer] and [employer] Act, this Virtual Visit was conducted, with patient's consent, to reduce the patient's risk of exposure to COVID-19 and provide continuity of care for an established patient. Services were provided through a virtual discussion to substitute for in-person clinic visit.     Electronically signed by: Gayle Lemons PA-C

## 2020-05-07 NOTE — PROGRESS NOTES
Liz Duarte is a 60 y.o. female who was seen by synchronous (real-time) audio-video technology on 3/30/2020. Consent:  She and/or her healthcare decision maker is aware that this patient-initiated Telehealth encounter is a billable service, with coverage as determined by her insurance carrier. She is aware that she may receive a bill and has provided verbal consent to proceed: Yes    I was in the office at [facility] via [employer] me while conducting this encounter. Assessment & Plan:   Diagnoses and all orders for this visit:    1. Right carpal tunnel syndrome    2. S/P carpal tunnel release      I have instructed the patient to follow-up PRN. I have told her that if she has any questions or concerns to feel free to call and asked them. Point we will try to do everything by phone a virtual conference. Coding Help - Use CPT Codes 27101-45254, 29046-73350 for Established and New Patients respectively, either employing EM elements or Time rules. Other codes (example consult codes) may also apply. I spent at least 15 minutes with this established patient, and >50% of the time was spent counseling and/or coordinating care regarding right hand surgery  712  Subjective: Liz Duarte was seen for Wrist Pain (right wrist pain)    Patient reports continued pain tenderness the hand however improved compared right after surgery. She reports her numbness and tingling is almost completely gone compared to before surgery. Prior to Admission medications    Medication Sig Start Date End Date Taking? Authorizing Provider   pregabalin (LYRICA) 75 mg capsule Take 1 cap by mouth in the morning and 2 at night as directed. 2/18/20  Yes Sincere Duggan MD   acetaminophen (TYLENOL EXTRA STRENGTH) 500 mg tablet Take 500 mg by mouth every six (6) hours as needed for Pain. Yes Provider, Historical   liothyronine (CYTOMEL) 5 mcg tablet Take 5 mcg by mouth daily.  6/28/19  Yes Provider, Historical Report called to Malka MOTTA, pt made ready for transport to [facility] with tele per transporter, pt states pain 6/10 when awoken from sleep, radha po fluids and meds, pedal pulses+2 bilat, right neck and left posterior knee dressings dry and intact, IVFs an d Heparin infusing, stable at present.   levothyroxine (SYNTHROID) 50 mcg tablet Take 50 mcg by mouth Daily (before breakfast). Yes Provider, Historical   naloxone (NARCAN) 4 mg/actuation nasal spray Use 1 spray intranasally, then discard. Repeat with new spray every 2 min as needed for opioid overdose symptoms, alternating nostrils. 3/16/20   Megan HUERTA, DO   diazePAM (VALIUM) 5 mg tablet Take 1 tab 30 min prior to MRI, may repeat x1. No driving, must have   Patient not taking: Reported on 2/18/2020 11/21/19   Lord Danny NP   gabapentin (NEURONTIN) 300 mg capsule Take 1 cap by mouth in the morning and 2 at night as directed. Patient taking differently: Take 300 mg by mouth two (2) times a day. Take 1 cap by mouth in the morning and 2 at night as directed. 11/11/19   Olivia Brooks MD   cyclobenzaprine (FLEXERIL) 10 mg tablet Take  by mouth three (3) times daily as needed for Muscle Spasm(s). Provider, Historical   clotrimazole-betamethasone (LOTRISONE) topical cream APPLY 2 TIMES DAILY 5/30/19   Provider, Historical   VOLTAREN 1 % gel Apply 4 g to affected area four (4) times daily. Maximum 16 grams per joint per day. Dispense 5 100 gram tubes  Patient not taking: Reported on 11/11/2019 12/20/18   EDIN Oswald   multivit,iron,min/green tea xt (DAILY MULTIPLE WEIGHT LOSS PO) Take 3 Tabs by mouth daily. Relacore weight loss tablets    Provider, Historical   calcium carbonate 500 mg calcium (1,250 mg) chewable tablet Take 2 Tabs by mouth daily as needed for Indigestion (As needed for upset stomach). Provider, Historical   polyethylene glycol (MIRALAX) 17 gram/dose powder Take 17 g by mouth daily. Provider, Historical   buffered aspirin (BUFFERIN) 325 mg tablet Take 1 Tab by mouth two (2) times a day. Patient not taking: Reported on 11/11/2019 7/31/18   Elian Cronin PA-C   ferrous sulfate 325 mg (65 mg iron) tablet Take 1 Tab by mouth two (2) times daily (with meals).   Patient not taking: Reported on 11/11/2019 7/31/18   Domitila Foster PA-C   oxyCODONE-acetaminophen (PERCOCET) 7.5-325 mg per tablet Take 1-2 Tabs by mouth every four (4) hours as needed for Pain. Max Daily Amount: 12 Tabs. Patient not taking: Reported on 11/11/2019 7/31/18   Satya WATSON PA-C   ondansetron hcl [facility]) 4 mg tablet Take 1 Tab by mouth every eight (8) hours as needed for Nausea. Patient not taking: Reported on 11/11/2019 7/31/18   Domitila Foster PA-C   OTHER daily. Provider, Historical   cyanocobalamin (VITAMIN B-12) 1,000 mcg tablet Take 1,000 mcg by mouth daily. Provider, Historical   NP THYROID 15 mg tablet TK 3 TS PO D 6/11/18   Provider, Historical   gabapentin (NEURONTIN) 300 mg capsule Take 1 tab by mouth three times daily 4/18/18   Provider, Historical   potassium iodide/iodine (IODINE STRONG, LUGOLS, PO) Take 25 mg by mouth daily. Provider, Historical   Selenomethionine 200 mcg tab Take 1 Tab by mouth daily. Provider, Historical   cholecalciferol, vitamin D3, (VITAMIN D3) 2,000 unit tab Take 1 Tab by mouth daily. Provider, Historical     Allergies   Allergen Reactions    Pcn [Penicillins] Hives       Patient Active Problem List   Diagnosis Code    Chronic low back pain M54.5, G89.29    Depression F32.9    Tobacco abuse Z72.0    S/P cervical spinal fusion Z98.1    Severe obesity (BMI 35.0-39. 9) E66.01    Hip arthritis M16.10     Patient Active Problem List    Diagnosis Date Noted    Hip arthritis 07/30/2018    Severe obesity (BMI 35.0-39.9) 06/12/2018    S/P cervical spinal fusion 04/06/2017    Chronic low back pain 06/19/2014    Depression 06/19/2014    Tobacco abuse 06/19/2014     Current Outpatient Medications   Medication Sig Dispense Refill    pregabalin (LYRICA) 75 mg capsule Take 1 cap by mouth in the morning and 2 at night as directed. 90 Cap 2    acetaminophen (TYLENOL EXTRA STRENGTH) 500 mg tablet Take 500 mg by mouth every six (6) hours as needed for Pain.       liothyronine (CYTOMEL) 5 mcg tablet Take 5 mcg by mouth daily. 0    levothyroxine (SYNTHROID) 50 mcg tablet Take 50 mcg by mouth Daily (before breakfast).  naloxone (NARCAN) 4 mg/actuation nasal spray Use 1 spray intranasally, then discard. Repeat with new spray every 2 min as needed for opioid overdose symptoms, alternating nostrils. 1 Each 0    diazePAM (VALIUM) 5 mg tablet Take 1 tab 30 min prior to MRI, may repeat x1. No driving, must have  (Patient not taking: Reported on 2/18/2020) 2 Tab 0    gabapentin (NEURONTIN) 300 mg capsule Take 1 cap by mouth in the morning and 2 at night as directed. (Patient taking differently: Take 300 mg by mouth two (2) times a day. Take 1 cap by mouth in the morning and 2 at night as directed.) 60 Cap 1    cyclobenzaprine (FLEXERIL) 10 mg tablet Take  by mouth three (3) times daily as needed for Muscle Spasm(s).  clotrimazole-betamethasone (LOTRISONE) topical cream APPLY 2 TIMES DAILY  1    VOLTAREN 1 % gel Apply 4 g to affected area four (4) times daily. Maximum 16 grams per joint per day. Dispense 5 100 gram tubes (Patient not taking: Reported on 11/11/2019) 5 Each 0    multivit,iron,min/green tea xt (DAILY MULTIPLE WEIGHT LOSS PO) Take 3 Tabs by mouth daily. Relacore weight loss tablets      calcium carbonate 500 mg calcium (1,250 mg) chewable tablet Take 2 Tabs by mouth daily as needed for Indigestion (As needed for upset stomach).  polyethylene glycol (MIRALAX) 17 gram/dose powder Take 17 g by mouth daily.  buffered aspirin (BUFFERIN) 325 mg tablet Take 1 Tab by mouth two (2) times a day. (Patient not taking: Reported on 11/11/2019) 60 Tab 1    ferrous sulfate 325 mg (65 mg iron) tablet Take 1 Tab by mouth two (2) times daily (with meals). (Patient not taking: Reported on 11/11/2019) 60 Tab 1    oxyCODONE-acetaminophen (PERCOCET) 7.5-325 mg per tablet Take 1-2 Tabs by mouth every four (4) hours as needed for Pain. Max Daily Amount: 12 Tabs.  (Patient not taking: Reported on 2019) 60 Tab 0    ondansetron hcl (ZOFRAN) 4 mg tablet Take 1 Tab by mouth every eight (8) hours as needed for Nausea. (Patient not taking: Reported on 2019) 30 Tab 1    OTHER daily.  cyanocobalamin (VITAMIN B-12) 1,000 mcg tablet Take 1,000 mcg by mouth daily.  NP THYROID 15 mg tablet TK 3 TS PO D  1    gabapentin (NEURONTIN) 300 mg capsule Take 1 tab by mouth three times daily  2    potassium iodide/iodine (IODINE STRONG, LUGOLS, PO) Take 25 mg by mouth daily.  Selenomethionine 200 mcg tab Take 1 Tab by mouth daily.  cholecalciferol, vitamin D3, (VITAMIN D3) 2,000 unit tab Take 1 Tab by mouth daily.        Allergies   Allergen Reactions    Pcn [Penicillins] Hives     Past Medical History:   Diagnosis Date    Adverse effect of anesthesia     hard to wake up    Chronic pain     Depression     GERD (gastroesophageal reflux disease)     Hypothyroidism     Low back pain     Thromboembolus (HCC)     blood clot in foot during pregnancy    Tobacco abuse     Vertigo      Past Surgical History:   Procedure Laterality Date    HX  SECTION      x3    HX HIP REPLACEMENT      HX HYSTERECTOMY      HX ORTHOPAEDIC  2002    2 disks removed lower back [facility].    HX ORTHOPAEDIC Right 2017    shoulder surgery    NEUROLOGICAL PROCEDURE UNLISTED       Family History   Problem Relation Age of Onset    Hypertension Mother     Cancer Mother         multiple myeloma    Cancer Father         throat    Cancer Sister      Social History     Tobacco Use    Smoking status: Former Smoker     Packs/day: 0.25     Years: 4.00     Pack years: 1.00     Last attempt to quit: 2015     Years since quittin.8    Smokeless tobacco: Never Used   Substance Use Topics    Alcohol use: No       Review of Systems - General ROS: negative  Psychological ROS: negative  ENT ROS: negative  Allergy and Immunology ROS: negative  Hematological and Lymphatic ROS: negative  Respiratory ROS: no cough, shortness of breath, or wheezing  Cardiovascular ROS: no chest pain or dyspnea on exertion  Musculoskeletal ROS: positive for - pain in hand - right  Neurological ROS: negative  Dermatological ROS: negative    PHYSICAL EXAMINATION:  [ INSTRUCTIONS:  \"[x]\" Indicates a positive item  \"[]\" Indicates a negative item  -- DELETE ALL ITEMS NOT EXAMINED]  Vital Signs: (As obtained by patient/caregiver at home)  There were no vitals taken for this visit. Right Hand: Dressing is been removed by patient today. Incision is clean dry and intact no evidence of drainage erythema breakdown or other signs of patient is able to palpate the incision with minimal pain. Range of motion is full of all digits. She reports sensation to be intact on palpation of the tips of her fingers. Self-examination cap refill is less than 3 seconds.     Constitutional: [x] Appears well-developed and well-nourished [x] No apparent distress      [] Abnormal -     Mental status: [x] Alert and awake  [x] Oriented to person/place/time [x] Able to follow commands    [] Abnormal -     Eyes:   EOM    [x]  Normal    [] Abnormal -   Sclera  [x]  Normal    [] Abnormal -          Discharge [x]  None visible   [] Abnormal -     HENT: [x] Normocephalic, atraumatic  [] Abnormal -   [x] Mouth/Throat: Mucous membranes are moist    External Ears [x] Normal  [] Abnormal -    Neck: [x] No visualized mass [] Abnormal -     Pulmonary/Chest: [x] Respiratory effort normal   [x] No visualized signs of difficulty breathing or respiratory distress        [] Abnormal -      Musculoskeletal:   [x] Normal gait with no signs of ataxia         [x] Normal range of motion of neck        [] Abnormal -     Neurological:        [x] No Facial Asymmetry (Cranial nerve 7 motor function) (limited exam due to video visit)          [x] No gaze palsy        [] Abnormal -          Skin:        [x] No significant exanthematous lesions or discoloration noted on facial skin         [] Abnormal -            Psychiatric:       [x] Normal Affect [] Abnormal -        [x] No Hallucinations    Other pertinent observable physical exam findings:-        We discussed the expected course, resolution and complications of the diagnosis(es) in detail. Medication risks, benefits, costs, interactions, and alternatives were discussed as indicated. I advised her to contact the office if her condition worsens, changes or fails to improve as anticipated. She expressed understanding with the diagnosis(es) and plan. Pursuant to the emergency declaration under the [address] [place] [address], [facility] waiver authority and the [employer] Act, this Virtual  Visit was conducted, with patient's consent, to reduce the patient's risk of exposure to COVID-19 and provide continuity of care for an established patient. Services were provided through a video synchronous discussion virtually to substitute for in-person clinic visit.     Camilla Schwab,

## 2020-05-13 ENCOUNTER — HOSPITAL ENCOUNTER (OUTPATIENT)
Dept: MAMMOGRAPHY | Age: 60
Discharge: HOME | End: 2020-05-13
Attending: INTERNAL MEDICINE
Payer: MEDICAID

## 2020-05-13 DIAGNOSIS — Z12.31 ENCOUNTER FOR SCREENING MAMMOGRAM FOR BREAST CANCER: ICD-10-CM

## 2020-05-13 PROCEDURE — 77063 BREAST TOMOSYNTHESIS BI: CPT

## 2020-06-11 ENCOUNTER — HOSPITAL ENCOUNTER (OUTPATIENT)
Dept: MAMMOGRAPHY | Age: 60
Discharge: HOME | End: 2020-06-11
Attending: INTERNAL MEDICINE
Payer: MEDICAID

## 2020-06-11 ENCOUNTER — NURSE NAVIGATOR (OUTPATIENT)
Dept: MAMMOGRAPHY | Age: 60
End: 2020-06-11

## 2020-06-11 ENCOUNTER — HOSPITAL ENCOUNTER (OUTPATIENT)
Dept: ULTRASOUND IMAGING | Age: 60
Discharge: HOME | End: 2020-06-11
Attending: INTERNAL MEDICINE
Payer: MEDICAID

## 2020-06-11 DIAGNOSIS — N63.0 LUMP OR MASS IN BREAST: ICD-10-CM

## 2020-06-11 DIAGNOSIS — R92.8 ABNORMAL MAMMOGRAM: ICD-10-CM

## 2020-06-11 PROCEDURE — 77062 BREAST TOMOSYNTHESIS BI: CPT

## 2020-06-11 PROCEDURE — 76642 ULTRASOUND BREAST LIMITED: CPT

## 2020-06-15 ENCOUNTER — HOSPITAL ENCOUNTER (OUTPATIENT)
Dept: MAMMOGRAPHY | Age: 60
Discharge: HOME | End: 2020-06-15
Attending: INTERNAL MEDICINE
Payer: MEDICAID

## 2020-06-15 ENCOUNTER — HOSPITAL ENCOUNTER (OUTPATIENT)
Dept: ULTRASOUND IMAGING | Age: 60
Discharge: HOME | End: 2020-06-15
Attending: INTERNAL MEDICINE
Payer: MEDICAID

## 2020-06-15 DIAGNOSIS — R92.8 ABNORMAL MAMMOGRAM: ICD-10-CM

## 2020-06-15 DIAGNOSIS — N63.0 LUMP OR MASS IN BREAST: ICD-10-CM

## 2020-06-15 PROCEDURE — 88342 IMHCHEM/IMCYTCHM 1ST ANTB: CPT

## 2020-06-15 PROCEDURE — 74011000250 HC RX REV CODE- 250

## 2020-06-15 PROCEDURE — 88305 TISSUE EXAM BY PATHOLOGIST: CPT

## 2020-06-15 PROCEDURE — 19083 BX BREAST 1ST LESION US IMAG: CPT

## 2020-06-15 PROCEDURE — 19081 BX BREAST 1ST LESION STRTCTC: CPT

## 2020-06-15 PROCEDURE — 77065 DX MAMMO INCL CAD UNI: CPT

## 2020-06-15 PROCEDURE — 88341 IMHCHEM/IMCYTCHM EA ADD ANTB: CPT

## 2020-06-15 PROCEDURE — 77030030538 MAM POST BX IMAGING RT INCL CAD

## 2020-06-15 RX ORDER — LIDOCAINE HYDROCHLORIDE AND EPINEPHRINE 10; 10 MG/ML; UG/ML
1.5 INJECTION, SOLUTION INFILTRATION; PERINEURAL
Status: COMPLETED | OUTPATIENT
Start: 2020-06-15 | End: 2020-06-15

## 2020-06-15 RX ORDER — LIDOCAINE HYDROCHLORIDE 10 MG/ML
5 INJECTION, SOLUTION EPIDURAL; INFILTRATION; INTRACAUDAL; PERINEURAL
Status: COMPLETED | OUTPATIENT
Start: 2020-06-15 | End: 2020-06-15

## 2020-06-15 RX ORDER — LIDOCAINE HYDROCHLORIDE AND EPINEPHRINE 10; 10 MG/ML; UG/ML
1-20 INJECTION, SOLUTION INFILTRATION; PERINEURAL
Status: COMPLETED | OUTPATIENT
Start: 2020-06-15 | End: 2020-06-15

## 2020-06-15 RX ADMIN — LIDOCAINE HYDROCHLORIDE 5 ML: 10 INJECTION, SOLUTION EPIDURAL; INFILTRATION; INTRACAUDAL; PERINEURAL at 09:20

## 2020-06-15 RX ADMIN — LIDOCAINE HYDROCHLORIDE AND EPINEPHRINE 20 MG: 10; 10 INJECTION, SOLUTION INFILTRATION; PERINEURAL at 10:16

## 2020-06-15 RX ADMIN — LIDOCAINE HYDROCHLORIDE 5 ML: 10 INJECTION, SOLUTION EPIDURAL; INFILTRATION; INTRACAUDAL; PERINEURAL at 10:15

## 2020-06-15 RX ADMIN — LIDOCAINE HYDROCHLORIDE AND EPINEPHRINE 15 MG: 10; 10 INJECTION, SOLUTION INFILTRATION; PERINEURAL at 09:20

## 2020-07-10 ENCOUNTER — OFFICE VISIT (OUTPATIENT)
Dept: SURGERY | Age: 60
End: 2020-07-10

## 2020-07-29 ENCOUNTER — HOSPITAL ENCOUNTER (OUTPATIENT)
Dept: GENERAL RADIOLOGY | Age: 60
Discharge: HOME | End: 2020-07-29
Payer: MEDICAID

## 2020-07-29 ENCOUNTER — HOSPITAL ENCOUNTER (OUTPATIENT)
Dept: LAB | Age: 60
Discharge: HOME | End: 2020-07-29
Payer: MEDICAID

## 2020-07-29 ENCOUNTER — OFFICE VISIT (OUTPATIENT)
Dept: SURGERY | Age: 60
End: 2020-07-29

## 2020-07-29 ENCOUNTER — HOSPITAL ENCOUNTER (OUTPATIENT)
Dept: LAB | Age: 60
Discharge: HOME | End: 2020-07-29

## 2020-07-29 VITALS
HEART RATE: 77 BPM | OXYGEN SATURATION: 99 % | WEIGHT: 224 LBS | SYSTOLIC BLOOD PRESSURE: 127 MMHG | BODY MASS INDEX: 37.32 KG/M2 | RESPIRATION RATE: 17 BRPM | TEMPERATURE: 96.8 F | DIASTOLIC BLOOD PRESSURE: 76 MMHG | HEIGHT: 65 IN

## 2020-07-29 DIAGNOSIS — D24.1 PAPILLOMA OF RIGHT BREAST: ICD-10-CM

## 2020-07-29 DIAGNOSIS — D24.1 PAPILLOMA OF RIGHT BREAST: Primary | ICD-10-CM

## 2020-07-29 LAB
ABSOLUTE LYMPHOCYTE COUNT, 10803: 2.6 K/UL (ref 1–4.8)
ANION GAP SERPL CALC-SCNC: 11.3 MMOL/L (ref 3–15)
ATRIAL RATE: 80 BPM
BASOPHILS # BLD: 0 K/UL (ref 0–0.2)
BASOPHILS NFR BLD: 1 % (ref 0–2)
BUN SERPL-MCNC: 14 MG/DL (ref 6–22)
CALCIUM SERPL-MCNC: 9.4 MG/DL (ref 8.4–10.5)
CALCULATED P AXIS, ECG09: 58 DEGREES
CALCULATED R AXIS, ECG10: 16 DEGREES
CALCULATED T AXIS, ECG11: 27 DEGREES
CHLORIDE SERPL-SCNC: 105 MMOL/L (ref 98–110)
CO2 SERPL-SCNC: 27 MMOL/L (ref 20–32)
CREAT SERPL-MCNC: 0.7 MG/DL (ref 0.8–1.4)
DIAGNOSIS, 93000: NORMAL
EOSINOPHIL # BLD: 0.1 K/UL (ref 0–0.5)
EOSINOPHIL NFR BLD: 2 % (ref 0–6)
ERYTHROCYTE [DISTWIDTH] IN BLOOD BY AUTOMATED COUNT: 14.6 % (ref 10–15.5)
GFRAA, 66117: >60
GFRNA, 66118: >60
GLUCOSE SERPL-MCNC: 111 MG/DL (ref 70–99)
GRANULOCYTES,GRANS: 43 % (ref 40–75)
HCT VFR BLD AUTO: 36.4 % (ref 35.1–48)
HGB BLD-MCNC: 11.1 G/DL (ref 11.7–16)
LYMPHOCYTES, LYMLT: 47 % (ref 20–45)
MCH RBC QN AUTO: 28 PG (ref 26–34)
MCHC RBC AUTO-ENTMCNC: 31 G/DL (ref 31–36)
MCV RBC AUTO: 92 FL (ref 81–99)
MONOCYTES # BLD: 0.4 K/UL (ref 0.1–1)
MONOCYTES NFR BLD: 8 % (ref 3–12)
NEUTROPHILS # BLD AUTO: 2.4 K/UL (ref 1.8–7.7)
P-R INTERVAL, ECG05: 176 MS
PLATELET # BLD AUTO: 247 K/UL (ref 140–440)
PMV BLD AUTO: 10.2 FL (ref 9–13)
POTASSIUM SERPL-SCNC: 4.7 MMOL/L (ref 3.5–5.5)
Q-T INTERVAL, ECG07: 372 MS
QRS DURATION, ECG06: 84 MS
QTC CALCULATION (BEZET), ECG08: 429 MS
RBC # BLD AUTO: 3.94 M/UL (ref 3.8–5.2)
SENTARA SPECIMEN COL,SENBCF: NORMAL
SODIUM SERPL-SCNC: 143 MMOL/L (ref 133–145)
VENTRICULAR RATE, ECG03: 80 BPM
WBC # BLD AUTO: 5.6 K/UL (ref 4–11)

## 2020-07-29 PROCEDURE — 71046 X-RAY EXAM CHEST 2 VIEWS: CPT

## 2020-07-29 PROCEDURE — 99001 SPECIMEN HANDLING PT-LAB: CPT

## 2020-07-29 PROCEDURE — 93005 ELECTROCARDIOGRAM TRACING: CPT

## 2020-07-29 NOTE — PATIENT INSTRUCTIONS
Open Breast Biopsy: Before Your Surgery What is an open breast biopsy? An open breast biopsy is surgery to take a sample of breast tissue. It may be done to check a lump found during a breast exam. Or it may be done to check an area of concern found on a mammogram or ultrasound. If the doctor can't feel the lump, a small wire can be put in the area during a mammogram or ultrasound just before surgery. The tip of the wire will guide the doctor to the area to be checked. The doctor will make a small cut in the breast to remove a piece of tissue. The cut is called an incision. If the lump or suspicious area is small, the doctor may be able to take out the entire lump or area. The doctor will close the incision with stitches. The breast tissue will be sent to a lab. There it will be examined under a microscope to check for breast cancer. Your doctor may get some answers right away. But it can take up to 1 to 2 weeks to get the final results. You will be able to go home on the same day as the biopsy. Most women are able to go back to work in 1 or 2 days. This depends on how you feel and the type of work you do. For 2 weeks after surgery, you will need to avoid bouncing and strenuous activities that involve the upper body. The surgery will leave a small scar on your breast that will fade with time. Less often, the surgery may leave a dent in the breast. You may be able to feel a hard area where the biopsy was done. This is a normal part of the healing process. It does not mean that the lump is growing back. The area will get softer in the weeks after surgery. Follow-up care is a key part of your treatment and safety. Be sure to make and go to all appointments, and call your doctor if you are having problems. It's also a good idea to know your test results and keep a list of the medicines you take. How do you prepare for surgery? Surgery can be stressful.  This information will help you understand what you can expect. And it will help you safely prepare for surgery. Preparing for surgery · Be sure you have someone to take you home. Anesthesia and pain medicine will make it unsafe for you to drive or get home on your own. · Understand exactly what surgery is planned, along with the risks, benefits, and other options. · Tell your doctor ALL the medicines, vitamins, supplements, and herbal remedies you take. Some may increase the risk of problems during your surgery. Your doctor will tell you if you should stop taking any of them before the surgery and how soon to do it. · If you take aspirin or some other blood thinner, ask your doctor if you should stop taking it before your surgery. Make sure that you understand exactly what your doctor wants you to do. These medicines increase the risk of bleeding. · Make sure your doctor and the hospital have a copy of your advance directive. If you don't have one, you may want to prepare one. It lets others know your health care wishes. It's a good thing to have before any type of surgery or procedure. What happens on the day of surgery? · Follow the instructions exactly about when to stop eating and drinking. If you don't, your surgery may be canceled. If your doctor told you to take your medicines on the day of surgery, take them with only a sip of water. · Take a bath or shower before you come in for your surgery. Do not apply lotions, perfumes, deodorants, or nail polish. · Do not shave the surgical site yourself. · Take off all jewelry and piercings. And take out contact lenses, if you wear them. · Bring a comfortable, supportive bra with you. For the first 3 days after surgery, you will need to wear this all the time, even at night. At the hospital or surgery center · Bring a picture ID. · The area for surgery is often marked to make sure there are no errors. If needed, you will get a mammogram or ultrasound to aura the area. · You will be kept comfortable and safe by your anesthesia provider. The anesthesia may make you sleep. Or it may just numb the area being worked on. · The surgery will take about 1 hour. When should you call your doctor? · You have questions or concerns. · You don't understand how to prepare for your surgery. · You become ill before the surgery (such as fever, flu, or a cold). · You need to reschedule or have changed your mind about having the surgery. Where can you learn more? Go to http://ramirez-sidra.info/ Enter N299 in the search box to learn more about \"Open Breast Biopsy: Before Your Surgery. \" Current as of: August 22, 2019               Content Version: 12.5 © 1562-8261 [employer]. Care instructions adapted under license by [employer] (which disclaims liability or warranty for this information). If you have questions about a medical condition or this instruction, always ask your healthcare professional. [address] any warranty or liability for your use of this information.

## 2020-07-29 NOTE — H&P (VIEW-ONLY)
[facility] General Surgery Subjective: HPI: Patient is a very pleasant 21-year-old female past medical history remarkable for tobacco abuse, chronic low back pain, depression and thromboembolic event during pregnancy. Patient is recently diagnosed with papilloma of the right breast on a core biopsy. Discussed with the patient the risk of premalignant or malignant or high risk lesions in association with papillomas. She understands this. Patient Active Problem List  
 Diagnosis Date Noted  Hip arthritis 2018  Severe obesity (BMI 35.0-39.9) 2018  S/P cervical spinal fusion 2017  Chronic low back pain 2014  Depression 2014  Tobacco abuse 2014 Past Medical History:  
Diagnosis Date  Adverse effect of anesthesia   
 hard to wake up  Chronic pain  Depression  GERD (gastroesophageal reflux disease)  Hypothyroidism  Low back pain  Thromboembolus ([address] blood clot in foot during pregnancy  Tobacco abuse  Vertigo Past Surgical History:  
Procedure Laterality Date  HAND/FINGER SURGERY UNLISTED    
 right thumb  HX BREAST BIOPSY Right 06/15/2020  HX  SECTION    
 x3  
 HX HIP REPLACEMENT    
 HX HYSTERECTOMY  HX ORTHOPAEDIC    
 2 disks removed lower back [facility].  
 HX ORTHOPAEDIC Right 2017  
 shoulder surgery  HX WRIST FRACTURE [place]    
 R [facility]  
 NEUROLOGICAL PROCEDURE UNLISTED Family History Problem Relation Age of Onset  Hypertension Mother  Cancer Mother   
     multiple myeloma  Cancer Father   
     throat  Cancer Sister Social History Tobacco Use  Smoking status: Former Smoker Packs/day: 0.25 Years: 4.00 Pack years: 1.00 Last attempt to quit: 2015 Years since quittin.1  Smokeless tobacco: Never Used Substance Use Topics  Alcohol use: No  
  
Allergies Allergen Reactions  Pcn [Penicillins] Hives  Percocet [Oxycodone-Acetaminophen] Itching Prior to Admission medications Medication Sig Start Date End Date Taking? Authorizing Provider  
naloxone [facility]) 4 mg/actuation nasal spray Use 1 spray intranasally, then discard. Repeat with new spray every 2 min as needed for opioid overdose symptoms, alternating nostrils. 3/16/20  Yes Michele Carter DO  
pregabalin (LYRICA) 75 mg capsule Take 1 cap by mouth in the morning and 2 at night as directed. 2/18/20  Yes Bereket Lazar MD  
diazePAM (VALIUM) 5 mg tablet Take 1 tab 30 min prior to MRI, may repeat x1. No driving, must have  78/52/05  Yes Wilda Seo NP  
gabapentin (NEURONTIN) 300 mg capsule Take 1 cap by mouth in the morning and 2 at night as directed. Patient taking differently: Take 300 mg by mouth two (2) times a day. Take 1 cap by mouth in the morning and 2 at night as directed. 11/11/19  Yes Bereket Lzaar MD  
acetaminophen (TYLENOL EXTRA STRENGTH) 500 mg tablet Take 500 mg by mouth every six (6) hours as needed for Pain. Yes Provider, Historical  
cyclobenzaprine (FLEXERIL) 10 mg tablet Take  by mouth three (3) times daily as needed for Muscle Spasm(s). Yes Provider, Historical  
clotrimazole-betamethasone (LOTRISONE) topical cream APPLY 2 TIMES DAILY 5/30/19  Yes Provider, Historical  
liothyronine (CYTOMEL) 5 mcg tablet Take 5 mcg by mouth daily. 6/28/19  Yes Provider, Historical  
VOLTAREN 1 % gel Apply 4 g to affected area four (4) times daily. Maximum 16 grams per joint per day. Dispense 5 100 gram tubes 12/20/18  Yes Sol Ariza PA  
multivit,iron,min/green tea xt (DAILY MULTIPLE WEIGHT LOSS PO) Take 3 Tabs by mouth daily. Relacore weight loss tablets   Yes Provider, Historical  
calcium carbonate 500 mg calcium (1,250 mg) chewable tablet Take 2 Tabs by mouth daily as needed for Indigestion (As needed for upset stomach).    Yes Provider, Historical  
 levothyroxine (SYNTHROID) 50 mcg tablet Take 50 mcg by mouth Daily (before breakfast). Yes Provider, Historical  
polyethylene glycol (MIRALAX) 17 gram/dose powder Take 17 g by mouth daily. Yes Provider, Historical  
buffered aspirin (BUFFERIN) 325 mg tablet Take 1 Tab by mouth two (2) times a day. 7/31/18  Yes Mariela WATSON PA-C  
ferrous sulfate 325 mg (65 mg iron) tablet Take 1 Tab by mouth two (2) times daily (with meals). 7/31/18  Yes Mariela WATSON PA-C  
ondansetron hcl (ZOFRAN) 4 mg tablet Take 1 Tab by mouth every eight (8) hours as needed for Nausea. 7/31/18  Yes Rosemary Smith PA-C  
OTHER daily. Yes Provider, Historical  
cyanocobalamin (VITAMIN B-12) 1,000 mcg tablet Take 1,000 mcg by mouth daily. Yes Provider, Historical  
NP THYROID 15 mg tablet TK 3 TS PO D 6/11/18  Yes Provider, Historical  
gabapentin (NEURONTIN) 300 mg capsule Take 1 tab by mouth three times daily 4/18/18  Yes Provider, Historical  
potassium iodide/iodine (IODINE STRONG, LUGOLS, PO) Take 25 mg by mouth daily. Yes Provider, Historical  
Selenomethionine 200 mcg tab Take 1 Tab by mouth daily. Yes Provider, Historical  
cholecalciferol, vitamin D3, (VITAMIN D3) 2,000 unit tab Take 1 Tab by mouth daily. Yes Provider, Historical  
oxyCODONE-acetaminophen (PERCOCET) 7.5-325 mg per tablet Take 1-2 Tabs by mouth every four (4) hours as needed for Pain. Max Daily Amount: 12 Tabs. 7/31/18   Rosemary Smith PA-C Review of Systems:   
14 systems were reviewed. The results are as above in the HPI and otherwise negative. Objective:  
 
 
Physical Exam: 
GENERAL: alert, cooperative, no distress, appears stated age, EYE: conjunctivae/corneas clear. PERRL, EOM's intact. Fundi benign, THROAT & NECK: normal and no erythema or exudates noted. ,   
LYMPHATIC: Cervical, supraclavicular, and axillary nodes normal. ,  
LUNG: clear to auscultation bilaterally,  
 HEART: regular rate and rhythm, S1, S2 normal, no murmur, click, rub or gallop BREAST: Bra size 40  D. The breast are symmetrical.  No dimpling, retractions, skin changes or nipple retractions are visible. No axillary of supraclavicular lymphadenopathy bilaterally. No nipple retraction or discharge bilaterally. No breast masses bilaterally. ABDOMEN: soft, non-tender. Bowel sounds normal. No masses,  no organomegaly, EXTREMITIES:  extremities normal, atraumatic, no cyanosis or edema, SKIN: Normal., NEUROLOGIC: AOx3. Gait normal. Reflexes and motor strength normal and symmetric. Cranial nerves 2-12 and sensation grossly intact. ,  
 
Data Review: Mammography Ms. Alber Remy has a reminder for a \"due or due soon\" health maintenance. I have asked that she contact her primary care provider for follow-up on this health maintenance. Impression: · Patient with papilloma right breast 
 
Plan: · Tag localized excisional biopsy right breast 
· Consent on chart · Preoperative orders written Signed By: Rachael Chopra MD   
 July 29, 2020

## 2020-07-29 NOTE — PROGRESS NOTES
[facility]  General Surgery    Subjective:      HPI: Patient is a very pleasant 59-year-old female past medical history remarkable for tobacco abuse, chronic low back pain, depression and thromboembolic event during pregnancy. Patient is recently diagnosed with papilloma of the right breast on a core biopsy. Discussed with the patient the risk of premalignant or malignant or high risk lesions in association with papillomas. She understands this.     Patient Active Problem List    Diagnosis Date Noted    Hip arthritis 2018    Severe obesity (BMI 35.0-39.9) 2018    S/P cervical spinal fusion 2017    Chronic low back pain 2014    Depression 2014    Tobacco abuse 2014     Past Medical History:   Diagnosis Date    Adverse effect of anesthesia     hard to wake up    Chronic pain     Depression     GERD (gastroesophageal reflux disease)     Hypothyroidism     Low back pain     Thromboembolus (HCC)     blood clot in foot during pregnancy    Tobacco abuse     Vertigo       Past Surgical History:   Procedure Laterality Date    HAND/FINGER SURGERY UNLISTED      right thumb    HX BREAST BIOPSY Right 06/15/2020    HX  SECTION      x3    HX HIP REPLACEMENT      HX HYSTERECTOMY      HX ORTHOPAEDIC  2002    2 disks removed lower back [facility].    HX ORTHOPAEDIC Right 2017    shoulder surgery    HX WRIST FRACTURE TX      [facility]    NEUROLOGICAL PROCEDURE UNLISTED        Family History   Problem Relation Age of Onset    Hypertension Mother     Cancer Mother         multiple myeloma    Cancer Father         throat    Cancer Sister       Social History     Tobacco Use    Smoking status: Former Smoker     Packs/day: 0.25     Years: 4.00     Pack years: 1.00     Last attempt to quit: 2015     Years since quittin.1    Smokeless tobacco: Never Used   Substance Use Topics    Alcohol use: No      Allergies   Allergen Reactions  Pcn [Penicillins] Hives    Percocet [Oxycodone-Acetaminophen] Itching       Prior to Admission medications    Medication Sig Start Date End Date Taking? Authorizing Provider   naloxone [facility]) 4 mg/actuation nasal spray Use 1 spray intranasally, then discard. Repeat with new spray every 2 min as needed for opioid overdose symptoms, alternating nostrils. 3/16/20  Yes Michele Carter DO   pregabalin (LYRICA) 75 mg capsule Take 1 cap by mouth in the morning and 2 at night as directed. 2/18/20  Yes Dali Courtney MD   diazePAM (VALIUM) 5 mg tablet Take 1 tab 30 min prior to MRI, may repeat x1. No driving, must have  90/27/25  Yes Guerline Jansen NP   gabapentin (NEURONTIN) 300 mg capsule Take 1 cap by mouth in the morning and 2 at night as directed. Patient taking differently: Take 300 mg by mouth two (2) times a day. Take 1 cap by mouth in the morning and 2 at night as directed. 11/11/19  Yes Dali Courtney MD   acetaminophen (TYLENOL EXTRA STRENGTH) 500 mg tablet Take 500 mg by mouth every six (6) hours as needed for Pain. Yes Provider, Historical   cyclobenzaprine (FLEXERIL) 10 mg tablet Take  by mouth three (3) times daily as needed for Muscle Spasm(s). Yes Provider, Historical   clotrimazole-betamethasone (LOTRISONE) topical cream APPLY 2 TIMES DAILY 5/30/19  Yes Provider, Historical   liothyronine (CYTOMEL) 5 mcg tablet Take 5 mcg by mouth daily. 6/28/19  Yes Provider, Historical   VOLTAREN 1 % gel Apply 4 g to affected area four (4) times daily. Maximum 16 grams per joint per day. Dispense 5 100 gram tubes 12/20/18  Yes Sol Ariza PA   multivit,iron,min/green tea xt (DAILY MULTIPLE WEIGHT LOSS PO) Take 3 Tabs by mouth daily. Relacore weight loss tablets   Yes Provider, Historical   calcium carbonate 500 mg calcium (1,250 mg) chewable tablet Take 2 Tabs by mouth daily as needed for Indigestion (As needed for upset stomach).    Yes Provider, Historical   levothyroxine (SYNTHROID) 50 mcg tablet Take 50 mcg by mouth Daily (before breakfast). Yes Provider, Historical   polyethylene glycol (MIRALAX) 17 gram/dose powder Take 17 g by mouth daily. Yes Provider, Historical   buffered aspirin (BUFFERIN) 325 mg tablet Take 1 Tab by mouth two (2) times a day. 7/31/18  Yes Orysia Dense R, PA-C   ferrous sulfate 325 mg (65 mg iron) tablet Take 1 Tab by mouth two (2) times daily (with meals). 7/31/18  Yes Orysia Dense R, PA-GINA   ondansetron hcl (ZOFRAN) 4 mg tablet Take 1 Tab by mouth every eight (8) hours as needed for Nausea. 7/31/18  Yes Queta Jacobs PA-C   OTHER daily. Yes Provider, Historical   cyanocobalamin (VITAMIN B-12) 1,000 mcg tablet Take 1,000 mcg by mouth daily. Yes Provider, Historical   NP THYROID 15 mg tablet TK 3 TS PO D 6/11/18  Yes Provider, Historical   gabapentin (NEURONTIN) 300 mg capsule Take 1 tab by mouth three times daily 4/18/18  Yes Provider, Historical   potassium iodide/iodine (IODINE STRONG, LUGOLS, PO) Take 25 mg by mouth daily. Yes Provider, Historical   Selenomethionine 200 mcg tab Take 1 Tab by mouth daily. Yes Provider, Historical   cholecalciferol, vitamin D3, (VITAMIN D3) 2,000 unit tab Take 1 Tab by mouth daily. Yes Provider, Historical   oxyCODONE-acetaminophen (PERCOCET) 7.5-325 mg per tablet Take 1-2 Tabs by mouth every four (4) hours as needed for Pain. Max Daily Amount: 12 Tabs. 7/31/18   Queta Jacobs PA-C       Review of Systems:    14 systems were reviewed. The results are as above in the HPI and otherwise negative. Objective:       Physical Exam:  GENERAL: alert, cooperative, no distress, appears stated age,   EYE: conjunctivae/corneas clear. PERRL, EOM's intact.  Fundi benign,  THROAT & NECK: normal and no erythema or exudates noted. ,    LYMPHATIC: Cervical, supraclavicular, and axillary nodes normal. ,   LUNG: clear to auscultation bilaterally,   HEART: regular rate and rhythm, S1, S2 normal, no murmur, click, rub or gallop  BREAST: Bra size 40  D. The breast are symmetrical.  No dimpling, retractions, skin changes or nipple retractions are visible. No axillary of supraclavicular lymphadenopathy bilaterally. No nipple retraction or discharge bilaterally. No breast masses bilaterally. ABDOMEN: soft, non-tender. Bowel sounds normal. No masses,  no organomegaly,  EXTREMITIES:  extremities normal, atraumatic, no cyanosis or edema,   SKIN: Normal.,   NEUROLOGIC: AOx3. Gait normal. Reflexes and motor strength normal and symmetric. Cranial nerves 2-12 and sensation grossly intact. ,     Data Review: Mammography     Ms. Rajwinder Navarro has a reminder for a \"due or due soon\" health maintenance. I have asked that she contact her primary care provider for follow-up on this health maintenance.     Impression:     · Patient with papilloma right breast    Plan:     · Tag localized excisional biopsy right breast  · Consent on chart  · Preoperative orders written  Signed By: Colette Cornell MD     July 29, 2020 normal... Well appearing, well nourished, awake, alert, oriented to person, place, time/situation and in no apparent distress.

## 2020-07-31 DIAGNOSIS — Z01.818 PREOP TESTING: Primary | ICD-10-CM

## 2020-08-01 LAB — SARS-COV-2, COV2: NOT DETECTED

## 2020-08-03 ENCOUNTER — HOSPITAL ENCOUNTER (OUTPATIENT)
Dept: PREADMISSION TESTING | Age: 60
Discharge: HOME | End: 2020-08-03
Payer: MEDICAID

## 2020-08-03 DIAGNOSIS — Z01.818 PREOP TESTING: ICD-10-CM

## 2020-08-03 PROCEDURE — 87635 SARS-COV-2 COVID-19 AMP PRB: CPT

## 2020-08-04 LAB — SARS-COV-2, COV2NT: NOT DETECTED

## 2020-08-05 ENCOUNTER — HOSPITAL ENCOUNTER (OUTPATIENT)
Dept: MAMMOGRAPHY | Age: 60
Discharge: HOME | End: 2020-08-05
Attending: SURGERY
Payer: MEDICAID

## 2020-08-05 DIAGNOSIS — N63.0 LUMP OR MASS IN BREAST: ICD-10-CM

## 2020-08-05 DIAGNOSIS — D36.9 INTRADUCTAL PAPILLOMA: ICD-10-CM

## 2020-08-05 DIAGNOSIS — R92.8 ABNORMAL MAMMOGRAM: ICD-10-CM

## 2020-08-05 PROCEDURE — 74011000250 HC RX REV CODE- 250: Performed by: SURGERY

## 2020-08-05 PROCEDURE — 19281 PERQ DEVICE BREAST 1ST IMAG: CPT

## 2020-08-05 RX ORDER — LIDOCAINE HYDROCHLORIDE 10 MG/ML
5 INJECTION, SOLUTION EPIDURAL; INFILTRATION; INTRACAUDAL; PERINEURAL
Status: COMPLETED | OUTPATIENT
Start: 2020-08-05 | End: 2020-08-05

## 2020-08-05 RX ADMIN — LIDOCAINE HYDROCHLORIDE 10 ML: 10 INJECTION, SOLUTION EPIDURAL; INFILTRATION; INTRACAUDAL; PERINEURAL at 09:30

## 2020-08-06 ENCOUNTER — ANESTHESIA EVENT (OUTPATIENT)
Dept: SURGERY | Age: 60
End: 2020-08-06
Payer: MEDICAID

## 2020-08-07 ENCOUNTER — ANESTHESIA (OUTPATIENT)
Dept: SURGERY | Age: 60
End: 2020-08-07
Payer: MEDICAID

## 2020-08-07 ENCOUNTER — APPOINTMENT (OUTPATIENT)
Dept: MAMMOGRAPHY | Age: 60
End: 2020-08-07
Attending: SURGERY
Payer: MEDICAID

## 2020-08-07 ENCOUNTER — HOSPITAL ENCOUNTER (OUTPATIENT)
Age: 60
Setting detail: OUTPATIENT SURGERY
Discharge: HOME | End: 2020-08-07
Attending: SURGERY | Admitting: SURGERY
Payer: MEDICAID

## 2020-08-07 VITALS
DIASTOLIC BLOOD PRESSURE: 75 MMHG | SYSTOLIC BLOOD PRESSURE: 114 MMHG | HEART RATE: 74 BPM | RESPIRATION RATE: 15 BRPM | OXYGEN SATURATION: 96 % | TEMPERATURE: 97.2 F | BODY MASS INDEX: 36.62 KG/M2 | WEIGHT: 219.8 LBS | HEIGHT: 65 IN

## 2020-08-07 DIAGNOSIS — G89.18 POSTOPERATIVE PAIN: Primary | ICD-10-CM

## 2020-08-07 PROCEDURE — 76010000153 HC OR TIME 1.5 TO 2 HR: Performed by: SURGERY

## 2020-08-07 PROCEDURE — 74011250636 HC RX REV CODE- 250/636: Performed by: NURSE ANESTHETIST, CERTIFIED REGISTERED

## 2020-08-07 PROCEDURE — 74011250636 HC RX REV CODE- 250/636

## 2020-08-07 PROCEDURE — 76210000017 HC OR PH I REC 1.5 TO 2 HR: Performed by: SURGERY

## 2020-08-07 PROCEDURE — 76210000023 HC REC RM PH II 2 TO 2.5 HR: Performed by: SURGERY

## 2020-08-07 PROCEDURE — 77030040201 HC PRB SET LOCALIZER DISP BIPT -D: Performed by: SURGERY

## 2020-08-07 PROCEDURE — 76060000034 HC ANESTHESIA 1.5 TO 2 HR: Performed by: SURGERY

## 2020-08-07 PROCEDURE — 74011000250 HC RX REV CODE- 250: Performed by: SURGERY

## 2020-08-07 PROCEDURE — 77030040922 HC BLNKT HYPOTHRM STRY -A: Performed by: SURGERY

## 2020-08-07 PROCEDURE — 77030040361 HC SLV COMPR DVT MDII -B: Performed by: SURGERY

## 2020-08-07 PROCEDURE — 88307 TISSUE EXAM BY PATHOLOGIST: CPT

## 2020-08-07 PROCEDURE — 74011250637 HC RX REV CODE- 250/637: Performed by: NURSE ANESTHETIST, CERTIFIED REGISTERED

## 2020-08-07 PROCEDURE — 74011250636 HC RX REV CODE- 250/636: Performed by: SURGERY

## 2020-08-07 PROCEDURE — 77030010512 HC APPL CLP LIG J&J -C: Performed by: SURGERY

## 2020-08-07 PROCEDURE — 74011000250 HC RX REV CODE- 250: Performed by: NURSE ANESTHETIST, CERTIFIED REGISTERED

## 2020-08-07 PROCEDURE — 77030002996 HC SUT SLK J&J -A: Performed by: SURGERY

## 2020-08-07 PROCEDURE — 77030031753 HC SHR ENDO COAG HARM J&J -E: Performed by: SURGERY

## 2020-08-07 PROCEDURE — 77030008683 HC TU ET CUF COVD -A: Performed by: ANESTHESIOLOGY

## 2020-08-07 PROCEDURE — 77030003028 HC SUT VCRL J&J -A: Performed by: SURGERY

## 2020-08-07 PROCEDURE — 77030031139 HC SUT VCRL2 J&J -A: Performed by: SURGERY

## 2020-08-07 RX ORDER — ONDANSETRON 2 MG/ML
INJECTION INTRAMUSCULAR; INTRAVENOUS AS NEEDED
Status: DISCONTINUED | OUTPATIENT
Start: 2020-08-07 | End: 2020-08-07 | Stop reason: HOSPADM

## 2020-08-07 RX ORDER — LABETALOL HYDROCHLORIDE 5 MG/ML
INJECTION, SOLUTION INTRAVENOUS AS NEEDED
Status: DISCONTINUED | OUTPATIENT
Start: 2020-08-07 | End: 2020-08-07 | Stop reason: HOSPADM

## 2020-08-07 RX ORDER — HYDROMORPHONE HYDROCHLORIDE 2 MG/ML
INJECTION, SOLUTION INTRAMUSCULAR; INTRAVENOUS; SUBCUTANEOUS
Status: DISCONTINUED
Start: 2020-08-07 | End: 2020-08-07 | Stop reason: HOSPADM

## 2020-08-07 RX ORDER — ROCURONIUM BROMIDE 10 MG/ML
INJECTION, SOLUTION INTRAVENOUS AS NEEDED
Status: DISCONTINUED | OUTPATIENT
Start: 2020-08-07 | End: 2020-08-07 | Stop reason: HOSPADM

## 2020-08-07 RX ORDER — ONDANSETRON 2 MG/ML
4 INJECTION INTRAMUSCULAR; INTRAVENOUS ONCE
Status: COMPLETED | OUTPATIENT
Start: 2020-08-07 | End: 2020-08-07

## 2020-08-07 RX ORDER — NALOXONE HYDROCHLORIDE 0.4 MG/ML
0.04 INJECTION, SOLUTION INTRAMUSCULAR; INTRAVENOUS; SUBCUTANEOUS AS NEEDED
Status: DISCONTINUED | OUTPATIENT
Start: 2020-08-07 | End: 2020-08-07 | Stop reason: HOSPADM

## 2020-08-07 RX ORDER — SODIUM CHLORIDE, SODIUM LACTATE, POTASSIUM CHLORIDE, CALCIUM CHLORIDE 600; 310; 30; 20 MG/100ML; MG/100ML; MG/100ML; MG/100ML
75 INJECTION, SOLUTION INTRAVENOUS CONTINUOUS
Status: DISCONTINUED | OUTPATIENT
Start: 2020-08-07 | End: 2020-08-07 | Stop reason: HOSPADM

## 2020-08-07 RX ORDER — PHENYLEPHRINE HCL IN 0.9% NACL 1 MG/10 ML
SYRINGE (ML) INTRAVENOUS AS NEEDED
Status: DISCONTINUED | OUTPATIENT
Start: 2020-08-07 | End: 2020-08-07 | Stop reason: HOSPADM

## 2020-08-07 RX ORDER — GLYCOPYRROLATE 0.2 MG/ML
INJECTION INTRAMUSCULAR; INTRAVENOUS AS NEEDED
Status: DISCONTINUED | OUTPATIENT
Start: 2020-08-07 | End: 2020-08-07 | Stop reason: HOSPADM

## 2020-08-07 RX ORDER — FENTANYL CITRATE 50 UG/ML
50 INJECTION, SOLUTION INTRAMUSCULAR; INTRAVENOUS
Status: DISCONTINUED | OUTPATIENT
Start: 2020-08-07 | End: 2020-08-07 | Stop reason: HOSPADM

## 2020-08-07 RX ORDER — FAMOTIDINE 20 MG/1
20 TABLET, FILM COATED ORAL ONCE
Status: COMPLETED | OUTPATIENT
Start: 2020-08-07 | End: 2020-08-07

## 2020-08-07 RX ORDER — FENTANYL CITRATE 50 UG/ML
INJECTION, SOLUTION INTRAMUSCULAR; INTRAVENOUS AS NEEDED
Status: DISCONTINUED | OUTPATIENT
Start: 2020-08-07 | End: 2020-08-07 | Stop reason: HOSPADM

## 2020-08-07 RX ORDER — SODIUM CHLORIDE 0.9 % (FLUSH) 0.9 %
5-40 SYRINGE (ML) INJECTION AS NEEDED
Status: DISCONTINUED | OUTPATIENT
Start: 2020-08-07 | End: 2020-08-07 | Stop reason: HOSPADM

## 2020-08-07 RX ORDER — PROPOFOL 10 MG/ML
INJECTION, EMULSION INTRAVENOUS AS NEEDED
Status: DISCONTINUED | OUTPATIENT
Start: 2020-08-07 | End: 2020-08-07 | Stop reason: HOSPADM

## 2020-08-07 RX ORDER — ALBUTEROL SULFATE 0.83 MG/ML
2.5 SOLUTION RESPIRATORY (INHALATION) AS NEEDED
Status: DISCONTINUED | OUTPATIENT
Start: 2020-08-07 | End: 2020-08-07 | Stop reason: HOSPADM

## 2020-08-07 RX ORDER — SODIUM CHLORIDE 0.9 % (FLUSH) 0.9 %
5-40 SYRINGE (ML) INJECTION EVERY 8 HOURS
Status: DISCONTINUED | OUTPATIENT
Start: 2020-08-07 | End: 2020-08-07 | Stop reason: HOSPADM

## 2020-08-07 RX ORDER — BUPIVACAINE HYDROCHLORIDE AND EPINEPHRINE 5; 5 MG/ML; UG/ML
INJECTION, SOLUTION EPIDURAL; INTRACAUDAL; PERINEURAL AS NEEDED
Status: DISCONTINUED | OUTPATIENT
Start: 2020-08-07 | End: 2020-08-07 | Stop reason: HOSPADM

## 2020-08-07 RX ORDER — HYDROMORPHONE HYDROCHLORIDE 2 MG/ML
INJECTION, SOLUTION INTRAMUSCULAR; INTRAVENOUS; SUBCUTANEOUS AS NEEDED
Status: DISCONTINUED | OUTPATIENT
Start: 2020-08-07 | End: 2020-08-07 | Stop reason: HOSPADM

## 2020-08-07 RX ORDER — DEXAMETHASONE SODIUM PHOSPHATE 4 MG/ML
INJECTION, SOLUTION INTRA-ARTICULAR; INTRALESIONAL; INTRAMUSCULAR; INTRAVENOUS; SOFT TISSUE AS NEEDED
Status: DISCONTINUED | OUTPATIENT
Start: 2020-08-07 | End: 2020-08-07 | Stop reason: HOSPADM

## 2020-08-07 RX ORDER — DIPHENHYDRAMINE HYDROCHLORIDE 50 MG/ML
12.5 INJECTION, SOLUTION INTRAMUSCULAR; INTRAVENOUS
Status: DISCONTINUED | OUTPATIENT
Start: 2020-08-07 | End: 2020-08-07 | Stop reason: HOSPADM

## 2020-08-07 RX ORDER — HYDROCODONE BITARTRATE AND ACETAMINOPHEN 5; 325 MG/1; MG/1
1 TABLET ORAL
Qty: 25 TAB | Refills: 0 | Status: SHIPPED | OUTPATIENT
Start: 2020-08-07 | End: 2020-08-14

## 2020-08-07 RX ORDER — INSULIN LISPRO 100 [IU]/ML
INJECTION, SOLUTION INTRAVENOUS; SUBCUTANEOUS ONCE
Status: DISCONTINUED | OUTPATIENT
Start: 2020-08-07 | End: 2020-08-07 | Stop reason: HOSPADM

## 2020-08-07 RX ORDER — MIDAZOLAM HYDROCHLORIDE 1 MG/ML
INJECTION, SOLUTION INTRAMUSCULAR; INTRAVENOUS AS NEEDED
Status: DISCONTINUED | OUTPATIENT
Start: 2020-08-07 | End: 2020-08-07 | Stop reason: HOSPADM

## 2020-08-07 RX ORDER — DOCUSATE SODIUM 100 MG/1
100 CAPSULE, LIQUID FILLED ORAL 2 TIMES DAILY
Qty: 60 CAP | Refills: 2 | Status: SHIPPED | OUTPATIENT
Start: 2020-08-07 | End: 2020-11-05

## 2020-08-07 RX ORDER — HYDROMORPHONE HYDROCHLORIDE 2 MG/ML
0.5 INJECTION, SOLUTION INTRAMUSCULAR; INTRAVENOUS; SUBCUTANEOUS AS NEEDED
Status: DISCONTINUED | OUTPATIENT
Start: 2020-08-07 | End: 2020-08-07 | Stop reason: HOSPADM

## 2020-08-07 RX ORDER — KETOROLAC TROMETHAMINE 15 MG/ML
INJECTION, SOLUTION INTRAMUSCULAR; INTRAVENOUS AS NEEDED
Status: DISCONTINUED | OUTPATIENT
Start: 2020-08-07 | End: 2020-08-07 | Stop reason: HOSPADM

## 2020-08-07 RX ORDER — SUCCINYLCHOLINE CHLORIDE 20 MG/ML
INJECTION INTRAMUSCULAR; INTRAVENOUS AS NEEDED
Status: DISCONTINUED | OUTPATIENT
Start: 2020-08-07 | End: 2020-08-07 | Stop reason: HOSPADM

## 2020-08-07 RX ORDER — NEOSTIGMINE METHYLSULFATE 1 MG/ML
INJECTION INTRAVENOUS AS NEEDED
Status: DISCONTINUED | OUTPATIENT
Start: 2020-08-07 | End: 2020-08-07 | Stop reason: HOSPADM

## 2020-08-07 RX ORDER — LIDOCAINE HYDROCHLORIDE 10 MG/ML
0.1 INJECTION, SOLUTION EPIDURAL; INFILTRATION; INTRACAUDAL; PERINEURAL AS NEEDED
Status: DISCONTINUED | OUTPATIENT
Start: 2020-08-07 | End: 2020-08-07 | Stop reason: HOSPADM

## 2020-08-07 RX ORDER — SODIUM CHLORIDE, SODIUM LACTATE, POTASSIUM CHLORIDE, CALCIUM CHLORIDE 600; 310; 30; 20 MG/100ML; MG/100ML; MG/100ML; MG/100ML
50 INJECTION, SOLUTION INTRAVENOUS CONTINUOUS
Status: DISCONTINUED | OUTPATIENT
Start: 2020-08-07 | End: 2020-08-07 | Stop reason: HOSPADM

## 2020-08-07 RX ADMIN — SODIUM CHLORIDE 1 G: 900 INJECTION, SOLUTION INTRAVENOUS at 09:53

## 2020-08-07 RX ADMIN — SODIUM CHLORIDE, SODIUM LACTATE, POTASSIUM CHLORIDE, AND CALCIUM CHLORIDE 75 ML/HR: 600; 310; 30; 20 INJECTION, SOLUTION INTRAVENOUS at 08:43

## 2020-08-07 RX ADMIN — Medication 100 MCG: at 10:43

## 2020-08-07 RX ADMIN — FAMOTIDINE 20 MG: 20 TABLET ORAL at 08:43

## 2020-08-07 RX ADMIN — PROPOFOL 150 MG: 10 INJECTION, EMULSION INTRAVENOUS at 09:56

## 2020-08-07 RX ADMIN — ONDANSETRON 4 MG: 2 INJECTION INTRAMUSCULAR; INTRAVENOUS at 13:54

## 2020-08-07 RX ADMIN — ROCURONIUM BROMIDE 20 MG: 50 INJECTION INTRAVENOUS at 10:20

## 2020-08-07 RX ADMIN — HYDROMORPHONE HYDROCHLORIDE 0.5 MG: 2 INJECTION, SOLUTION INTRAMUSCULAR; INTRAVENOUS; SUBCUTANEOUS at 11:56

## 2020-08-07 RX ADMIN — HYDROMORPHONE HYDROCHLORIDE 0.5 MG: 2 INJECTION, SOLUTION INTRAMUSCULAR; INTRAVENOUS; SUBCUTANEOUS at 11:15

## 2020-08-07 RX ADMIN — SODIUM CHLORIDE, SODIUM LACTATE, POTASSIUM CHLORIDE, AND CALCIUM CHLORIDE: 600; 310; 30; 20 INJECTION, SOLUTION INTRAVENOUS at 09:53

## 2020-08-07 RX ADMIN — SUCCINYLCHOLINE CHLORIDE 120 MG: 20 INJECTION, SOLUTION INTRAMUSCULAR; INTRAVENOUS at 09:56

## 2020-08-07 RX ADMIN — ONDANSETRON 4 MG: 2 INJECTION INTRAMUSCULAR; INTRAVENOUS at 10:52

## 2020-08-07 RX ADMIN — NEOSTIGMINE METHYLSULFATE 3 MG: 1 INJECTION, SOLUTION INTRAVENOUS at 10:52

## 2020-08-07 RX ADMIN — HYDROMORPHONE HYDROCHLORIDE 0.5 MG: 2 INJECTION, SOLUTION INTRAMUSCULAR; INTRAVENOUS; SUBCUTANEOUS at 11:20

## 2020-08-07 RX ADMIN — DEXAMETHASONE SODIUM PHOSPHATE 4 MG: 4 INJECTION, SOLUTION INTRAMUSCULAR; INTRAVENOUS at 10:40

## 2020-08-07 RX ADMIN — FENTANYL CITRATE 100 MCG: 50 INJECTION, SOLUTION INTRAMUSCULAR; INTRAVENOUS at 10:23

## 2020-08-07 RX ADMIN — FENTANYL CITRATE 50 MCG: 50 INJECTION, SOLUTION INTRAMUSCULAR; INTRAVENOUS at 11:28

## 2020-08-07 RX ADMIN — GLYCOPYRROLATE 0.4 MG: 0.2 INJECTION INTRAMUSCULAR; INTRAVENOUS at 10:52

## 2020-08-07 RX ADMIN — HYDROMORPHONE HYDROCHLORIDE 0.5 MG: 2 INJECTION, SOLUTION INTRAMUSCULAR; INTRAVENOUS; SUBCUTANEOUS at 11:07

## 2020-08-07 RX ADMIN — FENTANYL CITRATE 25 MCG: 50 INJECTION, SOLUTION INTRAMUSCULAR; INTRAVENOUS at 11:09

## 2020-08-07 RX ADMIN — MIDAZOLAM HYDROCHLORIDE 2 MG: 2 INJECTION, SOLUTION INTRAMUSCULAR; INTRAVENOUS at 09:53

## 2020-08-07 RX ADMIN — FENTANYL CITRATE 25 MCG: 50 INJECTION, SOLUTION INTRAMUSCULAR; INTRAVENOUS at 11:13

## 2020-08-07 RX ADMIN — LABETALOL HYDROCHLORIDE 5 MG: 5 INJECTION, SOLUTION INTRAVENOUS at 11:12

## 2020-08-07 RX ADMIN — KETOROLAC TROMETHAMINE 15 MG: 15 INJECTION, SOLUTION INTRAMUSCULAR; INTRAVENOUS at 10:54

## 2020-08-07 RX ADMIN — FENTANYL CITRATE 100 MCG: 50 INJECTION, SOLUTION INTRAMUSCULAR; INTRAVENOUS at 09:57

## 2020-08-07 RX ADMIN — SODIUM CHLORIDE 1000 MG: 900 INJECTION, SOLUTION INTRAVENOUS at 09:33

## 2020-08-07 RX ADMIN — HYDROMORPHONE HYDROCHLORIDE 0.5 MG: 2 INJECTION, SOLUTION INTRAMUSCULAR; INTRAVENOUS; SUBCUTANEOUS at 10:57

## 2020-08-07 NOTE — ANESTHESIA PREPROCEDURE EVALUATION
Relevant Problems   No relevant active problems       Anesthetic History   No history of anesthetic complications            Review of Systems / Medical History  Patient summary reviewed and pertinent labs reviewed    Pulmonary  Within defined limits                 Neuro/Psych   Within defined limits           Cardiovascular  Within defined limits                Exercise tolerance: >4 METS     GI/Hepatic/Renal     GERD: well controlled           Endo/Other      Hypothyroidism  Obesity     Other Findings   Comments:                  Physical Exam    Airway  Mallampati: II  TM Distance: 4 - 6 cm  Neck ROM: normal range of motion   Mouth opening: Normal     Cardiovascular  Regular rate and rhythm,  S1 and S2 normal,  no murmur, click, rub, or gallop  Rhythm: regular  Rate: normal         Dental  No notable dental hx       Pulmonary  Breath sounds clear to auscultation               Abdominal  GI exam deferred       Other Findings            Anesthetic Plan    ASA: 2  Anesthesia type: general          Induction: Intravenous  Anesthetic plan and risks discussed with: Patient

## 2020-08-07 NOTE — INTERVAL H&P NOTE
Update History & Physical 
 
The Patient's History and Physical of July 29, 2020 was reviewed with the patient and I examined the patient. There was no change. The surgical site was confirmed by the patient and me. Plan:  The risk, benefits, expected outcome, and alternative to the recommended procedure have been discussed with the patient. Patient understands and wants to proceed with the procedure.  
 
Electronically signed by Johnson Vicente MD on 8/7/2020 at 9:02 AM

## 2020-08-07 NOTE — ANESTHESIA POSTPROCEDURE EVALUATION
Procedure(s):  TAG LOCALIZED EXCISIONAL BIOPSY RIGHT BREAST.    general    Anesthesia Post Evaluation      Multimodal analgesia: multimodal analgesia used between 6 hours prior to anesthesia start to PACU discharge  Patient location during evaluation: bedside  Patient participation: complete - patient participated  Level of consciousness: awake  Pain score: 0  Pain management: adequate  Airway patency: patent  Anesthetic complications: no  Cardiovascular status: stable  Respiratory status: acceptable  Hydration status: acceptable  Post anesthesia nausea and vomiting:  none  Final Post Anesthesia Temperature Assessment:  Normothermia (36.0-37.5 degrees C)      INITIAL Post-op Vital signs:   Vitals Value Taken Time   /74 8/7/2020 12:57 PM   Temp 36.4 °C (97.6 °F) 8/7/2020 11:29 AM   Pulse 73 8/7/2020  1:03 PM   Resp 15 8/7/2020  1:03 PM   SpO2 95 % 8/7/2020  1:03 PM   Vitals shown include unvalidated device data.

## 2020-08-07 NOTE — OP NOTES
[address] [place] [address] Dr [facility], [address]                                 OPERATIVE REPORT    PATIENT:    Rhett Gregg  MRN:            [id number]      DATE:  2020  BILLIN  ROOM:        @BED@  ATTENDING:   Clarice Smith MD  DICTATING:   Clarice Smith MD      PREOPERATIVE DIAGNOSIS: intraductal papilloma right breast    POSTOPERATIVE DIAGNOSIS: Same    PROCEDURES PERFORMED: Tag localized excisional biopsy, right breast.    SURGEON: Kevin Bonilla MD    ASSISTANT: Renetta Corral SA    ANESTHESIA: LMA general and local (.25% marcaine and epinephrine). SPECIMENS REMOVED: right breast excisional biopsy. FINDINGS: Specimen mammography is confirmative. ESTIMATED BLOOD LOSS: 10 mL. FLUID:  600 mL. IMPLANTS:  none    COMPLICATIONS:  none    DESCRIPTION OF PROCEDURE: The patient was identified in the holding area  with family where consent for Tag localized excisional biopsy of right  breast was verified. In the operating room, the patient was positioned  supine on the OR table. She did receive perioperative antibiotics. LMA  general anesthesia was induced. The patient's right breast was prepped and  draped in sterile fashion using chlorhexidine solution and sterile drapes. The time-out was performed to ensure correct procedure. The localizer was used to identify the position in the lower inner quadrant of the right breast with the shortest distance to contact. The local  anesthetic was infiltrated into the skin and deep dermal tissues in a curvilinear incision. The 15 blade was used to create an incision through skin into the breast  tissue. The electrocautery was used to dissect down approximately 0.5 cm into the breast tissue. The  Localizer was used to guide dissection toward the Tag.   Per radiologist recommendations attempts were made to have a 2 cm margin around the tag.  It had been stated that with the migration of the clip 5 cm from the mass, the clip would not be present in the specimen. Circumferential dissection of the specimen with a margin of normal tissue was performed. The specimen was marked with the a long stitch lateral, double stitch posterior and short stitch superior. The specimen was sent to radiology where specimen mammography was performed  which confirms the area of interest is in the specimen. Additional local  anesthetic was infiltrated into the cavity. Electrocautery was used to  control bleeding within the cavity. The 3-0 Vicryl suture in interrupted  stitches were used to reapproximate the deep dermal tissues. 4-0 Monocryl  suture was used to reapproximate the skin in a running subcuticular  closure. Mastisol, steristrips and bandaids were used to create dressings. The patient tolerated the procedure very well. DISPOSITION: She was stable upon transport to the recovery room.                      Paige Grove MD

## 2020-08-07 NOTE — DISCHARGE SUMMARY
4 Ganesh Paul MD, [facility]  General Surgery  Discharge Summary     Patient ID:  Marius Gottron  [id number]  15 y.o.  1960    Admit Date: 8/7/2020    Discharge Date: 8/7/2020    Admission Diagnoses: D24.1 PAPILLOMA OF RIGHT BREAST    Discharge Diagnoses:    Problem List as of 8/7/2020 Date Reviewed: 8/7/2020          Codes Class Noted - Resolved    Hip arthritis ICD-10-CM: M16.10  ICD-9-CM: 716.95  7/30/2018 - Present        Severe obesity (BMI 35.0-39. 9) ICD-10-CM: E66.01  ICD-9-CM: 278.01  6/12/2018 - Present        S/P cervical spinal fusion ICD-10-CM: Z98.1  ICD-9-CM: V45.4  4/6/2017 - Present        Chronic low back pain ICD-10-CM: M54.5, G89.29  ICD-9-CM: 724.2, 338.29  6/19/2014 - Present        Depression ICD-10-CM: F32.9  ICD-9-CM: 814  6/19/2014 - Present        Tobacco abuse ICD-10-CM: Z72.0  ICD-9-CM: 305.1  6/19/2014 - Present               Admission Condition: Good    Discharge Condition: Good    Last Procedure: Procedure(s):  TAG LOCALIZED EXCISIONAL BIOPSY RIGHT BREAST    Hospital Course:   Normal hospital course for this procedure. Consults: None    Significant Diagnostic Studies: None    Disposition: home    Patient Instructions:   Current Discharge Medication List      START taking these medications    Details   HYDROcodone-acetaminophen (NORCO) 5-325 mg per tablet Take 1 Tab by mouth every six (6) hours as needed for Pain for up to 7 days. Max Daily Amount: 4 Tabs. Qty: 25 Tab, Refills: 0    Associated Diagnoses: Postoperative pain      docusate sodium (COLACE) 100 mg capsule Take 1 Cap by mouth two (2) times a day for 90 days. Qty: 60 Cap, Refills: 2      bisacodyL 5 mg tab Take 5 mg by mouth daily. Qty: 3 Tab, Refills: 0         CONTINUE these medications which have NOT CHANGED    Details   naloxone (NARCAN) 4 mg/actuation nasal spray Use 1 spray intranasally, then discard.  Repeat with new spray every 2 min as needed for opioid overdose symptoms, alternating nostrils. Qty: 1 Each, Refills: 0    Associated Diagnoses: Chronic pain syndrome      pregabalin (LYRICA) 75 mg capsule Take 1 cap by mouth in the morning and 2 at night as directed. Qty: 90 Cap, Refills: 2    Associated Diagnoses: Cervical radiculopathy      !! gabapentin (NEURONTIN) 300 mg capsule Take 1 cap by mouth in the morning and 2 at night as directed. Qty: 60 Cap, Refills: 1    Associated Diagnoses: Neuritis      acetaminophen (TYLENOL EXTRA STRENGTH) 500 mg tablet Take 500 mg by mouth every six (6) hours as needed for Pain. cyclobenzaprine (FLEXERIL) 10 mg tablet Take  by mouth three (3) times daily as needed for Muscle Spasm(s). clotrimazole-betamethasone (LOTRISONE) topical cream APPLY 2 TIMES DAILY  Refills: 1      liothyronine (CYTOMEL) 5 mcg tablet Take 5 mcg by mouth daily. Refills: 0      multivit,iron,min/green tea xt (DAILY MULTIPLE WEIGHT LOSS PO) Take 3 Tabs by mouth daily. Relacore weight loss tablets      calcium carbonate 500 mg calcium (1,250 mg) chewable tablet Take 2 Tabs by mouth daily as needed for Indigestion (As needed for upset stomach). levothyroxine (SYNTHROID) 50 mcg tablet Take 50 mcg by mouth Daily (before breakfast). polyethylene glycol (MIRALAX) 17 gram/dose powder Take 17 g by mouth daily. buffered aspirin (BUFFERIN) 325 mg tablet Take 1 Tab by mouth two (2) times a day. Qty: 60 Tab, Refills: 1    Associated Diagnoses: Hip arthritis      ferrous sulfate 325 mg (65 mg iron) tablet Take 1 Tab by mouth two (2) times daily (with meals). Qty: 60 Tab, Refills: 1    Associated Diagnoses: Hip arthritis      ondansetron hcl (ZOFRAN) 4 mg tablet Take 1 Tab by mouth every eight (8) hours as needed for Nausea. Qty: 30 Tab, Refills: 1    Associated Diagnoses: Hip arthritis      OTHER daily. cyanocobalamin (VITAMIN B-12) 1,000 mcg tablet Take 1,000 mcg by mouth daily.       NP THYROID 15 mg tablet TK 3 TS PO D  Refills: 1      !! gabapentin (NEURONTIN) 300 mg capsule Take 1 tab by mouth three times daily  Refills: 2      potassium iodide/iodine (IODINE STRONG, LUGOLS, PO) Take 25 mg by mouth daily. Selenomethionine 200 mcg tab Take 1 Tab by mouth daily. cholecalciferol, vitamin D3, (VITAMIN D3) 2,000 unit tab Take 1 Tab by mouth daily. diazePAM (VALIUM) 5 mg tablet Take 1 tab 30 min prior to MRI, may repeat x1. No driving, must have   Qty: 2 Tab, Refills: 0    Associated Diagnoses: Cervical radiculopathy      VOLTAREN 1 % gel Apply 4 g to affected area four (4) times daily. Maximum 16 grams per joint per day. Dispense 5 100 gram tubes  Qty: 5 Each, Refills: 0    Associated Diagnoses: Right hip pain; Trochanteric bursitis of right hip      oxyCODONE-acetaminophen (PERCOCET) 7.5-325 mg per tablet Take 1-2 Tabs by mouth every four (4) hours as needed for Pain. Max Daily Amount: 12 Tabs. Qty: 60 Tab, Refills: 0    Associated Diagnoses: Hip arthritis       ! ! - Potential duplicate medications found. Please discuss with provider. Activity: See surgical instructions  Diet: Low fat, Low cholesterol  Wound Care: As directed    Follow-up with Dr. Manisha Kelly in 1 week.   Follow-up tests/labs None    Signed:  Jayna David MD  8/7/2020  11:19 AM

## 2020-08-07 NOTE — DISCHARGE INSTRUCTIONS
[facility]  David Hernandes MD, FACS  General Surgery    Pt may remove the dressing and shower in two days. Allow soap and water to run over the incision. No driving or operating heavy machinery while on narcotic pain medications. No strenuous activity or contact sports for two weeks. No lifting greater than 15 lbs for 2 weeks. Call MD for any redness, swelling, bleeding or pus at the incision. Also call for any nausea, vomiting, increased pain or pain uncontrolled by pain medicine. Patient Education        Open Breast Biopsy: What to Expect at Home  Your Recovery  An open breast biopsy is surgery to take a sample of breast tissue. A breast biopsy may be done to check a lump found during a breast exam. Or it may be done to check an area of concern found on a mammogram or ultrasound. The breast tissue will be sent to a lab, where a doctor will look at the tissue under a microscope to check for breast cancer. Your doctor may have some answers right away. But it can take up to 1 to 2 weeks to get the final results. Your doctor will discuss the results with you. For a few days after the surgery, you will probably feel tired and have some pain. The skin around the cut (incision) may feel firm, swollen, and tender. The area may be bruised. Tenderness usually goes away in a few days, and the bruising within 2 weeks. Firmness and swelling may take 3 to 6 months to go away. The stitches in your incision may dissolve on their own. Or the doctor may take them out 7 to 10 days after surgery. This care sheet gives you a general idea about how long it will take for you to recover. But each person recovers at a different pace. Follow the steps below to get better as quickly as possible. How can you care for yourself at home? Activity  · Rest when you feel tired. Getting enough sleep will help you recover. · Try to walk each day.  Start by walking a little more than you did the day before. Bit by bit, increase the amount you walk. Walking boosts blood flow and helps prevent pneumonia and constipation. · For 2 weeks, avoid strenuous activities that put pressure on your chest or that involve vigorous movement of your upper body and arm on the side of the biopsy. Examples of these might include strenuous housework, holding an active child, jogging, or aerobic exercise. · For 2 weeks, avoid lifting anything that would make you strain. This may include heavy grocery bags and milk containers, a heavy briefcase or backpack, cat litter or dog food bags, a vacuum , or a child. · Ask your doctor when you can drive again. · You will probably need to take 1 or 2 days off from work. This depends on the type of work you do and how you feel. Diet  · You can eat your normal diet. If your stomach is upset, try bland, low-fat foods like plain rice, broiled chicken, toast, and yogurt. Medicines  · Your doctor will tell you if and when you can restart your medicines. He or she will also give you instructions about taking any new medicines. · If you take aspirin or some other blood thinner, ask your doctor if and when to start taking it again. Make sure that you understand exactly what your doctor wants you to do. · Be safe with medicines. Take pain medicines exactly as directed. ? If the doctor gave you a prescription medicine for pain, take it as prescribed. ? If you are not taking a prescription pain medicine, ask your doctor if you can take an over-the-counter medicine. · If you think your pain medicine is making you sick to your stomach:  ? Take your medicine after meals (unless your doctor has told you not to). ? Ask your doctor for a different pain medicine. · If your doctor prescribed antibiotics, take them as directed. Do not stop taking them just because you feel better. You need to take the full course of antibiotics.   Incision care  · If you have strips of tape on the incision, leave the tape on for a week or until it falls off. · Wash the area daily with warm, soapy water, and pat it dry. Don't use hydrogen peroxide or alcohol, which can slow healing. You may cover the area with a gauze bandage if it weeps or rubs against clothing. Change the bandage every day. · Keep the area clean and dry. Other instructions  · For the first 3 days after surgery, wear a supportive bra all the time, even at night. Follow-up care is a key part of your treatment and safety. Be sure to make and go to all appointments, and call your doctor if you are having problems. It's also a good idea to know your test results and keep a list of the medicines you take. When should you call for help? [id number] anytime you think you may need emergency care. For example, call if:  · You passed out (lost consciousness). · You have chest pain, are short of breath, or cough up blood. Call your doctor now or seek immediate medical care if:  · You are sick to your stomach or cannot drink fluids. · You have pain that does not get better after you take pain medicine. · You cannot pass stools or gas. · You have signs of a blood clot in your leg (called a deep vein thrombosis), such as:  ? Pain in your calf, back of the knee, thigh, or groin. ? Redness or swelling in your leg. · You have signs of infection, such as:  ? Increased pain, swelling, warmth, or redness. ? Red streaks leading from the incision. ? Pus draining from the incision. ? A fever. · You have loose stitches, or your incision comes open. · Bright red blood has soaked through the bandage over your incision. Watch closely for changes in your health, and be sure to contact your doctor if:  · You have any problems. · You have new or worse swelling or pain in your arm. Where can you learn more? Go to http://ramirez-sidra.info/  Enter T362 in the search box to learn more about \"Open Breast Biopsy: What to Expect at Home. \"  Current as of: August 22, 2019               Content Version: 12.5  © 4596-3672 [employer]. Care instructions adapted under license by [employer] (which disclaims liability or warranty for this information). If you have questions about a medical condition or this instruction, always ask your healthcare professional. [address] any warranty or liability for your use of this information.

## 2020-08-14 ENCOUNTER — OFFICE VISIT (OUTPATIENT)
Dept: SURGERY | Age: 60
End: 2020-08-14

## 2020-08-14 VITALS
HEIGHT: 65 IN | OXYGEN SATURATION: 97 % | HEART RATE: 83 BPM | TEMPERATURE: 96.7 F | DIASTOLIC BLOOD PRESSURE: 63 MMHG | WEIGHT: 222 LBS | SYSTOLIC BLOOD PRESSURE: 119 MMHG | RESPIRATION RATE: 17 BRPM | BODY MASS INDEX: 36.99 KG/M2

## 2020-08-14 DIAGNOSIS — N60.21 SCLEROSING ADENOSIS OF BREAST, RIGHT: Primary | ICD-10-CM

## 2020-08-14 NOTE — PROGRESS NOTES
[facility]  General Surgery    Subjective:  Patient presents today for follow-up excisional biopsy tag localized right breast for intraductal papilloma. The pathology reveals nodular adenosis without any residual papilloma or malignancy. Patient has done well since the biopsy. She has minimal pain. Objective:  Vitals:    08/14/20 1313   BP: 119/63   Pulse: 83   Resp: 17   Temp: (!) 96.7 °F (35.9 °C)   TempSrc: Temporal   SpO2: 97%   Weight: 100.7 kg (222 lb)   Height: 5' 5\" (1.651 m)       Physical Exam:    General: Awake and alert, oriented x4, no apparent distress   Breasts:       Right: No dimpling, discoloration, nipple inversion or retractions. No axillary or supraclavicular lymphadenopathy. No mass. Incision in the upper inner quadrant of the breast healing well. Current Medications:  Current Outpatient Medications   Medication Sig Dispense Refill    HYDROcodone-acetaminophen (NORCO) 5-325 mg per tablet Take 1 Tab by mouth every six (6) hours as needed for Pain for up to 7 days. Max Daily Amount: 4 Tabs. 25 Tab 0    naloxone (NARCAN) 4 mg/actuation nasal spray Use 1 spray intranasally, then discard. Repeat with new spray every 2 min as needed for opioid overdose symptoms, alternating nostrils. 1 Each 0    pregabalin (LYRICA) 75 mg capsule Take 1 cap by mouth in the morning and 2 at night as directed. 90 Cap 2    diazePAM (VALIUM) 5 mg tablet Take 1 tab 30 min prior to MRI, may repeat x1. No driving, must have  2 Tab 0    gabapentin (NEURONTIN) 300 mg capsule Take 1 cap by mouth in the morning and 2 at night as directed. (Patient taking differently: Take 300 mg by mouth two (2) times a day. Take 1 cap by mouth in the morning and 2 at night as directed.) 60 Cap 1    acetaminophen (TYLENOL EXTRA STRENGTH) 500 mg tablet Take 500 mg by mouth every six (6) hours as needed for Pain.       cyclobenzaprine (FLEXERIL) 10 mg tablet Take  by mouth three (3) times daily as needed for Muscle Spasm(s).  clotrimazole-betamethasone (LOTRISONE) topical cream APPLY 2 TIMES DAILY  1    liothyronine (CYTOMEL) 5 mcg tablet Take 5 mcg by mouth daily. 0    VOLTAREN 1 % gel Apply 4 g to affected area four (4) times daily. Maximum 16 grams per joint per day. Dispense 5 100 gram tubes 5 Each 0    multivit,iron,min/green tea xt (DAILY MULTIPLE WEIGHT LOSS PO) Take 3 Tabs by mouth daily. Relacore weight loss tablets      calcium carbonate 500 mg calcium (1,250 mg) chewable tablet Take 2 Tabs by mouth daily as needed for Indigestion (As needed for upset stomach).  levothyroxine (SYNTHROID) 50 mcg tablet Take 50 mcg by mouth Daily (before breakfast).  polyethylene glycol (MIRALAX) 17 gram/dose powder Take 17 g by mouth daily.  buffered aspirin (BUFFERIN) 325 mg tablet Take 1 Tab by mouth two (2) times a day. 60 Tab 1    ferrous sulfate 325 mg (65 mg iron) tablet Take 1 Tab by mouth two (2) times daily (with meals). 60 Tab 1    oxyCODONE-acetaminophen (PERCOCET) 7.5-325 mg per tablet Take 1-2 Tabs by mouth every four (4) hours as needed for Pain. Max Daily Amount: 12 Tabs. 60 Tab 0    ondansetron hcl (ZOFRAN) 4 mg tablet Take 1 Tab by mouth every eight (8) hours as needed for Nausea. 30 Tab 1    OTHER daily.  cyanocobalamin (VITAMIN B-12) 1,000 mcg tablet Take 1,000 mcg by mouth daily.  NP THYROID 15 mg tablet TK 3 TS PO D  1    gabapentin (NEURONTIN) 300 mg capsule Take 1 tab by mouth three times daily  2    potassium iodide/iodine (IODINE STRONG, LUGOLS, PO) Take 25 mg by mouth daily.  Selenomethionine 200 mcg tab Take 1 Tab by mouth daily.  cholecalciferol, vitamin D3, (VITAMIN D3) 2,000 unit tab Take 1 Tab by mouth daily.  docusate sodium (COLACE) 100 mg capsule Take 1 Cap by mouth two (2) times a day for 90 days. 60 Cap 2    bisacodyL 5 mg tab Take 5 mg by mouth daily. 3 Tab 0       Chart and notes reviewed. Data reviewed.  I have evaluated and examined the patient. Impression and plan:  Patient status post excisional biopsy of sclerosing adenosis from the right breast because of intraductal papilloma on previous biopsy.   Monthly self breast exam  Follow-up in 6 months for clinical breast exam after diagnostic mammogram right breast  Please call a visit in the interim with any questions     Laurie Turpin MD

## 2020-08-14 NOTE — PATIENT INSTRUCTIONS

## 2020-08-21 ENCOUNTER — OFFICE VISIT (OUTPATIENT)
Dept: ORTHOPEDIC SURGERY | Age: 60
End: 2020-08-21

## 2020-08-21 VITALS — TEMPERATURE: 96.9 F

## 2020-08-21 DIAGNOSIS — M70.61 GREATER TROCHANTERIC BURSITIS OF RIGHT HIP: ICD-10-CM

## 2020-08-21 DIAGNOSIS — M25.551 RIGHT HIP PAIN: ICD-10-CM

## 2020-08-21 DIAGNOSIS — M76.891 TENDINITIS OF RIGHT HIP FLEXOR: ICD-10-CM

## 2020-08-21 DIAGNOSIS — M65.332 TRIGGER MIDDLE FINGER OF LEFT HAND: ICD-10-CM

## 2020-08-21 DIAGNOSIS — Z96.641 HISTORY OF TOTAL RIGHT HIP REPLACEMENT: Primary | ICD-10-CM

## 2020-08-21 RX ORDER — BETAMETHASONE SODIUM PHOSPHATE AND BETAMETHASONE ACETATE 3; 3 MG/ML; MG/ML
6 INJECTION, SUSPENSION INTRA-ARTICULAR; INTRALESIONAL; INTRAMUSCULAR; SOFT TISSUE ONCE
Qty: 1 ML | Refills: 0
Start: 2020-08-21 | End: 2020-08-21

## 2020-08-21 RX ORDER — CELECOXIB 200 MG/1
200 CAPSULE ORAL DAILY
Qty: 30 CAP | Refills: 2 | Status: SHIPPED | OUTPATIENT
Start: 2020-08-21 | End: 2020-11-19

## 2020-08-21 NOTE — PROGRESS NOTES
Patient: Jenifer Zhang                MRN: [id number]       SSN: [id number]  YOB: 1960        AGE: 63 y.o. SEX: female  There is no height or weight on file to calculate BMI. PCP: Tj Holguin MD  08/21/20    HISTORY:  I had the pleasure of reviewing Aleida in follow-up. She is having a lot of trouble with her left middle finger. It is triggering on her. We will try it with an injection today. She is here complaining of right hip pain. It is mainly laterally based, but she does get a little bit in the groin as well. Denies fevers or chills. Otherwise has been feeling well. Back is occasionally painful as well with some radiculopathy. No start-up discomfort. No complaints with instability involving the hip, and again, it hurts to roll over on it at night. She is able to walk unassisted. PHYSICAL EXAMINATION:  Today:  She is a very nice lady. BMI is 37. Both hips rotate well. Well healed incision. Examined with a female assistant present. Her left middle finger is triggering actively at the A1 pulley. It is a little bit thickened at region. No evidence for infection. Good capillary refill. With regards to the hip, she is tender over the trochanter. It is the majority of the pain. She does have a little bit of hip flexor tendonitis as well clinically. RADIOGRAPHS:  X-rays confirm that the hip replacement is in excellent position and alignment. Well aligned and well fixed. No evidence of loosening. I think her anteversion is quite acceptable. She was somewhat dysplastic anteriorly as well. OVERALL IMPRESSION:  My overall impression is trigger finger, left middle finger and also bursitis, right hip. Hip flexor tendonitis. RADIOGRAPHS:   X-rays from 08/21/2020 confirm that the implants are very nicely aligned and well fixed. I am very pleased with this.     PROCEDURE:  Under aseptic conditions and after informed, written consent with separate time outs, left middle finger at the A1 pulley injected with half and half Celestone 3 mg preparation and in the right hip as well with 1 mL Celestone, well tolerated. PLAN:  In about 3 weeks we will try her with some physical therapy. If she is not happy with the trigger finger injection, I will have her see Dr. Vicky Pedro for definitive management, possible release. It has been a pleasure to share in her care. CC: Peterson Frye [facility], [facility]  OFFICE PROCEDURE PROGRESS NOTE      Chart reviewed for the following:  Austyn GEORGE M.D., have reviewed the History, Physical and updated the Allergic reactions for Erin Tai? TIME OUT performed immediately prior to start of procedure:  Austyn GEORGE M.D., have performed the following reviews on Erin Tai prior to the start of the procedure:  ????????  * Patient was identified by name and date of birth   * Agreement on procedure being performed was verified  * Risks and Benefits explained to the patient  * Procedure site verified and marked as necessary  * Patient was positioned for comfort  * Consent was signed and verified    Time: 3:45 PM    Body part: left middle finger    Medication & Dose: 0.5mL Celestone preparation, i.e. 3 mg. Date of procedure: 08/21/20    Procedure performed by: Triny Johnson. Edilberto Dutta M.D., JASPREET(C)    Provider assisted by: Ethan Galeano LPN    Patient assisted by: self    How tolerated by patient: tolerated the procedure well with no complications      Time: 3:46 PM    Body part: right hip    Medication & Dose: 1mL Celestone preparation, i.e. 6 mg. Date of procedure: 08/21/20    Procedure performed by: Triny Dutta M.D., RUST(C)    Provider assisted by: Ethan Galeano LPN    Patient assisted by: self    How tolerated by patient: tolerated the procedure well with no complications      REVIEW OF SYSTEMS:      CON: negative  EYE: negative   ENT: negative  RESP: negative  GI:    negative   :  negative  MSK: Positive  A twelve point review of systems was completed, positives noted and all other systems were reviewed and are negative          Past Medical History:   Diagnosis Date    Adverse effect of anesthesia     hard to wake up    Chronic pain     Depression     GERD (gastroesophageal reflux disease)     Hypothyroidism     Low back pain     Thromboembolus (HCC)     blood clot in foot during pregnancy    Tobacco abuse     Vertigo        Family History   Problem Relation Age of Onset    Hypertension Mother     Cancer Mother         multiple myeloma    Cancer Father         throat    Cancer Sister        Current Outpatient Medications   Medication Sig Dispense Refill    docusate sodium (COLACE) 100 mg capsule Take 1 Cap by mouth two (2) times a day for 90 days. 60 Cap 2    bisacodyL 5 mg tab Take 5 mg by mouth daily. 3 Tab 0    naloxone (NARCAN) 4 mg/actuation nasal spray Use 1 spray intranasally, then discard. Repeat with new spray every 2 min as needed for opioid overdose symptoms, alternating nostrils. 1 Each 0    pregabalin (LYRICA) 75 mg capsule Take 1 cap by mouth in the morning and 2 at night as directed. 90 Cap 2    diazePAM (VALIUM) 5 mg tablet Take 1 tab 30 min prior to MRI, may repeat x1. No driving, must have  2 Tab 0    gabapentin (NEURONTIN) 300 mg capsule Take 1 cap by mouth in the morning and 2 at night as directed. (Patient taking differently: Take 300 mg by mouth two (2) times a day. Take 1 cap by mouth in the morning and 2 at night as directed.) 60 Cap 1    acetaminophen (TYLENOL EXTRA STRENGTH) 500 mg tablet Take 500 mg by mouth every six (6) hours as needed for Pain.  cyclobenzaprine (FLEXERIL) 10 mg tablet Take  by mouth three (3) times daily as needed for Muscle Spasm(s).  clotrimazole-betamethasone (LOTRISONE) topical cream APPLY 2 TIMES DAILY  1    liothyronine (CYTOMEL) 5 mcg tablet Take 5 mcg by mouth daily. 0    VOLTAREN 1 % gel Apply 4 g to affected area four (4) times daily. Maximum 16 grams per joint per day. Dispense 5 100 gram tubes 5 Each 0    multivit,iron,min/green tea xt (DAILY MULTIPLE WEIGHT LOSS PO) Take 3 Tabs by mouth daily. Relacore weight loss tablets      calcium carbonate 500 mg calcium (1,250 mg) chewable tablet Take 2 Tabs by mouth daily as needed for Indigestion (As needed for upset stomach).  levothyroxine (SYNTHROID) 50 mcg tablet Take 50 mcg by mouth Daily (before breakfast).  polyethylene glycol (MIRALAX) 17 gram/dose powder Take 17 g by mouth daily.  buffered aspirin (BUFFERIN) 325 mg tablet Take 1 Tab by mouth two (2) times a day. 60 Tab 1    ferrous sulfate 325 mg (65 mg iron) tablet Take 1 Tab by mouth two (2) times daily (with meals). 60 Tab 1    oxyCODONE-acetaminophen (PERCOCET) 7.5-325 mg per tablet Take 1-2 Tabs by mouth every four (4) hours as needed for Pain. Max Daily Amount: 12 Tabs. 60 Tab 0    ondansetron hcl (ZOFRAN) 4 mg tablet Take 1 Tab by mouth every eight (8) hours as needed for Nausea. 30 Tab 1    OTHER daily.  cyanocobalamin (VITAMIN B-12) 1,000 mcg tablet Take 1,000 mcg by mouth daily.  NP THYROID 15 mg tablet TK 3 TS PO D  1    gabapentin (NEURONTIN) 300 mg capsule Take 1 tab by mouth three times daily  2    potassium iodide/iodine (IODINE STRONG, LUGOLS, PO) Take 25 mg by mouth daily.  Selenomethionine 200 mcg tab Take 1 Tab by mouth daily.  cholecalciferol, vitamin D3, (VITAMIN D3) 2,000 unit tab Take 1 Tab by mouth daily.          Allergies   Allergen Reactions    Pcn [Penicillins] Hives    Percocet [Oxycodone-Acetaminophen] Itching       Past Surgical History:   Procedure Laterality Date    HAND/FINGER SURGERY UNLISTED      right thumb    HX BREAST BIOPSY Right 06/15/2020    HX BREAST BIOPSY Right 8/7/2020    TAG LOCALIZED EXCISIONAL BIOPSY RIGHT BREAST performed by Hortencia Szymanski MD at [address] [place] [address] HX  SECTION      x3    HX HIP REPLACEMENT      HX HYSTERECTOMY      HX ORTHOPAEDIC  2002    2 disks removed lower back [facility].    HX ORTHOPAEDIC Right 2017    shoulder surgery    HX WRIST FRACTURE TX      R CTR    NEUROLOGICAL PROCEDURE UNLISTED         Social History     Socioeconomic History    Marital status: SINGLE     Spouse name: Not on file    Number of children: Not on file    Years of education: Not on file    Highest education level: Not on file   Occupational History    Not on file   Social Needs    Financial resource strain: Not on file    Food insecurity     Worry: Not on file     Inability: Not on file    Transportation needs     Medical: Not on file     Non-medical: Not on file   Tobacco Use    Smoking status: Former Smoker     Packs/day: 0.25     Years: 4.00     Pack years: 1.00     Last attempt to quit: 2015     Years since quittin.2    Smokeless tobacco: Never Used   Substance and Sexual Activity    Alcohol use: No    Drug use: No    Sexual activity: Not on file   Lifestyle    Physical activity     Days per week: Not on file     Minutes per session: Not on file    Stress: Not on file   Relationships    Social connections     Talks on phone: Not on file     Gets together: Not on file     Attends Denominational service: Not on file     Active member of club or organization: Not on file     Attends meetings of clubs or organizations: Not on file     Relationship status: Not on file    Intimate partner violence     Fear of current or ex partner: Not on file     Emotionally abused: Not on file     Physically abused: Not on file     Forced sexual activity: Not on file   Other Topics Concern    Not on file   Social History Narrative    3/2015[de-identified] currently unemployed due to chronic low back pain, used to work renovating houses. Lived with her mother until her mother's death in the summer of .   Currently living with her daughters, splits time between [place] and [place]. Visit Vitals  Temp 96.9 °F (36.1 °C)         PHYSICAL EXAMINATION:  GENERAL: Alert and oriented x3, in no acute distress, well-developed, well-nourished, afebrile. HEART: No JVD. EYES: No scleral icterus   NECK: No significant lymphadenopathy   LUNGS: No respiratory compromise or indrawing  ABDOMEN: Soft, non-tender, non-distended. Electronically signed by:  Adal De La Fuente MD

## 2020-08-21 NOTE — PROGRESS NOTES
Roopa Win M.D., have reviewed the history, physical, and have updated the allergic reactions for Saul Spine. TIME OUT performed immediately prior to the start of procedure: 
Janice José M.D., have performed the following reviews on Saul Spine prior to the start of the procedure: 
 
- Patient was identified by name and date of birth - Agreement on procedure being performed was verified - Risks and benefits explained to the patient 
-Patient was positioned for comfort 
- Consent was signed and verified Time: 3:45 PM 
 
Body Part: right greater trochanteric bursa and left hand middle finger Medication and Dose: Each injected with 1mL Celestone preparation, i.e. 6 mg. Date of Procedure: 08/21/20 PROCEDURE PERFORMED BY : Alan Win M.D., [facility](C) Provider Assisted by: Reymundo Peabody Patient assisted by: self Patient tolerated procedure well with no complications

## 2020-09-17 ENCOUNTER — HOSPITAL ENCOUNTER (OUTPATIENT)
Dept: PHYSICAL THERAPY | Age: 60
Discharge: HOME | End: 2020-09-17
Payer: MEDICAID

## 2020-09-17 PROCEDURE — 97161 PT EVAL LOW COMPLEX 20 MIN: CPT

## 2020-09-17 NOTE — PROGRESS NOTES
PT DAILY TREATMENT NOTE 10-18    Patient Name: Mitchell De Luna  Date:2020  : 1960  [x]  Patient  Verified  Payor: [facility] MEDICAID / Plan: [address] [place] [address] / Product Type: Managed Care Medicaid /    In time:1000  Out time:1040  Total Treatment Time (min): 40  Visit #: 1 of 8    Treatment Area: Pain in right hip [M25.551]    SUBJECTIVE   Pain Level (0-10 scale): 5  Any medication changes, allergies to medications, adverse drug reactions, diagnosis change, or new procedure performed?: [x] No    [] Yes (see summary sheet for update)  Subjective functional status/changes:   [] No changes reported       OBJECTIVE    Modality rationale:    Min Type Additional Details    [] Estim:  []Unatt       []IFC  []Premod                        []Other:  []w/ice   []w/heat  Position:  Location:    [] Estim: []Att    []TENS instruct  []NMES                    []Other:  []w/US   []w/ice   []w/heat  Position:  Location:    []  Traction: [] Cervical       []Lumbar                       [] Prone          []Supine                       []Intermittent   []Continuous Lbs:  [] before manual  [] after manual    []  Ultrasound: []Continuous   [] Pulsed                           []1MHz   []3MHz W/cm2:  Location:    []  Iontophoresis with dexamethasone         Location: [] Take home patch   [] In clinic    []  Ice     []  heat  []  Ice massage  []  Laser   []  Anodyne Position:  Location:    []  Laser with stim  []  Other:  Position:  Location:    []  Vasopneumatic Device Pressure:       [] lo [] med [] hi   Temperature: [] lo [] med [] hi   [] Skin assessment post-treatment:  []intact []redness- no adverse reaction    []redness  adverse reaction:     25 min [x]Eval                  []Re-Eval       15 min Therapeutic Exercise:  [] See flow sheet : HEP   Rationale: increase ROM and increase strength to improve the patients ability to perform ADL              With   [] TE   [] TA   [] neuro   [] other: Patient Education: [x] Review HEP    [] Progressed/Changed HEP based on:   [] positioning   [] body mechanics   [] transfers   [] heat/ice application    [] other:      Other Objective/Functional Measures:       Pain Level (0-10 scale) post treatment: 5    ASSESSMENT/Changes in Function:      Patient will continue to benefit from skilled PT services to modify and progress therapeutic interventions, address functional mobility deficits, address ROM deficits, address strength deficits, analyze and address soft tissue restrictions and analyze and cue movement patterns to attain remaining goals. [x]  See Plan of Care  []  See progress note/recertification  []  See Discharge Summary         Progress towards goals / Updated goals:  Short Term Goals: To be accomplished in 1 weeks:   1. The pt will be I and compliant with HEP. IE issued HEP  Long Term Goals: To be accomplished in 4 weeks:   1. Improve FOTO score to predicted outcome to improve ability for daily tasks. IE: 64   2. The pt will demonstrate ability to perform SLR to improve ability for getting in and out of bed and other daily tasks. IE: MMT 2-/5   3. Improve right glute med MMT to 3/5 for improved ability for gait. IE: 2/5   4. The pt will report no difficulty with performing light activities at home.    IE: moderate difficulty      PLAN  []  Upgrade activities as tolerated     [x]  Continue plan of care  []  Update interventions per flow sheet       []  Discharge due to:_  []  Other:_      Demetrius Hendrix, PT 9/17/2020  9:52 AM    Future Appointments   Date Time Provider Radha Chaudhary   9/17/2020 10:00 AM Delma Wang, PT [facility]   10/22/2020  1:00 PM Ernesto Olszewski Joeline Boning, MD [address]

## 2020-09-17 NOTE — PROGRESS NOTES
[facility]  [address]  [place], [address]  [phone] fax    Plan of Care/ Statement of Necessity for Physical Therapy Services    Patient name: Marius Gottron Start of Care: 2020   Referral source: John Chan MD : 1960    Medical Diagnosis: Pain in right hip [M25.551]  Payor: [facility] MEDICAID / Plan: [address] [place] [address] / Product Type: Managed Care Medicaid /  Onset Date:     Treatment Diagnosis: right hip pain   Prior Hospitalization: see medical history Provider#: [id number]   Medications: Verified on Patient summary List    Comorbidities: right NHI 2018, OA, depression, thyroid disease, breast surgery   Prior Level of Function: functionally I with daily tasks. The Plan of Care and following information is based on the information from the initial evaluation. Assessment/ key information: 64 y/o female presents with right lateral hip pain. The pt reports she underwent a right NHI in  and has never fully recovered. The pt reports difficulty with walking, lying on the right side, getting in and out of bed and increased activity. The pt received an injection in August with good pain relief. The pt demonstrates + trendelenburg gait pattern without use of AD. AROM flexion to 90 degrees and abduction to 35 degrees. MMT reveals significant weakness throughout the right hip as follows: flexion 2-/5, extension 2+/5, abduction 2/5, ER 3+/5. + tenderness is noted over the right glute med and min, right TFL/ITB and trochanteric bursa. The pt will benefit from PT to address the aforementioned impairments.      Evaluation Complexity History MEDIUM  Complexity : 1-2 comorbidities / personal factors will impact the outcome/ POC ; Examination MEDIUM Complexity : 3 Standardized tests and measures addressing body structure, function, activity limitation and / or participation in recreation  ;Presentation LOW Complexity : Stable, uncomplicated  ;Clinical Decision Making MEDIUM Complexity : FOTO score of 26-74  Overall Complexity Rating: LOW   Problem List: pain affecting function, decrease ROM, decrease strength, impaired gait/ balance, decrease ADL/ functional abilitiies, decrease activity tolerance, decrease flexibility/ joint mobility and decrease transfer abilities   Treatment Plan may include any combination of the following: Therapeutic exercise, Therapeutic activities, Neuromuscular re-education, Physical agent/modality, Manual therapy, Aquatic therapy, Patient education and Self Care training  Patient / Family readiness to learn indicated by: asking questions, trying to perform skills and interest  Persons(s) to be included in education: patient (P)  Barriers to Learning/Limitations: None  Patient Goal (s): To be able to walk better  Patient Self Reported Health Status: good  Rehabilitation Potential: fair    Short Term Goals: To be accomplished in 1 weeks:   1. The pt will be I and compliant with HEP. Long Term Goals: To be accomplished in 4 weeks:   1. Improve FOTO score to predicted outcome to improve ability for daily tasks. 2. The pt will demonstrate ability to perform SLR to improve ability for getting in and out of bed and other daily tasks. 3. Improve right glute med MMT to 3/5 for improved ability for gait. 4. The pt will report no difficulty with performing light activities at home. Frequency / Duration: Patient to be seen 2 times per week for 4 weeks. Patient/ Caregiver education and instruction: Diagnosis, prognosis, self care, activity modification and exercises   [x]  Plan of care has been reviewed with SUSHIL Villalta, PT 9/17/2020 9:54 AM    ________________________________________________________________________    I certify that the above Therapy Services are being furnished while the patient is under my care.  I agree with the treatment plan and certify that this therapy is necessary.     Physician's Signature:____________Date:_________TIME:________    ** Signature, Date and Time must be completed for valid certification **    Please sign and return to [facility] [address]  [address]  [place], [address]  [phone] fax

## 2020-09-25 ENCOUNTER — HOSPITAL ENCOUNTER (OUTPATIENT)
Dept: PHYSICAL THERAPY | Age: 60
Discharge: HOME | End: 2020-09-25
Payer: MEDICAID

## 2020-09-25 PROCEDURE — 97112 NEUROMUSCULAR REEDUCATION: CPT

## 2020-09-25 PROCEDURE — 97110 THERAPEUTIC EXERCISES: CPT

## 2020-09-25 NOTE — PROGRESS NOTES
PT DAILY TREATMENT NOTE 10-18    Patient Name: Kylah Herr  Date:2020  : 1960  [x]  Patient  Verified  Payor: [facility] MEDICAID / Plan: [address] [place] [address] / Product Type: Managed Care Medicaid /    In time:10:45  Out time: 11:30  Total Treatment Time (min): 45  Visit #: 2 of 8    Treatment Area: Pain in right hip [M25.551]    SUBJECTIVE  Pain Level (0-10 scale): 0/10  Any medication changes, allergies to medications, adverse drug reactions, diagnosis change, or new procedure performed?: [x] No    [] Yes (see summary sheet for update)  Subjective functional status/changes:   [] No changes reported  Pt reports no new    OBJECTIVE    Modality rationale: decrease inflammation and decrease pain to improve the patients ability to perform ADLs with increased ease.    Min Type Additional Details    [] Estim:  []Unatt       []IFC  []Premod                        []Other:  []w/ice   []w/heat  Position:  Location:    [] Estim: []Att    []TENS instruct  []NMES                    []Other:  []w/US   []w/ice   []w/heat  Position:  Location:    []  Traction: [] Cervical       []Lumbar                       [] Prone          []Supine                       []Intermittent   []Continuous Lbs:  [] before manual  [] after manual    []  Ultrasound: []Continuous   [] Pulsed                           []1MHz   []3MHz W/cm2:  Location:    []  Iontophoresis with dexamethasone         Location: [] Take home patch   [] In clinic   10 [x]  Ice     []  heat  []  Ice massage  []  Laser   []  Anodyne Position:left sidelying  Location:right hip    []  Laser with stim  []  Other:  Position:  Location:    []  Vasopneumatic Device Pressure:       [] lo [] med [] hi   Temperature: [] lo [] med [] hi   [] Skin assessment post-treatment:  []intact []redness- no adverse reaction    []redness  adverse reaction:     17 min Therapeutic Exercise:  [x] See flow sheet :   Rationale: increase ROM and increase strength to improve the patients ability to perform ADLs with increased ease. 10 min Neuromuscular Re-education:  [x]  See flow sheet :step ups, side stepping   Rationale: increase strength, improve coordination and increase proprioception  to improve the patients ability to perform ADLs with increased ease. 8 min Manual Therapy:  MFR to right ITB   Rationale: decrease pain, increase ROM and increase tissue extensibility to improve functional mobility. With   [] TE   [] TA   [] neuro   [] other: Patient Education: [x] Review HEP    [] Progressed/Changed HEP based on:   [] positioning   [] body mechanics   [] transfers   [] heat/ice application    [] other:      Other Objective/Functional Measures: Initiated exercises as per flow sheet. Pain Level (0-10 scale) post treatment: 0/10    ASSESSMENT/Changes in Function: Pt demonstrates continued trendelenburg gait pattern when ambulating through treatment area. Patient will continue to benefit from skilled PT services to modify and progress therapeutic interventions, address functional mobility deficits, address ROM deficits, address strength deficits, analyze and address soft tissue restrictions, analyze and cue movement patterns and analyze and modify body mechanics/ergonomics to attain remaining goals. []  See Plan of Care  []  See progress note/recertification  []  See Discharge Summary         Progress towards goals / Updated goals:  Short Term Goals: To be accomplished in 1 weeks:              1. The pt will be I and compliant with HEP. IE issued HEP   Current: initiated per patient report. 9/25/2020  Long Term Goals: To be accomplished in 4 weeks:              1. Improve FOTO score to predicted outcome to improve ability for daily tasks. IE: 64              2. The pt will demonstrate ability to perform SLR to improve ability for getting in and out of bed and other daily tasks.                IE: MMT 2-/5              3. Improve right glute med MMT to 3/5 for improved ability for gait. IE: 2/5              4. The pt will report no difficulty with performing light activities at home.               IE: moderate difficulty  PLAN  []  Upgrade activities as tolerated     [x]  Continue plan of care  []  Update interventions per flow sheet       []  Discharge due to:_  []  Other:_      Kaley Dry, PTA 9/25/2020  10:51 AM    Future Appointments   Date Time Provider Radha Neena   9/29/2020 10:00 AM Chilo Meoller, PTA [facility]   10/1/2020 10:00 AM Yudith Saleh, PT [facility]   10/6/2020 10:00 AM Chilo Moeller, PTA [facility]   10/8/2020 11:00 AM Chilo Moeller, PTA [facility]   10/13/2020 10:00 AM Chilo Moeller, PTA [facility]   10/15/2020 10:00 AM Yudith Saleh, PT [facility]   10/22/2020  1:00 PM Page Will Kel Madera MD [facility]

## 2020-09-29 ENCOUNTER — HOSPITAL ENCOUNTER (OUTPATIENT)
Dept: PHYSICAL THERAPY | Age: 60
Discharge: HOME | End: 2020-09-29
Payer: MEDICAID

## 2020-09-29 PROCEDURE — 97110 THERAPEUTIC EXERCISES: CPT

## 2020-09-29 PROCEDURE — 97140 MANUAL THERAPY 1/> REGIONS: CPT

## 2020-09-29 PROCEDURE — 97112 NEUROMUSCULAR REEDUCATION: CPT

## 2020-09-29 NOTE — PROGRESS NOTES
PT DAILY TREATMENT NOTE 10-18    Patient Name: Matt Mata  Date:2020  : 1960  [x]  Patient  Verified  Payor: [facility] MEDICAID / [place] [address] / Product Type: Managed Care Medicaid /    In time:10:00  Out time:10:49  Total Treatment Time (min): 49  Visit #: 3 of 8    Medicare/BCBS Only   Total Timed Codes (min):  39 1:1 Treatment Time:  39       Treatment Area: Pain in right hip [M25.551]    SUBJECTIVE  Pain Level (0-10 scale): 0//10  Any medication changes, allergies to medications, adverse drug reactions, diagnosis change, or new procedure performed?: [x] No    [] Yes (see summary sheet for update)  Subjective functional status/changes:   [] No changes reported   Pt denies pain but reports stiffness in right hip. OBJECTIVE    Modality rationale: decrease inflammation and decrease pain to improve the patients ability to improve tolerance to ADLs.     Min Type Additional Details    [] Estim:  []Unatt       []IFC  []Premod                        []Other:  []w/ice   []w/heat  Position:  Location:    [] Estim: []Att    []TENS instruct  []NMES                    []Other:  []w/US   []w/ice   []w/heat  Position:  Location:    []  Traction: [] Cervical       []Lumbar                       [] Prone          []Supine                       []Intermittent   []Continuous Lbs:  [] before manual  [] after manual    []  Ultrasound: []Continuous   [] Pulsed                           []1MHz   []3MHz W/cm2:  Location:    []  Iontophoresis with dexamethasone         Location: [] Take home patch   [] In clinic   10 [x]  Ice     []  heat  []  Ice massage  []  Laser   []  Anodyne Position:left side lying  Location:right hip    []  Laser with stim  []  Other:  Position:  Location:    []  Vasopneumatic Device Pressure:       [] lo [] med [] hi   Temperature: [] lo [] med [] hi   [] Skin assessment post-treatment:  []intact []redness- no adverse reaction    []redness  adverse reaction: 11 min Therapeutic Exercise:  [x] See flow sheet :   Rationale: increase ROM and increase strength to improve the patients ability to perform ADLs with increased ease. 10 min Neuromuscular Re-education:  [x]  See flow sheet :   Rationale: increase strength, improve coordination and increase proprioception  to improve the patients ability to perform ADLs with increased ease. 8 min Manual Therapy:  MFR to right ITB and glute   Rationale: decrease pain, increase ROM and increase tissue extensibility to improve functional mobility. With   [] TE   [] TA   [] neuro   [] other: Patient Education: [x] Review HEP    [] Progressed/Changed HEP based on:   [] positioning   [] body mechanics   [] transfers   [] heat/ice application    [] other:      Other Objective/Functional Measures: added sidelying clam fire hydrant. Pain Level (0-10 scale) post treatment: 3/10    ASSESSMENT/Changes in Function: Pt demonstrates 3/5 glute med strength with ability to abduct right LE AG. Patient will continue to benefit from skilled PT services to modify and progress therapeutic interventions, address functional mobility deficits, address ROM deficits, address strength deficits, analyze and address soft tissue restrictions and analyze and cue movement patterns to attain remaining goals. []  See Plan of Care  []  See progress note/recertification  []  See Discharge Summary         Progress towards goals / Updated goals:  Short Term Goals: To be accomplished in 1 weeks:              1. The pt will be I and compliant with HEP.             XV issued HEP              Current: initiated per patient report. 9/25/2020  Long Term Goals: To be accomplished in 4 weeks:              1. Improve FOTO score to predicted outcome to improve ability for daily tasks.              IE: 56              2. The pt will demonstrate ability to perform SLR to improve ability for getting in and out of bed and other daily tasks.             HD: MMT 2-/5              8. Improve right glute med MMT to 3/5 for improved ability for gait.             TL: 2/5              4.  The pt will report no difficulty with performing light activities at home.              IE: moderate difficulty    PLAN  []  Upgrade activities as tolerated     [x]  Continue plan of care  []  Update interventions per flow sheet       []  Discharge due to:_  []  Other:_      Shane Alert, PTA 9/29/2020  10:12 AM    Future Appointments   Date Time Provider Radha Chaudhary   10/1/2020 10:00 AM [facility], PT [facility]   10/6/2020 10:00 AM [facility] [facility], PTA [facility]   10/8/2020 11:00 AM [facility] Ma[facility], PTA [facility]   10/13/2020 10:00 AM Garett Mauro, PTA [facility]   10/15/2020 10:00 AM [facility], PT [facility]   10/22/2020  1:00 PM Tabatha Faustin MD [facility]

## 2020-10-01 ENCOUNTER — HOSPITAL ENCOUNTER (OUTPATIENT)
Dept: PHYSICAL THERAPY | Age: 60
Discharge: HOME | End: 2020-10-01
Payer: MEDICAID

## 2020-10-01 PROCEDURE — 97110 THERAPEUTIC EXERCISES: CPT

## 2020-10-01 PROCEDURE — 97112 NEUROMUSCULAR REEDUCATION: CPT

## 2020-10-01 PROCEDURE — 97035 APP MDLTY 1+ULTRASOUND EA 15: CPT

## 2020-10-01 NOTE — PROGRESS NOTES
PT DAILY TREATMENT NOTE 10-18    Patient Name: Jefferson Dave  Date:10/1/2020  : 1960  [x]  Patient  Verified  Payor: [facility] MEDICAID / [place] [address] / Product Type: Managed Care Medicaid /    In ECGK:[id number]  Out time:11:03  Total Treatment Time (min): 79  Visit #: 4 of 8      Treatment Area: Pain in right hip [M25.551]    SUBJECTIVE  Pain Level (0-10 scale): 0  Any medication changes, allergies to medications, adverse drug reactions, diagnosis change, or new procedure performed?: [x] No    [] Yes (see summary sheet for update)  Subjective functional status/changes:   [] No changes reported  The pt reports she is just stiff today. She feels PT is helping but still reports difficulty with walking.     OBJECTIVE    Modality rationale: decrease inflammation and decrease pain to improve the patients ability to perform ADL    Min Type Additional Details    [] Estim:  []Unatt       []IFC  []Premod                        []Other:  []w/ice   []w/heat  Position:  Location:    [] Estim: []Att    []TENS instruct  []NMES                    []Other:  []w/US   []w/ice   []w/heat  Position:  Location:    []  Traction: [] Cervical       []Lumbar                       [] Prone          []Supine                       []Intermittent   []Continuous Lbs:  [] before manual  [] after manual    [x]  Ultrasound: []Continuous   [x] Pulsed   5 min+3min setup(time would not populate in Min box)                        []1MHz   [x]3MHz Location: right lateral hip  W/cm2: 1.0    []  Iontophoresis with dexamethasone         Location: [] Take home patch   [] In clinic   10 [x]  Ice     []  heat  []  Ice massage  []  Laser   []  Anodyne Position: left sidelying  Location: right hip    []  Laser with stim  []  Other: Position:  Location:    []  Vasopneumatic Device Pressure:       [] lo [] med [] hi   Temperature: [] lo [] med [] hi   [] Skin assessment post-treatment:  []intact []redness- no adverse reaction []redness  adverse reaction:     33 min Therapeutic Exercise:  [x] See flow sheet :   Rationale: increase ROM and increase strength to improve the patients ability to perform ADL       10 min Neuromuscular Re-education:  [x]  See flow sheet : Glute activation and core activation exercises   Rationale: increase strength and increase proprioception  to improve the patients ability to perform ADL    5 min Manual Therapy:  STM/MFR right ITB. Lateral quads and hamtrings   Rationale: decrease pain and increase tissue extensibility to improve ability for daily tasks. With   [] TE   [] TA   [] neuro   [] other: Patient Education: [x] Review HEP    [] Progressed/Changed HEP based on:   [] positioning   [] body mechanics   [] transfers   [] heat/ice application    [] other:      Other Objective/Functional Measures: added pulsed US trial, added assisted SLR     Pain Level (0-10 scale) post treatment: 0    ASSESSMENT/Changes in Function:  The pt appears to be gaining some relief with PT from pain. Significant weakness remains but pt is progress with therex. Patient will continue to benefit from skilled PT services to modify and progress therapeutic interventions, address functional mobility deficits, address ROM deficits, address strength deficits, analyze and address soft tissue restrictions and analyze and cue movement patterns to attain remaining goals. []  See Plan of Care  []  See progress note/recertification  []  See Discharge Summary         Progress towards goals / Updated goals:  Short Term Goals: To be accomplished in 1 weeks:              1. The pt will be I and compliant with HEP.              issued HEP              HOCYBMA: initiated per patient report. 9/25/2020  Long Term Goals: To be accomplished in 4 weeks:              1. Improve FOTO score to predicted outcome to improve ability for daily tasks.              IE: 56              2.  The pt will demonstrate ability to perform SLR to improve ability for getting in and out of bed and other daily tasks.               IE: MMT 2-/5   Current: 2-/5 on 10-01-20, added assisted SLR              3. Improve right glute med MMT to 3/5 for improved ability for gait.             SC: 2/5              4.  The pt will report no difficulty with performing light activities at home.              IE: moderate difficulty       PLAN  []  Upgrade activities as tolerated     [x]  Continue plan of care  []  Update interventions per flow sheet       []  Discharge due to:_  []  Other:_      Dylan Cardoso, PT 10/1/2020  10:21 AM    Future Appointments   Date Time Provider Radha Chaudhary   10/6/2020 10:00 AM [facility], PTA [facility]   10/8/2020 11:00 AM [facility], PTA [facility]   10/13/2020 10:00 AM [facility], PTA [facility]   10/15/2020 10:00 AM Blu Kate, PT [facility]   10/22/2020  1:00 PM Diallo Almaraz MD [facility]

## 2020-10-06 ENCOUNTER — APPOINTMENT (OUTPATIENT)
Dept: PHYSICAL THERAPY | Age: 60
End: 2020-10-06
Payer: MEDICAID

## 2020-10-08 ENCOUNTER — HOSPITAL ENCOUNTER (OUTPATIENT)
Dept: PHYSICAL THERAPY | Age: 60
Discharge: HOME | End: 2020-10-08
Payer: MEDICAID

## 2020-10-08 PROCEDURE — 97112 NEUROMUSCULAR REEDUCATION: CPT

## 2020-10-08 PROCEDURE — 97035 APP MDLTY 1+ULTRASOUND EA 15: CPT

## 2020-10-08 PROCEDURE — 97110 THERAPEUTIC EXERCISES: CPT

## 2020-10-08 NOTE — PROGRESS NOTES
PT DAILY TREATMENT NOTE 10-18    Patient Name: Deepthi Smart  Date:10/8/2020  : 1960  [x]  Patient  Verified  Payor: [facility] MEDICAID / [place] [address] / Product Type: Managed Care Medicaid /    In time:10:59  Out time:11:45  Total Treatment Time (min): 46  Visit #: 5 of 8    Treatment Area: Pain in right hip [M25.551]    SUBJECTIVE  Pain Level (0-10 scale): 0/10  Any medication changes, allergies to medications, adverse drug reactions, diagnosis change, or new procedure performed?: [x] No    [] Yes (see summary sheet for update)  Subjective functional status/changes:   [] No changes reported  Pt reports no new complaints of pain. Pt reports compliance with HEP. \"I am just stiff today. \"    OBJECTIVE    Modality rationale: decrease inflammation and decrease pain to improve the patients ability to perform ADLs.     Min Type Additional Details    [] Estim:  []Unatt       []IFC  []Premod                        []Other:  []w/ice   []w/heat  Position:  Location:    [] Estim: []Att    []TENS instruct  []NMES                    []Other:  []w/US   []w/ice   []w/heat  Position:  Location:    []  Traction: [] Cervical       []Lumbar                       [] Prone          []Supine                       []Intermittent   []Continuous Lbs:  [] before manual  [] after manual   8 [x]  Ultrasound: []Continuous   [x] Pulsed                           []1MHz   [x]3MHz W/cm2:1.0  Location:right lateral hip    []  Iontophoresis with dexamethasone         Location: [] Take home patch   [] In clinic   10 [x]  Ice     []  heat  []  Ice massage  []  Laser   []  Anodyne Position:sitting  Location:right hip    []  Laser with stim  []  Other:  Position:  Location:    []  Vasopneumatic Device Pressure:       [] lo [] med [] hi   Temperature: [] lo [] med [] hi   [] Skin assessment post-treatment:  []intact []redness- no adverse reaction    []redness  adverse reaction:     18 min Therapeutic Exercise:  [x] See flow sheet :   Rationale: increase ROM and increase strength to improve the patients ability to perform ADLs with increased ease. 10 min Neuromuscular Re-education:  [x]  See flow sheet :   Rationale: increase strength, improve coordination and increase proprioception  to improve the patients ability to perform ADLs with increased ease. With   [] TE   [] TA   [] neuro   [] other: Patient Education: [x] Review HEP    [] Progressed/Changed HEP based on:   [] positioning   [] body mechanics   [] transfers   [] heat/ice application    [] other:      Other Objective/Functional Measures: Pt demonstrates ability to perform hip abduction AG with maximum effort. 3-/5 MMT right hip abduction      Pain Level (0-10 scale) post treatment: 0/10    ASSESSMENT/Changes in Function: Pt demonstrates slight increase in right hip strength but continues to have significant trendelenburg gait pattern due to glute med weakness. Patient will continue to benefit from skilled PT services to modify and progress therapeutic interventions, address functional mobility deficits, address ROM deficits, address strength deficits, analyze and address soft tissue restrictions, analyze and cue movement patterns and analyze and modify body mechanics/ergonomics to attain remaining goals. []  See Plan of Care  []  See progress note/recertification  []  See Discharge Summary         Progress towards goals / Updated goals:  Short Term Goals: To be accomplished in 1 weeks:              1. The pt will be I and compliant with HEP.             EN issued HEP              [id number]: initiated per patient report. 9/25/2020  Long Term Goals: To be accomplished in 4 weeks:              1. Improve FOTO score to predicted outcome to improve ability for daily tasks.              IE: 56              2.  The pt will demonstrate ability to perform SLR to improve ability for getting in and out of bed and other daily tasks.               IE: MMT 2-/5              Current: 2-/5 on 10-01-20, added assisted SLR              3. Improve right glute med MMT to 3/5 for improved ability for gait.             BI: 2/5              4.  The pt will report no difficulty with performing light activities at home.              IE: moderate difficulty    PLAN  []  Upgrade activities as tolerated     [x]  Continue plan of care  []  Update interventions per flow sheet       []  Discharge due to:_  []  Other:_      Basim Mayo PTA 10/8/2020  11:00 AM    Future Appointments   Date Time Provider Radha Chaudhary   10/13/2020 10:00 AM Michelle Calvillo, PTA [facility]   10/15/2020 10:00 AM Cristiano Ferraro, PT [facility]   10/22/2020  1:00 PM Teressa Sanchez MD [facility] AMB

## 2020-10-13 ENCOUNTER — HOSPITAL ENCOUNTER (OUTPATIENT)
Dept: PHYSICAL THERAPY | Age: 60
Discharge: HOME | End: 2020-10-13
Payer: MEDICAID

## 2020-10-13 PROCEDURE — 97035 APP MDLTY 1+ULTRASOUND EA 15: CPT

## 2020-10-13 PROCEDURE — 97112 NEUROMUSCULAR REEDUCATION: CPT

## 2020-10-13 PROCEDURE — 97110 THERAPEUTIC EXERCISES: CPT

## 2020-10-13 NOTE — PROGRESS NOTES
PT DAILY TREATMENT NOTE 10-18    Patient Name: Lee Bull  Date:10/13/2020  : 1960  [x]  Patient  Verified  Payor: [facility] MEDICAID / [place] [address] / Product Type: Managed Care Medicaid /    In time:10:00  Out time:10:52  Total Treatment Time (min): 52  Visit #: 6 of 8    Treatment Area: Pain in right hip [M25.551]    SUBJECTIVE  Pain Level (0-10 scale): 310  Any medication changes, allergies to medications, adverse drug reactions, diagnosis change, or new procedure performed?: [x] No    [] Yes (see summary sheet for update)  Subjective functional status/changes:   [] No changes reported  Pt reports increased pain today due to stepping down too hard from a step stool while hanging curtains. OBJECTIVE    Modality rationale: decrease inflammation, decrease pain and increase tissue extensibility to improve the patients ability to perform ADLs with increased ease.     Min Type Additional Details    [] Estim:  []Unatt       []IFC  []Premod                        []Other:  []w/ice   []w/heat  Position:  Location:    [] Estim: []Att    []TENS instruct  []NMES                    []Other:  []w/US   []w/ice   []w/heat  Position:  Location:    []  Traction: [] Cervical       []Lumbar                       [] Prone          []Supine                       []Intermittent   []Continuous Lbs:  [] before manual  [] after manual   8 [x]  Ultrasound: []Continuous   [x] Pulsed                           [x]1MHz   []3MHz W/cm2:1.0  Location:right greater trochanter    []  Iontophoresis with dexamethasone         Location: [] Take home patch   [] In clinic   10 [x]  Ice     []  heat  []  Ice massage  []  Laser   []  Anodyne Position:left sidelying  Location:right hip    []  Laser with stim  []  Other:  Position:  Location:    []  Vasopneumatic Device Pressure:       [] lo [] med [] hi   Temperature: [] lo [] med [] hi   [] Skin assessment post-treatment:  []intact []redness- no adverse reaction []redness  adverse reaction:     24 min Therapeutic Exercise:  [x] See flow sheet :   Rationale: increase ROM and increase strength to improve the patients ability to perform ADLs. 10 min Neuromuscular Re-education:  [x]  See flow sheet :   Rationale: increase strength, improve coordination and increase proprioception  to improve the patients ability to perform ADLs with increased ease. With   [] TE   [] TA   [] neuro   [] other: Patient Education: [x] Review HEP    [] Progressed/Changed HEP based on:   [] positioning   [] body mechanics   [] transfers   [] heat/ice application    [] other:      Other Objective/Functional Measures: Pt required assistance to perform supine SLR. Pain Level (0-10 scale) post treatment: 4/10    ASSESSMENT/Changes in Function: Pt demonstrates continued right hip weakness with difficulty performing hip abduction AG and hip flexion AG. Patient will continue to benefit from skilled PT services to modify and progress therapeutic interventions, address functional mobility deficits, address ROM deficits, address strength deficits, analyze and address soft tissue restrictions and analyze and cue movement patterns to attain remaining goals. []  See Plan of Care  []  See progress note/recertification  []  See Discharge Summary         Progress towards goals / Updated goals:  Short Term Goals: To be accomplished in 1 weeks:              1. The pt will be I and compliant with HEP.             [place] issued HEP              [id number]: initiated per patient report. 9/25/2020  Long Term Goals: To be accomplished in 4 weeks:              1. Improve FOTO score to predicted outcome to improve ability for daily tasks.              IE: 56              2.  The pt will demonstrate ability to perform SLR to improve ability for getting in and out of bed and other daily tasks.   Leann Dominguez: MMT 2-/5              Current: 2-/5 on 10-01-20, added assisted SLR              3. Improve right glute med MMT to 3/5 for improved ability for gait.             HC: 2/5              4.  The pt will report no difficulty with performing light activities at home.              IE: moderate difficulty    PLAN  []  Upgrade activities as tolerated     [x]  Continue plan of care  []  Update interventions per flow sheet       []  Discharge due to:_  []  Other:_      Avanell Pump, PTA 10/13/2020  10:07 AM    Future Appointments   Date Time Provider Radha Chaudhary   10/15/2020 10:00 AM [facility]   10/22/2020  1:00 PM Manda Martinez MD [facility]

## 2020-10-15 ENCOUNTER — HOSPITAL ENCOUNTER (OUTPATIENT)
Dept: PHYSICAL THERAPY | Age: 60
Discharge: HOME | End: 2020-10-15
Payer: MEDICAID

## 2020-10-15 PROCEDURE — 97035 APP MDLTY 1+ULTRASOUND EA 15: CPT

## 2020-10-15 PROCEDURE — 97110 THERAPEUTIC EXERCISES: CPT

## 2020-10-15 PROCEDURE — 97112 NEUROMUSCULAR REEDUCATION: CPT

## 2020-10-15 NOTE — PROGRESS NOTES
[facility]  [address]  [place], [address]  [phone] fax    Physical Therapy Progress Note  Patient name: Lidia Parson Start of Care: 2020   Referral source: Lalita Bellamy MD : 1960                Medical Diagnosis: Pain in right hip [M25.551]  Payor: [facility] MEDICAID / Plan: [address] [place] [address] / Product Type: Managed Care Medicaid /  Onset Date:                 Treatment Diagnosis: right hip pain   Prior Hospitalization: see medical history Provider#: [id number]   Medications: Verified on Patient summary List    Comorbidities: right NHI 2018, OA, depression, thyroid disease, breast surgery   Prior Level of Function: functionally I with daily tasks. Visits from Start of Care: 7    Missed Visits: 0        Key Functional Changes: The pt reports improvement in the tightness in the hip but limited change in pain to date. Strength shows some improvement but is still significantly limited. She has met FOTO goal, indicating improved ability for perform daily tasks. She will benefit from continuation of PT to gain further strength and diminish pain. Progress towards goals / Updated goals:  Short Term Goals: To be accomplished in 1 weeks:              1. The pt will be I and compliant with HEP.             WQ issued HEP              UCFFQJI: initiated per patient report. 2020    Long Term Goals: To be accomplished in 4 weeks:              1. Improve FOTO score to predicted outcome to improve ability for daily tasks.              IE: 56   Current: met goal on 10-15-20              2. The pt will demonstrate ability to perform SLR to improve ability for getting in and out of bed and other daily tasks.   Lindie Puls: MMT 2-/5              Current: 2-/5 on 10-15-20, added assisted SLR              3. Improve right glute med MMT to 3/5 for improved ability for gait.               BB: 2/5   Current: 2/5              4. The pt will report no difficulty with performing light activities at home.              IE: moderate difficulty   Current: MET goal on 10-15-20    Updated Goals: to be achieved in 2-4 weeks:      1. The pt will demonstrate ability to perform SLR to improve ability for getting in and out of bed and other daily tasks.   Annie Slater[de-identified] 2-/5               2. Improve right glute med MMT to 3/5 for improved ability for gait.             PN: 2/5   3. The pt will report pain being 3/10 or less for improved ability for daily tasks. PN: 4/10 or worse at times          ASSESSMENT/RECOMMENDATIONS:  [x]Continue therapy per initial plan/protocol at a frequency of  2 x per week for 2-4 weeks  []Continue therapy with the following recommended changes:_____________________      _____________________________________________________________________  []Discontinue therapy progressing towards or have reached established goals  []Discontinue therapy due to lack of appreciable progress towards goals  []Discontinue therapy due to lack of attendance or compliance  []Await Physician's recommendations/decisions regarding therapy  []Other:________________________________________________________________    Thank you for this referral.    Joseph Ryan, PT 10/15/2020 9:58 AM  NOTE TO PHYSICIAN:  PLEASE COMPLETE THE ORDERS BELOW AND   FAX TO [facility] [facility]: [phone]  If you are unable to process this request in 24 hours please contact our office: [phone] I have read the above report and request that my patient continue as recommended. ? I have read the above report and request that my patient continue therapy with the following changes/special instructions:__________________________________________________________  ? I have read the above report and request that my patient be discharged from therapy.     Physicians signature: ______________________________Date: ______Time:______

## 2020-10-15 NOTE — PROGRESS NOTES
PT DAILY TREATMENT NOTE 10-18    Patient Name: Mayra Diaz  Date:10/15/2020  : 1960  [x]  Patient  Verified  Payor: [facility] MEDICAID / Plan: [place] [address] / Product Type: Managed Care Medicaid /    In VVNF:[id number]  Out time:1045  Total Treatment Time (min): 60  Visit #: 7 of 8      Treatment Area: Pain in right hip [M25.551]    SUBJECTIVE  Pain Level (0-10 scale): 4  Any medication changes, allergies to medications, adverse drug reactions, diagnosis change, or new procedure performed?: [x] No    [] Yes (see summary sheet for update)  Subjective functional status/changes:   [] No changes reported  The pt reports improvement with PT but still having lateral hip pain.      OBJECTIVE    Modality rationale: decrease inflammation and decrease pain to improve the patients ability to perform ADL   Min Type Additional Details    [] Estim:  []Unatt       []IFC  []Premod                        []Other:  []w/ice   []w/heat  Position:  Location:    [] Estim: []Att    []TENS instruct  []NMES                    []Other:  []w/US   []w/ice   []w/heat  Position:  Location:    []  Traction: [] Cervical       []Lumbar                       [] Prone          []Supine                        Intermittent   []Continuous Lbs:  [] before manual  [] after manual   6' +2' set up [x]  Ultrasound: []Continuous   [x] Pulsed                           []1MHz   [x]3MHz W/cm2: 1.0  Location: right glute med    []  Iontophoresis with dexamethasone         Location: [] Take home patch   [] In clinic   10 [x]  Ice     []  heat  []  Ice massage  []  Laser   []  Anodyne Position: left sidelying  Location: right hip    []  Laser with stim  []  Other:  Position:  Location:    []  Vasopneumatic Device Pressure:       [] lo [] med [] hi   Temperature: [] lo [] med [] hi   [] Skin assessment post-treatment:  []intact []redness- no adverse reaction    []redness  adverse reaction:     29 min Therapeutic Exercise:  [x] See flow sheet :   Rationale: increase ROM and increase strength to improve the patients ability to perform ADL       8 min Neuromuscular Re-education:  []  See flow sheet : glute activation, side steps   Rationale: increase strength, improve balance and increase proprioception  to improve the patients ability to perform functional tasks. 5 min Manual Therapy:  MFR/TPR to right glute med/min   Rationale: decrease pain and increase tissue extensibility to improve ability for daily tasks. With   [] TE   [] TA   [] neuro   [] other: Patient Education: [x] Review HEP    [] Progressed/Changed HEP based on:   [] positioning   [] body mechanics   [] transfers   [] heat/ice application    [] other:      Other Objective/Functional Measures:  Progressed therex per flow sheet     Pain Level (0-10 scale) post treatment: 3    ASSESSMENT/Changes in Function:  The pt has met FOTO goal but does remain significantly weak through the right hip. She will benefit from continued PT. Patient will continue to benefit from skilled PT services to modify and progress therapeutic interventions, address functional mobility deficits, address ROM deficits, address strength deficits, analyze and address soft tissue restrictions and analyze and cue movement patterns to attain remaining goals. []  See Plan of Care  []  See progress note/recertification  []  See Discharge Summary         Progress towards goals / Updated goals:  Short Term Goals: To be accomplished in 1 weeks:              1. The pt will be I and compliant with HEP.             AL issued HEP              SQMRRRV: initiated per patient report. 9/25/2020    Long Term Goals: To be accomplished in 4 weeks:              1. Improve FOTO score to predicted outcome to improve ability for daily tasks.              IE: 56   Current: met goal on 10-15-20              2.  The pt will demonstrate ability to perform SLR to improve ability for getting in and out of bed and other daily tasks.               AA: MMT 2-/5              HQUJDCT: 2-/5 on 10-15-20, added assisted SLR              3. Improve right glute med MMT to 3/5 for improved ability for gait.             YH: 2/5   Current: 2/5              4.  The pt will report no difficulty with performing light activities at home.              IE: moderate difficulty   Current: MET goal on 10-15-20    PLAN  []  Upgrade activities as tolerated     [x]  Continue plan of care  []  Update interventions per flow sheet       []  Discharge due to:_   []  Other:_      Kady Salmeron PT 10/15/2020  9:51 AM    Future Appointments   Date Time Provider Radha Milleri   10/15/2020 10:00 AM Nelly Wick, PT [facility]   10/22/2020  1:00 PM Lizzy Estevez MD [facility]

## 2020-10-19 ENCOUNTER — APPOINTMENT (OUTPATIENT)
Dept: PHYSICAL THERAPY | Age: 60
End: 2020-10-19
Payer: MEDICAID

## 2020-10-21 ENCOUNTER — HOSPITAL ENCOUNTER (OUTPATIENT)
Dept: PHYSICAL THERAPY | Age: 60
Discharge: HOME | End: 2020-10-21
Payer: MEDICAID

## 2020-10-21 PROCEDURE — 97110 THERAPEUTIC EXERCISES: CPT

## 2020-10-21 PROCEDURE — 97112 NEUROMUSCULAR REEDUCATION: CPT

## 2020-10-21 PROCEDURE — 97035 APP MDLTY 1+ULTRASOUND EA 15: CPT

## 2020-10-21 NOTE — PROGRESS NOTES
PT DAILY TREATMENT NOTE 10-18    Patient Name: Linden Quiroga  Date:10/21/2020  : 1960  [x]  Patient  Verified  Payor: [facility] MEDICAID / Plan: [facility] [address] / Product Type: Managed Care Medicaid /    In time:2:30  Out time:3:29  Total Treatment Time (min): 61  Visit #: 1 of 4-8      Treatment Area: Pain in right hip [M25.551]    SUBJECTIVE  Pain Level (0-10 scale): 3  Any medication changes, allergies to medications, adverse drug reactions, diagnosis change, or new procedure performed?: [x] No    [] Yes (see summary sheet for update)  Subjective functional status/changes:   [x] No changes reported       OBJECTIVE    Modality rationale: decrease inflammation and decrease pain to improve the patients ability to perform ADl   Min Type Additional Details    [] Estim:  []Unatt       []IFC  []Premod                        []Other:  []w/ice   []w/heat  Position:  Location:    [] Estim: []Att    []TENS instruct  []NMES                    []Other:  []w/US   []w/ice   []w/heat  Position:  Location:    []  Traction: [] Cervical       []Lumbar                       [] Prone          []Supine                       []Intermittent   []Continuous Lbs:  [] before manual  [] after manual   6'+2' set up []  Ultrasound: []Continuous   [x] Pulsed                           []1MHz   [x]3MHz W/cm2:1.0  Location:right glute med    []  Iontophoresis with dexamethasone         Location: [] Take home patch   [] In clinic   10 [x]  Ice     []  heat  []  Ice massage  []  Laser   []  Anodyne Position: sidelying  Location:right hip    []  Laser with stim  []  Other:  Position:  Location:    []  Vasopneumatic Device Pressure:       [] lo [] med [] hi   Temperature: [] lo [] med [] hi   [] Skin assessment post-treatment:  []intact []redness- no adverse reaction    []redness  adverse reaction:       28 min Therapeutic Exercise:  [x] See flow sheet :   Rationale: increase ROM and increase strength to improve the patients ability to perform ADL       8 min Neuromuscular Re-education:  [x]  See flow sheet :   Rationale: increase strength, improve balance and increase proprioception  to improve the patients ability to perform ADL    5 min Manual Therapy:  MFR/TPR to right glute med//min   Rationale: decrease pain, increase tissue extensibility and decrease trigger points to improve functional mobiltiy. With   [] TE   [] TA   [] neuro   [] other: Patient Education: [x] Review HEP    [] Progressed/Changed HEP based on:   [] positioning   [] body mechanics   [] transfers   [] heat/ice application    [] other:      Other Objective/Functional Measures: added resistance with hip x 3     Pain Level (0-10 scale) post treatment: 0    ASSESSMENT/Changes in Function: the pt gave good effort with today's treatment. + trigger point noted in the right glute med but diminished pain noted post treatment. She was able to progress with therex. Patient will continue to benefit from skilled PT services to modify and progress therapeutic interventions, address functional mobility deficits, address ROM deficits, address strength deficits, analyze and address soft tissue restrictions and analyze and cue movement patterns to attain remaining goals. []  See Plan of Care  []  See progress note/recertification  []  See Discharge Summary         Progress towards goals / Updated goals:  Updated Goals: to be achieved in 2-4 weeks:                 1. The pt will demonstrate ability to perform SLR to improve ability for getting in and out of bed and other daily tasks.   Delories Boros[de-identified] 2-/5               2. Improve right glute med MMT to 3/5 for improved ability for gait.             MQ: 2/5   Current: 2/5              3. The pt will report pain being 3/10 or less for improved ability for daily tasks.               PN: 4/10 or worse at times     PLAN  []  Upgrade activities as tolerated     [x]  Continue plan of care  []  Update interventions per flow sheet       []  Discharge due to:_  []  Other:_      Anny Glez, PT 10/21/2020  2:39 PM    Future Appointments   Date Time Provider Radha Chaudhary   10/22/2020  1:00 PM Briseyda Shepard MD [facility]   10/26/2020 10:45 AM Eder Aguirre, PT [facility]   10/29/2020 11:15 AM Chey Benny, PTA [facility]   11/4/2020 11:15 AM Chey Benny, PTA [facility]   11/6/2020 10:45 AM Chey Benny, PTA [facility]

## 2020-10-22 ENCOUNTER — OFFICE VISIT (OUTPATIENT)
Dept: ORTHOPEDIC SURGERY | Age: 60
End: 2020-10-22
Payer: MEDICAID

## 2020-10-22 VITALS
WEIGHT: 227.6 LBS | HEART RATE: 85 BPM | TEMPERATURE: 97.1 F | DIASTOLIC BLOOD PRESSURE: 69 MMHG | BODY MASS INDEX: 37.92 KG/M2 | SYSTOLIC BLOOD PRESSURE: 104 MMHG | HEIGHT: 65 IN | OXYGEN SATURATION: 94 %

## 2020-10-22 DIAGNOSIS — Z96.641 HISTORY OF TOTAL RIGHT HIP REPLACEMENT: Primary | ICD-10-CM

## 2020-10-22 PROCEDURE — 99214 OFFICE O/P EST MOD 30 MIN: CPT | Performed by: ORTHOPAEDIC SURGERY

## 2020-10-22 NOTE — PROGRESS NOTES
Patient: Lidia Parson                MRN: [id number]       SSN: [id number]  YOB: 1960        AGE: 63 y.o. SEX: female  Body mass index is 37.87 kg/m². PCP: Vicenta Iqbal MD  10/22/20    Dear [facility]:    HISTORY:  I had the pleasure of reviewing Thressa. I injected her left third digit at the A1 pulley for trigger finger. It has helped considerably. It is about 80-85% improved . Occasionally just almost some triggering, but it is much less painful. Her hip replacement - she reports no pain. It was about a year and a half ago. She is being reevaluated for both today. REVIEW OF SYSTEMS:   Negative for fevers, chills, night sweats, or weight loss. No cough or shortness of breath. Pertinent positives noted. Other systems are reviewed and are negative. PHYSICAL EXAMINATION:  The previous portal of entry for injection is clean and dry and is much improved, the A1 pulley has much less swelling at the level. The tendon is running considerably more smoothly, still just a little bit of thickening of that pulley, but no active triggering today. With regards to hip, good motion and not particularly tender. Excellent hip flexion and strength. Her abductor strength is still could be a touch stronger. I have instructed her on some abduction exercises, which she can easily do at home. RADIOGRAPHS:  I did review her x-rays from the last visit, showing the hip is well aligned and well fixed. PLAN:  At this point she will return to see us just into the new year, sooner if there are any problems whatsoever. I wanted to thank you for allowing me to share in her care and I wish you the very best in the fall season.     C:   Shanna Krishnan MD        REVIEW OF SYSTEMS:      CON: negative  EYE: negative   ENT: negative  RESP: negative  GI:    negative   :  negative  MSK: Positive  A twelve point review of systems was completed, positives noted and all other systems were reviewed and are negative          Past Medical History:   Diagnosis Date    Adverse effect of anesthesia     hard to wake up    Chronic pain     Depression     GERD (gastroesophageal reflux disease)     Hypothyroidism     Low back pain     Thromboembolus (HCC)     blood clot in foot during pregnancy    Tobacco abuse     Vertigo        Family History   Problem Relation Age of Onset    Hypertension Mother     Cancer Mother         multiple myeloma    Cancer Father         throat    Cancer Sister        Current Outpatient Medications   Medication Sig Dispense Refill    celecoxib (CeleBREX) 200 mg capsule Take 1 Cap by mouth daily for 90 days. Take with food. 30 Cap 2    docusate sodium (COLACE) 100 mg capsule Take 1 Cap by mouth two (2) times a day for 90 days. 60 Cap 2    bisacodyL 5 mg tab Take 5 mg by mouth daily. 3 Tab 0    naloxone (NARCAN) 4 mg/actuation nasal spray Use 1 spray intranasally, then discard. Repeat with new spray every 2 min as needed for opioid overdose symptoms, alternating nostrils. 1 Each 0    pregabalin (LYRICA) 75 mg capsule Take 1 cap by mouth in the morning and 2 at night as directed. 90 Cap 2    diazePAM (VALIUM) 5 mg tablet Take 1 tab 30 min prior to MRI, may repeat x1. No driving, must have  2 Tab 0    gabapentin (NEURONTIN) 300 mg capsule Take 1 cap by mouth in the morning and 2 at night as directed. (Patient taking differently: Take 300 mg by mouth two (2) times a day. Take 1 cap by mouth in the morning and 2 at night as directed.) 60 Cap 1    acetaminophen (TYLENOL EXTRA STRENGTH) 500 mg tablet Take 500 mg by mouth every six (6) hours as needed for Pain.  liothyronine (CYTOMEL) 5 mcg tablet Take 5 mcg by mouth daily. 0    VOLTAREN 1 % gel Apply 4 g to affected area four (4) times daily. Maximum 16 grams per joint per day. Dispense 5 100 gram tubes 5 Each 0    levothyroxine (SYNTHROID) 50 mcg tablet Take 50 mcg by mouth Daily (before breakfast).  buffered aspirin (BUFFERIN) 325 mg tablet Take 1 Tab by mouth two (2) times a day. 60 Tab 1    ferrous sulfate 325 mg (65 mg iron) tablet Take 1 Tab by mouth two (2) times daily (with meals). 60 Tab 1    oxyCODONE-acetaminophen (PERCOCET) 7.5-325 mg per tablet Take 1-2 Tabs by mouth every four (4) hours as needed for Pain. Max Daily Amount: 12 Tabs. 60 Tab 0    ondansetron hcl (ZOFRAN) 4 mg tablet Take 1 Tab by mouth every eight (8) hours as needed for Nausea. 30 Tab 1    cyclobenzaprine (FLEXERIL) 10 mg tablet Take  by mouth three (3) times daily as needed for Muscle Spasm(s).  clotrimazole-betamethasone (LOTRISONE) topical cream APPLY 2 TIMES DAILY  1    multivit,iron,min/green tea xt (DAILY MULTIPLE WEIGHT LOSS PO) Take 3 Tabs by mouth daily. Relacore weight loss tablets      calcium carbonate 500 mg calcium (1,250 mg) chewable tablet Take 2 Tabs by mouth daily as needed for Indigestion (As needed for upset stomach).  polyethylene glycol (MIRALAX) 17 gram/dose powder Take 17 g by mouth daily.  OTHER daily.  cyanocobalamin (VITAMIN B-12) 1,000 mcg tablet Take 1,000 mcg by mouth daily.  NP THYROID 15 mg tablet TK 3 TS PO D  1    gabapentin (NEURONTIN) 300 mg capsule Take 1 tab by mouth three times daily  2    potassium iodide/iodine (IODINE STRONG, LUGOLS, PO) Take 25 mg by mouth daily.  Selenomethionine 200 mcg tab Take 1 Tab by mouth daily.  cholecalciferol, vitamin D3, (VITAMIN D3) 2,000 unit tab Take 1 Tab by mouth daily.          Allergies   Allergen Reactions    Pcn [Penicillins] Hives    Percocet [Oxycodone-Acetaminophen] Itching       Past Surgical History:   Procedure Laterality Date    HAND/FINGER SURGERY UNLISTED      right thumb    HX BREAST BIOPSY Right 06/15/2020    HX BREAST BIOPSY Right 2020    TAG LOCALIZED EXCISIONAL BIOPSY RIGHT BREAST performed by Peggy Dhaliwal MD at [address] [place] [address] HX  SECTION      x3    HX HIP REPLACEMENT  HX HYSTERECTOMY      HX ORTHOPAEDIC  2002    2 disks removed lower back [facility].    HX ORTHOPAEDIC Right 2017    shoulder surgery    HX WRIST FRACTURE TX      R CTR    NEUROLOGICAL PROCEDURE UNLISTED         Social History     Socioeconomic History    Marital status: SINGLE     Spouse name: Not on file    Number of children: Not on file    Years of education: Not on file    Highest education level: Not on file   Occupational History    Not on file   Social Needs    Financial resource strain: Not on file    Food insecurity     Worry: Not on file     Inability: Not on file    Transportation needs     Medical: Not on file     Non-medical: Not on file   Tobacco Use    Smoking status: Former Smoker     Packs/day: 0.25     Years: 4.00     Pack years: 1.00     Last attempt to quit: 2015     Years since quittin.3    Smokeless tobacco: Never Used   Substance and Sexual Activity    Alcohol use: No    Drug use: No    Sexual activity: Not on file   Lifestyle    Physical activity     Days per week: Not on file     Minutes per session: Not on file    Stress: Not on file   Relationships    Social connections     Talks on phone: Not on file     Gets together: Not on file     Attends Synagogue service: Not on file     Active member of club or organization: Not on file     Attends meetings of clubs or organizations: Not on file     Relationship status: Not on file    Intimate partner violence     Fear of current or ex partner: Not on file     Emotionally abused: Not on file     Physically abused: Not on file     Forced sexual activity: Not on file   Other Topics Concern    Not on file   Social History Narrative    3/2015[de-identified] currently unemployed due to chronic low back pain, used to work renovating houses. Lived with her mother until her mother's death in the summer of . Currently living with her daughters, splits time between [place] and [place].        Visit Vitals  /69 (BP 1 Location: Right arm, BP Patient Position: Sitting)   Pulse 85   Temp 97.1 °F (36.2 °C) (Temporal)   Ht 5' 5\" (1.651 m)   Wt 103.2 kg (227 lb 9.6 oz)   SpO2 94%   BMI 37.87 kg/m²         PHYSICAL EXAMINATION:  GENERAL: Alert and oriented x3, in no acute distress, well-developed, well-nourished, afebrile. HEART: No JVD. EYES: No scleral icterus   NECK: No significant lymphadenopathy   LUNGS: No respiratory compromise or indrawing  ABDOMEN: Soft, non-tender, non-distended. Electronically signed by:  Tiago Faulkner MD

## 2020-10-24 DIAGNOSIS — M54.12 CERVICAL RADICULOPATHY: ICD-10-CM

## 2020-10-26 ENCOUNTER — HOSPITAL ENCOUNTER (OUTPATIENT)
Dept: PHYSICAL THERAPY | Age: 60
Discharge: HOME | End: 2020-10-26
Payer: MEDICAID

## 2020-10-26 PROCEDURE — 97112 NEUROMUSCULAR REEDUCATION: CPT

## 2020-10-26 PROCEDURE — 97035 APP MDLTY 1+ULTRASOUND EA 15: CPT

## 2020-10-26 PROCEDURE — 97110 THERAPEUTIC EXERCISES: CPT

## 2020-10-26 RX ORDER — PREGABALIN 75 MG/1
CAPSULE ORAL
Qty: 90 CAP | Refills: 0 | OUTPATIENT
Start: 2020-10-26

## 2020-10-26 NOTE — PROGRESS NOTES
PT DAILY TREATMENT NOTE 10-18    Patient Name: Rosemary Lock  Date:10/26/2020  : 1960  [x]  Patient  Verified  Payor: [facility] MEDICAID / Plan[facility] [address] / Product Type: Managed Care Medicaid /    In time:10:43  Out time:1142  Total Treatment Time (min): 61  Visit #: 2 of 4-8    Treatment Area: Pain in right hip [M25.551]    SUBJECTIVE  Pain Level (0-10 scale): 0  Any medication changes, allergies to medications, adverse drug reactions, diagnosis change, or new procedure performed?: [x] No    [] Yes (see summary sheet for update)  Subjective functional status/changes:   [] No changes reported  The pt reports she is doing better.      OBJECTIVE    Modality rationale: decrease pain and increase tissue extensibility to improve the patients ability to perform ADL   Min Type Additional Details    [] Estim:  []Unatt       []IFC  []Premod                        []Other:  []w/ice   []w/heat  Position:  Location:    [] Estim: []Att    []TENS instruct  []NMES                    []Other:  []w/US   []w/ice   []w/heat  Position:  Location:    []  Traction: [] Cervical       []Lumbar                       [] Prone          []Supine                       []Intermittent   []Continuous Lbs:  [] before manual  [] after manual   6'+2\" [x]  Ultrasound: []Continuous   [x] Pulsed                           []1MHz   [x]3MHz W/cm2:1.0  Location: right glute med    []  Iontophoresis with dexamethasone         Location: [] Take home patch   [] In clinic   10 [x]  Ice     []  heat  []  Ice massage  []  Laser   []  Anodyne Position:sidelying left  Location:right hip    []  Laser with stim  []  Other:  Position:  Location:    []  Vasopneumatic Device Pressure:       [] lo [] med [] hi   Temperature: [] lo [] med [] hi   [] Skin assessment post-treatment:  []intact []redness- no adverse reaction    []redness  adverse reaction:     28 min Therapeutic Exercise:  [x] See flow sheet :   Rationale: increase ROM and increase strength to improve the patients ability to perform functional tasks. 8 min Neuromuscular Re-education:  [x]  See flow sheet : Side steps, glute activation exercises   Rationale: increase strength, improve balance and increase proprioception  to improve the patients ability to perform functional tasks. 5 min Manual Therapy:  STM/TPR to right glute med/min with pt in left sidelying. Rationale: decrease pain, increase ROM, increase tissue extensibility and decrease trigger points to perform gait and daily activities            With   [] TE   [] TA   [] neuro   [] other: Patient Education: [x] Review HEP    [] Progressed/Changed HEP based on:   [] positioning   [] body mechanics   [] transfers   [] heat/ice application    [] other:      Other Objective/Functional Measures: increased therex per flow sheet       Pain Level (0-10 scale) post treatment: 0    ASSESSMENT/Changes in Function:  Good relief post treatment. SLR continues to require PT assist due to weakness. The pt reports improved ability for gait. Patient will continue to benefit from skilled PT services to modify and progress therapeutic interventions, address functional mobility deficits, address ROM deficits, address strength deficits, analyze and address soft tissue restrictions and analyze and cue movement patterns to attain remaining goals. []  See Plan of Care  []  See progress note/recertification  []  See Discharge Summary         Progress towards goals / Updated goals:  Updated Goals: to be achieved in 2-4 weeks:                 1. The pt will demonstrate ability to perform SLR to improve ability for getting in and out of bed and other daily tasks.   [facility][de-identified] 2-/5               2. Improve right glute med MMT to 3/5 for improved ability for gait.             NI: 2/5              Current: 2/5              3.  The pt will report pain being 3/10 or less for improved ability for daily tasks.              PN: 4/10 or worse at times     PLAN   []  Upgrade activities as tolerated     [x]  Continue plan of care  []  Update interventions per flow sheet       []  Discharge due to:_  []  Other:_      Bernabe Dawson, PT 10/26/2020  10:51 AM    Future Appointments   Date Time Provider Radha Chaudhary   10/29/2020 11:15 [facility]   11/4/2020 11:15 [facility]   11/6/2020 10:45 AM [facility]   1/15/2021  9:00 AM Nathan Mcdermott MD [facility]

## 2020-10-29 ENCOUNTER — HOSPITAL ENCOUNTER (OUTPATIENT)
Dept: PHYSICAL THERAPY | Age: 60
Discharge: HOME | End: 2020-10-29
Payer: MEDICAID

## 2020-10-29 PROCEDURE — 97110 THERAPEUTIC EXERCISES: CPT

## 2020-10-29 PROCEDURE — 97530 THERAPEUTIC ACTIVITIES: CPT

## 2020-10-29 PROCEDURE — 97112 NEUROMUSCULAR REEDUCATION: CPT

## 2020-10-29 NOTE — PROGRESS NOTES
PT DAILY TREATMENT NOTE 10-18    Patient Name: Claud Libman  Date:10/29/2020  : 1960  [x]  Patient  Verified  Payor: [facility] MEDICAID / [place] [address] / Product Type: Managed Care Medicaid /    In time:11:15  Out time:11:55  Total Treatment Time (min): 40  Visit #: 3 of 4-8    Treatment Area: Pain in right hip [M25.551]    SUBJECTIVE  Pain Level (0-10 scale): 0/10  Any medication changes, allergies to medications, adverse drug reactions, diagnosis change, or new procedure performed?: [x] No    [] Yes (see summary sheet for update)  Subjective functional status/changes:   [] No changes reported  Pt denies pain currently. OBJECTIVE    20 min Therapeutic Exercise:  [x] See flow sheet :   Rationale: increase ROM and increase strength to improve the patients ability to perform ADLs with increased ease. 10 min Therapeutic Activity:  [x]  See flow sheet :   Rationale: increase ROM and increase strength  to improve the patients ability to perform ADLs with increased ease. 10 min Neuromuscular Re-education:  [x]  See flow sheet :   Rationale: increase strength, improve coordination and increase proprioception  to improve the patients ability to perform ADLs. With   [] TE   [] TA   [] neuro   [] other: Patient Education: [x] Review HEP    [] Progressed/Changed HEP based on:   [] positioning   [] body mechanics   [] transfers   [] heat/ice application    [] other:      Other Objective/Functional Measures: Pt demonstrates 10 supine SLR without quad lag or assistance. Pain Level (0-10 scale) post treatment: 0/10    ASSESSMENT/Changes in Function: Pt demonstrates continued increased strength of right LE with ability to perform 10 supine SLR without quad lag or assistance.      Patient will continue to benefit from skilled PT services to modify and progress therapeutic interventions, address functional mobility deficits, address ROM deficits, address strength deficits, analyze and address soft tissue restrictions, analyze and cue movement patterns and analyze and modify body mechanics/ergonomics to attain remaining goals. []  See Plan of Care  []  See progress note/recertification  []  See Discharge Summary         Progress towards goals / Updated goals:  Updated Goals: to be achieved in 2-4 weeks:                 1. The pt will demonstrate ability to perform SLR to improve ability for getting in and out of bed and other daily tasks.              PN[de-identified] 2-/5    Current: met. 10/29/2020              2. Improve right glute med MMT to 3/5 for improved ability for gait.             GN: 2/5              Current: 2/5              3.  The pt will report pain being 3/10 or less for improved ability for daily tasks.              PN: 4/10 or worse at times     PLAN  []  Upgrade activities as tolerated     [x]  Continue plan of care  []  Update interventions per flow sheet       []  Discharge due to:_  []  Other:_      David Casarez PTA 10/29/2020  11:27 AM    Future Appointments   Date Time Provider Radha Chaudhary   11/4/2020 11:15 AM Airam Lea PTA [facility]   11/6/2020 10:45 [facility]   1/15/2021  9:00 AM Di Chau MD [facility]

## 2020-11-04 ENCOUNTER — HOSPITAL ENCOUNTER (OUTPATIENT)
Dept: PHYSICAL THERAPY | Age: 60
Discharge: HOME | End: 2020-11-04
Payer: MEDICAID

## 2020-11-04 PROCEDURE — 97530 THERAPEUTIC ACTIVITIES: CPT

## 2020-11-04 PROCEDURE — 97112 NEUROMUSCULAR REEDUCATION: CPT

## 2020-11-04 PROCEDURE — 97110 THERAPEUTIC EXERCISES: CPT

## 2020-11-04 NOTE — PROGRESS NOTES
PT DAILY TREATMENT NOTE     Patient Name: Arnulfo Almodovar  Date:2020  : 1960  [x]  Patient  Verified  Payor: [facility] MEDICAID / Plan: [place] [address] / Product Type: Managed Care Medicaid /    In time:11:03  Out time:11:41  Total Treatment Time (min): 38  Visit #: 4 of 4-8    Medicare/BCBS Only   Total Timed Codes (min):  38 1:1 Treatment Time:  38       Treatment Area: Pain in right hip [M25.551]    SUBJECTIVE  Pain Level (0-10 scale): 0/10  Any medication changes, allergies to medications, adverse drug reactions, diagnosis change, or new procedure performed?: [x] No    [] Yes (see summary sheet for update)  Subjective functional status/changes:   [] No changes reported  Pt reports no new complaints of pain. Pt reports compliance with HEP. OBJECTIVE    18 min Therapeutic Exercise:  [x] See flow sheet :   Rationale: increase ROM and increase strength to improve the patients ability to perform ADLs with increased ease. 10 min Therapeutic Activity:  [x]  See flow sheet :   Rationale: increase strength, improve coordination and increase proprioception  to improve the patients ability to perform ADLs with increased ease. 10 min Neuromuscular Re-education:  [x]  See flow sheet :   Rationale: increase strength, improve coordination and increase proprioception  to improve the patients ability to perform functional activities with increased ease. With   [] TE   [] TA   [] neuro   [] other: Patient Education: [x] Review HEP    [] Progressed/Changed HEP based on:   [] positioning   [] body mechanics   [] transfers   [] heat/ice application    [] other:      Other Objective/Functional Measures:      Pain Level (0-10 scale) post treatment: 0/10    ASSESSMENT/Changes in Function: Pt continues to demonstrate improved hip strength with decreased gait antalgia noted when ambulating throughout treatment area.      Patient will continue to benefit from skilled PT services to modify and progress therapeutic interventions, address functional mobility deficits, address ROM deficits, address strength deficits, analyze and address soft tissue restrictions, analyze and cue movement patterns and analyze and modify body mechanics/ergonomics to attain remaining goals. []  See Plan of Care  []  See progress note/recertification  []  See Discharge Summary         Progress towards goals / Updated goals:  Updated Goals: to be achieved in 2-4 weeks:                 1. The pt will demonstrate ability to perform SLR to improve ability for getting in and out of bed and other daily tasks.              PN[de-identified] 2-/5               Current: met. 10/29/2020              2. Improve right glute med MMT to 3/5 for improved ability for gait.             YK: 2/5              Current: 2/5              3.  The pt will report pain being 3/10 or less for improved ability for daily tasks.              PN: 4/10 or worse at times     PLAN  []  Upgrade activities as tolerated     [x]  Continue plan of care  []  Update interventions per flow sheet       []  Discharge due to:_  []  Other:_      Mohamud Vidal PTA 11/4/2020  11:14 AM    Future Appointments   Date Time Provider Radha Chaudhary   11/4/2020 11:15 AM [facility]   11/6/2020 10:45 AM [facility] Situ, [facility] [place]   1/15/2021  9:00 AM Marilynn Jordan MD [facility]

## 2020-11-06 ENCOUNTER — HOSPITAL ENCOUNTER (OUTPATIENT)
Dept: PHYSICAL THERAPY | Age: 60
Discharge: HOME | End: 2020-11-06
Payer: MEDICAID

## 2020-11-06 PROCEDURE — 97530 THERAPEUTIC ACTIVITIES: CPT

## 2020-11-06 PROCEDURE — 97110 THERAPEUTIC EXERCISES: CPT

## 2020-11-06 PROCEDURE — 97112 NEUROMUSCULAR REEDUCATION: CPT

## 2020-11-06 NOTE — PROGRESS NOTES
PT DAILY TREATMENT NOTE     Patient Name: Gloria Rincon  Date:2020  : 1960  [x]  Patient  Verified  Payor: [facility] MEDICAID / [place] [address] / Product Type: Managed Care Medicaid /    In time:10:45  Out time:11:15  Total Treatment Time (min): 30  Visit #: 5 of 4-8    Treatment Area: Pain in right hip [M25.551]    SUBJECTIVE  Pain Level (0-10 scale): 0/10  Any medication changes, allergies to medications, adverse drug reactions, diagnosis change, or new procedure performed?: [x] No    [] Yes (see summary sheet for update)  Subjective functional status/changes:   [] No changes reported  Pt reports no new complaints of pain. Pt reports compliance with HEP. \"I feel like I don't wobble as much when I walk and I can walk farther distances. \"    OBJECTIVE    10 min Therapeutic Exercise:  [] See flow sheet :   Rationale: increase ROM and increase strength to improve the patients ability to perform ADLs with increased ease. 10 min Therapeutic Activity:  []  See flow sheet :   Rationale: increase strength and improve coordination  to improve the patients ability to perform ADLs with increased ease. 10 min Neuromuscular Re-education:  []  See flow sheet :   Rationale: increase strength, improve coordination and increase proprioception  to improve the patients ability to perform ADLs with increased ease. With   [] TE   [] TA   [] neuro   [] other: Patient Education: [x] Review HEP    [] Progressed/Changed HEP based on:   [] positioning   [] body mechanics   [] transfers   [] heat/ice application    [] other:      Other Objective/Functional Measures: right glute med MMT 3-/5. Pain Level (0-10 scale) post treatment: 0/10    ASSESSMENT/Changes in Function: Pt demonstrates improved functional strength and decreased gait antalgia.       []  See Plan of Care  []  See progress note/recertification  [x]  See Discharge Summary         Progress towards goals / Updated goals:  Updated Goals: to be achieved in 2-4 weeks:                 1. The pt will demonstrate ability to perform SLR to improve ability for getting in and out of bed and other daily tasks.   Adal Williamson[de-identified] 2-/5               GAPMRZQ: met. 10/29/2020              2. Improve right glute med MMT to 3/5 for improved ability for gait.             QY: 2/5              Current: progressed 3-/5. 11/06/2020              3. The pt will report pain being 3/10 or less for improved ability for daily tasks.              PN: 4/10 or worse at times     Current: Met Pt reports no pain.  11/06/2020    PLAN  []  Upgrade activities as tolerated     [x]  Continue plan of care  []  Update interventions per flow sheet       []  Discharge due to:_  []  Other:_      Junior Dwayne PTA 11/6/2020  10:50 AM    Future Appointments   Date Time Provider Radha Chaudhary   1/15/2021  9:00 AM Deonna Batista MD [facility]

## 2020-11-06 NOTE — PROGRESS NOTES
[facility]  [address]  [place], [address]  [phone] fax    Physical Therapy Discharge Summary  Patient name: Aleida Lyle Start of Care: 9/17/2020   Referral source: Sara Keen MD RXJ: [id number]                Medical Diagnosis: Pain in right hip [M25.551]  Payor: [facility] MEDICAID / Plan: [facility] [address] / Product Type: Managed Care [facility] /  Onset Date: 2018                Treatment Diagnosis: right hip pain   Prior Hospitalization: see medical history Provider#: [id number]   Medications: Verified on Patient summary List    Comorbidities: right NHI 2018, OA, depression, thyroid disease, breast surgery   Prior Level of Function: functionally I with daily tasks.   Visits from Start of Care: 12    Missed Visits: 0  Reporting Period : 10/15/2020 to 11/06/2020      Summary of Care:  Progress towards goals:                 1. The pt will demonstrate ability to perform SLR to improve ability for getting in and out of bed and other daily tasks.   Shanna Coelho[de-identified] 2-/5               WBDXMMS: met. 10/29/2020              2. Improve right glute med MMT to 3/5 for improved ability for gait.             DD: 2/5              Current: progressed 3-/5. 11/06/2020              3. The pt will report pain being 3/10 or less for improved ability for daily tasks.              PN: 4/10 or worse at times               Current: Met Pt reports no pain. 11/06/2020     ASSESSMENT/RECOMMENDATIONS:  [x]Discontinue therapy: [x]Patient has reached or is progressing toward set goals      []Patient is non-compliant or has abdicated      []Due to lack of appreciable progress towards set goals    Bianca Torres, PTA 11/6/2020 1:22 PM    NOTE TO PHYSICIAN:  Please complete the following and fax to: [facility] at [facility] at [phone]  . Retain this original for your records.   If you are unable to process this request in   24 hours, please contact our office.      [] I have read the above report and request that my patient continue therapy with the following changes/special instructions:  [] I have read the above report and request that my patient be discharged from therapy    Physician's Signature:____________Date:_________TIME:________    ** Signature, Date and Time must be completed for valid certification **

## 2021-01-06 ENCOUNTER — APPOINTMENT (OUTPATIENT)
Dept: CT IMAGING | Age: 61
End: 2021-01-06
Attending: EMERGENCY MEDICINE
Payer: MEDICAID

## 2021-01-06 ENCOUNTER — HOSPITAL ENCOUNTER (EMERGENCY)
Age: 61
Discharge: HOME | End: 2021-01-06
Attending: EMERGENCY MEDICINE
Payer: MEDICAID

## 2021-01-06 VITALS
HEIGHT: 66 IN | BODY MASS INDEX: 34.72 KG/M2 | TEMPERATURE: 98 F | OXYGEN SATURATION: 100 % | DIASTOLIC BLOOD PRESSURE: 95 MMHG | SYSTOLIC BLOOD PRESSURE: 144 MMHG | WEIGHT: 216 LBS | HEART RATE: 61 BPM | RESPIRATION RATE: 14 BRPM

## 2021-01-06 DIAGNOSIS — R42 ACUTE SEVERE VERTIGO: Primary | ICD-10-CM

## 2021-01-06 LAB
ANION GAP SERPL CALC-SCNC: 5 MMOL/L (ref 3–18)
ATRIAL RATE: 58 BPM
BASOPHILS # BLD: 0 K/UL (ref 0–0.1)
BASOPHILS NFR BLD: 1 % (ref 0–2)
BUN SERPL-MCNC: 10 MG/DL (ref 7–18)
BUN/CREAT SERPL: 13 (ref 12–20)
CALCIUM SERPL-MCNC: 9 MG/DL (ref 8.5–10.1)
CALCULATED P AXIS, ECG09: 50 DEGREES
CALCULATED R AXIS, ECG10: 26 DEGREES
CALCULATED T AXIS, ECG11: 42 DEGREES
CHLORIDE SERPL-SCNC: 108 MMOL/L (ref 100–111)
CO2 SERPL-SCNC: 30 MMOL/L (ref 21–32)
CREAT SERPL-MCNC: 0.8 MG/DL (ref 0.6–1.3)
DIAGNOSIS, 93000: NORMAL
DIFFERENTIAL METHOD BLD: ABNORMAL
EOSINOPHIL # BLD: 0.1 K/UL (ref 0–0.4)
EOSINOPHIL NFR BLD: 2 % (ref 0–5)
ERYTHROCYTE [DISTWIDTH] IN BLOOD BY AUTOMATED COUNT: 14.9 % (ref 11.6–14.5)
GLUCOSE SERPL-MCNC: 111 MG/DL (ref 74–99)
HCT VFR BLD AUTO: 39.4 % (ref 35–45)
HGB BLD-MCNC: 12.5 G/DL (ref 12–16)
LYMPHOCYTES # BLD: 1.8 K/UL (ref 0.9–3.6)
LYMPHOCYTES NFR BLD: 36 % (ref 21–52)
MAGNESIUM SERPL-MCNC: 2.3 MG/DL (ref 1.6–2.6)
MCH RBC QN AUTO: 29 PG (ref 24–34)
MCHC RBC AUTO-ENTMCNC: 31.7 G/DL (ref 31–37)
MCV RBC AUTO: 91.4 FL (ref 74–97)
MONOCYTES # BLD: 0.3 K/UL (ref 0.05–1.2)
MONOCYTES NFR BLD: 6 % (ref 3–10)
NEUTS SEG # BLD: 2.8 K/UL (ref 1.8–8)
NEUTS SEG NFR BLD: 55 % (ref 40–73)
P-R INTERVAL, ECG05: 164 MS
PLATELET # BLD AUTO: 240 K/UL (ref 135–420)
PMV BLD AUTO: 10.8 FL (ref 9.2–11.8)
POTASSIUM SERPL-SCNC: 4.7 MMOL/L (ref 3.5–5.5)
Q-T INTERVAL, ECG07: 404 MS
QRS DURATION, ECG06: 68 MS
QTC CALCULATION (BEZET), ECG08: 396 MS
RBC # BLD AUTO: 4.31 M/UL (ref 4.2–5.3)
SODIUM SERPL-SCNC: 143 MMOL/L (ref 136–145)
VENTRICULAR RATE, ECG03: 58 BPM
WBC # BLD AUTO: 5 K/UL (ref 4.6–13.2)

## 2021-01-06 PROCEDURE — 93005 ELECTROCARDIOGRAM TRACING: CPT

## 2021-01-06 PROCEDURE — 70450 CT HEAD/BRAIN W/O DYE: CPT

## 2021-01-06 PROCEDURE — 83735 ASSAY OF MAGNESIUM: CPT

## 2021-01-06 PROCEDURE — 99285 EMERGENCY DEPT VISIT HI MDM: CPT

## 2021-01-06 PROCEDURE — 80048 BASIC METABOLIC PNL TOTAL CA: CPT

## 2021-01-06 PROCEDURE — 85025 COMPLETE CBC W/AUTO DIFF WBC: CPT

## 2021-01-06 PROCEDURE — 74011250636 HC RX REV CODE- 250/636: Performed by: EMERGENCY MEDICINE

## 2021-01-06 RX ORDER — MECLIZINE HCL 12.5 MG 12.5 MG/1
25 TABLET ORAL
Status: COMPLETED | OUTPATIENT
Start: 2021-01-06 | End: 2021-01-06

## 2021-01-06 RX ORDER — MECLIZINE HCL 12.5 MG 12.5 MG/1
25 TABLET ORAL
Qty: 30 TAB | Refills: 0 | Status: SHIPPED | OUTPATIENT
Start: 2021-01-06 | End: 2021-01-16

## 2021-01-06 RX ADMIN — MECLIZINE 25 MG: 12.5 TABLET ORAL at 12:37

## 2021-01-06 NOTE — ED PROVIDER NOTES
HPI patient complains of symptoms of dizziness and feeling like \"things are spinning around me\". He says symptoms started last night before she went to bed. When she woke up this morning, the symptoms continued. She says symptoms are worse if she sits up and moves, or tries to walk. She has symptoms are better if she lays still and turns her head to the left. She states a past history of vertigo several years ago but says today's symptoms are much worse. She also describes some nausea associated with her dizzy feelings. She denies any head pain or headache. She said the symptoms came on suddenly last night while she was sitting down. Prior to that time yesterday, she says she was feeling fine. No other complaints given at this time.     Past Medical History:   Diagnosis Date    Adverse effect of anesthesia     hard to wake up    Chronic pain     Depression     GERD (gastroesophageal reflux disease)     Hypothyroidism     Low back pain     Thromboembolus (HCC)     blood clot in foot during pregnancy    Tobacco abuse     Vertigo        Past Surgical History:   Procedure Laterality Date    HAND/FINGER SURGERY UNLISTED      right thumb    HX BREAST BIOPSY Right 06/15/2020    HX BREAST BIOPSY Right 8/7/2020    TAG LOCALIZED EXCISIONAL BIOPSY RIGHT BREAST performed by Maria De Jesus Westbrook MD at [address] [place] [address]  [facility]      x3    HX HIP REPLACEMENT      HX HYSTERECTOMY      HX ORTHOPAEDIC  2002    2 disks removed lower back [facility].    HX ORTHOPAEDIC Right 2017    shoulder surgery    HX WRIST FRACTURE TX      R CTR    NEUROLOGICAL PROCEDURE UNLISTED           Family History:   Problem Relation Age of Onset    Hypertension Mother     Cancer Mother         multiple myeloma    Cancer Father         throat    Cancer Sister        Social History     Socioeconomic History    Marital status: SINGLE     Spouse name: Not on file    Number of children: Not on file    Years of education: Not on file   • Highest education level: Not on file   Occupational History   • Not on file   Social Needs   • Financial resource strain: Not on file   • Food insecurity     Worry: Not on file     Inability: Not on file   • Transportation needs     Medical: Not on file     Non-medical: Not on file   Tobacco Use   • Smoking status: Former Smoker     Packs/day: 0.25     Years: 4.00     Pack years: 1.00     Quit date: 2015     Years since quittin.5   • Smokeless tobacco: Never Used   Substance and Sexual Activity   • Alcohol use: No   • Drug use: No   • Sexual activity: Not on file   Lifestyle   • Physical activity     Days per week: Not on file     Minutes per session: Not on file   • Stress: Not on file   Relationships   • Social connections     Talks on phone: Not on file     Gets together: Not on file     Attends Caodaism service: Not on file     Active member of club or organization: Not on file     Attends meetings of clubs or organizations: Not on file     Relationship status: Not on file   • Intimate partner violence     Fear of current or ex partner: Not on file     Emotionally abused: Not on file     Physically abused: Not on file     Forced sexual activity: Not on file   Other Topics Concern   • Not on file   Social History Narrative    3/2015:: currently unemployed due to chronic low back pain, used to work renovating houses.  Lived with her mother until her mother's death in the summer of .  Currently living with her daughters, splits time between [facility].         ALLERGIES: Pcn [penicillins] and Percocet [oxycodone-acetaminophen]    Review of Systems   Constitutional: Negative.    HENT: Negative.    Eyes: Negative.    Respiratory: Negative.    Cardiovascular: Negative.    Gastrointestinal: Negative.    Genitourinary: Negative.    Musculoskeletal: Negative.    Skin: Negative.    Neurological: Positive for dizziness and light-headedness.   Psychiatric/Behavioral:  Negative. Vitals:    01/06/21 1006 01/06/21 1030 01/06/21 1033   BP:  132/79    Pulse: (!) 57 74 63   Resp: 16 16 12   Temp: 98 °F (36.7 °C)     SpO2: 100% 100% 99%   Weight: 98 kg (216 lb)     Height: 5' 6\" (1.676 m)              Physical Exam  Vitals signs and nursing note reviewed. Constitutional:       Appearance: She is well-developed. HENT:      Head: Normocephalic and atraumatic. Nose: Nose normal.      Mouth/Throat:      Mouth: Mucous membranes are moist.   Eyes:      Conjunctiva/sclera: Conjunctivae normal.      Pupils: Pupils are equal, round, and reactive to light. Neck:      Musculoskeletal: Normal range of motion and neck supple. Cardiovascular:      Rate and Rhythm: Normal rate and regular rhythm. Pulmonary:      Effort: Pulmonary effort is normal.      Breath sounds: Normal breath sounds. Abdominal:      Palpations: Abdomen is soft. Musculoskeletal: Normal range of motion. Skin:     General: Skin is warm and dry. Neurological:      Mental Status: She is alert and oriented to person, place, and time. Comments: Patient becomes very symptomatic with moving her head from left to right or with going from supine to the sitting position. Psychiatric:         Mood and Affect: Mood normal.          MDM  Number of Diagnoses or Management Options  Diagnosis management comments: Patient says she is starting to feel better after meds given in the emergency department. Her CAT scan with her and this is provided her reassurance. She understands and agrees with the disposition and follow-up plan. Call Johnson Lindsey MD      EKG was read by me at 10:13 AM showing a sinus bradycardia at a rate of 58 with no acute changes.          Procedures

## 2021-01-15 ENCOUNTER — OFFICE VISIT (OUTPATIENT)
Dept: ORTHOPEDIC SURGERY | Age: 61
End: 2021-01-15
Payer: MEDICAID

## 2021-01-15 VITALS
TEMPERATURE: 95.5 F | RESPIRATION RATE: 16 BRPM | OXYGEN SATURATION: 97 % | HEIGHT: 66 IN | HEART RATE: 82 BPM | SYSTOLIC BLOOD PRESSURE: 121 MMHG | DIASTOLIC BLOOD PRESSURE: 82 MMHG | WEIGHT: 227 LBS | BODY MASS INDEX: 36.48 KG/M2

## 2021-01-15 DIAGNOSIS — M65.332 TRIGGER FINGER, LEFT MIDDLE FINGER: Primary | ICD-10-CM

## 2021-01-15 PROCEDURE — 20550 NJX 1 TENDON SHEATH/LIGAMENT: CPT | Performed by: ORTHOPAEDIC SURGERY

## 2021-01-15 PROCEDURE — 99214 OFFICE O/P EST MOD 30 MIN: CPT | Performed by: ORTHOPAEDIC SURGERY

## 2021-01-15 RX ORDER — BETAMETHASONE SODIUM PHOSPHATE AND BETAMETHASONE ACETATE 3; 3 MG/ML; MG/ML
3 INJECTION, SUSPENSION INTRA-ARTICULAR; INTRALESIONAL; INTRAMUSCULAR; SOFT TISSUE ONCE
Status: COMPLETED | OUTPATIENT
Start: 2021-01-15 | End: 2021-01-15

## 2021-01-15 RX ADMIN — BETAMETHASONE SODIUM PHOSPHATE AND BETAMETHASONE ACETATE 3 MG: 3; 3 INJECTION, SUSPENSION INTRA-ARTICULAR; INTRALESIONAL; INTRAMUSCULAR; SOFT TISSUE at 09:14

## 2021-01-15 NOTE — PROGRESS NOTES
Patient: Pascual Grandchild                MRN: [id number]       SSN: [id number]  YOB: 1960        AGE: 63 y.o. SEX: female  Body mass index is 36.64 kg/m². PCP: Lisa Gonzales MD  01/15/21    HISTORY:  I had the pleasure of reviewing Aleida. It has been a few years since her right hip replacement. She has no pain and is quite pleased with things. She is here for evaluation of left hand pain, her left third digit triggers on her. She has had a previous injection, which helped considerably, but it started to re-trigger again in the last month or so and she would like to try another injection for this. The pain is mild to moderate. No other musculoskeletal issues recently. No shortness of breath or chest pain. PHYSICAL EXAMINATION:  She is walking well. There is no antalgic component to the gait. Her wrist is noncontributory. Good cap refill in the hands. Good sensation. She is definitely triggering at the level of the A1 pulley on the third digit and other digits are examined and are benign. She does have thickening at the A1 pulley. I think her anatomy is variable. Her A1 pulley is a little more distal than many. PROCEDURE:  Under aseptic conditions and standard protocol, left middle figer was injected at the A1 pulley with 1/2 and 1/2 preparation, 3 mg Celestone. PLAN:  She is going to return to see us in about a month or so if she is not happy with things and I think surgical release by one of my associates would be appropriate.     CWaylan Goltz, MD        REVIEW OF SYSTEMS:      CON: negative  EYE: negative   ENT: negative  RESP: negative  GI:    negative   :  negative  MSK: Positive  A twelve point review of systems was completed, positives noted and all other systems were reviewed and are negative          Past Medical History:   Diagnosis Date    Adverse effect of anesthesia     hard to wake up    Chronic pain     Depression     GERD (gastroesophageal reflux disease)     Hypothyroidism     Low back pain     Thromboembolus (HCC)     blood clot in foot during pregnancy    Tobacco abuse     Vertigo        Family History   Problem Relation Age of Onset    Hypertension Mother     Cancer Mother         multiple myeloma    Cancer Father         throat    Cancer Sister        Current Outpatient Medications   Medication Sig Dispense Refill    meclizine (ANTIVERT) 12.5 mg tablet Take 2 Tabs by mouth three (3) times daily as needed for Dizziness for up to 10 days. 30 Tab 0    bisacodyL 5 mg tab Take 5 mg by mouth daily. 3 Tab 0    acetaminophen (TYLENOL EXTRA STRENGTH) 500 mg tablet Take 500 mg by mouth every six (6) hours as needed for Pain.  liothyronine (CYTOMEL) 5 mcg tablet Take 5 mcg by mouth daily. 0    VOLTAREN 1 % gel Apply 4 g to affected area four (4) times daily. Maximum 16 grams per joint per day. Dispense 5 100 gram tubes 5 Each 0    levothyroxine (SYNTHROID) 50 mcg tablet Take 50 mcg by mouth Daily (before breakfast).  OTHER daily.       NP THYROID 15 mg tablet TK 3 TS PO D  1       Allergies   Allergen Reactions    Pcn [Penicillins] Hives    Percocet [Oxycodone-Acetaminophen] Itching       Past Surgical History:   Procedure Laterality Date    HAND/FINGER SURGERY UNLISTED      right thumb    HX BREAST BIOPSY Right 06/15/2020    HX BREAST BIOPSY Right 2020    TAG LOCALIZED EXCISIONAL BIOPSY RIGHT BREAST performed by Celi Adair [facility] MAIN OR    HX  SECTION      x3    HX HIP REPLACEMENT      HX HYSTERECTOMY      HX ORTHOPAEDIC  2002    2 disks removed lower back [facility].    HX ORTHOPAEDIC Right 2017    shoulder surgery    HX WRIST FRACTURE TX      R CTR    NEUROLOGICAL PROCEDURE UNLISTED         Social History     Socioeconomic History    Marital status: SINGLE     Spouse name: Not on file    Number of children: Not on file    Years of education: Not on file    Highest education level: Not on file   Occupational History    Not on file   Social Needs    Financial resource strain: Not on file    Food insecurity     Worry: Not on file     Inability: Not on file    Transportation needs     Medical: Not on file     Non-medical: Not on file   Tobacco Use    Smoking status: Former Smoker     Packs/day: 0.25     Years: 4.00     Pack years: 1.00     Quit date: 2015     Years since quittin.6    Smokeless tobacco: Never Used   Substance and Sexual Activity    Alcohol use: No    Drug use: No    Sexual activity: Not on file   Lifestyle    Physical activity     Days per week: Not on file     Minutes per session: Not on file    Stress: Not on file   Relationships    Social connections     Talks on phone: Not on file     Gets together: Not on file     Attends Protestant service: Not on file     Active member of club or organization: Not on file     Attends meetings of clubs or organizations: Not on file     Relationship status: Not on file    Intimate partner violence     Fear of current or ex partner: Not on file     Emotionally abused: Not on file     Physically abused: Not on file     Forced sexual activity: Not on file   Other Topics Concern    Not on file   Social History Narrative    3/2015[de-identified] currently unemployed due to chronic low back pain, used to work renovating houses. Lived with her mother until her mother's death in the summer of . Currently living with her daughters, splits time between [place] and [place]. Visit Vitals  /82 (BP 1 Location: Right arm)   Pulse 82   Temp (!) 95.5 °F (35.3 °C) (Temporal)   Resp 16   Ht 5' 6\" (1.676 m)   Wt 103 kg (227 lb)   SpO2 97%   BMI 36.64 kg/m²         PHYSICAL EXAMINATION:  GENERAL: Alert and oriented x3, in no acute distress, well-developed, well-nourished, afebrile. HEART: No JVD.   EYES: No scleral icterus   NECK: No significant lymphadenopathy   LUNGS: No respiratory compromise or indrawing  ABDOMEN: Soft, non-tender, non-distended. Electronically signed by: MD DORIS Dyer, Faheem Lombardi M.D., have reviewed the history, physical, and have updated the allergic reactions for Jett Ramos. TIME OUT performed immediately prior to the start of procedure:  Chino Aragon M.D., have performed the following reviews on Jett Ramos prior to the start of the procedure:    - Patient was identified by name and date of birth  - Agreement on procedure being performed was verified  - Risks and benefits explained to the patient  -Patient was positioned for comfort  - Consent was signed and verified    Time: 9:20 AM     Body Part: left middle trigger finger    Medication and Dose: 0.5 mL Celestone preparation, i.e. 3 mg. Date of Procedure: 01/15/21    PROCEDURE PERFORMED BY : Elihu Pallas L. June Arnold, M.D., [facility])    Provider Assisted by: Ora David    Patient assisted by: self    Patient tolerated procedure well with no complications

## 2021-04-29 ENCOUNTER — OFFICE VISIT (OUTPATIENT)
Dept: ORTHOPEDIC SURGERY | Age: 61
End: 2021-04-29
Payer: MEDICAID

## 2021-04-29 VITALS
OXYGEN SATURATION: 97 % | HEIGHT: 66 IN | HEART RATE: 87 BPM | WEIGHT: 231 LBS | BODY MASS INDEX: 37.12 KG/M2 | RESPIRATION RATE: 16 BRPM

## 2021-04-29 DIAGNOSIS — M65.332 TRIGGER FINGER, LEFT MIDDLE FINGER: ICD-10-CM

## 2021-04-29 DIAGNOSIS — D36.9 INTRADUCTAL PAPILLOMA: Primary | ICD-10-CM

## 2021-04-29 DIAGNOSIS — Z96.641 HISTORY OF TOTAL RIGHT HIP REPLACEMENT: Primary | ICD-10-CM

## 2021-04-29 DIAGNOSIS — M25.551 RIGHT HIP PAIN: ICD-10-CM

## 2021-04-29 DIAGNOSIS — D36.9 INTRADUCTAL PAPILLOMA: ICD-10-CM

## 2021-04-29 PROCEDURE — 99214 OFFICE O/P EST MOD 30 MIN: CPT | Performed by: PHYSICIAN ASSISTANT

## 2021-04-29 NOTE — PROGRESS NOTES
[address] [place] [address]  [phone]           Patient: Clarice Montgomery                MRN: [id number]       SSN: [id number]  YOB: 1960        AGE: 63 y.o. SEX: female  Body mass index is 37.28 kg/m². PCP: Yury Moore MD  04/29/21      This office note has been dictated. REVIEW OF SYSTEMS:  Constitutional: Negative for fever, chills, weight loss and malaise/fatigue. HENT: Negative. Eyes: Negative. Respiratory: Negative. Cardiovascular: Negative. Gastrointestinal: No bowel incontinence or constipation. Genitourinary: No bladder incontinence or saddle anesthesia. Skin: Negative. Neurological: Negative. Endo/Heme/Allergies: Negative. Psychiatric/Behavioral: Negative. Musculoskeletal: As per HPI above. Past Medical History:   Diagnosis Date    Adverse effect of anesthesia     hard to wake up    Chronic pain     Depression     GERD (gastroesophageal reflux disease)     Hypothyroidism     Low back pain     Thromboembolus (HCC)     blood clot in foot during pregnancy    Tobacco abuse     Vertigo          Current Outpatient Medications:     bisacodyL 5 mg tab, Take 5 mg by mouth daily. , Disp: 3 Tab, Rfl: 0    acetaminophen (TYLENOL EXTRA STRENGTH) 500 mg tablet, Take 500 mg by mouth every six (6) hours as needed for Pain., Disp: , Rfl:     liothyronine (CYTOMEL) 5 mcg tablet, Take 5 mcg by mouth daily. , Disp: , Rfl: 0    VOLTAREN 1 % gel, Apply 4 g to affected area four (4) times daily. Maximum 16 grams per joint per day. Dispense 5 100 gram tubes, Disp: 5 Each, Rfl: 0    levothyroxine (SYNTHROID) 50 mcg tablet, Take 50 mcg by mouth Daily (before breakfast). , Disp: , Rfl:     OTHER, daily. , Disp: , Rfl:     NP THYROID 15 mg tablet, TK 3 TS PO D, Disp: , Rfl: 1    Allergies   Allergen Reactions    Pcn [Penicillins] Hives    Percocet [Oxycodone-Acetaminophen] Itching Social History     Socioeconomic History    Marital status: SINGLE     Spouse name: Not on file    Number of children: Not on file    Years of education: Not on file    Highest education level: Not on file   Occupational History    Not on file   Social Needs    Financial resource strain: Not on file    Food insecurity     Worry: Not on file     Inability: Not on file    Transportation needs     Medical: Not on file     Non-medical: Not on file   Tobacco Use    Smoking status: Former Smoker     Packs/day: 0.25     Years: 4.00     Pack years: 1.00     Quit date: 2015     Years since quittin.9    Smokeless tobacco: Never Used   Substance and Sexual Activity    Alcohol use: No    Drug use: No    Sexual activity: Not on file   Lifestyle    Physical activity     Days per week: Not on file     Minutes per session: Not on file    Stress: Not on file   Relationships    Social connections     Talks on phone: Not on file     Gets together: Not on file     Attends Orthodoxy service: Not on file     Active member of club or organization: Not on file     Attends meetings of clubs or organizations: Not on file     Relationship status: Not on file    Intimate partner violence     Fear of current or ex partner: Not on file     Emotionally abused: Not on file     Physically abused: Not on file     Forced sexual activity: Not on file   Other Topics Concern    Not on file   Social History Narrative    3/2015[de-identified] currently unemployed due to chronic low back pain, used to work renovating houses. Lived with her mother until her mother's death in the summer of . Currently living with her daughters, splits time between [place] and [place].        Past Surgical History:   Procedure Laterality Date    HAND/FINGER SURGERY UNLISTED      right thumb    HX BREAST BIOPSY Right 06/15/2020    HX BREAST BIOPSY Right 2020    TAG LOCALIZED EXCISIONAL BIOPSY RIGHT BREAST performed by [facility]    HX  SECTION      x3    HX HIP REPLACEMENT      HX HYSTERECTOMY      HX ORTHOPAEDIC  2002    2 disks removed lower back [facility].    HX ORTHOPAEDIC Right 2017    shoulder surgery    HX WRIST FRACTURE TX      R CTR    NEUROLOGICAL PROCEDURE UNLISTED               Patient seen and evaluated today for her right hip and hands. She is status post right hip replacement in the past.  She was sent to physical therapy for more strengthening. She states the hip is feeling quite good. She is very pleased with the results. She is having no pain. In regards to the hands she has had a trigger finger on the left as well as some arthritis on her right hand. She had 3 injections previously on the left hand which have not helped. No numbness or tingling. She is using Voltaren gel. Patient denies recent fevers, chills, chest pain, SOB, or injuries. No recent systemic changes noted. A 12-point review of systems is performed today. Pertinent positives are noted. All other systems reviewed and otherwise are negative. Physical exam: General: Alert and oriented x3, nad.  well-developed, well nourished. normal affect, AF. NC/AT, EOMI, neck supple, trachea midline, no JVD present. Breathing is non-labored. Examination of the hands reveals skin intact. There is no erythema, ecchymosis, warmth. Neurovascular status intact. She does have some discomfort to the DIP of the third digit on the right hand as well as triggering on the third digit of the left hand. She does have some discomfort to palpation to the A1 pulley. Examination lower extremities reveals pain-free range of motion hips. There is no pain to palpation trochanter bursa. Negative straight leg raise. Negative calf tenderness. Negative Homans. No signs of DVT present. Abduction strength is 5 - on the right and 5 out of 5 in the left. Leg lengths are perfect.     Assessment: #1 bilateral hand pain, #2 status post right hip replacement    Plan: At this point, we will refer her to Dr. Rebeca Brambila for further evaluation treatment options regarding her hands. Surgical intervention and trigger release was discussed. She will continue with home exercise program and sideline abduction exercises regarding hip replacement. We will see her back in 3 months time for evaluation.         JR Mathew TAVERAS, PA-C, ATC

## 2021-05-13 ENCOUNTER — OFFICE VISIT (OUTPATIENT)
Dept: ORTHOPEDIC SURGERY | Age: 61
End: 2021-05-13
Payer: MEDICAID

## 2021-05-13 VITALS
OXYGEN SATURATION: 98 % | BODY MASS INDEX: 37.77 KG/M2 | HEART RATE: 75 BPM | WEIGHT: 235 LBS | TEMPERATURE: 97.5 F | HEIGHT: 66 IN

## 2021-05-13 DIAGNOSIS — M65.332 TRIGGER MIDDLE FINGER OF LEFT HAND: Primary | ICD-10-CM

## 2021-05-13 DIAGNOSIS — M65.331 TRIGGER MIDDLE FINGER OF RIGHT HAND: ICD-10-CM

## 2021-05-13 PROCEDURE — 99214 OFFICE O/P EST MOD 30 MIN: CPT | Performed by: ORTHOPAEDIC SURGERY

## 2021-05-13 NOTE — LETTER
Patient: Raydell                         PROCEDURE: Left Middle Finger A1 Pulley Release    PRE OPERATIVE INSTRUCTIONS:  Five (5) days prior to surgery STOP taking any hormonal medications, aspirin, fish oil and/or anti-inflammatory medications. If you are taking blood thinner medication (such as Coumadin, Plavix, Heparin or others) you will need special instructions from the prescribing physician. LABS: Report to [facility] at [facility] on 6/7/2021 between 1:00pm-3:45pm for bloodwork & COVID testing.     o It doesn't matter if you have eaten before going to the Lab. o If required labs are not completed Surgery will be canceled. Surgery Date: 6/10/2021  Time: 1:15pm  Report to [facility] on the First Floor Admitting at: 11:45am    THE DAY OF SURGERY:         1. Do not eat, chew gum or drink anything after midnight prior to the date of your surgery. 2. Take your blood pressure and/or heart medications with small sips of water unless otherwise instructed. If you are insulin dependent, bring your insulin with you, unless otherwise instructed. 3. Bring a list of your medications and the dosage to the hospital including  vitamins. 4. Do not wear nail polish, make-up, jewelry, perfumes or skin creams. 5. Do not bring valuables or money to the hospital.        6. You MUST have a responsible adult arranged to drive you home following surgery. This person will NOT be allowed to wait inside the hospital during your surgery. Please have good contact information available to give the hospital. It is recommended that they stay with you 24 hours after surgery. Post op visit  appointment is scheduled with Dr. Ronan Feng       on 6/24/2021 @ 9:15am at the [facility] office.       Aracely Jeffrey, Surgery Scheduler  [phone]

## 2021-05-13 NOTE — PROGRESS NOTES
Silke Manning is a 63 y.o. female right handed retiree. Worker's Compensation and legal considerations: none filed. Vitals:    21 0942   Pulse: 75   Temp: 97.5 °F (36.4 °C)   TempSrc: Skin   SpO2: 98%   Weight: 235 lb (106.6 kg)   Height: 5' 6\" (1.676 m)   PainSc:   5   PainLoc: Hand           Chief Complaint   Patient presents with    Hand Pain     bilateral       HPI: Patient presents today for new problem of bilateral middle finger pain and locking. Initial HPI: She comes in today as a referral regarding a positive EMG result of carpal tunnel syndrome. She also has a history of cervical spine disease. This has previously been treated with surgery. Date of onset:      Injury: No    Prior Treatment:  Yes: Comment: Injections by another provider.     Numbness/ Tingling: No    ROS: Review of Systems - General ROS: negative  Psychological ROS: negative  Allergy and Immunology ROS: negative  Hematological and Lymphatic ROS: negative  Respiratory ROS: no cough, shortness of breath, or wheezing  Cardiovascular ROS: no chest pain or dyspnea on exertion  Gastrointestinal ROS: no abdominal pain, change in bowel habits, or black or bloody stools  Musculoskeletal ROS: positive for - pain in hand -bilateral  Neurological ROS: Negative  Dermatological ROS: negative    Past Medical History:   Diagnosis Date    Adverse effect of anesthesia     hard to wake up    Chronic pain     Depression     GERD (gastroesophageal reflux disease)     Hypothyroidism     Low back pain     Thromboembolus (HCC)     blood clot in foot during pregnancy    Tobacco abuse     Vertigo        Past Surgical History:   Procedure Laterality Date    HAND/FINGER SURGERY UNLISTED      right thumb    HX BREAST BIOPSY Right 06/15/2020    HX BREAST BIOPSY Right 2020    TAG LOCALIZED EXCISIONAL BIOPSY RIGHT BREAST performed by Jade Kanner, MD at [address]    HX  SECTION      x3    HX HIP REPLACEMENT  HX HYSTERECTOMY      HX ORTHOPAEDIC  2002    2 disks removed lower back [facility].    HX ORTHOPAEDIC Right 2017    shoulder surgery    HX WRIST FRACTURE TX      R CTR    NEUROLOGICAL PROCEDURE UNLISTED         Current Outpatient Medications   Medication Sig Dispense Refill    bisacodyL 5 mg tab Take 5 mg by mouth daily. 3 Tab 0    acetaminophen (TYLENOL EXTRA STRENGTH) 500 mg tablet Take 500 mg by mouth every six (6) hours as needed for Pain.  liothyronine (CYTOMEL) 5 mcg tablet Take 5 mcg by mouth daily. 0    VOLTAREN 1 % gel Apply 4 g to affected area four (4) times daily. Maximum 16 grams per joint per day. Dispense 5 100 gram tubes 5 Each 0    levothyroxine (SYNTHROID) 50 mcg tablet Take 50 mcg by mouth Daily (before breakfast).  OTHER daily.  NP THYROID 15 mg tablet TK 3 TS PO D  1       Allergies   Allergen Reactions    Pcn [Penicillins] Hives    Percocet [Oxycodone-Acetaminophen] Itching         PE:     Physical Exam  Vitals signs and nursing note reviewed. Constitutional:       General: She is not in acute distress. Appearance: Normal appearance. She is not ill-appearing, toxic-appearing or diaphoretic. HENT:      Head: Normocephalic and atraumatic. Nose: Nose normal.      Mouth/Throat:      Mouth: Mucous membranes are moist.   Eyes:      Extraocular Movements: Extraocular movements intact. Pupils: Pupils are equal, round, and reactive to light. Neck:      Musculoskeletal: Normal range of motion and neck supple. Cardiovascular:      Pulses: Normal pulses. Pulmonary:      Effort: Pulmonary effort is normal. No respiratory distress. Abdominal:      General: Abdomen is flat. There is no distension. Musculoskeletal:         General: Swelling and tenderness present. No deformity or signs of injury. Right lower leg: No edema. Left lower leg: No edema. Skin:     General: Skin is warm and dry.       Capillary Refill: Capillary refill takes less than 2 seconds. Findings: No bruising or erythema. Neurological:      General: No focal deficit present. Mental Status: She is alert and oriented to person, place, and time. Cranial Nerves: No cranial nerve deficit. Sensory: No sensory deficit. Psychiatric:         Mood and Affect: Mood normal.         Behavior: Behavior normal.           Hand:    Examination L Digit(s) R Digit(s)   1st CMC Tenderness -  -    1st CMC Grind -  -    Mana Nodes -  -    Heberden Nodes -  -    A1 Pulley Tenderness + LF + LF   Triggering +  +    UCL Instability -  -    RCL Instability -  -    Lateral Stress Pain -  -    Palmar Cords -  -    Tabletop test -  -    Garrod's Pads -  -     Strength       Pinch Strength         ROM: Full      NCV & EMG Findings:  Evaluation of the right median motor nerve showed prolonged distal onset latency (4.5 ms). The right median sensory nerve showed prolonged distal peak latency (4.0 ms) and decreased conduction velocity (Wrist-2nd Digit, 35 m/s). All remaining nerves (as indicated in the following tables) were within normal limits.       Needle evaluation of the right biceps muscle showed increased motor unit amplitude and moderately increased polyphasic potentials. The right triceps muscle showed increased motor unit amplitude. The right pronator teres muscle showed increased insertional activity and increased spontaneous activity. The right Ext Digitorum muscle showed increased insertional activity, increased spontaneous activity, and diminished recruitment. All remaining muscles (as indicated in the following table) showed no evidence of electrical instability.          INTERPRETATION  This is an abnormal electrodiagnostic examination. These findings may be consistent with:  1. Moderate median mononeuropathy at the right wrist (carpal tunnel syndrome)  2. Ongoing/acute right C6/7 radiculopathy.   3. Chronic, inactive right C5/6 radiculopathy     CLINICAL INTERPRETATION  Her electrodiagnostic findings of cervical radiculopathy and CTS are likely related to her right arm symptoms. Imaging: none indicated        ICD-10-CM ICD-9-CM    1. Trigger middle finger of left hand  M65.332 727.03 SCHEDULE SURGERY   2. Trigger middle finger of right hand  M65.331 727.03        Plan:     Schedule left middle finger A1 pulley release    This procedure has been fully reviewed with the patient and written informed consent has been obtained. The patient was counseled at length about the risks of [place] Covid-19 during their perioperative period and any recovery window from their procedure. The patient was made aware that k[place] Covid-19  may worsen their prognosis for recovering from their procedure and lend to a higher morbidity and/or mortality risk. All material risks, benefits, and reasonable alternatives including postponing the procedure were discussed. The patient does  wish to proceed with the procedure at this time. Follow-up and Dispositions    · Return for 2 weeks postop and right-sided surgical discussion.          Plan was reviewed with patient, who verbalized agreement and understanding of the plan

## 2021-05-21 ENCOUNTER — HOSPITAL ENCOUNTER (OUTPATIENT)
Dept: MAMMOGRAPHY | Age: 61
Discharge: HOME | End: 2021-05-21
Attending: SURGERY
Payer: MEDICAID

## 2021-05-21 DIAGNOSIS — D36.9 INTRADUCTAL PAPILLOMA: ICD-10-CM

## 2021-05-21 PROCEDURE — 77061 BREAST TOMOSYNTHESIS UNI: CPT

## 2021-05-21 PROCEDURE — 77066 DX MAMMO INCL CAD BI: CPT

## 2021-05-27 DIAGNOSIS — Z01.818 PREOP EXAMINATION: Primary | ICD-10-CM

## 2021-06-07 ENCOUNTER — HOSPITAL ENCOUNTER (OUTPATIENT)
Dept: LAB | Age: 61
Discharge: HOME | End: 2021-06-07

## 2021-06-07 DIAGNOSIS — Z01.818 PREOP EXAMINATION: Primary | ICD-10-CM

## 2021-06-07 LAB
SENTARA SPECIMEN COL,SENBCF: NORMAL
SENTARA SPECIMEN COL,SENBCF: NORMAL

## 2021-06-07 PROCEDURE — 99001 SPECIMEN HANDLING PT-LAB: CPT

## 2021-06-08 LAB
A-G RATIO,AGRAT: 1.9 RATIO (ref 1.1–2.6)
ABSOLUTE LYMPHOCYTE COUNT, 10803: 2.7 K/UL (ref 1–4.8)
ALBUMIN SERPL-MCNC: 4.3 G/DL (ref 3.5–5)
ALP SERPL-CCNC: 48 U/L (ref 40–120)
ALT SERPL-CCNC: 20 U/L (ref 5–40)
ANION GAP SERPL CALC-SCNC: 8 MMOL/L (ref 3–15)
AST SERPL W P-5'-P-CCNC: 17 U/L (ref 10–37)
BASOPHILS # BLD: 0 K/UL (ref 0–0.2)
BASOPHILS NFR BLD: 0 % (ref 0–2)
BILIRUB SERPL-MCNC: 0.2 MG/DL (ref 0.2–1.2)
BUN SERPL-MCNC: 7 MG/DL (ref 6–22)
CALCIUM SERPL-MCNC: 9.8 MG/DL (ref 8.4–10.5)
CHLORIDE SERPL-SCNC: 106 MMOL/L (ref 98–110)
CO2 SERPL-SCNC: 28 MMOL/L (ref 20–32)
CREAT SERPL-MCNC: 0.8 MG/DL (ref 0.8–1.4)
EOSINOPHIL # BLD: 0.1 K/UL (ref 0–0.5)
EOSINOPHIL NFR BLD: 1 % (ref 0–6)
ERYTHROCYTE [DISTWIDTH] IN BLOOD BY AUTOMATED COUNT: 14.6 % (ref 10–15.5)
GFRAA, 66117: >60
GFRNA, 66118: >60
GLOBULIN,GLOB: 2.3 G/DL (ref 2–4)
GLUCOSE SERPL-MCNC: 82 MG/DL (ref 70–99)
GRANULOCYTES,GRANS: 45 % (ref 40–75)
HCT VFR BLD AUTO: 37 % (ref 35.1–48)
HGB BLD-MCNC: 11.4 G/DL (ref 11.7–16)
LYMPHOCYTES, LYMLT: 45 % (ref 20–45)
MCH RBC QN AUTO: 30 PG (ref 26–34)
MCHC RBC AUTO-ENTMCNC: 31 G/DL (ref 31–36)
MCV RBC AUTO: 96 FL (ref 81–99)
MONOCYTES # BLD: 0.5 K/UL (ref 0.1–1)
MONOCYTES NFR BLD: 9 % (ref 3–12)
NEUTROPHILS # BLD AUTO: 2.7 K/UL (ref 1.8–7.7)
PLATELET # BLD AUTO: 265 K/UL (ref 140–440)
PMV BLD AUTO: 10 FL (ref 9–13)
POTASSIUM SERPL-SCNC: 4.2 MMOL/L (ref 3.5–5.5)
PROT SERPL-MCNC: 6.6 G/DL (ref 6.2–8.1)
RBC # BLD AUTO: 3.84 M/UL (ref 3.8–5.2)
SODIUM SERPL-SCNC: 142 MMOL/L (ref 133–145)
WBC # BLD AUTO: 6 K/UL (ref 4–11)

## 2021-06-09 DIAGNOSIS — Z01.818 PREOP EXAMINATION: ICD-10-CM

## 2021-06-09 LAB — SARS-COV-2, COV2: NOT DETECTED

## 2021-06-10 DIAGNOSIS — Z98.890 S/P TRIGGER FINGER RELEASE: ICD-10-CM

## 2021-06-10 DIAGNOSIS — M65.332 TRIGGER MIDDLE FINGER OF LEFT HAND: Primary | ICD-10-CM

## 2021-06-10 RX ORDER — DICLOFENAC SODIUM 75 MG/1
75 TABLET, DELAYED RELEASE ORAL 2 TIMES DAILY WITH MEALS
Qty: 10 TABLET | Refills: 0 | Status: SHIPPED | OUTPATIENT
Start: 2021-06-10 | End: 2021-06-15

## 2021-06-10 RX ORDER — TRAMADOL HYDROCHLORIDE 50 MG/1
50 TABLET ORAL
Qty: 12 TABLET | Refills: 0 | Status: SHIPPED | OUTPATIENT
Start: 2021-06-10 | End: 2021-06-13

## 2021-06-24 ENCOUNTER — OFFICE VISIT (OUTPATIENT)
Dept: ORTHOPEDIC SURGERY | Age: 61
End: 2021-06-24
Payer: MEDICAID

## 2021-06-24 VITALS
WEIGHT: 232 LBS | TEMPERATURE: 97.1 F | BODY MASS INDEX: 37.28 KG/M2 | HEART RATE: 82 BPM | OXYGEN SATURATION: 97 % | RESPIRATION RATE: 16 BRPM | HEIGHT: 66 IN

## 2021-06-24 DIAGNOSIS — S66.901A INJURY OF EXTENSOR TENDON OF RIGHT HAND, INITIAL ENCOUNTER: Primary | ICD-10-CM

## 2021-06-24 DIAGNOSIS — M65.332 TRIGGER MIDDLE FINGER OF LEFT HAND: ICD-10-CM

## 2021-06-24 DIAGNOSIS — Z98.890 S/P TRIGGER FINGER RELEASE: ICD-10-CM

## 2021-06-24 PROCEDURE — 99024 POSTOP FOLLOW-UP VISIT: CPT | Performed by: ORTHOPAEDIC SURGERY

## 2021-06-24 NOTE — PROGRESS NOTES
Rossy Torres is a 63 y.o. female right handed retiree. Worker's Compensation and legal considerations: none filed. Vitals:    06/24/21 0842   Pulse: 82   Resp: 16   Temp: 97.1 °F (36.2 °C)   SpO2: 97%   Weight: 232 lb (105.2 kg)   Height: 5' 6\" (1.676 m)   PainSc:   0 - No pain           Chief Complaint   Patient presents with    Surgical Follow-up     left middle finger A1 pulley release. HPI: Patient presents today with a new problem of right middle finger extensor lag. She reports has been like this for some time. She is also here for first postop appointment 2 weeks status post left middle finger A1 pulley release. Preop HPI: Patient presents today for new problem of bilateral middle finger pain and locking. Initial HPI: She comes in today as a referral regarding a positive EMG result of carpal tunnel syndrome. She also has a history of cervical spine disease. This has previously been treated with surgery. Date of onset:  2017    Injury: No    Prior Treatment:  Yes: Comment: On left to left middle finger A1 pulley release, on the right no treatment so far.     Numbness/ Tingling: No    ROS: Review of Systems - General ROS: negative  Psychological ROS: negative  Allergy and Immunology ROS: negative  Hematological and Lymphatic ROS: negative  Respiratory ROS: no cough, shortness of breath, or wheezing  Cardiovascular ROS: no chest pain or dyspnea on exertion  Gastrointestinal ROS: no abdominal pain, change in bowel habits, or black or bloody stools  Musculoskeletal ROS: positive for - pain in hand -bilateral  Neurological ROS: Negative  Dermatological ROS: negative    Past Medical History:   Diagnosis Date    Adverse effect of anesthesia     hard to wake up    Chronic pain     Depression     GERD (gastroesophageal reflux disease)     Hypothyroidism     Low back pain     Thromboembolus (HCC)     blood clot in foot during pregnancy    Tobacco abuse     Vertigo        Past Surgical History:   Procedure Laterality Date    HAND/FINGER SURGERY UNLISTED      right thumb    HX BREAST BIOPSY Right 06/15/2020    HX BREAST BIOPSY Right 2020    TAG LOCALIZED EXCISIONAL BIOPSY RIGHT BREAST performed by Briseyda Dai MD at [address] HX  SECTION      x3    HX HIP REPLACEMENT      HX HYSTERECTOMY      HX ORTHOPAEDIC  2002    2 disks removed lower back [facility].    HX ORTHOPAEDIC Right 2017    shoulder surgery    HX OTHER SURGICAL      Left middle finger A1 pulley release    HX WRIST FRACTURE TX      R CTR    NEUROLOGICAL PROCEDURE UNLISTED         Current Outpatient Medications   Medication Sig Dispense Refill    bisacodyL 5 mg tab Take 5 mg by mouth daily. 3 Tab 0    acetaminophen (TYLENOL EXTRA STRENGTH) 500 mg tablet Take 500 mg by mouth every six (6) hours as needed for Pain.  liothyronine (CYTOMEL) 5 mcg tablet Take 5 mcg by mouth daily. 0    VOLTAREN 1 % gel Apply 4 g to affected area four (4) times daily. Maximum 16 grams per joint per day. Dispense 5 100 gram tubes 5 Each 0    levothyroxine (SYNTHROID) 50 mcg tablet Take 50 mcg by mouth Daily (before breakfast).  OTHER daily. (Patient not taking: Reported on 2021)      NP THYROID 15 mg tablet TK 3 TS PO D (Patient not taking: Reported on 2021)  1       Allergies   Allergen Reactions    Pcn [Penicillins] Hives    Percocet [Oxycodone-Acetaminophen] Itching         PE:     Physical Exam  Vitals and nursing note reviewed. Constitutional:       General: She is not in acute distress. Appearance: Normal appearance. She is not ill-appearing, toxic-appearing or diaphoretic. Cardiovascular:      Pulses: Normal pulses. Pulmonary:      Effort: Pulmonary effort is normal. No respiratory distress. Abdominal:      General: Abdomen is flat. There is no distension. Musculoskeletal:         General: No swelling, tenderness, deformity or signs of injury.       Cervical back: Normal range of motion and neck supple. Right lower leg: No edema. Left lower leg: No edema. Skin:     General: Skin is warm and dry. Capillary Refill: Capillary refill takes less than 2 seconds. Findings: No bruising or erythema. Neurological:      General: No focal deficit present. Mental Status: She is alert and oriented to person, place, and time. Cranial Nerves: No cranial nerve deficit. Sensory: No sensory deficit. Psychiatric:         Mood and Affect: Mood normal.         Behavior: Behavior normal.           Left hand: Incision clean dry and intact with no drainage breakdown erythema or any signs of infection. Range of motion full. Sensation grossly intact distally. Right hand: There appears to be an extensor lag that is passively correctable but not actively correctable in the middle finger. There is no evidence of injury. Sensation grossly intact distally and cap refill brisk. NCV & EMG Findings:  Evaluation of the right median motor nerve showed prolonged distal onset latency (4.5 ms). The right median sensory nerve showed prolonged distal peak latency (4.0 ms) and decreased conduction velocity (Wrist-2nd Digit, 35 m/s). All remaining nerves (as indicated in the following tables) were within normal limits.       Needle evaluation of the right biceps muscle showed increased motor unit amplitude and moderately increased polyphasic potentials. The right triceps muscle showed increased motor unit amplitude. The right pronator teres muscle showed increased insertional activity and increased spontaneous activity. The right Ext Digitorum muscle showed increased insertional activity, increased spontaneous activity, and diminished recruitment. All remaining muscles (as indicated in the following table) showed no evidence of electrical instability.          INTERPRETATION  This is an abnormal electrodiagnostic examination.  These findings may be consistent with:  1. Moderate median mononeuropathy at the right wrist (carpal tunnel syndrome)  2. Ongoing/acute right C6/7 radiculopathy. 3. Chronic, inactive right C5/6 radiculopathy     CLINICAL INTERPRETATION  Her electrodiagnostic findings of cervical radiculopathy and CTS are likely related to her right arm symptoms. Imaging: none indicated        ICD-10-CM ICD-9-CM    1. Injury of extensor tendon of right hand, initial encounter  S66.901A 959.4 MRI HAND RT WO CONT       Plan:     MRI of right hand without contrast to rule out an extensor tendon injury. Discussed range of motion exercises, edema control, and scar care. Follow-up and Dispositions    · Return for MRI review.          Plan was reviewed with patient, who verbalized agreement and understanding of the plan

## 2021-07-08 ENCOUNTER — HOSPITAL ENCOUNTER (OUTPATIENT)
Age: 61
Discharge: HOME | End: 2021-07-08
Attending: ORTHOPAEDIC SURGERY
Payer: MEDICAID

## 2021-07-08 DIAGNOSIS — S66.901A INJURY OF EXTENSOR TENDON OF RIGHT HAND, INITIAL ENCOUNTER: ICD-10-CM

## 2021-07-08 PROCEDURE — 73218 MRI UPPER EXTREMITY W/O DYE: CPT

## 2021-07-22 ENCOUNTER — OFFICE VISIT (OUTPATIENT)
Dept: ORTHOPEDIC SURGERY | Age: 61
End: 2021-07-22
Payer: MEDICAID

## 2021-07-22 VITALS
BODY MASS INDEX: 37.19 KG/M2 | HEIGHT: 66 IN | OXYGEN SATURATION: 96 % | TEMPERATURE: 97.8 F | WEIGHT: 231.4 LBS | HEART RATE: 84 BPM

## 2021-07-22 DIAGNOSIS — S63.632S SPRAIN OF INTERPHALANGEAL JOINT OF RIGHT MIDDLE FINGER, SEQUELA: ICD-10-CM

## 2021-07-22 DIAGNOSIS — Z98.890 S/P TRIGGER FINGER RELEASE: ICD-10-CM

## 2021-07-22 DIAGNOSIS — M65.332 TRIGGER MIDDLE FINGER OF LEFT HAND: ICD-10-CM

## 2021-07-22 DIAGNOSIS — S66.801A: Primary | ICD-10-CM

## 2021-07-22 PROCEDURE — 99214 OFFICE O/P EST MOD 30 MIN: CPT | Performed by: ORTHOPAEDIC SURGERY

## 2021-07-22 PROCEDURE — 99024 POSTOP FOLLOW-UP VISIT: CPT | Performed by: ORTHOPAEDIC SURGERY

## 2021-07-22 RX ORDER — MECLIZINE HYDROCHLORIDE 25 MG/1
TABLET ORAL
COMMUNITY
Start: 2021-07-21

## 2021-07-22 NOTE — PROGRESS NOTES
Romulo Bullard is a 63 y.o. female right handed retiree. Worker's Compensation and legal considerations: none filed. Vitals:    07/22/21 0855   Pulse: 84   Temp: 97.8 °F (36.6 °C)   TempSrc: Skin   SpO2: 96%   Weight: 231 lb 6.4 oz (105 kg)   Height: 5' 6\" (1.676 m)   PainSc:   0 - No pain   PainLoc: Hand           Chief Complaint   Patient presents with    Hand Pain     right, MRI review       HPI: Patient presents today for MRI review of the right side as well as being approximately 6 weeks status post left middle finger A1 pulley release. She reports no pain on the right side or the left side. She has been doing exercises at home for the right.    6/24/2021 HPI: Patient presents today with a new problem of right middle finger extensor lag. She reports has been like this for some time. She is also here for first postop appointment 2 weeks status post left middle finger A1 pulley release. Preop HPI: Patient presents today for new problem of bilateral middle finger pain and locking. Initial HPI: She comes in today as a referral regarding a positive EMG result of carpal tunnel syndrome. She also has a history of cervical spine disease. This has previously been treated with surgery. Date of onset:  2017    Injury: No    Prior Treatment:  Yes: Comment: On left to left middle finger A1 pulley release, on the right no treatment so far.     Numbness/ Tingling: No    ROS: Review of Systems - General ROS: negative  Psychological ROS: negative  Allergy and Immunology ROS: negative  Hematological and Lymphatic ROS: negative  Respiratory ROS: no cough, shortness of breath, or wheezing  Cardiovascular ROS: no chest pain or dyspnea on exertion  Gastrointestinal ROS: no abdominal pain, change in bowel habits, or black or bloody stools  Musculoskeletal ROS: positive for - pain in hand -bilateral  Neurological ROS: Negative  Dermatological ROS: negative    Past Medical History:   Diagnosis Date    Adverse effect of anesthesia     hard to wake up    Chronic pain     Depression     GERD (gastroesophageal reflux disease)     Hypothyroidism     Low back pain     Right hand pain     Thromboembolus (HCC)     blood clot in foot during pregnancy    Tobacco abuse     Vertigo        Past Surgical History:   Procedure Laterality Date    HAND/FINGER SURGERY UNLISTED      right thumb    HX BREAST BIOPSY Right 06/15/2020    HX BREAST BIOPSY Right 8/7/2020    TAG LOCALIZED EXCISIONAL BIOPSY RIGHT BREAST performed by Jessy Nicole MD at [address] [place] [address]  [facility]      x3    HX HIP REPLACEMENT      HX HYSTERECTOMY      HX ORTHOPAEDIC  2002    2 disks removed lower back [facility].    HX ORTHOPAEDIC Right 2017    shoulder surgery    HX OTHER SURGICAL      Left middle finger A1 pulley release    HX WRIST FRACTURE TX      R CTR    NEUROLOGICAL PROCEDURE UNLISTED         Current Outpatient Medications   Medication Sig Dispense Refill    meclizine (ANTIVERT) 25 mg tablet       bisacodyL 5 mg tab Take 5 mg by mouth daily. 3 Tab 0    acetaminophen (TYLENOL EXTRA STRENGTH) 500 mg tablet Take 500 mg by mouth every six (6) hours as needed for Pain.  liothyronine (CYTOMEL) 5 mcg tablet Take 5 mcg by mouth daily. 0    VOLTAREN 1 % gel Apply 4 g to affected area four (4) times daily. Maximum 16 grams per joint per day. Dispense 5 100 gram tubes 5 Each 0    levothyroxine (SYNTHROID) 50 mcg tablet Take 50 mcg by mouth Daily (before breakfast).  OTHER daily. (Patient not taking: Reported on 6/24/2021)      NP THYROID 15 mg tablet TK 3 TS PO D (Patient not taking: Reported on 6/24/2021)  1       Allergies   Allergen Reactions    Pcn [Penicillins] Hives    Percocet [Oxycodone-Acetaminophen] Itching         PE:     Physical Exam  Vitals and nursing note reviewed. Constitutional:       General: She is not in acute distress. Appearance: Normal appearance. She is not ill-appearing. Cardiovascular:      Pulses: Normal pulses. Pulmonary:      Effort: Pulmonary effort is normal. No respiratory distress. Musculoskeletal:         General: No swelling, tenderness, deformity or signs of injury. Cervical back: Normal range of motion and neck supple. Right lower leg: No edema. Left lower leg: No edema. Skin:     General: Skin is warm and dry. Capillary Refill: Capillary refill takes less than 2 seconds. Findings: No bruising or erythema. Neurological:      General: No focal deficit present. Mental Status: She is alert and oriented to person, place, and time. Cranial Nerves: No cranial nerve deficit. Sensory: No sensory deficit. Psychiatric:         Mood and Affect: Mood normal.         Behavior: Behavior normal.         Right hand: Again there is a noted extensor lag of the middle finger. Sensation grossly intact distally and cap refill brisk. The leg is approximately 10 degrees. Left hand: Incision healed. Sensation grossly intact distally. Cap refill brisk. Range of motion full. NCV & EMG Findings:  Evaluation of the right median motor nerve showed prolonged distal onset latency (4.5 ms). The right median sensory nerve showed prolonged distal peak latency (4.0 ms) and decreased conduction velocity (Wrist-2nd Digit, 35 m/s). All remaining nerves (as indicated in the following tables) were within normal limits.       Needle evaluation of the right biceps muscle showed increased motor unit amplitude and moderately increased polyphasic potentials. The right triceps muscle showed increased motor unit amplitude. The right pronator teres muscle showed increased insertional activity and increased spontaneous activity. The right Ext Digitorum muscle showed increased insertional activity, increased spontaneous activity, and diminished recruitment.   All remaining muscles (as indicated in the following table) showed no evidence of electrical instability.          INTERPRETATION  This is an abnormal electrodiagnostic examination. These findings may be consistent with:  1. Moderate median mononeuropathy at the right wrist (carpal tunnel syndrome)  2. Ongoing/acute right C6/7 radiculopathy. 3. Chronic, inactive right C5/6 radiculopathy     CLINICAL INTERPRETATION  Her electrodiagnostic findings of cervical radiculopathy and CTS are likely related to her right arm symptoms. Imaging:     MRI right hand without contrast  IMPRESSION  At the level of the middle finger PIP, there is focal scarring/attenuation of  the ulnar component of the intrinsic tendon (oblique and transverse fibers) of  the extensor mechanism. Note axial images 37-42. Extensor mechanism of the  middle finger otherwise appears intact.     Slight thickening of collateral ligaments of the middle finger PIP - suspect  scarring related to remote injury. ICD-10-CM ICD-9-CM    1. Injury of intrinsic muscle of right hand  S66.801A 959.4    2. Sprain of interphalangeal joint of right middle finger, sequela  S63.632S 905.7      842.13    3. Trigger middle finger of left hand  M65.332 727.03    4. S/P trigger finger release  Z98.890 V45.89        Plan:     Given the MRI findings I discussed possibility of occupational therapy but patient would prefer to do exercises on her own. Follow-up and Dispositions    · Return if symptoms worsen or fail to improve.          Plan was reviewed with patient, who verbalized agreement and understanding of the plan

## 2022-03-15 NOTE — PROGRESS NOTES
Ms Leanna Arias attended the Joint Replacement Pre-Operative class on  7/23/2018. Topics discussed included surgery preparation, what to expect the day of surgery, medications, physical and occupational therapy, and discharge planning. It was discussed that this is considered an elective surgery and that prior to the surgery she needs to make decisions such as arranging for help at home once she is discharged. Ms Leanna Arias was given a total hip patient education notebook to take home. Opportunity was given to ask questions and phone number of the Orthopaedic   was given for any questions or concerns that may arise later. MS Leanna Arias identified Dee Peters as  through surgical/recovery process. X Size Of Lesion In Cm (Optional): 0 Consent: The risks of atrophy were reviewed with the patient. Total Volume Injected (Ccs- Only Use Numbers And Decimals): 1 Concentration Of Solution Injected (Mg/Ml): 20.0 Include Z78.9 (Other Specified Conditions Influencing Health Status) As An Associated Diagnosis?: No Validate Note Data When Using Inventory: Yes Kenalog Preparation: Kenalog Lot # (Optional): [id number] Medical Necessity Clause: This procedure was medically necessary because the lesions that were treated were: Detail Level: Detailed Expiration Date (Optional): 03/23 Administered By (Optional): Dr. Mckenzie

## 2022-03-18 PROBLEM — Z98.1 S/P CERVICAL SPINAL FUSION: Status: ACTIVE | Noted: 2017-04-06

## 2022-03-19 PROBLEM — M16.10 HIP ARTHRITIS: Status: ACTIVE | Noted: 2018-07-30

## 2022-05-02 ENCOUNTER — TRANSCRIBE ORDER (OUTPATIENT)
Dept: SCHEDULING | Age: 62
End: 2022-05-02

## 2022-05-02 DIAGNOSIS — Z12.31 VISIT FOR SCREENING MAMMOGRAM: Primary | ICD-10-CM

## 2022-05-23 ENCOUNTER — HOSPITAL ENCOUNTER (OUTPATIENT)
Dept: MAMMOGRAPHY | Age: 62
Discharge: HOME | End: 2022-05-23
Attending: INTERNAL MEDICINE
Payer: MEDICAID

## 2022-05-23 DIAGNOSIS — Z12.31 VISIT FOR SCREENING MAMMOGRAM: ICD-10-CM

## 2022-05-23 PROCEDURE — 77063 BREAST TOMOSYNTHESIS BI: CPT

## 2022-12-19 ENCOUNTER — OFFICE VISIT (OUTPATIENT)
Dept: PRIMARY CARE CLINIC | Age: 62
End: 2022-12-19
Payer: MEDICAID

## 2022-12-19 VITALS
TEMPERATURE: 97.2 F | OXYGEN SATURATION: 95 % | HEIGHT: 66 IN | SYSTOLIC BLOOD PRESSURE: 138 MMHG | DIASTOLIC BLOOD PRESSURE: 88 MMHG | RESPIRATION RATE: 20 BRPM | BODY MASS INDEX: 37.73 KG/M2 | HEART RATE: 86 BPM | WEIGHT: 234.8 LBS

## 2022-12-19 DIAGNOSIS — E78.2 MIXED HYPERLIPIDEMIA: Chronic | ICD-10-CM

## 2022-12-19 DIAGNOSIS — E03.9 ACQUIRED HYPOTHYROIDISM: Chronic | ICD-10-CM

## 2022-12-19 DIAGNOSIS — E11.9 WELL CONTROLLED TYPE 2 DIABETES MELLITUS (HCC): Primary | Chronic | ICD-10-CM

## 2022-12-19 PROCEDURE — 99214 OFFICE O/P EST MOD 30 MIN: CPT | Performed by: NURSE PRACTITIONER

## 2022-12-19 RX ORDER — ASCORBIC ACID 250 MG
250 TABLET ORAL DAILY
COMMUNITY

## 2022-12-19 RX ORDER — GLUCOSAMINE SULFATE 1500 MG
1000 POWDER IN PACKET (EA) ORAL DAILY
COMMUNITY

## 2022-12-19 RX ORDER — METFORMIN HYDROCHLORIDE 500 MG/1
500 TABLET, EXTENDED RELEASE ORAL 2 TIMES DAILY
Qty: 60 TABLET | Refills: 2 | Status: SHIPPED | OUTPATIENT
Start: 2022-12-19

## 2022-12-19 RX ORDER — INSULIN PUMP SYRINGE, 3 ML
EACH MISCELLANEOUS
Qty: 1 KIT | Refills: 0 | Status: SHIPPED | OUTPATIENT
Start: 2022-12-19

## 2022-12-19 RX ORDER — METFORMIN HYDROCHLORIDE 500 MG/1
500 TABLET, EXTENDED RELEASE ORAL 2 TIMES DAILY
COMMUNITY
Start: 2022-11-18 | End: 2022-12-19 | Stop reason: SDUPTHER

## 2022-12-19 RX ORDER — ATORVASTATIN CALCIUM 10 MG/1
10 TABLET, FILM COATED ORAL DAILY
COMMUNITY
Start: 2022-11-21 | End: 2022-12-19 | Stop reason: SDUPTHER

## 2022-12-19 RX ORDER — ATORVASTATIN CALCIUM 10 MG/1
10 TABLET, FILM COATED ORAL DAILY
Qty: 30 TABLET | Refills: 2 | Status: SHIPPED | OUTPATIENT
Start: 2022-12-19

## 2022-12-19 RX ORDER — LANCETS
EACH MISCELLANEOUS
Qty: 1 EACH | Refills: 11 | Status: SHIPPED | OUTPATIENT
Start: 2022-12-19

## 2022-12-19 RX ORDER — IBUPROFEN 200 MG
CAPSULE ORAL
Qty: 100 STRIP | Refills: 2 | Status: SHIPPED | OUTPATIENT
Start: 2022-12-19

## 2022-12-19 RX ORDER — LEVOTHYROXINE SODIUM 50 UG/1
25 TABLET ORAL
Qty: 30 TABLET | Refills: 2 | Status: SHIPPED | OUTPATIENT
Start: 2022-12-19

## 2022-12-19 NOTE — PROGRESS NOTES
HISTORY OF PRESENT ILLNESS  Onel Sullivan is a 60 y.o. female presents to [facility]. Recently moved here to live with her daughter and son in law from [facility], [place] [place]. Diabetes: Last A1c was   Lab Results   Component Value Date/Time    Hemoglobin A1c 6.2 (H) 07/23/2018 10:22 AM    . Diabetes well controlled on metformin. Recently dx x6 months ago. Patient's last eye exam was 2022, normal. Denies neuropathy. Hyperlipidemia: Mixed hyperlipidemia well controlled on atorvastatin. Denies any leg cramps or malaise from this medication. Reported compliance with taking medication daily. Hypothyroidism:  Patient has been using levothyroxine 50 mcg with good control over hypothyroidism. Denies weight changes, palpitations, fatigue or trouble sleeping. Dizziness: Patient reported that she has been having dizziness for a while. Patient reported that she gets some blurred vision. Has hx of vertigo. Feels like this is related to her new diagnosis of diabetes. Also gets some headaches intermittently, specifically in the morning. Bought glucose tablets to use when she feels this way with some resolution at times and other times it is not helpful. Patient has 3 daughters, lives with one of her daughters here. Patient notes she is retired due to chronic back pain. Vitals:    12/19/22 0844   BP: 138/88   Pulse: 86   Resp: 20   Temp: 97.2 °F (36.2 °C)   TempSrc: Temporal   SpO2: 95%   Weight: 234 lb 12.8 oz (106.5 kg)   Height: 5' 6\" (1.676 m)     Patient Active Problem List   Diagnosis Code    Chronic low back pain M54.50, G89.29    Depression F32. A    Tobacco abuse Z72.0    S/P cervical spinal fusion Z98.1    Severe obesity (BMI 35.0-39. 9) E66.01    Hip arthritis M16.10     Patient Active Problem List    Diagnosis Date Noted    Hip arthritis 07/30/2018    Severe obesity (BMI 35.0-39.9) 06/12/2018    S/P cervical spinal fusion 04/06/2017    Chronic low back pain 06/19/2014    Depression 06/19/2014    Tobacco abuse 06/19/2014     Current Outpatient Medications   Medication Sig Dispense Refill    cholecalciferol (Vitamin D3) 25 mcg (1,000 unit) cap Take 1,000 Units by mouth daily. ascorbic acid, vitamin C, (Vitamin C) 250 mg tablet Take 250 mg by mouth daily. zinc sulfate (ZINC-220 PO) Take 220 mg by mouth daily. metFORMIN ER (GLUCOPHAGE XR) 500 mg tablet Take 1 Tablet by mouth two (2) times a day. 60 Tablet 2    atorvastatin (LIPITOR) 10 mg tablet Take 1 Tablet by mouth daily. 30 Tablet 2    levothyroxine (SYNTHROID) 50 mcg tablet Take 0.5 Tablets by mouth Daily (before breakfast). 30 Tablet 2    Blood-Glucose Meter monitoring kit Use as directed to check BG once a day 1 Kit 0    glucose blood VI test strips (blood glucose test) strip Use daily to check BG once a day. 100 Strip 2    lancets misc Use as directed to check BG daily. 1 Each 11    meclizine (ANTIVERT) 25 mg tablet       bisacodyL 5 mg tab Take 5 mg by mouth daily. 3 Tab 0    acetaminophen (TYLENOL) 500 mg tablet Take 500 mg by mouth every six (6) hours as needed for Pain. VOLTAREN 1 % gel Apply 4 g to affected area four (4) times daily. Maximum 16 grams per joint per day.  Dispense 5 100 gram tubes 5 Each 0     Allergies   Allergen Reactions    Pcn [Penicillins] Hives    Percocet [Oxycodone-Acetaminophen] Itching     Past Medical History:   Diagnosis Date    Adverse effect of anesthesia     hard to wake up    Chronic pain     Depression     GERD (gastroesophageal reflux disease)     Hypothyroidism     Low back pain     Right hand pain     Thromboembolus (HCC)     blood clot in foot during pregnancy    Tobacco abuse     Vertigo      Past Surgical History:   Procedure Laterality Date    HAND/FINGER SURGERY UNLISTED      right thumb    HX BREAST BIOPSY Right 06/15/2020    HX BREAST BIOPSY Right 8/7/2020    TAG LOCALIZED EXCISIONAL BIOPSY RIGHT BREAST performed by Bj Smith MD at [facility] MAIN OR    HX  SECTION      x3    HX HIP REPLACEMENT      HX HYSTERECTOMY      HX ORTHOPAEDIC  2002    2 disks removed lower back [facility].    HX ORTHOPAEDIC Right 2017    shoulder surgery    HX OTHER SURGICAL      Left middle finger A1 pulley release    HX WRIST FRACTURE TX      R CTR    NEUROLOGICAL PROCEDURE UNLISTED       Family History   Problem Relation Age of Onset    Hypertension Mother     Cancer Mother         multiple myeloma    Cancer Father         throat    Cancer Sister      Social History     Tobacco Use    Smoking status: Former     Packs/day: 0.25     Years: 4.00     Pack years: 1.00     Types: Cigarettes     Quit date: 2015     Years since quittin.5    Smokeless tobacco: Never   Substance Use Topics    Alcohol use: No           Review of Systems   Constitutional:  Negative for malaise/fatigue and weight loss. Eyes:  Negative for blurred vision and double vision. Respiratory:  Negative for shortness of breath. Cardiovascular:  Negative for chest pain and palpitations. Neurological:  Positive for dizziness. Negative for tingling, tremors and headaches. Psychiatric/Behavioral:  The patient is not nervous/anxious and does not have insomnia. Physical Exam  Constitutional:       Appearance: Normal appearance. She is obese. HENT:      Head: Normocephalic. Eyes:      Extraocular Movements: Extraocular movements intact. Conjunctiva/sclera: Conjunctivae normal.      Pupils: Pupils are equal, round, and reactive to light. Cardiovascular:      Rate and Rhythm: Normal rate and regular rhythm. Pulses: Normal pulses. Heart sounds: Normal heart sounds. Pulmonary:      Effort: Pulmonary effort is normal.      Breath sounds: Normal breath sounds. Musculoskeletal:         General: Normal range of motion. Cervical back: Normal range of motion and neck supple. Skin:     General: Skin is warm and dry.    Neurological:      General: No focal deficit present. Mental Status: She is alert and oriented to person, place, and time. Psychiatric:         Mood and Affect: Mood normal.         ASSESSMENT and PLAN  Diagnoses and all orders for this visit:    1. Well controlled type 2 diabetes mellitus ([address]  Comments:  rechecking labs. meter sent in for her to be able to monitor glucose levels  Orders:  -     CBC WITH AUTOMATED DIFF  -     METABOLIC PANEL, COMPREHENSIVE  -     HEMOGLOBIN A1C WITH EAG  -     metFORMIN ER (GLUCOPHAGE XR) 500 mg tablet; Take 1 Tablet by mouth two (2) times a day. -     Blood-Glucose Meter monitoring kit; Use as directed to check BG once a day  -     glucose blood VI test strips (blood glucose test) strip; Use daily to check BG once a day. -     lancets misc; Use as directed to check BG daily. 2. Acquired hypothyroidism  Comments:  sounds well controlled. Checking labs. Orders:  -     TSH 3RD GENERATION  -     levothyroxine (SYNTHROID) 50 mcg tablet; Take 0.5 Tablets by mouth Daily (before breakfast). 3. Mixed hyperlipidemia  -     LIPID PANEL  -     atorvastatin (LIPITOR) 10 mg tablet; Take 1 Tablet by mouth daily.        Marti Burt NP

## 2022-12-19 NOTE — PROGRESS NOTES
1. \"Have you been to the ER, urgent care clinic since your last visit? Hospitalized since your last visit? \" No    2. \"Have you seen or consulted any other health care providers outside of the [address] [place] [address] since your last visit? \" No     3. For patients aged 39-70: Has the patient had a colonoscopy / FIT/ Cologuard? Yes - Care Gap present. Most recent result on file      If the patient is female:    4. For patients aged 41-77: Has the patient had a mammogram within the past 2 years? Yes - Care Gap present. Most recent result on file      5. For patients aged 21-65: Has the patient had a pap smear?  NA - based on age or sex  Chief Complaint   Patient presents with    Establish Care     Visit Vitals  /88 (BP 1 Location: Left upper arm, BP Patient Position: Sitting, BP Cuff Size: Large adult)   Pulse 86   Temp 97.2 °F (36.2 °C) (Temporal)   Resp 20   Ht 5' 6\" (1.676 m)   Wt 234 lb 12.8 oz (106.5 kg)   SpO2 95%   BMI 37.90 kg/m²

## 2022-12-20 LAB
ALBUMIN SERPL-MCNC: 4.6 G/DL (ref 3.8–4.8)
ALBUMIN/GLOB SERPL: 2 {RATIO} (ref 1.2–2.2)
ALP SERPL-CCNC: 58 IU/L (ref 44–121)
ALT SERPL-CCNC: 19 IU/L (ref 0–32)
AST SERPL-CCNC: 17 IU/L (ref 0–40)
BASOPHILS # BLD AUTO: 0 X10E3/UL (ref 0–0.2)
BASOPHILS NFR BLD AUTO: 1 %
BILIRUB SERPL-MCNC: 0.3 MG/DL (ref 0–1.2)
BUN SERPL-MCNC: 9 MG/DL (ref 8–27)
BUN/CREAT SERPL: 10 (ref 12–28)
CALCIUM SERPL-MCNC: 9.6 MG/DL (ref 8.7–10.3)
CHLORIDE SERPL-SCNC: 103 MMOL/L (ref 96–106)
CHOLEST SERPL-MCNC: 164 MG/DL (ref 100–199)
CO2 SERPL-SCNC: 25 MMOL/L (ref 20–29)
CREAT SERPL-MCNC: 0.94 MG/DL (ref 0.57–1)
EGFR: 68 ML/MIN/1.73
EOSINOPHIL # BLD AUTO: 0.1 X10E3/UL (ref 0–0.4)
EOSINOPHIL NFR BLD AUTO: 2 %
ERYTHROCYTE [DISTWIDTH] IN BLOOD BY AUTOMATED COUNT: 13.7 % (ref 11.7–15.4)
EST. AVERAGE GLUCOSE BLD GHB EST-MCNC: 128 MG/DL
GLOBULIN SER CALC-MCNC: 2.3 G/DL (ref 1.5–4.5)
GLUCOSE SERPL-MCNC: 106 MG/DL (ref 70–99)
HBA1C MFR BLD: 6.1 % (ref 4.8–5.6)
HCT VFR BLD AUTO: 37.7 % (ref 34–46.6)
HDLC SERPL-MCNC: 63 MG/DL
HGB BLD-MCNC: 12.4 G/DL (ref 11.1–15.9)
IMM GRANULOCYTES # BLD AUTO: 0 X10E3/UL (ref 0–0.1)
IMM GRANULOCYTES NFR BLD AUTO: 0 %
LDLC SERPL CALC-MCNC: 85 MG/DL (ref 0–99)
LYMPHOCYTES # BLD AUTO: 2.4 X10E3/UL (ref 0.7–3.1)
LYMPHOCYTES NFR BLD AUTO: 46 %
MCH RBC QN AUTO: 28.6 PG (ref 26.6–33)
MCHC RBC AUTO-ENTMCNC: 32.9 G/DL (ref 31.5–35.7)
MCV RBC AUTO: 87 FL (ref 79–97)
MONOCYTES # BLD AUTO: 0.3 X10E3/UL (ref 0.1–0.9)
MONOCYTES NFR BLD AUTO: 6 %
NEUTROPHILS # BLD AUTO: 2.3 X10E3/UL (ref 1.4–7)
NEUTROPHILS NFR BLD AUTO: 45 %
PLATELET # BLD AUTO: 290 X10E3/UL (ref 150–450)
POTASSIUM SERPL-SCNC: 4.4 MMOL/L (ref 3.5–5.2)
PROT SERPL-MCNC: 6.9 G/DL (ref 6–8.5)
RBC # BLD AUTO: 4.34 X10E6/UL (ref 3.77–5.28)
SODIUM SERPL-SCNC: 142 MMOL/L (ref 134–144)
TRIGL SERPL-MCNC: 85 MG/DL (ref 0–149)
TSH SERPL DL<=0.005 MIU/L-ACNC: 2.16 UIU/ML (ref 0.45–4.5)
VLDLC SERPL CALC-MCNC: 16 MG/DL (ref 5–40)
WBC # BLD AUTO: 5.1 X10E3/UL (ref 3.4–10.8)

## 2023-03-22 DIAGNOSIS — E11.9 WELL CONTROLLED TYPE 2 DIABETES MELLITUS (HCC): Chronic | ICD-10-CM

## 2023-03-23 RX ORDER — METFORMIN HYDROCHLORIDE 500 MG/1
TABLET, EXTENDED RELEASE ORAL
Qty: 60 TABLET | Refills: 1 | Status: SHIPPED | OUTPATIENT
Start: 2023-03-23

## 2023-04-16 DIAGNOSIS — E11.9 WELL CONTROLLED TYPE 2 DIABETES MELLITUS (HCC): Chronic | ICD-10-CM

## 2023-04-25 RX ORDER — METFORMIN HYDROCHLORIDE 500 MG/1
TABLET, EXTENDED RELEASE ORAL
Qty: 60 TABLET | Refills: 1 | Status: SHIPPED | OUTPATIENT
Start: 2023-04-25

## 2023-05-17 ENCOUNTER — OFFICE VISIT (OUTPATIENT)
Facility: CLINIC | Age: 63
End: 2023-05-17
Payer: COMMERCIAL

## 2023-05-17 VITALS
HEIGHT: 66 IN | WEIGHT: 230.4 LBS | SYSTOLIC BLOOD PRESSURE: 132 MMHG | BODY MASS INDEX: 37.03 KG/M2 | TEMPERATURE: 97.8 F | DIASTOLIC BLOOD PRESSURE: 75 MMHG | OXYGEN SATURATION: 97 % | HEART RATE: 68 BPM

## 2023-05-17 DIAGNOSIS — R42 DIZZINESS: ICD-10-CM

## 2023-05-17 DIAGNOSIS — E11.9 TYPE 2 DIABETES MELLITUS WITHOUT COMPLICATION, WITHOUT LONG-TERM CURRENT USE OF INSULIN (HCC): Primary | ICD-10-CM

## 2023-05-17 DIAGNOSIS — E03.9 HYPOTHYROIDISM, UNSPECIFIED TYPE: ICD-10-CM

## 2023-05-17 DIAGNOSIS — E11.9 TYPE 2 DIABETES MELLITUS WITHOUT COMPLICATION, WITHOUT LONG-TERM CURRENT USE OF INSULIN (HCC): ICD-10-CM

## 2023-05-17 DIAGNOSIS — R10.13 DYSPEPSIA: ICD-10-CM

## 2023-05-17 DIAGNOSIS — Z12.31 ENCOUNTER FOR SCREENING MAMMOGRAM FOR MALIGNANT NEOPLASM OF BREAST: ICD-10-CM

## 2023-05-17 LAB — HBA1C MFR BLD: 6.1 %

## 2023-05-17 PROCEDURE — 83036 HEMOGLOBIN GLYCOSYLATED A1C: CPT | Performed by: INTERNAL MEDICINE

## 2023-05-17 PROCEDURE — 99214 OFFICE O/P EST MOD 30 MIN: CPT | Performed by: INTERNAL MEDICINE

## 2023-05-17 RX ORDER — LIOTHYRONINE SODIUM 25 UG/1
12.5 TABLET ORAL DAILY
COMMUNITY

## 2023-05-17 RX ORDER — OMEPRAZOLE 40 MG/1
40 CAPSULE, DELAYED RELEASE ORAL
Qty: 90 CAPSULE | Refills: 0 | Status: SHIPPED | OUTPATIENT
Start: 2023-05-17

## 2023-05-17 ASSESSMENT — PATIENT HEALTH QUESTIONNAIRE - PHQ9
SUM OF ALL RESPONSES TO PHQ QUESTIONS 1-9: 0
6. FEELING BAD ABOUT YOURSELF - OR THAT YOU ARE A FAILURE OR HAVE LET YOURSELF OR YOUR FAMILY DOWN: 0
5. POOR APPETITE OR OVEREATING: 0
9. THOUGHTS THAT YOU WOULD BE BETTER OFF DEAD, OR OF HURTING YOURSELF: 0
SUM OF ALL RESPONSES TO PHQ QUESTIONS 1-9: 0
3. TROUBLE FALLING OR STAYING ASLEEP: 0
2. FEELING DOWN, DEPRESSED OR HOPELESS: 0
7. TROUBLE CONCENTRATING ON THINGS, SUCH AS READING THE NEWSPAPER OR WATCHING TELEVISION: 0
8. MOVING OR SPEAKING SO SLOWLY THAT OTHER PEOPLE COULD HAVE NOTICED. OR THE OPPOSITE, BEING SO FIGETY OR RESTLESS THAT YOU HAVE BEEN MOVING AROUND A LOT MORE THAN USUAL: 0
1. LITTLE INTEREST OR PLEASURE IN DOING THINGS: 0
SUM OF ALL RESPONSES TO PHQ QUESTIONS 1-9: 0
4. FEELING TIRED OR HAVING LITTLE ENERGY: 0
SUM OF ALL RESPONSES TO PHQ9 QUESTIONS 1 & 2: 0
10. IF YOU CHECKED OFF ANY PROBLEMS, HOW DIFFICULT HAVE THESE PROBLEMS MADE IT FOR YOU TO DO YOUR WORK, TAKE CARE OF THINGS AT HOME, OR GET ALONG WITH OTHER PEOPLE: 0
SUM OF ALL RESPONSES TO PHQ QUESTIONS 1-9: 0

## 2023-05-17 ASSESSMENT — ENCOUNTER SYMPTOMS
RESPIRATORY NEGATIVE: 1
GASTROINTESTINAL NEGATIVE: 1

## 2023-05-17 NOTE — PROGRESS NOTES
" She fell in the bathroom 2 hours ago. She hurt her head. She passed out for 30 seconds . (+) head laceration (+) Diarrhea Brendan Campos is a 60 y.o. female and presents with New Patient (Blood pressure up at dentist)    Milo Us is establishing care, she says she had a dental extrction in May 9, before that her blood pressure was elevated, she says when they first took it it was in the 160s/1102, then in the 170s/100. Odilon chest pain, palpitations, leg sweeling sob, she oes not have a h/o HTN. She has DM which has been controlled, hypothyroidism. Last 1C from December 22 was 6.1. She says when she lays down she feels something stuck in her throat and reflux and she has to stand up. She says she walks every day 30 minutes to 1 hour continuously, she sweats and feels sob. She is little frustrated that she can not lose weight, she sys she doees not eat much of any past, rice, she says when she eats she eats about a cup a portion, does not eat fast foods, does not drink sugary drinks. She does snack on [place] baked beans candy, 2 boxes a day, I searched for the nutrional facts 1 portion size Is 2 tbs has 23 gr carbs, one box has 8 portions. She says she has intermittent dizziness which is worse when she changes positions like standing after bending over, feels everything spinning, nausea,     Review of Systems  Review of Systems   Constitutional: Negative. HENT: Negative. Respiratory: Negative. Cardiovascular: Negative. Gastrointestinal: Negative. Genitourinary: Negative. Musculoskeletal: Negative. Skin: Negative. Neurological: Negative. Psychiatric/Behavioral: Negative.           Past Medical History:   Diagnosis Date    Adverse effect of anesthesia     hard to wake up    Chronic pain     Depression     GERD (gastroesophageal reflux disease)     Hypothyroidism     Low back pain     Right hand pain     Thromboembolus (HCC)     blood clot in foot during pregnancy    Tobacco abuse     Vertigo      Past Surgical History:   Procedure Laterality Date    BREAST BIOPSY Right 06/15/2020    BREAST BIOPSY

## 2023-05-17 NOTE — PROGRESS NOTES
BP (!) 132/90 (Site: Right Upper Arm, Position: Sitting, Cuff Size: Medium Adult)   Pulse 68   Temp 97.8 °F (36.6 °C) (Temporal)   Ht 5' 6\" (1.676 m)   Wt 230 lb 6.4 oz (104.5 kg)   SpO2 97%   BMI 37.19 kg/m²    Chief Complaint   Patient presents with    New Patient     Blood pressure up at dentist    1. \"Have you been to the ER, urgent care clinic since your last visit? Hospitalized since your last visit? \" No    2. \"Have you seen or consulted any other health care providers outside of the [address] [place] [address] since your last visit? \" No     3. For patients aged 39-70: Has the patient had a colonoscopy / FIT/ Cologuard? Yes - no Care Gap present      If the patient is female:    4. For patients aged 41-77: Has the patient had a mammogram within the past 2 years? Yes - no Care Gap present      5. For patients aged 21-65: Has the patient had a pap smear?  NA - based on age or sex

## 2023-05-19 DIAGNOSIS — E11.9 TYPE 2 DIABETES MELLITUS WITHOUT COMPLICATIONS (HCC): ICD-10-CM

## 2023-05-19 LAB
ALBUMIN SERPL-MCNC: 4.3 G/DL (ref 3.8–4.8)
ALBUMIN/CREAT UR: 4 MG/G CREAT (ref 0–29)
ALBUMIN/GLOB SERPL: 1.7 {RATIO} (ref 1.2–2.2)
ALP SERPL-CCNC: 60 IU/L (ref 44–121)
ALT SERPL-CCNC: 18 IU/L (ref 0–32)
AST SERPL-CCNC: 18 IU/L (ref 0–40)
BILIRUB SERPL-MCNC: 0.3 MG/DL (ref 0–1.2)
BUN SERPL-MCNC: 8 MG/DL (ref 8–27)
BUN/CREAT SERPL: 10 (ref 12–28)
CALCIUM SERPL-MCNC: 9.2 MG/DL (ref 8.7–10.3)
CHLORIDE SERPL-SCNC: 108 MMOL/L (ref 96–106)
CO2 SERPL-SCNC: 23 MMOL/L (ref 20–29)
CREAT SERPL-MCNC: 0.83 MG/DL (ref 0.57–1)
CREAT UR-MCNC: 144.4 MG/DL
EGFRCR SERPLBLD CKD-EPI 2021: 79 ML/MIN/1.73
GLOBULIN SER CALC-MCNC: 2.6 G/DL (ref 1.5–4.5)
GLUCOSE SERPL-MCNC: 86 MG/DL (ref 70–99)
MICROALBUMIN UR-MCNC: 5.2 UG/ML
POTASSIUM SERPL-SCNC: 4.4 MMOL/L (ref 3.5–5.2)
PROT SERPL-MCNC: 6.9 G/DL (ref 6–8.5)
SODIUM SERPL-SCNC: 145 MMOL/L (ref 134–144)
T3 SERPL-MCNC: 73 NG/DL (ref 71–180)
T4 FREE SERPL-MCNC: 0.85 NG/DL (ref 0.82–1.77)
THYROPEROXIDASE AB SERPL-ACNC: <9 IU/ML (ref 0–34)
TSH SERPL DL<=0.005 MIU/L-ACNC: 1.93 UIU/ML (ref 0.45–4.5)

## 2023-05-22 RX ORDER — METFORMIN HYDROCHLORIDE 500 MG/1
TABLET, EXTENDED RELEASE ORAL
Qty: 180 TABLET | Refills: 3 | Status: SHIPPED | OUTPATIENT
Start: 2023-05-22

## 2023-06-19 ENCOUNTER — OFFICE VISIT (OUTPATIENT)
Age: 63
End: 2023-06-19
Payer: COMMERCIAL

## 2023-06-19 VITALS
OXYGEN SATURATION: 99 % | WEIGHT: 224 LBS | DIASTOLIC BLOOD PRESSURE: 88 MMHG | HEIGHT: 66 IN | HEART RATE: 64 BPM | SYSTOLIC BLOOD PRESSURE: 124 MMHG | BODY MASS INDEX: 36 KG/M2

## 2023-06-19 DIAGNOSIS — R42 DIZZINESS AND GIDDINESS: Primary | ICD-10-CM

## 2023-06-19 PROCEDURE — 99212 OFFICE O/P EST SF 10 MIN: CPT | Performed by: NURSE PRACTITIONER

## 2023-06-19 ASSESSMENT — ENCOUNTER SYMPTOMS
GASTROINTESTINAL NEGATIVE: 1
RESPIRATORY NEGATIVE: 1

## 2023-06-19 NOTE — PROGRESS NOTES
Subjective: Jareth Holder   60 y.o.   1960     New Patient Visit  This is a 60 y.o. female who is here today to discuss issues with dizziness. Location - head, center of forehead     Quality - states it is a dizzy feeling behind the eyes    Severity -  mild     Duration - has been ongoing for a while, has known vertigo but states this feels different     Timing - random     Context - she states she will get random bouts of feeling dizziness, and states she can feel it right between her eyes. Modifying Features - she states if she lays down it will ease up a little bit. Tylenol will also help. Associated symptoms/signs - pressure in head       Review of Systems  Review of Systems   Constitutional: Negative. HENT: Negative. Eyes:         Watery eyes    Respiratory: Negative. Cardiovascular: Negative. Gastrointestinal: Negative. Endocrine: Negative. Genitourinary: Negative. Musculoskeletal: Negative. Skin: Negative. Allergic/Immunologic: Positive for environmental allergies. Neurological:  Positive for dizziness and headaches. Hematological: Negative. Psychiatric/Behavioral: Negative.            Past Medical History:   Diagnosis Date    Adverse effect of anesthesia     hard to wake up    Chronic pain     Depression     GERD (gastroesophageal reflux disease)     Hypothyroidism     Low back pain     Right hand pain     Thromboembolus (HCC)     blood clot in foot during pregnancy    Tobacco abuse     Vertigo      Past Surgical History:   Procedure Laterality Date    BREAST BIOPSY Right 06/15/2020    BREAST BIOPSY Right 8/7/2020    TAG LOCALIZED EXCISIONAL BIOPSY RIGHT BREAST performed by Franck Roberts MD at [facility]      x3    HAND/FINGER SURGERY UNLISTED      right thumb    HYSTERECTOMY (CERVIX STATUS UNKNOWN)      ALETHA STEROTACTIC LOC BREAST BIOPSY RIGHT Right 6/15/2020    ALETHA STEROTACTIC LOC BREAST BIOPSY RIGHT 6/15/2020 [facility] RAD MAMMO

## 2023-07-07 ENCOUNTER — NURSE ONLY (OUTPATIENT)
Age: 63
End: 2023-07-07

## 2023-07-07 DIAGNOSIS — E11.9 TYPE 2 DIABETES MELLITUS WITHOUT COMPLICATION, WITHOUT LONG-TERM CURRENT USE OF INSULIN (HCC): Primary | ICD-10-CM

## 2023-07-07 NOTE — PROGRESS NOTES
Participant became upset during Diabetes class. She is having difficulty accepting the diagnosis of diabetes. She stated that she did not know what type of diabetes she had. This writer told her that she had been diagnosed with Type II diabetes. She abruptly stood up and left the class. Grant Dee RD spoke with participant who states, \" she would not be coming back. \"  No explanation was given.

## 2023-07-14 ENCOUNTER — NURSE ONLY (OUTPATIENT)
Age: 63
End: 2023-07-14

## 2023-07-14 DIAGNOSIS — Z71.89 ENCOUNTER FOR DIABETES EDUCATION: Primary | ICD-10-CM

## 2023-07-14 NOTE — PROGRESS NOTES
[address] [facility]  Diabetes Self-Management Education & Support Program  Encounter Note      SUMMARY  Diabetes self-care management training was completed related to healthy eating and monitoring. The participant will return on July 21 to continue DSMES related to taking medications and physical activity. The participant did identify SMART Goal(s) and will practice knowledge and skills related to healthy eating and monitoring to improve diabetes self-management. EVALUATION:  Ms. Duke Camacho came back to group sessions. She requested a book and sat up front. She was all smiles and engaged in group discussions. This was a very different presentation than that from last week. Ms. Duke Camacho shared difficulty in obtaining blood from lancet when trying to monitor her BG. She shared that her grandson will poke her with the lancet in order to obtain enough pressure for FS. Ms Duke Camacho was strongly encouraged to bring in her pen device and lacets, as well as glucometer, to assist her with proper monitoring technique. Ms. Duke Camacho identified that she does not eat breakfast but eats lunch and dinner. She participated in group activity. She worked with other classmates and practiced designing a balanced 45 g CHO meal using Healthy plate template. Ms. Duke Camacho demonstrated good understanding of using exchanges, label reading and CHO counting. RECOMMENDATIONS:  Monitor FBG at least 3 days per week  Eat 3 meals per day   Aim for 45 g CHO per meal and use Healthy Plate Template to balance intakes  Make up Class 1A and 1B-will approach this matter at next visit    [address]:  WHAT CAN I EAT? 60  HOW CAN BLOOD GLUCOSE MONITORING HELP ME? 60      Next provider visit is scheduled for 8/17/2023 demetria Woo Human GOAL(S)   Eat breakfast at least one time over the next seven days. ACHIEVEMENT OF GOAL(S) : 0-24%           DATE DSMES TOPIC EVALUATION     7/14/2023 WHAT CAN I EAT?

## 2023-07-18 ENCOUNTER — HOSPITAL ENCOUNTER (OUTPATIENT)
Facility: HOSPITAL | Age: 63
Discharge: HOME | End: 2023-07-21
Payer: COMMERCIAL

## 2023-07-18 DIAGNOSIS — R42 DIZZINESS AND GIDDINESS: ICD-10-CM

## 2023-07-18 LAB — CREAT BLD-MCNC: 0.9 MG/DL (ref 0.6–1.3)

## 2023-07-18 PROCEDURE — 6360000004 HC RX CONTRAST MEDICATION: Performed by: INTERNAL MEDICINE

## 2023-07-18 PROCEDURE — 70470 CT HEAD/BRAIN W/O & W/DYE: CPT

## 2023-07-18 PROCEDURE — 82565 ASSAY OF CREATININE: CPT

## 2023-07-18 RX ADMIN — IOPAMIDOL 100 ML: 755 INJECTION, SOLUTION INTRAVENOUS at 10:56

## 2023-07-21 ENCOUNTER — NURSE ONLY (OUTPATIENT)
Age: 63
End: 2023-07-21

## 2023-07-21 DIAGNOSIS — E11.9 TYPE 2 DIABETES MELLITUS WITHOUT COMPLICATION, WITHOUT LONG-TERM CURRENT USE OF INSULIN (HCC): Primary | ICD-10-CM

## 2023-07-21 NOTE — PROGRESS NOTES
[facility]  Diabetes Self-Management Education & Support Program  Encounter Note      SUMMARY  Diabetes self-care management training was completed related to taking medications and physical activity. The participant will return on July 28 to continue DSMES related to healthy coping and problem solving. The participant did identify SMART Goal(s) and will practice knowledge and skills related to reducing risks, healthy eating, monitoring, being active, and medications to improve diabetes self-management. EVALUATION:  Ms Lyla Mishra shared brought in her glucometer and pen device for assistance with obtaining blood for FS. Her pen devices was set for 7. This RDN assisted Ms. Lyla Mishra in obtaining FS. Her BG was 99 mg/dL. Ms. Lyla Mishra reports walking for exercise but identified the weather (heat) as being a barrier. She shared that she has been doing \"better\" at eating three meals per day. RECOMMENDATIONS:  Monitor FBG and 2H after largest meal to assess for target  Increase PA per week: aim for 150 minutes of moderate to intense PA per week; use progression chart increase stamina and endurance  Take medications as perscirbed     TOPICS DISCUSSED TODAY:  HOW DO MY DIABETES MEDICATIONS WORK? 61  HOW DOES PHYSICAL ACTIVITY AFFECT MY DIABETES? 60      Next provider visit is scheduled for 8/17/2023 c Dr. Joe Murdock GOAL(S)  Increase physical activity by using paddle wheel  for 30  minutes 3 times over the next week. ACHIEVEMENT OF GOAL(S) : 0-24%         DATE DSMES TOPIC EVALUATION     7/21/2023 HOW DO MY DIABETES MEDICATIONS WORK?    Type 1 medications & methods   Insulin injections   Injection sites   Type 2 medications   Oral agents   GLP-1 agonists   Hypoglycemia symptoms & treatment   Glucagon emergency kits   General guidance regarding insulin use whether Type 1, 2 or gestational diabetes   Storage of insulin   Disposal    Traveling with medications   Barriers to

## 2023-07-26 ENCOUNTER — TELEPHONE (OUTPATIENT)
Facility: CLINIC | Age: 63
End: 2023-07-26

## 2023-07-26 NOTE — TELEPHONE ENCOUNTER
----- Message from Efra Last MD sent at 5/29/2023 12:22 PM EDT -----  Please advise patient of normal results.  Thank you

## 2023-07-28 ENCOUNTER — NURSE ONLY (OUTPATIENT)
Age: 63
End: 2023-07-28

## 2023-07-28 DIAGNOSIS — E11.9 TYPE 2 DIABETES MELLITUS WITHOUT COMPLICATION, WITHOUT LONG-TERM CURRENT USE OF INSULIN (HCC): Primary | ICD-10-CM

## 2023-07-28 NOTE — PROGRESS NOTES
[facility]  Diabetes Self-Management Education & Support Program  Encounter Note      SUMMARY  Diabetes self-care management training was completed related to healthy coping and problem solving. the participant will return in 6 weeks to follow up on DSMES related to reducing risks, healthy eating, monitoring, taking medications, physical activity, healthy coping, and problem solving. The participant did not identify SMART Goal(s) and will practice knowledge and skills related to reducing risks, healthy eating and monitoring, being active and medications, and healthy coping and problem solving to improve diabetes self-management. EVALUATION:  Participant was engaged in learning and shared in discussions. She shared that she still feels overwhelmed and afraid. She talked about the first class when she left because it was too much for her. She feels that she is alone and has no support. One of her classmates exchanged phone numbers with her so they could support each other. I saw Ms. Samara walker for the first time today at the thought of support. She thinks she may need to come back for more education to help her \"get it\". Contact information given. RECOMMENDATIONS:  Continue using skills obtained in DSMES classes. 6 week follow up  Seek support from classmate and educators as needed. TOPICS DISCUSSED TODAY:  HOW DO I FIND SUPPORT TO TACKLE THIS CONDITION? 61  HOW DO I FIGURE OUT WHAT'S INFLUENCING MY BLOOD GLUCOSES? 60      Next provider visit is scheduled for 8/1/23 with Kaushik Devlin CNP       SMART GOAL(S)  Increase physical activity by using paddle wheel  for 30  minutes 3 times over the next week. ACHIEVEMENT OF GOAL(S) : %    2. Eat breakfast at least one time over the next seven days. ACHIEVEMENT OF GOAL(S) :  %         DATE DSMES TOPIC EVALUATION     7/28/2023 HOW DO I FIND SUPPORT TO TACKLE THIS CONDITION?    Normal responses to diabetes

## 2023-10-09 NOTE — TELEPHONE ENCOUNTER
Patient called requesting a refill for the following medication:      diclofenac sodium (VOLTAREN) 1 % GEL

## 2023-12-06 DIAGNOSIS — R10.13 DYSPEPSIA: ICD-10-CM

## 2023-12-08 RX ORDER — OMEPRAZOLE 40 MG/1
40 CAPSULE, DELAYED RELEASE ORAL
Qty: 90 CAPSULE | Refills: 0 | Status: SHIPPED | OUTPATIENT
Start: 2023-12-08

## 2024-01-03 ENCOUNTER — TRANSCRIBE ORDERS (OUTPATIENT)
Facility: HOSPITAL | Age: 64
End: 2024-01-03

## 2024-01-03 DIAGNOSIS — Z12.31 VISIT FOR SCREENING MAMMOGRAM: Primary | ICD-10-CM

## 2024-01-18 ENCOUNTER — HOSPITAL ENCOUNTER (OUTPATIENT)
Facility: HOSPITAL | Age: 64
Discharge: HOME | End: 2024-01-18
Attending: INTERNAL MEDICINE
Payer: MEDICAID

## 2024-01-18 VITALS — BODY MASS INDEX: 36 KG/M2 | HEIGHT: 66 IN | WEIGHT: 224 LBS

## 2024-01-18 DIAGNOSIS — Z12.31 VISIT FOR SCREENING MAMMOGRAM: ICD-10-CM

## 2024-01-18 PROCEDURE — 77063 BREAST TOMOSYNTHESIS BI: CPT

## 2024-09-09 ENCOUNTER — TELEPHONE (OUTPATIENT)
Facility: CLINIC | Age: 64
End: 2024-09-09

## 2024-10-07 ENCOUNTER — OFFICE VISIT (OUTPATIENT)
Facility: CLINIC | Age: 64
End: 2024-10-07
Payer: MEDICAID

## 2024-10-07 VITALS
DIASTOLIC BLOOD PRESSURE: 86 MMHG | BODY MASS INDEX: 36.13 KG/M2 | RESPIRATION RATE: 18 BRPM | OXYGEN SATURATION: 97 % | WEIGHT: 224.8 LBS | HEIGHT: 66 IN | TEMPERATURE: 98 F | SYSTOLIC BLOOD PRESSURE: 138 MMHG | HEART RATE: 91 BPM

## 2024-10-07 DIAGNOSIS — E11.9 TYPE 2 DIABETES MELLITUS WITHOUT COMPLICATION, WITHOUT LONG-TERM CURRENT USE OF INSULIN (HCC): ICD-10-CM

## 2024-10-07 DIAGNOSIS — R42 VERTIGO: ICD-10-CM

## 2024-10-07 DIAGNOSIS — E78.2 MIXED HYPERLIPIDEMIA: Primary | ICD-10-CM

## 2024-10-07 DIAGNOSIS — E03.9 HYPOTHYROIDISM, UNSPECIFIED TYPE: Primary | ICD-10-CM

## 2024-10-07 DIAGNOSIS — M16.11 OSTEOARTHRITIS OF RIGHT HIP, UNSPECIFIED OSTEOARTHRITIS TYPE: ICD-10-CM

## 2024-10-07 DIAGNOSIS — E03.9 HYPOTHYROIDISM, UNSPECIFIED TYPE: ICD-10-CM

## 2024-10-07 LAB — HBA1C MFR BLD: 5.6 %

## 2024-10-07 PROCEDURE — 83036 HEMOGLOBIN GLYCOSYLATED A1C: CPT | Performed by: STUDENT IN AN ORGANIZED HEALTH CARE EDUCATION/TRAINING PROGRAM

## 2024-10-07 PROCEDURE — 99214 OFFICE O/P EST MOD 30 MIN: CPT | Performed by: STUDENT IN AN ORGANIZED HEALTH CARE EDUCATION/TRAINING PROGRAM

## 2024-10-07 PROCEDURE — 1123F ACP DISCUSS/DSCN MKR DOCD: CPT | Performed by: STUDENT IN AN ORGANIZED HEALTH CARE EDUCATION/TRAINING PROGRAM

## 2024-10-07 RX ORDER — METFORMIN HCL 500 MG
500 TABLET, EXTENDED RELEASE 24 HR ORAL
Qty: 60 TABLET | Refills: 0 | Status: SHIPPED | OUTPATIENT
Start: 2024-10-07

## 2024-10-07 RX ORDER — MECLIZINE HYDROCHLORIDE 25 MG/1
25 TABLET ORAL 3 TIMES DAILY PRN
Qty: 30 TABLET | Refills: 2 | Status: SHIPPED | OUTPATIENT
Start: 2024-10-07

## 2024-10-07 RX ORDER — METFORMIN HCL 500 MG
500 TABLET, EXTENDED RELEASE 24 HR ORAL 2 TIMES DAILY
Qty: 60 TABLET | Refills: 0 | Status: SHIPPED | OUTPATIENT
Start: 2024-10-07 | End: 2024-10-07

## 2024-10-07 SDOH — ECONOMIC STABILITY: FOOD INSECURITY: WITHIN THE PAST 12 MONTHS, YOU WORRIED THAT YOUR FOOD WOULD RUN OUT BEFORE YOU GOT MONEY TO BUY MORE.: SOMETIMES TRUE

## 2024-10-07 SDOH — ECONOMIC STABILITY: INCOME INSECURITY: HOW HARD IS IT FOR YOU TO PAY FOR THE VERY BASICS LIKE FOOD, HOUSING, MEDICAL CARE, AND HEATING?: SOMEWHAT HARD

## 2024-10-07 SDOH — ECONOMIC STABILITY: FOOD INSECURITY: WITHIN THE PAST 12 MONTHS, THE FOOD YOU BOUGHT JUST DIDN'T LAST AND YOU DIDN'T HAVE MONEY TO GET MORE.: SOMETIMES TRUE

## 2024-10-07 ASSESSMENT — PATIENT HEALTH QUESTIONNAIRE - PHQ9
8. MOVING OR SPEAKING SO SLOWLY THAT OTHER PEOPLE COULD HAVE NOTICED. OR THE OPPOSITE, BEING SO FIGETY OR RESTLESS THAT YOU HAVE BEEN MOVING AROUND A LOT MORE THAN USUAL: NOT AT ALL
SUM OF ALL RESPONSES TO PHQ QUESTIONS 1-9: 0
9. THOUGHTS THAT YOU WOULD BE BETTER OFF DEAD, OR OF HURTING YOURSELF: NOT AT ALL
SUM OF ALL RESPONSES TO PHQ QUESTIONS 1-9: 0
3. TROUBLE FALLING OR STAYING ASLEEP: NOT AT ALL
SUM OF ALL RESPONSES TO PHQ QUESTIONS 1-9: 0
1. LITTLE INTEREST OR PLEASURE IN DOING THINGS: NOT AT ALL
4. FEELING TIRED OR HAVING LITTLE ENERGY: NOT AT ALL
6. FEELING BAD ABOUT YOURSELF - OR THAT YOU ARE A FAILURE OR HAVE LET YOURSELF OR YOUR FAMILY DOWN: NOT AT ALL
7. TROUBLE CONCENTRATING ON THINGS, SUCH AS READING THE NEWSPAPER OR WATCHING TELEVISION: NOT AT ALL
SUM OF ALL RESPONSES TO PHQ QUESTIONS 1-9: 0
SUM OF ALL RESPONSES TO PHQ9 QUESTIONS 1 & 2: 0
2. FEELING DOWN, DEPRESSED OR HOPELESS: NOT AT ALL
10. IF YOU CHECKED OFF ANY PROBLEMS, HOW DIFFICULT HAVE THESE PROBLEMS MADE IT FOR YOU TO DO YOUR WORK, TAKE CARE OF THINGS AT HOME, OR GET ALONG WITH OTHER PEOPLE: NOT DIFFICULT AT ALL

## 2024-10-07 ASSESSMENT — ENCOUNTER SYMPTOMS
COUGH: 0
ABDOMINAL PAIN: 0
SHORTNESS OF BREATH: 0

## 2024-10-07 NOTE — PROGRESS NOTES
Angeles Vasquez (: 1960) is a 64 y.o. female, Established patient patient, here for evaluation of the following chief complaint(s):  Other (New provider )       ASSESSMENT/PLAN:  Below is the assessment and plan developed based on review of pertinent history, physical exam, labs, studies, and medications.    1. Hypothyroidism, unspecified type  -     CBC; Future  -     Lipid Panel; Future  -     TSH; Future  -     T4, Free; Future  2. Type 2 diabetes mellitus without complication, without long-term current use of insulin (HCC)  -     CBC; Future  -     Comprehensive Metabolic Panel; Future  -     Lipid Panel; Future  -     Amb External Referral To Ophthalmology  -     AMB POC HEMOGLOBIN A1C  -     metFORMIN (GLUCOPHAGE-XR) 500 MG extended release tablet; Take 1 tablet by mouth daily (with breakfast), Disp-60 tablet, R-0Normal  3. Vertigo  -     meclizine (ANTIVERT) 25 MG tablet; Take 1 tablet by mouth 3 times daily as needed for Dizziness ceived the following from [facility]: Outside name: meclizine (ANTIVERT) 25 mg tablet, Disp-30 tablet, R-2Normal  4. Osteoarthritis of right hip, unspecified osteoarthritis type  -     diclofenac sodium (VOLTAREN) 1 % GEL; Apply 4 g topically 4 times daily as needed for Pain, Topical, 4 TIMES DAILY PRN Starting Mon 10/7/2024, Disp-350 g, R-1, Normal    Established patient of the practice. Previously under care of Dr. Hedrick, first visit with me     Diabetes: Under good control, restarted metformin but only once daily dose.  Have sent this to pharmacy.  Recommended to get blood work done.  Results for orders placed or performed in visit on 10/07/24   [facility] POC HEMOGLOBIN A1C   Result Value Ref Range    Hemoglobin A1C, POC 5.6 %     Blood work as above.  She had mammogram in 2024.  Colonoscopy in , due again in      Return in about 4 months (around 2025) for follow up.         SUBJECTIVE/OBJECTIVE:  SILVINO Vasquez is a 64-year-old

## 2024-10-07 NOTE — PROGRESS NOTES
Chief Complaint   Patient presents with    Other     New provider      /86 (Site: Left Upper Arm, Position: Sitting)   Pulse 91   Temp 98 °F (36.7 °C) (Temporal)   Resp 18   Ht 1.676 m (5' 5.98\")   Wt 102 kg (224 lb 12.8 oz)   SpO2 97%   BMI 36.30 kg/m²     \"Have you been to the ER, urgent care clinic since your last visit?  Hospitalized since your last visit?\"    NO    “Have you seen or consulted any other health care providers outside our system since your last visit?”    NO      “Have you had a diabetic eye exam?”    NO     No diabetic eye exam on file

## 2024-10-07 NOTE — PATIENT INSTRUCTIONS
[phone]   Website: [email or web address]  The service area includes any location in [facility]. Rides are available to destinations outside the [facility] for employment and medical purposes, including limited service to the cities of [place], [place], [place] and [place]; the [facility], [facility] and [place]; and [facility] [place]    [facility] Transit  What they offer: Public transportation and paratransit services in the [place] region for local residents, businesses and visitors of [facility] [facility] and the surrounding counties.   Fare Free Service: [facility] [employer] is providing fare free service until further notice. This includes all fixed route and para-transit services.  [facility] is pleased to offer an express route to [place]. This program is specifically designed senior persons over 65 years of age and the disabled.  Website: [email or web address]  Information for any of our services are available during regular business hours by calling [phone]  Paratransit Phone: [phone].  Hours of Operation: [place] Area Transit (PAT) buses operate Monday thru Friday from 5:45AM-6:15PM, and on Saturday from 7:15AM-6:15PM.   Medicaid Plans - [employer]  Each health plan has options for transportation services. Contact your insurance provider to schedule transportation. Transportation or Member Services contact information is listed on the back of the insurance card.    Insurance Contacts  [facility] [employer] [facility]  Phone: [phone] Website: Remark/[place]  [employer]  Phone: [phone] Website: [place]/vamedicaid [place] [facility]  Phone: [phone] Website: [place] [employer]  [employer] ([place]Phone: [phone]   Website:

## 2024-10-08 LAB
ALBUMIN SERPL-MCNC: 4.4 G/DL (ref 3.9–4.9)
ALP SERPL-CCNC: 66 IU/L (ref 44–121)
ALT SERPL-CCNC: 17 IU/L (ref 0–32)
AST SERPL-CCNC: 20 IU/L (ref 0–40)
BILIRUB SERPL-MCNC: 0.2 MG/DL (ref 0–1.2)
BUN SERPL-MCNC: 11 MG/DL (ref 8–27)
BUN/CREAT SERPL: 12 (ref 12–28)
CALCIUM SERPL-MCNC: 9.6 MG/DL (ref 8.7–10.3)
CHLORIDE SERPL-SCNC: 105 MMOL/L (ref 96–106)
CHOLEST SERPL-MCNC: 234 MG/DL (ref 100–199)
CO2 SERPL-SCNC: 23 MMOL/L (ref 20–29)
CREAT SERPL-MCNC: 0.9 MG/DL (ref 0.57–1)
EGFRCR SERPLBLD CKD-EPI 2021: 71 ML/MIN/1.73
ERYTHROCYTE [DISTWIDTH] IN BLOOD BY AUTOMATED COUNT: 14.3 % (ref 11.7–15.4)
GLOBULIN SER CALC-MCNC: 2.5 G/DL (ref 1.5–4.5)
GLUCOSE SERPL-MCNC: 81 MG/DL (ref 70–99)
HCT VFR BLD AUTO: 40.5 % (ref 34–46.6)
HDLC SERPL-MCNC: 71 MG/DL
HGB BLD-MCNC: 12.9 G/DL (ref 11.1–15.9)
LDLC SERPL CALC-MCNC: 136 MG/DL (ref 0–99)
MCH RBC QN AUTO: 29.3 PG (ref 26.6–33)
MCHC RBC AUTO-ENTMCNC: 31.9 G/DL (ref 31.5–35.7)
MCV RBC AUTO: 92 FL (ref 79–97)
PLATELET # BLD AUTO: 299 X10E3/UL (ref 150–450)
POTASSIUM SERPL-SCNC: 5.1 MMOL/L (ref 3.5–5.2)
PROT SERPL-MCNC: 6.9 G/DL (ref 6–8.5)
RBC # BLD AUTO: 4.41 X10E6/UL (ref 3.77–5.28)
SODIUM SERPL-SCNC: 144 MMOL/L (ref 134–144)
T4 FREE SERPL-MCNC: 0.91 NG/DL (ref 0.82–1.77)
TRIGL SERPL-MCNC: 157 MG/DL (ref 0–149)
TSH SERPL DL<=0.005 MIU/L-ACNC: 1.43 UIU/ML (ref 0.45–4.5)
VLDLC SERPL CALC-MCNC: 27 MG/DL (ref 5–40)
WBC # BLD AUTO: 5.2 X10E3/UL (ref 3.4–10.8)

## 2024-10-20 RX ORDER — PRAVASTATIN SODIUM 20 MG
20 TABLET ORAL DAILY
Qty: 90 TABLET | Refills: 1 | Status: SHIPPED | OUTPATIENT
Start: 2024-10-20

## 2024-12-20 DIAGNOSIS — E11.9 TYPE 2 DIABETES MELLITUS WITHOUT COMPLICATION, WITHOUT LONG-TERM CURRENT USE OF INSULIN (HCC): ICD-10-CM

## 2024-12-31 RX ORDER — METFORMIN HYDROCHLORIDE 500 MG/1
500 TABLET, EXTENDED RELEASE ORAL
Qty: 30 TABLET | Refills: 0 | Status: SHIPPED | OUTPATIENT
Start: 2024-12-31

## 2025-01-28 ENCOUNTER — PATIENT MESSAGE (OUTPATIENT)
Facility: CLINIC | Age: 65
End: 2025-01-28

## 2025-01-28 DIAGNOSIS — E11.9 TYPE 2 DIABETES MELLITUS WITHOUT COMPLICATION, WITHOUT LONG-TERM CURRENT USE OF INSULIN (HCC): ICD-10-CM

## 2025-01-28 RX ORDER — METFORMIN HYDROCHLORIDE 500 MG/1
500 TABLET, EXTENDED RELEASE ORAL
Qty: 90 TABLET | Refills: 1 | Status: SHIPPED | OUTPATIENT
Start: 2025-01-28

## 2025-02-05 ENCOUNTER — TRANSCRIBE ORDERS (OUTPATIENT)
Facility: HOSPITAL | Age: 65
End: 2025-02-05

## 2025-02-05 DIAGNOSIS — Z12.31 VISIT FOR SCREENING MAMMOGRAM: Primary | ICD-10-CM

## 2025-02-27 ENCOUNTER — HOSPITAL ENCOUNTER (OUTPATIENT)
Facility: HOSPITAL | Age: 65
Discharge: HOME | End: 2025-02-27
Payer: MEDICARE

## 2025-02-27 DIAGNOSIS — Z12.31 VISIT FOR SCREENING MAMMOGRAM: ICD-10-CM

## 2025-02-27 PROCEDURE — 77063 BREAST TOMOSYNTHESIS BI: CPT

## 2025-04-17 DIAGNOSIS — E78.2 MIXED HYPERLIPIDEMIA: ICD-10-CM

## 2025-04-17 RX ORDER — PRAVASTATIN SODIUM 20 MG
20 TABLET ORAL DAILY
Qty: 90 TABLET | Refills: 1 | Status: SHIPPED | OUTPATIENT
Start: 2025-04-17

## 2025-04-30 SDOH — HEALTH STABILITY: PHYSICAL HEALTH: ON AVERAGE, HOW MANY MINUTES DO YOU ENGAGE IN EXERCISE AT THIS LEVEL?: 30 MIN

## 2025-04-30 SDOH — HEALTH STABILITY: PHYSICAL HEALTH: ON AVERAGE, HOW MANY DAYS PER WEEK DO YOU ENGAGE IN MODERATE TO STRENUOUS EXERCISE (LIKE A BRISK WALK)?: 3 DAYS

## 2025-04-30 ASSESSMENT — PATIENT HEALTH QUESTIONNAIRE - PHQ9
10. IF YOU CHECKED OFF ANY PROBLEMS, HOW DIFFICULT HAVE THESE PROBLEMS MADE IT FOR YOU TO DO YOUR WORK, TAKE CARE OF THINGS AT HOME, OR GET ALONG WITH OTHER PEOPLE: SOMEWHAT DIFFICULT
3. TROUBLE FALLING OR STAYING ASLEEP: NEARLY EVERY DAY
SUM OF ALL RESPONSES TO PHQ QUESTIONS 1-9: 12
9. THOUGHTS THAT YOU WOULD BE BETTER OFF DEAD, OR OF HURTING YOURSELF: NOT AT ALL
6. FEELING BAD ABOUT YOURSELF - OR THAT YOU ARE A FAILURE OR HAVE LET YOURSELF OR YOUR FAMILY DOWN: NOT AT ALL
SUM OF ALL RESPONSES TO PHQ QUESTIONS 1-9: 12
7. TROUBLE CONCENTRATING ON THINGS, SUCH AS READING THE NEWSPAPER OR WATCHING TELEVISION: NOT AT ALL
5. POOR APPETITE OR OVEREATING: NEARLY EVERY DAY
8. MOVING OR SPEAKING SO SLOWLY THAT OTHER PEOPLE COULD HAVE NOTICED. OR THE OPPOSITE, BEING SO FIGETY OR RESTLESS THAT YOU HAVE BEEN MOVING AROUND A LOT MORE THAN USUAL: NOT AT ALL
1. LITTLE INTEREST OR PLEASURE IN DOING THINGS: SEVERAL DAYS
4. FEELING TIRED OR HAVING LITTLE ENERGY: NEARLY EVERY DAY
2. FEELING DOWN, DEPRESSED OR HOPELESS: MORE THAN HALF THE DAYS

## 2025-04-30 ASSESSMENT — LIFESTYLE VARIABLES
HOW OFTEN DO YOU HAVE A DRINK CONTAINING ALCOHOL: 1
HOW OFTEN DO YOU HAVE SIX OR MORE DRINKS ON ONE OCCASION: 1
HOW OFTEN DO YOU HAVE A DRINK CONTAINING ALCOHOL: NEVER
HOW MANY STANDARD DRINKS CONTAINING ALCOHOL DO YOU HAVE ON A TYPICAL DAY: 0
HOW MANY STANDARD DRINKS CONTAINING ALCOHOL DO YOU HAVE ON A TYPICAL DAY: PATIENT DOES NOT DRINK

## 2025-05-06 SDOH — ECONOMIC STABILITY: FOOD INSECURITY: WITHIN THE PAST 12 MONTHS, YOU WORRIED THAT YOUR FOOD WOULD RUN OUT BEFORE YOU GOT MONEY TO BUY MORE.: SOMETIMES TRUE

## 2025-05-06 SDOH — ECONOMIC STABILITY: INCOME INSECURITY: IN THE LAST 12 MONTHS, WAS THERE A TIME WHEN YOU WERE NOT ABLE TO PAY THE MORTGAGE OR RENT ON TIME?: YES

## 2025-05-06 SDOH — ECONOMIC STABILITY: TRANSPORTATION INSECURITY
IN THE PAST 12 MONTHS, HAS THE LACK OF TRANSPORTATION KEPT YOU FROM MEDICAL APPOINTMENTS OR FROM GETTING MEDICATIONS?: NO

## 2025-05-06 SDOH — ECONOMIC STABILITY: FOOD INSECURITY: WITHIN THE PAST 12 MONTHS, THE FOOD YOU BOUGHT JUST DIDN'T LAST AND YOU DIDN'T HAVE MONEY TO GET MORE.: NEVER TRUE

## 2025-05-06 SDOH — ECONOMIC STABILITY: TRANSPORTATION INSECURITY
IN THE PAST 12 MONTHS, HAS LACK OF TRANSPORTATION KEPT YOU FROM MEETINGS, WORK, OR FROM GETTING THINGS NEEDED FOR DAILY LIVING?: NO

## 2025-05-07 ENCOUNTER — OFFICE VISIT (OUTPATIENT)
Facility: CLINIC | Age: 65
End: 2025-05-07
Payer: MEDICARE

## 2025-05-07 VITALS
SYSTOLIC BLOOD PRESSURE: 126 MMHG | OXYGEN SATURATION: 98 % | TEMPERATURE: 97.1 F | WEIGHT: 232 LBS | HEIGHT: 66 IN | DIASTOLIC BLOOD PRESSURE: 80 MMHG | RESPIRATION RATE: 18 BRPM | HEART RATE: 86 BPM | BODY MASS INDEX: 37.28 KG/M2

## 2025-05-07 DIAGNOSIS — Z13.9 ENCOUNTER FOR SCREENING INVOLVING SOCIAL DETERMINANTS OF HEALTH (SDOH): ICD-10-CM

## 2025-05-07 DIAGNOSIS — E78.2 MIXED HYPERLIPIDEMIA: ICD-10-CM

## 2025-05-07 DIAGNOSIS — Z78.0 ENCOUNTER FOR OSTEOPOROSIS SCREENING IN ASYMPTOMATIC POSTMENOPAUSAL PATIENT: ICD-10-CM

## 2025-05-07 DIAGNOSIS — E11.9 TYPE 2 DIABETES MELLITUS WITHOUT COMPLICATION, WITHOUT LONG-TERM CURRENT USE OF INSULIN (HCC): ICD-10-CM

## 2025-05-07 DIAGNOSIS — R01.1 HEART MURMUR: ICD-10-CM

## 2025-05-07 DIAGNOSIS — M16.11 OSTEOARTHRITIS OF RIGHT HIP, UNSPECIFIED OSTEOARTHRITIS TYPE: ICD-10-CM

## 2025-05-07 DIAGNOSIS — Z13.820 ENCOUNTER FOR OSTEOPOROSIS SCREENING IN ASYMPTOMATIC POSTMENOPAUSAL PATIENT: ICD-10-CM

## 2025-05-07 DIAGNOSIS — Z00.00 WELCOME TO MEDICARE PREVENTIVE VISIT: Primary | ICD-10-CM

## 2025-05-07 LAB — HBA1C MFR BLD: 5.6 %

## 2025-05-07 PROCEDURE — 1123F ACP DISCUSS/DSCN MKR DOCD: CPT | Performed by: STUDENT IN AN ORGANIZED HEALTH CARE EDUCATION/TRAINING PROGRAM

## 2025-05-07 PROCEDURE — G0402 INITIAL PREVENTIVE EXAM: HCPCS | Performed by: STUDENT IN AN ORGANIZED HEALTH CARE EDUCATION/TRAINING PROGRAM

## 2025-05-07 PROCEDURE — 3044F HG A1C LEVEL LT 7.0%: CPT | Performed by: STUDENT IN AN ORGANIZED HEALTH CARE EDUCATION/TRAINING PROGRAM

## 2025-05-07 PROCEDURE — 83036 HEMOGLOBIN GLYCOSYLATED A1C: CPT | Performed by: STUDENT IN AN ORGANIZED HEALTH CARE EDUCATION/TRAINING PROGRAM

## 2025-05-07 PROCEDURE — 99214 OFFICE O/P EST MOD 30 MIN: CPT | Performed by: STUDENT IN AN ORGANIZED HEALTH CARE EDUCATION/TRAINING PROGRAM

## 2025-05-07 RX ORDER — METFORMIN HYDROCHLORIDE 500 MG/1
500 TABLET, EXTENDED RELEASE ORAL
Qty: 90 TABLET | Refills: 3 | Status: SHIPPED | OUTPATIENT
Start: 2025-05-07

## 2025-05-07 RX ORDER — PRAVASTATIN SODIUM 20 MG
20 TABLET ORAL DAILY
Qty: 90 TABLET | Refills: 2 | Status: SHIPPED | OUTPATIENT
Start: 2025-05-07

## 2025-05-07 ASSESSMENT — VISUAL ACUITY
OS_CC: 20/20
OD_CC: 20/20

## 2025-05-07 NOTE — PATIENT INSTRUCTIONS
[facility] Food Resources*  (Call [facility]/ 211 if need more resources.)   SNAP (formerly Food [place])  What they offer: SNAP is used like cash to buy eligible food items from authorized retailers.  Apply for benefits online: https://www.commonhelp.[place].gov/  Apply for benefits by phone: [phone] (M-F 8AM - 7PM; Sat 9AM - 12PM)  [employer] Hunger Hotline  What they offer:  The [employer] Hunger Hotline connects individuals in need of food with a local food pantry or program across [facility] in [facility].   Website: https://[place].co/store-/ (search zip code)   Hunger Hotline Inquiry Form: https://[place].co/hunger-hotline/  Hunger Hotline Phone Number:  804-521-2500 x [phone] (M-F 9:00AM-4:00PM)    Meals on Wheels  Meals on Wheels is a program that delivers meals to individuals who have no reliable means for maintaining a healthy diet.  Services are provided by area.   Sequence Service Area:   [facility], and [place].  [facility], [facility], [facility], [place], and [place]  Website:  https://SurveySnap.org/how-we-help/meals-on-wheels/  To Apply:  Ages 18-59:  Apply online: https://Sur[employer]Snap.[employer] Apps/meals-on-wheels-application-form/  Apply by phone: [phone]  To Apply:  Ages 60+:  Apply Online: [email or web address]://SurveySnap.[employer] Apps/meals-on-wheels-application-form/  Apply By Phone: [phone] (M-F 8:30AM-5PM)  For [facility], [facility], [place], [place], [place] and [place]: You may also apply by calling [employer], [facility] Agency on [phone]  For [facility], and [place] as well as [facility], [facility], [place], [place] and [place]:    Contact [facility] Agency on [phone]    [place] Aging Service Area:   Counties of [place], [place], [place], [place],

## 2025-05-07 NOTE — PROGRESS NOTES
\"Have you been to the ER, urgent care clinic since your last visit?  Hospitalized since your last visit?\"    NO    “Have you seen or consulted any other health care providers outside of [facility] since your last visit?”    NO    Chief Complaint   Patient presents with    Medicare AWV    Follow-up Chronic Condition     /80 (BP Site: Left Upper Arm, Patient Position: Sitting, BP Cuff Size: Medium Adult)   Pulse 86   Temp 97.1 °F (36.2 °C) (Temporal)   Resp 18   Ht 1.676 m (5' 6\")   Wt 105.2 kg (232 lb)   SpO2 98%   BMI 37.45 kg/m²               Click Here for Release of Records Request

## 2025-05-07 NOTE — PROGRESS NOTES
Medicare Annual Wellness Visit    Angeles Vasquez is here for Medicare AWV (Welcome) and Follow-up Chronic Condition    Assessment & Plan   Welcome to Medicare preventive visit  Type 2 diabetes mellitus without complication, without long-term current use of insulin (HCC)  -     AMB POC HEMOGLOBIN A1C  -     Comprehensive Metabolic Panel; Future  -     T4, Free; Future  -     TSH; Future  -     Albumin/Creatinine Ratio, Urine; Future  -     metFORMIN (GLUCOPHAGE-XR) 500 MG extended release tablet; Take 1 tablet by mouth daily (with breakfast), Disp-90 tablet, R-3Normal  Mixed hyperlipidemia  -     pravastatin (PRAVACHOL) 20 MG tablet; Take 1 tablet by mouth daily, Disp-90 tablet, R-2Normal  -     Lipid Panel; Future  Osteoarthritis of right hip, unspecified osteoarthritis type  -     diclofenac sodium (VOLTAREN) 1 % GEL; Apply 4 g topically 4 times daily as needed for Pain, Topical, 4 TIMES DAILY PRN Starting Wed 5/7/2025, Disp-350 g, R-1, Normal  Encounter for screening involving social determinants of health (SDoH)  -     [facility] - Referral for Social Determinants of Health  Encounter for osteoporosis screening in asymptomatic postmenopausal patient  -     DEXA BONE DENSITY AXIAL SKELETON; Future  Heart murmur  -     Echo (TTE) complete (PRN contrast/bubble/strain/3D); Future      Diabetes controlled continue current medications  Blood work and orders as above.  Heart murmur, asymptomatic, ordered ECHO    UTD with mammogram, colonoscopy due next year.   Refused immunizations.   Had EKG done in 2021     Results for orders placed or performed in visit on 05/07/25   AMB POC HEMOGLOBIN A1C   Result Value Ref Range    Hemoglobin A1C, POC 5.6 %       Return in about 3 months (around 8/7/2025) for follow up.     Subjective   The following acute and/or chronic problems were also addressed today:  She is type 2 diabetes, dyspepsia.   She has vertigo, takes meclizine as needed. She had seen ENT in the past. This is diclofenac

## 2025-05-08 LAB
ALBUMIN SERPL-MCNC: 4.6 G/DL (ref 3.9–4.9)
ALBUMIN/CREAT UR: 3 MG/G CREAT (ref 0–29)
ALP SERPL-CCNC: 67 IU/L (ref 44–121)
ALT SERPL-CCNC: 17 IU/L (ref 0–32)
AST SERPL-CCNC: 18 IU/L (ref 0–40)
BILIRUB SERPL-MCNC: 0.3 MG/DL (ref 0–1.2)
BUN SERPL-MCNC: 9 MG/DL (ref 8–27)
BUN/CREAT SERPL: 10 (ref 12–28)
CALCIUM SERPL-MCNC: 9.9 MG/DL (ref 8.7–10.3)
CHLORIDE SERPL-SCNC: 105 MMOL/L (ref 96–106)
CHOLEST SERPL-MCNC: 212 MG/DL (ref 100–199)
CO2 SERPL-SCNC: 23 MMOL/L (ref 20–29)
CREAT SERPL-MCNC: 0.88 MG/DL (ref 0.57–1)
CREAT UR-MCNC: 209.5 MG/DL
EGFRCR SERPLBLD CKD-EPI 2021: 73 ML/MIN/1.73
ERYTHROCYTE [DISTWIDTH] IN BLOOD BY AUTOMATED COUNT: 14.4 % (ref 11.7–15.4)
GLOBULIN SER CALC-MCNC: 2.6 G/DL (ref 1.5–4.5)
GLUCOSE SERPL-MCNC: 84 MG/DL (ref 70–99)
HCT VFR BLD AUTO: 37.2 % (ref 34–46.6)
HDLC SERPL-MCNC: 76 MG/DL
HGB BLD-MCNC: 12.5 G/DL (ref 11.1–15.9)
LDLC SERPL CALC-MCNC: 117 MG/DL (ref 0–99)
MCH RBC QN AUTO: 29.8 PG (ref 26.6–33)
MCHC RBC AUTO-ENTMCNC: 33.6 G/DL (ref 31.5–35.7)
MCV RBC AUTO: 89 FL (ref 79–97)
MICROALBUMIN UR-MCNC: 6.9 UG/ML
PLATELET # BLD AUTO: 281 X10E3/UL (ref 150–450)
POTASSIUM SERPL-SCNC: 4.7 MMOL/L (ref 3.5–5.2)
PROT SERPL-MCNC: 7.2 G/DL (ref 6–8.5)
RBC # BLD AUTO: 4.2 X10E6/UL (ref 3.77–5.28)
SODIUM SERPL-SCNC: 144 MMOL/L (ref 134–144)
T4 FREE SERPL-MCNC: 0.9 NG/DL (ref 0.82–1.77)
TRIGL SERPL-MCNC: 106 MG/DL (ref 0–149)
TSH SERPL DL<=0.005 MIU/L-ACNC: 0.97 UIU/ML (ref 0.45–4.5)
VLDLC SERPL CALC-MCNC: 19 MG/DL (ref 5–40)
WBC # BLD AUTO: 6.1 X10E3/UL (ref 3.4–10.8)

## 2025-05-12 ENCOUNTER — TELEPHONE (OUTPATIENT)
Facility: CLINIC | Age: 65
End: 2025-05-12

## 2025-05-12 NOTE — TELEPHONE ENCOUNTER
Angeles Vasquez was contacted by Madiha Florentino, a Community Health Navigator, regarding a Social Determinants of Health referral. The patient was referred to the [facility] for resources assistance for housing an food.    Upon speaking with the patient she advised at this time, low income or income restricted housing  resources would be beneficial in the nearby locality of [facility] where she currently resides. I advised I will mail resources that might be beneficial for her.     Follow-up attempt.will be made with the patient to ensure resources and received and if she has any questions.

## 2025-05-16 ENCOUNTER — TELEPHONE (OUTPATIENT)
Facility: CLINIC | Age: 65
End: 2025-05-16

## 2025-05-16 NOTE — TELEPHONE ENCOUNTER
Angeles Vasquez was contacted by Mercedes Pal, a Community Health Navigator, regarding a Social Determinants of Health referral.     Called the patient and she advised me that she was on her way out to go look a house and advised me to give her a call back later.    Will call back later

## 2025-05-20 ENCOUNTER — HOSPITAL ENCOUNTER (OUTPATIENT)
Facility: HOSPITAL | Age: 65
Discharge: HOME | End: 2025-05-23
Attending: STUDENT IN AN ORGANIZED HEALTH CARE EDUCATION/TRAINING PROGRAM
Payer: MEDICARE

## 2025-05-20 ENCOUNTER — HOSPITAL ENCOUNTER (OUTPATIENT)
Facility: HOSPITAL | Age: 65
Discharge: HOME | End: 2025-05-22
Attending: STUDENT IN AN ORGANIZED HEALTH CARE EDUCATION/TRAINING PROGRAM
Payer: MEDICARE

## 2025-05-20 DIAGNOSIS — R01.1 HEART MURMUR: ICD-10-CM

## 2025-05-20 DIAGNOSIS — Z78.0 ENCOUNTER FOR OSTEOPOROSIS SCREENING IN ASYMPTOMATIC POSTMENOPAUSAL PATIENT: ICD-10-CM

## 2025-05-20 DIAGNOSIS — Z13.820 ENCOUNTER FOR OSTEOPOROSIS SCREENING IN ASYMPTOMATIC POSTMENOPAUSAL PATIENT: ICD-10-CM

## 2025-05-20 PROCEDURE — 77080 DXA BONE DENSITY AXIAL: CPT

## 2025-05-20 PROCEDURE — 6360000004 HC RX CONTRAST MEDICATION

## 2025-05-20 PROCEDURE — C8929 TTE W OR WO FOL WCON,DOPPLER: HCPCS

## 2025-05-20 PROCEDURE — 6360000004 HC RX CONTRAST MEDICATION: Performed by: STUDENT IN AN ORGANIZED HEALTH CARE EDUCATION/TRAINING PROGRAM

## 2025-05-20 RX ADMIN — SULFUR HEXAFLUORIDE 2 ML: 60.7; .19; .19 INJECTION, POWDER, LYOPHILIZED, FOR SUSPENSION INTRAVENOUS; INTRAVESICAL at 18:16

## 2025-05-20 RX ADMIN — SULFUR HEXAFLUORIDE 2 ML: 60.7; .19; .19 INJECTION, POWDER, LYOPHILIZED, FOR SUSPENSION INTRAVENOUS; INTRAVESICAL at 18:19

## 2025-05-21 ENCOUNTER — RESULTS FOLLOW-UP (OUTPATIENT)
Facility: CLINIC | Age: 65
End: 2025-05-21

## 2025-05-21 DIAGNOSIS — E78.2 MIXED HYPERLIPIDEMIA: ICD-10-CM

## 2025-05-21 LAB
ECHO AO ASC DIAM: 2.5 CM
ECHO AO ROOT DIAM: 2.4 CM
ECHO AV AREA PEAK VELOCITY: 1.9 CM2
ECHO AV AREA VTI: 1.8 CM2
ECHO AV MEAN GRADIENT: 5 MMHG
ECHO AV MEAN VELOCITY: 1.1 M/S
ECHO AV PEAK GRADIENT: 9 MMHG
ECHO AV PEAK VELOCITY: 1.5 M/S
ECHO AV VELOCITY RATIO: 0.73
ECHO AV VTI: 29.7 CM
ECHO EST RA PRESSURE: 3 MMHG
ECHO LA AREA 4C: 10.6 CM2
ECHO LA DIAMETER: 3.1 CM
ECHO LA MAJOR AXIS: 3.8 CM
ECHO LA TO AORTIC ROOT RATIO: 1.29
ECHO LA VOL MOD A4C: 23 ML (ref 22–52)
ECHO LV E' LATERAL VELOCITY: 5.98 CM/S
ECHO LV E' SEPTAL VELOCITY: 6.53 CM/S
ECHO LV EDV A2C: 25 ML
ECHO LV EDV A4C: 49 ML
ECHO LV EF PHYSICIAN: 60 %
ECHO LV EJECTION FRACTION A2C: 70 %
ECHO LV EJECTION FRACTION A4C: 56 %
ECHO LV EJECTION FRACTION BIPLANE: 63 % (ref 55–100)
ECHO LV ESV A2C: 8 ML
ECHO LV ESV A4C: 21 ML
ECHO LV FRACTIONAL SHORTENING: 44 % (ref 28–44)
ECHO LV INTERNAL DIMENSION DIASTOLIC: 4.3 CM (ref 3.9–5.3)
ECHO LV INTERNAL DIMENSION SYSTOLIC: 2.4 CM
ECHO LV IVSD: 0.9 CM (ref 0.6–0.9)
ECHO LV MASS 2D: 123.3 G (ref 67–162)
ECHO LV POSTERIOR WALL DIASTOLIC: 0.9 CM (ref 0.6–0.9)
ECHO LV RELATIVE WALL THICKNESS RATIO: 0.42
ECHO LVOT AREA: 2.5 CM2
ECHO LVOT AV VTI INDEX: 0.71
ECHO LVOT DIAM: 1.8 CM
ECHO LVOT MEAN GRADIENT: 2 MMHG
ECHO LVOT PEAK GRADIENT: 5 MMHG
ECHO LVOT PEAK VELOCITY: 1.1 M/S
ECHO LVOT SV: 53.7 ML
ECHO LVOT VTI: 21.1 CM
ECHO MV A VELOCITY: 0.85 M/S
ECHO MV AREA VTI: 2.6 CM2
ECHO MV E DECELERATION TIME (DT): 256 MS
ECHO MV E VELOCITY: 0.61 M/S
ECHO MV E/A RATIO: 0.72
ECHO MV E/E' LATERAL: 10.2
ECHO MV E/E' RATIO (AVERAGED): 9.77
ECHO MV E/E' SEPTAL: 9.34
ECHO MV LVOT VTI INDEX: 0.98
ECHO MV MAX VELOCITY: 0.9 M/S
ECHO MV MEAN GRADIENT: 2 MMHG
ECHO MV MEAN VELOCITY: 0.6 M/S
ECHO MV PEAK GRADIENT: 3 MMHG
ECHO MV REGURGITANT PEAK GRADIENT: 18 MMHG
ECHO MV REGURGITANT PEAK VELOCITY: 2.1 M/S
ECHO MV REGURGITANT VTIA: 54.8 CM
ECHO MV VTI: 20.6 CM
ECHO PULMONARY ARTERY END DIASTOLIC PRESSURE: 2 MMHG
ECHO PV REGURGITANT MAX VELOCITY: 0.7 M/S
ECHO RA AREA 4C: 8.6 CM2
ECHO RA VOLUME: 17 ML
ECHO RIGHT VENTRICULAR SYSTOLIC PRESSURE (RVSP): 22 MMHG
ECHO RV FREE WALL PEAK S': 10.6 CM/S
ECHO RV TAPSE: 2.5 CM (ref 1.7–?)
ECHO TV REGURGITANT MAX VELOCITY: 2.18 M/S
ECHO TV REGURGITANT PEAK GRADIENT: 19 MMHG
LV EF: 60 ML

## 2025-05-21 RX ORDER — PRAVASTATIN SODIUM 40 MG
40 TABLET ORAL DAILY
Qty: 90 TABLET | Refills: 2 | Status: SHIPPED | OUTPATIENT
Start: 2025-05-21

## 2025-05-28 ENCOUNTER — RESULTS FOLLOW-UP (OUTPATIENT)
Facility: CLINIC | Age: 65
End: 2025-05-28

## 2025-07-30 ENCOUNTER — TELEPHONE (OUTPATIENT)
Facility: CLINIC | Age: 65
End: 2025-07-30

## 2025-07-31 ENCOUNTER — TELEPHONE (OUTPATIENT)
Facility: CLINIC | Age: 65
End: 2025-07-31

## (undated) DEVICE — LAMINECTOMY RICHMOND-LF: Brand: MEDLINE INDUSTRIES, INC.

## (undated) DEVICE — GLOVE SURG BIOGEL 8.0 STRL -- SKINSENSE

## (undated) DEVICE — SUTURE MCRYL SZ 2-0 L36IN ABSRB UD L36MM CT-1 1/2 CIR Y945H

## (undated) DEVICE — TRAY CATH 16F DRN BG LTX -- CONVERT TO ITEM 363158

## (undated) DEVICE — STRYKER PERFORMANCE SERIES SAGITTAL BLADE: Brand: STRYKER PERFORMANCE SERIES

## (undated) DEVICE — DRSG PATCH ANTIMIC 1INX4.0MM -- CONVERT TO ITEM 356053

## (undated) DEVICE — DRAIN SURG 10FR SIL RND HUBLESS W/ 1/8IN TRCR BLAK

## (undated) DEVICE — COVER LT HNDL BLU STRL -- MEDICHOICE

## (undated) DEVICE — STERILE POLYISOPRENE POWDER-FREE SURGICAL GLOVES WITH EMOLLIENT COATING: Brand: PROTEXIS

## (undated) DEVICE — SUTURE VCRL UD BR CT 3-0 18IN CT1 J838D

## (undated) DEVICE — SMOKE EVACUATION PENCIL: Brand: VALLEYLAB

## (undated) DEVICE — INTENDED FOR TISSUE SEPARATION, AND OTHER PROCEDURES THAT REQUIRE A SHARP SURGICAL BLADE TO PUNCTURE OR CUT.: Brand: BARD-PARKER SAFETY BLADES SIZE 15, STERILE

## (undated) DEVICE — BIPOLAR FORCEPS CORD,BANANA LEADS: Brand: VALLEYLAB

## (undated) DEVICE — KIT CLN UP BON SECOURS MARYV

## (undated) DEVICE — GAUZE SPONGES,12 PLY: Brand: CURITY

## (undated) DEVICE — Z CONVERTED USE 2107985 COVER FLROSCP W36XL28IN 4 SIDE ADH

## (undated) DEVICE — MAGNETIC INSTRUMENT PAD 10" X 16"; MEDIUM; DISPOSABLE: Brand: CARDINAL HEALTH

## (undated) DEVICE — FLOSEAL MATRIX IS INDICATED IN SURGICAL PROCEDURES (OTHER THAN IN OPHTHALMIC) AS AN ADJUNCT TO HEMOSTASIS WHEN CONTROL OF BLEEDING BY LIGATURE OR CONVENTIONALPROCEDURES IS INEFFECTIVE OR IMPRACTICAL.: Brand: FLOSEAL HEMOSTATIC MATRIX

## (undated) DEVICE — BLANKET WRM AD W50XL85.8IN PACU FULL BODY FORC AIR

## (undated) DEVICE — 1200 GUARD II KIT W/5MM TUBE W/O VAC TUBE: Brand: GUARDIAN

## (undated) DEVICE — REM POLYHESIVE ADULT PATIENT RETURN ELECTRODE: Brand: VALLEYLAB

## (undated) DEVICE — NEEDLE SPNL 22GA L3.5IN BLK HUB S STL REG WALL FIT STYL W/

## (undated) DEVICE — SHEAR HARMONIC FOCUS OEM 9CM --

## (undated) DEVICE — 3M™ TEGADERM™ TRANSPARENT FILM DRESSING FRAME STYLE, 1626W, 4 IN X 4-3/4 IN (10 CM X 12 CM), 50/CT 4CT/CASE: Brand: 3M™ TEGADERM™

## (undated) DEVICE — SOLUTION IRRIG 3000ML 0.9% SOD CHL FLX CONT 0797208] ICU MEDICAL INC]

## (undated) DEVICE — SUTURE VCRL SZ 2-0 L18IN ABSRB UD CT-1 L36MM 1/2 CIR J839D

## (undated) DEVICE — SLIM BODY SKIN STAPLER: Brand: APPOSE ULC

## (undated) DEVICE — SUTURE VCRL SZ 1 L27IN ABSRB VLT CTX L48MM 1 2 CIR SGL ARMED J365H

## (undated) DEVICE — STOCKING ANTIEMB KNEE LG REG --

## (undated) DEVICE — STERILE POLYISOPRENE POWDER-FREE SURGICAL GLOVES: Brand: PROTEXIS

## (undated) DEVICE — SUTURE VCRL SZ 3-0 L18IN ABSRB UD POLYGLACTIN 910 BRAID TIE J910T

## (undated) DEVICE — COVER,TABLE,60X90,STERILE: Brand: MEDLINE

## (undated) DEVICE — PENCIL ES L3M BTTN SWCH S STL HEX LOK BLDE ELECTRD HOLSTER

## (undated) DEVICE — SUT SLK 2-0SH 30IN BLK --

## (undated) DEVICE — PREP CHLORAPREP 10.5 ML ORG --

## (undated) DEVICE — SUTURE VCRL SZ 0 L27IN ABSRB UD L36MM CT-1 1/2 CIR J260H

## (undated) DEVICE — 3M™ BAIR PAWS FLEX™ WARMING GOWN, STANDARD, 20 PER CASE 81003: Brand: BAIR PAWS™

## (undated) DEVICE — DRSG AQUACEL SURG 3.5 X 10IN -- CONVERT TO ITEM 370183

## (undated) DEVICE — KENDALL SCD EXPRESS SLEEVES, KNEE LENGTH, MEDIUM: Brand: KENDALL SCD

## (undated) DEVICE — SYRINGE MED 3ML NDL 22GA L1 1/2IN REG BVL SFGLDE

## (undated) DEVICE — Device

## (undated) DEVICE — HIP PILLOW, ABDUCTION: Brand: DEROYAL

## (undated) DEVICE — GARMENT,MEDLINE,DVT,INT,CALF,MED, GEN2: Brand: MEDLINE

## (undated) DEVICE — SOLUTION IV 1000ML 0.9% SOD CHL

## (undated) DEVICE — BONE WAX WHITE: Brand: BONE WAX WHITE

## (undated) DEVICE — GOWN,REINFORCED,POLY,AURORA,XXLARGE,STR: Brand: MEDLINE

## (undated) DEVICE — SUTURE VCRL SZ 4-0 L18IN ABSRB UD L19MM PS-2 3/8 CIR PRIM J496H

## (undated) DEVICE — NEEDLE HYPO 21GA L1.5IN INTRAMUSCULAR S STL LATCH BVL UP

## (undated) DEVICE — PREP SKN CHLRAPRP 26ML TNT -- CONVERT TO ITEM 373320

## (undated) DEVICE — Z DISCONTINUED USE 2717541 SUTURE STRATAFIX SZ 3-0 L30CM NONABSORBABLE UD L26MM FS 3/8

## (undated) DEVICE — FLEX ADVANTAGE 3000CC: Brand: FLEX ADVANTAGE

## (undated) DEVICE — HOOD, PEEL-AWAY: Brand: FLYTE

## (undated) DEVICE — SUTURE VCRL SZ 2 L27IN ABSRB VLT L65MM TP-1 1/2 CIR J649G

## (undated) DEVICE — SYRINGE MED 20ML STD CLR PLAS LUERLOCK TIP N CTRL DISP

## (undated) DEVICE — PACKING 8004000 NEURAY 200PK 13X13MM: Brand: NEURAY ®

## (undated) DEVICE — SOLUTION IRRIG 1000ML H2O STRL BLT

## (undated) DEVICE — COVER,MAYO STAND,STERILE: Brand: MEDLINE

## (undated) DEVICE — SUTURE PERMAHAND SZ 2-0 L30IN NONABSORBABLE BLK SILK W/O A305H

## (undated) DEVICE — 3M™ STERI-STRIP™ REINFORCED ADHESIVE SKIN CLOSURES, R1549, 1/2 IN X 2 IN (12 MM X 50 MM), 6 STRIPS/ENVELOPE: Brand: 3M™ STERI-STRIP™

## (undated) DEVICE — INFECTION CONTROL KIT SYS

## (undated) DEVICE — HALTER TRACTION HD W/ TRI COTTON LINING FOAM LTX

## (undated) DEVICE — TUBING IRRIG L77IN DIA0.241IN L BOR FOR CYSTO W/ NVENT

## (undated) DEVICE — SYR 10ML LUER LOK 1/5ML GRAD --

## (undated) DEVICE — SKIN MARKER,REGULAR TIP WITH RULER AND LABELS: Brand: DEVON

## (undated) DEVICE — NEEDLE HYPO 18GA L1.5IN PNK S STL HUB POLYPR SHLD REG BVL

## (undated) DEVICE — Z DISCONTINUED USE 2744636  DRESSING AQUACEL 14 IN ALG W3.5XL14IN POLYUR FLM CVR W/ HYDRCOLL

## (undated) DEVICE — Z DISCONTINUED BY MEDLINE USE 2711682 TRAY SKIN PREP DRY W/ PREM GLV

## (undated) DEVICE — GOWN,REINF,POLY,ECL,PP SLV,3XL,XLONG: Brand: MEDLINE

## (undated) DEVICE — SYSTEM SKIN CLSR 22CM DERMBND PRINEO

## (undated) DEVICE — GEL BAG FOR WRAPS --

## (undated) DEVICE — STRIP,CLOSURE,WOUND,MEDI-STRIP,1/2X4: Brand: MEDLINE

## (undated) DEVICE — SOLUTION IRRIG 3000ML LAC R FLX CONT

## (undated) DEVICE — SYR LR LCK 1ML GRAD NSAF 30ML --

## (undated) DEVICE — 3M™ STERI-DRAPE™ U-DRAPE 1015: Brand: STERI-DRAPE™

## (undated) DEVICE — (D)PACK ICE DISP -- DISC BY MFR

## (undated) DEVICE — DRAPE,LAPAROTOMY,PCH,STERILE: Brand: MEDLINE

## (undated) DEVICE — DRILL BIT 14MM

## (undated) DEVICE — DRAPE MICSCP W20XL64IN CLR LENS WECK FOR ZEISS OPMI

## (undated) DEVICE — BIPOLAR SEALER 23-112-1 AQM 6.0: Brand: AQUAMANTYS ®

## (undated) DEVICE — GOWN,SIRUS,NONRNF,SETINSLV,2XL,18/CS: Brand: MEDLINE

## (undated) DEVICE — PROBE SET W/ DRP

## (undated) DEVICE — GLOVE SURG SZ 8 L11.77IN FNGR THK9.8MIL STRW LTX POLYMER

## (undated) DEVICE — X-RAY SPONGES,12 PLY: Brand: DERMACEA

## (undated) DEVICE — INTENDED FOR TISSUE SEPARATION, AND OTHER PROCEDURES THAT REQUIRE A SHARP SURGICAL BLADE TO PUNCTURE OR CUT.: Brand: BARD-PARKER SAFETY BLADES SIZE 10, STERILE

## (undated) DEVICE — HANDLE LT SNAP ON ULT DURABLE LENS FOR TRUMPF ALC DISPOSABLE

## (undated) DEVICE — 3M™ STERI-STRIP™ COMPOUND BENZOIN TINCTURE 40 BAGS/CARTON 4 CARTONS/CASE C1544: Brand: 3M™ STERI-STRIP™

## (undated) DEVICE — HANDPIECE SET WITH HIGH FLOW TIP AND SUCTION TUBE: Brand: INTERPULSE

## (undated) DEVICE — 3M™ STERI-DRAPE™ INSTRUMENT POUCH 1018: Brand: STERI-DRAPE™

## (undated) DEVICE — SPONGE: SPECIALTY PEANUT XR 100/CS: Brand: MEDICAL ACTION INDUSTRIES

## (undated) DEVICE — TOOL 14MH30 LEGEND 14CM 3MM: Brand: MIDAS REX ™

## (undated) DEVICE — STAPLER SKIN 35CT WD STRL DISP -- MULTIFIRE PREMIUM

## (undated) DEVICE — PACK PROCEDURE SURG TOT HIP BSHR LF

## (undated) DEVICE — CASPAR DISTR PIN12MMSTER: Brand: AESCULAP

## (undated) DEVICE — PACK PROCEDURE SURG MAJ W/ BASIN LF

## (undated) DEVICE — MEDI-VAC NON-CONDUCTIVE SUCTION TUBING: Brand: CARDINAL HEALTH

## (undated) DEVICE — TOWEL SURG W17XL27IN STD BLU COT NONFENESTRATED PREWASHED

## (undated) DEVICE — WRAP KNEE COLD THERAPY --

## (undated) DEVICE — 4-PORT MANIFOLD: Brand: NEPTUNE 2

## (undated) DEVICE — APPLIER CLP L9.38IN M LIG TI DISP STR RNG HNDL LIGACLP